# Patient Record
Sex: FEMALE | Race: WHITE | Employment: OTHER | URBAN - METROPOLITAN AREA
[De-identification: names, ages, dates, MRNs, and addresses within clinical notes are randomized per-mention and may not be internally consistent; named-entity substitution may affect disease eponyms.]

---

## 2017-01-11 ENCOUNTER — ANESTHESIA EVENT (OUTPATIENT)
Dept: SURGERY | Age: 82
DRG: 470 | End: 2017-01-11
Payer: MEDICARE

## 2017-01-12 ENCOUNTER — ANESTHESIA (OUTPATIENT)
Dept: SURGERY | Age: 82
DRG: 470 | End: 2017-01-12
Payer: MEDICARE

## 2017-01-12 ENCOUNTER — SURGERY (OUTPATIENT)
Age: 82
End: 2017-01-12

## 2017-01-12 PROBLEM — M19.90 OSTEOARTHRITIS: Status: ACTIVE | Noted: 2017-01-12

## 2017-01-12 PROCEDURE — 77030020782 HC GWN BAIR PAWS FLX 3M -B: Performed by: ANESTHESIOLOGY

## 2017-01-12 PROCEDURE — 74011000250 HC RX REV CODE- 250

## 2017-01-12 PROCEDURE — 74011250636 HC RX REV CODE- 250/636: Performed by: ORTHOPAEDIC SURGERY

## 2017-01-12 PROCEDURE — 77030003602 HC NDL NRV BLK BBMI -B: Performed by: ANESTHESIOLOGY

## 2017-01-12 PROCEDURE — 74011250636 HC RX REV CODE- 250/636: Performed by: ANESTHESIOLOGY

## 2017-01-12 PROCEDURE — 74011250636 HC RX REV CODE- 250/636

## 2017-01-12 PROCEDURE — 77030003665 HC NDL SPN BBMI -A: Performed by: ANESTHESIOLOGY

## 2017-01-12 PROCEDURE — 77030007880 HC KT SPN EPDRL BBMI -B: Performed by: ANESTHESIOLOGY

## 2017-01-12 RX ORDER — ROCURONIUM BROMIDE 10 MG/ML
INJECTION, SOLUTION INTRAVENOUS AS NEEDED
Status: DISCONTINUED | OUTPATIENT
Start: 2017-01-12 | End: 2017-01-12 | Stop reason: HOSPADM

## 2017-01-12 RX ORDER — ONDANSETRON 2 MG/ML
INJECTION INTRAMUSCULAR; INTRAVENOUS AS NEEDED
Status: DISCONTINUED | OUTPATIENT
Start: 2017-01-12 | End: 2017-01-12 | Stop reason: HOSPADM

## 2017-01-12 RX ORDER — LIDOCAINE HYDROCHLORIDE 20 MG/ML
INJECTION, SOLUTION EPIDURAL; INFILTRATION; INTRACAUDAL; PERINEURAL AS NEEDED
Status: DISCONTINUED | OUTPATIENT
Start: 2017-01-12 | End: 2017-01-12 | Stop reason: HOSPADM

## 2017-01-12 RX ORDER — FENTANYL CITRATE 50 UG/ML
INJECTION, SOLUTION INTRAMUSCULAR; INTRAVENOUS AS NEEDED
Status: DISCONTINUED | OUTPATIENT
Start: 2017-01-12 | End: 2017-01-12 | Stop reason: HOSPADM

## 2017-01-12 RX ORDER — ROPIVACAINE HYDROCHLORIDE 5 MG/ML
INJECTION, SOLUTION EPIDURAL; INFILTRATION; PERINEURAL AS NEEDED
Status: DISCONTINUED | OUTPATIENT
Start: 2017-01-12 | End: 2017-01-12 | Stop reason: HOSPADM

## 2017-01-12 RX ORDER — DEXAMETHASONE SODIUM PHOSPHATE 4 MG/ML
INJECTION, SOLUTION INTRA-ARTICULAR; INTRALESIONAL; INTRAMUSCULAR; INTRAVENOUS; SOFT TISSUE AS NEEDED
Status: DISCONTINUED | OUTPATIENT
Start: 2017-01-12 | End: 2017-01-12 | Stop reason: HOSPADM

## 2017-01-12 RX ORDER — PROPOFOL 10 MG/ML
INJECTION, EMULSION INTRAVENOUS AS NEEDED
Status: DISCONTINUED | OUTPATIENT
Start: 2017-01-12 | End: 2017-01-12 | Stop reason: HOSPADM

## 2017-01-12 RX ADMIN — SODIUM CHLORIDE, SODIUM LACTATE, POTASSIUM CHLORIDE, AND CALCIUM CHLORIDE: 600; 310; 30; 20 INJECTION, SOLUTION INTRAVENOUS at 10:46

## 2017-01-12 RX ADMIN — SODIUM CHLORIDE 50 ML: 9 INJECTION, SOLUTION INTRAMUSCULAR; INTRAVENOUS; SUBCUTANEOUS at 09:51

## 2017-01-12 RX ADMIN — CEFAZOLIN 2 G: 1 INJECTION, POWDER, FOR SOLUTION INTRAMUSCULAR; INTRAVENOUS; PARENTERAL at 09:19

## 2017-01-12 RX ADMIN — SODIUM CHLORIDE, SODIUM LACTATE, POTASSIUM CHLORIDE, AND CALCIUM CHLORIDE: 600; 310; 30; 20 INJECTION, SOLUTION INTRAVENOUS at 09:58

## 2017-01-12 RX ADMIN — ROPIVACAINE HYDROCHLORIDE 60 ML: 2 INJECTION, SOLUTION EPIDURAL; INFILTRATION at 09:51

## 2017-01-12 RX ADMIN — LIDOCAINE HYDROCHLORIDE 70 MG: 20 INJECTION, SOLUTION EPIDURAL; INFILTRATION; INTRACAUDAL; PERINEURAL at 09:26

## 2017-01-12 RX ADMIN — ONDANSETRON 4 MG: 2 INJECTION INTRAMUSCULAR; INTRAVENOUS at 09:45

## 2017-01-12 RX ADMIN — MORPHINE SULFATE 10 MG: 10 INJECTION INTRAMUSCULAR; INTRAVENOUS; SUBCUTANEOUS at 09:50

## 2017-01-12 RX ADMIN — KETOROLAC TROMETHAMINE 30 MG: 30 INJECTION, SOLUTION INTRAMUSCULAR; INTRAVENOUS at 09:50

## 2017-01-12 RX ADMIN — FENTANYL CITRATE 50 MCG: 50 INJECTION, SOLUTION INTRAMUSCULAR; INTRAVENOUS at 10:16

## 2017-01-12 RX ADMIN — PROPOFOL 120 MG: 10 INJECTION, EMULSION INTRAVENOUS at 09:26

## 2017-01-12 RX ADMIN — ROPIVACAINE HYDROCHLORIDE 25 ML: 5 INJECTION, SOLUTION EPIDURAL; INFILTRATION; PERINEURAL at 09:00

## 2017-01-12 RX ADMIN — SODIUM CHLORIDE 1 G: 900 INJECTION, SOLUTION INTRAVENOUS at 09:51

## 2017-01-12 RX ADMIN — SODIUM CHLORIDE, SODIUM LACTATE, POTASSIUM CHLORIDE, AND CALCIUM CHLORIDE: 600; 310; 30; 20 INJECTION, SOLUTION INTRAVENOUS at 09:20

## 2017-01-12 RX ADMIN — FENTANYL CITRATE 50 MCG: 50 INJECTION, SOLUTION INTRAMUSCULAR; INTRAVENOUS at 10:00

## 2017-01-12 RX ADMIN — DEXAMETHASONE SODIUM PHOSPHATE 8 MG: 4 INJECTION, SOLUTION INTRA-ARTICULAR; INTRALESIONAL; INTRAMUSCULAR; INTRAVENOUS; SOFT TISSUE at 09:45

## 2017-01-12 RX ADMIN — ROCURONIUM BROMIDE 30 MG: 10 INJECTION, SOLUTION INTRAVENOUS at 09:26

## 2017-01-12 RX ADMIN — FENTANYL CITRATE 100 MCG: 50 INJECTION, SOLUTION INTRAMUSCULAR; INTRAVENOUS at 09:25

## 2017-01-12 NOTE — ANESTHESIA POSTPROCEDURE EVALUATION
Post-Anesthesia Evaluation and Assessment    Patient: Yousuf Chaves MRN: [id number]  SSN: [id number]    YOB: 1929  Age: 80 y.o. Sex: female       Cardiovascular Function/Vital Signs  Visit Vitals    /70    Pulse 92    Temp 36.5 °C (97.7 °F)    Resp 16    Ht 5' 3\" (1.6 m)    Wt 65.8 kg (145 lb)    SpO2 92%    BMI 25.69 kg/m2       Patient is status post general anesthesia for Procedure(s):  LEFT TOTAL KNEE ARTHROPLASTY . Nausea/Vomiting: None    Postoperative hydration reviewed and adequate. Pain:  Pain Scale 1: Visual (01/12/17 1144)  Pain Intensity 1: 0 (01/12/17 1144)   Managed    Neurological Status:   Neuro (WDL): Exceptions to WDL (01/12/17 1144)  Neuro  Neurologic State: Drowsy (01/12/17 1144)  Orientation Level: Oriented to person;Oriented to place;Oriented to situation (01/12/17 1059)  Cognition: Follows commands (01/12/17 1144)  LUE Motor Response: Purposeful (01/12/17 1144)  LLE Motor Response: Purposeful (01/12/17 1144)  RUE Motor Response: Purposeful (01/12/17 1144)  RLE Motor Response: Purposeful (01/12/17 1144)   At baseline    Mental Status and Level of Consciousness: Arousable    Pulmonary Status:   O2 Device: Nasal cannula (01/12/17 1144)   Adequate oxygenation and airway patent    Complications related to anesthesia: None    Post-anesthesia assessment completed.  No concerns    Signed By: Nga Presley MD     January 12, 2017

## 2017-01-12 NOTE — ANESTHESIA PROCEDURE NOTES
Peripheral Block    Start time: 1/12/2017 8:56 AM  End time: 1/12/2017 9:00 AM  Performed by: Maynor Garcia  Authorized by: Maynor Garcia       Pre-procedure: Indications: post-op pain management    Preanesthetic Checklist: patient identified, risks and benefits discussed, site marked, timeout performed, anesthesia consent given and patient being monitored    Timeout Time: 08:56          Block Type:   Block Type:   Adductor canal  Monitoring:  Continuous pulse ox, frequent vital sign checks, heart rate, oxygen and responsive to questions  Injection Technique:  Single shot  Procedures: ultrasound guided and nerve stimulator    Patient Position: supine  Prep: DuraPrep    Location:  Mid thigh  Needle Type:  Stimuplex  Needle Gauge:  20 G  Needle Localization:  Nerve stimulator and ultrasound guidance  Motor Response: minimal motor response >0.4 mA    Medication Injected:  0.5%  ropivacaine  Adds:  Epi 1:400K  Volume (mL):  25    Assessment:  Number of attempts:  1  Injection Assessment:  Incremental injection every 5 mL, local visualized surrounding nerve on ultrasound, negative aspiration for blood, no paresthesia, no intravascular symptoms and ultrasound image on chart  Patient tolerance:  Patient tolerated the procedure well with no immediate complications

## 2017-01-12 NOTE — ANESTHESIA PREPROCEDURE EVALUATION
Anesthetic History               Review of Systems / Medical History  Patient summary reviewed    Pulmonary                   Neuro/Psych         Psychiatric history     Cardiovascular    Hypertension: well controlled        Dysrhythmias : atrial fibrillation  CAD and cardiac stents (5/2015)  Pertinent negatives: No past MI  Exercise tolerance: >4 METS  Comments: Cath 12/2015, nonobstructive disease, EF 60%   GI/Hepatic/Renal     GERD           Endo/Other      Hypothyroidism       Other Findings              Physical Exam    Airway  Mallampati: II  TM Distance: > 6 cm  Neck ROM: normal range of motion   Mouth opening: Normal     Cardiovascular  Regular rate and rhythm,  S1 and S2 normal,  no murmur, click, rub, or gallop             Dental  No notable dental hx       Pulmonary  Breath sounds clear to auscultation               Abdominal         Other Findings            Anesthetic Plan    ASA: 3        Post-op pain plan if not by surgeon: peripheral nerve block single      Anesthetic plan and risks discussed with: Patient      Patient continued Plavix through 1/8. Will proceed with GA.

## 2017-01-13 PROBLEM — Z96.659 S/P TOTAL KNEE ARTHROPLASTY: Status: ACTIVE | Noted: 2017-01-13

## 2017-01-15 ENCOUNTER — APPOINTMENT (OUTPATIENT)
Dept: CT IMAGING | Age: 82
DRG: 470 | End: 2017-01-15
Attending: INTERNAL MEDICINE
Payer: MEDICARE

## 2017-01-15 PROCEDURE — 71260 CT THORAX DX C+: CPT

## 2017-01-17 ENCOUNTER — PATIENT OUTREACH (OUTPATIENT)
Dept: CASE MANAGEMENT | Age: 82
End: 2017-01-17

## 2017-01-17 NOTE — PROGRESS NOTES
Patient discharge to [address] [place] [address] (SNF) 01/16/2017, follow up call in 21 days post acute discharge to home. Patient is ineligible for Transitions of Care Outreach Program /Call due to discharge disposition.

## 2017-01-27 ENCOUNTER — PATIENT OUTREACH (OUTPATIENT)
Dept: CASE MANAGEMENT | Age: 82
End: 2017-01-27

## 2017-04-04 PROBLEM — N36.2 URETHRAL CARUNCLE: Status: ACTIVE | Noted: 2017-04-04

## 2017-04-06 PROBLEM — N39.0 URINARY TRACT INFECTION WITHOUT HEMATURIA: Status: ACTIVE | Noted: 2017-04-06

## 2017-05-11 ENCOUNTER — HOSPITAL ENCOUNTER (OUTPATIENT)
Dept: MAMMOGRAPHY | Age: 82
Discharge: HOME | End: 2017-05-11
Attending: FAMILY MEDICINE
Payer: MEDICARE

## 2017-05-11 DIAGNOSIS — N64.4 PAIN OF BOTH BREASTS: ICD-10-CM

## 2017-05-11 PROCEDURE — 76642 ULTRASOUND BREAST LIMITED: CPT

## 2017-05-11 PROCEDURE — 77066 DX MAMMO INCL CAD BI: CPT

## 2017-05-11 NOTE — PROGRESS NOTES
Mammogram/ultrasound show no evidence of malignancy/cancer  Let me know if symptoms persist/worsen  Dr. Estefania Steinberg  Results released to [address] [place] [address] [address] with comments

## 2017-07-23 ENCOUNTER — APPOINTMENT (OUTPATIENT)
Dept: GENERAL RADIOLOGY | Age: 82
End: 2017-07-23
Attending: EMERGENCY MEDICINE
Payer: MEDICARE

## 2017-07-23 ENCOUNTER — HOSPITAL ENCOUNTER (EMERGENCY)
Age: 82
Discharge: HOME | End: 2017-07-23
Attending: EMERGENCY MEDICINE
Payer: MEDICARE

## 2017-07-23 ENCOUNTER — APPOINTMENT (OUTPATIENT)
Dept: CT IMAGING | Age: 82
End: 2017-07-23
Attending: EMERGENCY MEDICINE
Payer: MEDICARE

## 2017-07-23 VITALS
RESPIRATION RATE: 18 BRPM | BODY MASS INDEX: 26.58 KG/M2 | TEMPERATURE: 97.7 F | HEIGHT: 63 IN | OXYGEN SATURATION: 100 % | WEIGHT: 150 LBS | SYSTOLIC BLOOD PRESSURE: 157 MMHG | HEART RATE: 60 BPM | DIASTOLIC BLOOD PRESSURE: 90 MMHG

## 2017-07-23 DIAGNOSIS — R07.9 CHEST PAIN, UNSPECIFIED TYPE: Primary | ICD-10-CM

## 2017-07-23 DIAGNOSIS — K21.9 GASTROESOPHAGEAL REFLUX DISEASE WITHOUT ESOPHAGITIS: ICD-10-CM

## 2017-07-23 LAB
ALBUMIN SERPL BCP-MCNC: 3.2 G/DL (ref 3.2–4.6)
ALBUMIN/GLOB SERPL: 0.8 {RATIO} (ref 1.2–3.5)
ALP SERPL-CCNC: 91 U/L (ref 50–136)
ALT SERPL-CCNC: 20 U/L (ref 12–65)
ANION GAP BLD CALC-SCNC: 10 MMOL/L (ref 7–16)
AST SERPL W P-5'-P-CCNC: 26 U/L (ref 15–37)
BASOPHILS # BLD AUTO: 0 K/UL (ref 0–0.2)
BASOPHILS # BLD: 1 % (ref 0–2)
BILIRUB SERPL-MCNC: 1 MG/DL (ref 0.2–1.1)
BUN SERPL-MCNC: 19 MG/DL (ref 8–23)
CALCIUM SERPL-MCNC: 8.4 MG/DL (ref 8.3–10.4)
CHLORIDE SERPL-SCNC: 107 MMOL/L (ref 98–107)
CO2 SERPL-SCNC: 23 MMOL/L (ref 21–32)
CREAT SERPL-MCNC: 0.87 MG/DL (ref 0.6–1)
DIFFERENTIAL METHOD BLD: ABNORMAL
EOSINOPHIL # BLD: 0.1 K/UL (ref 0–0.8)
EOSINOPHIL NFR BLD: 1 % (ref 0.5–7.8)
ERYTHROCYTE [DISTWIDTH] IN BLOOD BY AUTOMATED COUNT: 16.3 % (ref 11.9–14.6)
GLOBULIN SER CALC-MCNC: 4 G/DL (ref 2.3–3.5)
GLUCOSE SERPL-MCNC: 88 MG/DL (ref 65–100)
HCT VFR BLD AUTO: 31.6 % (ref 35.8–46.3)
HGB BLD-MCNC: 10.4 G/DL (ref 11.7–15.4)
IMM GRANULOCYTES # BLD: 0 K/UL (ref 0–0.5)
IMM GRANULOCYTES NFR BLD AUTO: 0.4 % (ref 0–5)
LYMPHOCYTES # BLD AUTO: 44 % (ref 13–44)
LYMPHOCYTES # BLD: 2.3 K/UL (ref 0.5–4.6)
MCH RBC QN AUTO: 25.8 PG (ref 26.1–32.9)
MCHC RBC AUTO-ENTMCNC: 32.9 G/DL (ref 31.4–35)
MCV RBC AUTO: 78.4 FL (ref 79.6–97.8)
MONOCYTES # BLD: 0.8 K/UL (ref 0.1–1.3)
MONOCYTES NFR BLD AUTO: 14 % (ref 4–12)
NEUTS SEG # BLD: 2.1 K/UL (ref 1.7–8.2)
NEUTS SEG NFR BLD AUTO: 40 % (ref 43–78)
PLATELET # BLD AUTO: 215 K/UL (ref 150–450)
PMV BLD AUTO: 10.6 FL (ref 10.8–14.1)
POTASSIUM SERPL-SCNC: 3.9 MMOL/L (ref 3.5–5.1)
PROT SERPL-MCNC: 7.2 G/DL (ref 6.3–8.2)
RBC # BLD AUTO: 4.03 M/UL (ref 4.05–5.25)
SODIUM SERPL-SCNC: 140 MMOL/L (ref 136–145)
TROPONIN I BLD-MCNC: 0 NG/ML (ref 0–0.08)
TROPONIN I SERPL-MCNC: <0.02 NG/ML (ref 0.02–0.05)
WBC # BLD AUTO: 5.4 K/UL (ref 4.3–11.1)

## 2017-07-23 PROCEDURE — 71260 CT THORAX DX C+: CPT

## 2017-07-23 PROCEDURE — 74011250637 HC RX REV CODE- 250/637: Performed by: EMERGENCY MEDICINE

## 2017-07-23 PROCEDURE — 84484 ASSAY OF TROPONIN QUANT: CPT | Performed by: EMERGENCY MEDICINE

## 2017-07-23 PROCEDURE — 99285 EMERGENCY DEPT VISIT HI MDM: CPT | Performed by: EMERGENCY MEDICINE

## 2017-07-23 PROCEDURE — 96361 HYDRATE IV INFUSION ADD-ON: CPT | Performed by: EMERGENCY MEDICINE

## 2017-07-23 PROCEDURE — 74011250636 HC RX REV CODE- 250/636: Performed by: EMERGENCY MEDICINE

## 2017-07-23 PROCEDURE — 74011000258 HC RX REV CODE- 258: Performed by: EMERGENCY MEDICINE

## 2017-07-23 PROCEDURE — 93005 ELECTROCARDIOGRAM TRACING: CPT | Performed by: EMERGENCY MEDICINE

## 2017-07-23 PROCEDURE — 74011636320 HC RX REV CODE- 636/320: Performed by: EMERGENCY MEDICINE

## 2017-07-23 PROCEDURE — 85025 COMPLETE CBC W/AUTO DIFF WBC: CPT | Performed by: EMERGENCY MEDICINE

## 2017-07-23 PROCEDURE — 96374 THER/PROPH/DIAG INJ IV PUSH: CPT | Performed by: EMERGENCY MEDICINE

## 2017-07-23 PROCEDURE — 80053 COMPREHEN METABOLIC PANEL: CPT | Performed by: EMERGENCY MEDICINE

## 2017-07-23 PROCEDURE — 71020 XR CHEST PA LAT: CPT

## 2017-07-23 RX ORDER — SODIUM CHLORIDE 0.9 % (FLUSH) 0.9 %
10 SYRINGE (ML) INJECTION
Status: COMPLETED | OUTPATIENT
Start: 2017-07-23 | End: 2017-07-23

## 2017-07-23 RX ORDER — MORPHINE SULFATE 4 MG/ML
4 INJECTION, SOLUTION INTRAMUSCULAR; INTRAVENOUS
Status: COMPLETED | OUTPATIENT
Start: 2017-07-23 | End: 2017-07-23

## 2017-07-23 RX ORDER — NITROGLYCERIN 0.4 MG/1
0.4 TABLET SUBLINGUAL
Status: COMPLETED | OUTPATIENT
Start: 2017-07-23 | End: 2017-07-23

## 2017-07-23 RX ADMIN — NITROGLYCERIN 0.4 MG: 0.4 TABLET SUBLINGUAL at 16:04

## 2017-07-23 RX ADMIN — Medication 10 ML: at 18:27

## 2017-07-23 RX ADMIN — SODIUM CHLORIDE 100 ML: 900 INJECTION, SOLUTION INTRAVENOUS at 18:27

## 2017-07-23 RX ADMIN — IOPAMIDOL 100 ML: 755 INJECTION, SOLUTION INTRAVENOUS at 18:27

## 2017-07-23 RX ADMIN — NITROGLYCERIN 0.4 MG: 0.4 TABLET SUBLINGUAL at 16:11

## 2017-07-23 RX ADMIN — NITROGLYCERIN 0.4 MG: 0.4 TABLET SUBLINGUAL at 16:22

## 2017-07-23 RX ADMIN — SODIUM CHLORIDE 500 ML: 900 INJECTION, SOLUTION INTRAVENOUS at 17:29

## 2017-07-23 RX ADMIN — MORPHINE SULFATE 4 MG: 4 INJECTION, SOLUTION INTRAMUSCULAR; INTRAVENOUS at 17:29

## 2017-07-23 NOTE — ED TRIAGE NOTES
Pt arrive c/o chest pain that radiates into upper back and left shoulder that started Friday night. Denies n/v/d. States sob.  Dr. Issac Marquez is pts cardiologist.

## 2017-07-23 NOTE — ED PROVIDER NOTES
HPI Comments: Patient states  That she woke up Saturday morning with lower chest and back pain relating to her left shoulder. She describes it as constant achy pressure with episodes of sharp pain worse when she moves a certain way. She has had some shortness of breath and states she has chronic shortness of breath. She has had stents before, the last time being in May 2015. She states she had similar symptoms at that time. She sees Dr. Leyla Lee of [facility] cardiology. She has not taken any medicine for her symptoms. She does take aspirin and Plavix. Elements of this note were created using speech recognition software. As such, errors of speech recognition may be present. Patient is a 80 y.o. female presenting with chest pain. The history is provided by the patient. Chest Pain (Angina)    Pertinent negatives include no fever, no nausea and no vomiting. 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 Normal LV systolic function.     Hypercholesteremia 1/14/2013    Hypertension     Hypothyroidism 1/14/2013    Osteoarthritis of back 1/2/2014    Osteoporosis     Pulmonary embolism ([address] 2009    after knee surgery    Sick sinus syndrome ([address] 1/7/2016    Tachycardia-Bradycardia    UTI (urinary tract infection)        Past Surgical History:   Procedure Laterality Date    HX APPENDECTOMY  1965    HX BREAST BIOPSY Bilateral     HX CATARACT REMOVAL Right 2010    HX CORONARY STENT PLACEMENT  05/2014    LAD X 3    HX HEART CATHETERIZATION  09/17/2015    HX HEENT      multiple eye surgeries - left - macular hoe, retinal detachment twice,     HX HYSTERECTOMY  1970    HX KNEE REPLACEMENT  2009    HX KNEE REPLACEMENT Left 01/12/2017    HX ORTHOPAEDIC  2006 and 2008    herniated disc    HX TONSIL AND ADENOIDECTOMY  1934         Family History:   Problem Relation Age of Onset    Cancer Mother     Breast Cancer Mother     Cancer Father     Cancer Sister     Cancer Brother      pancreas    Breast Cancer Maternal Aunt        Social History     Social History    Marital status:      Spouse name: N/A    Number of children: N/A    Years of education: N/A     Occupational History    Not on file. Social History Main Topics    Smoking status: Never Smoker    Smokeless tobacco: Never Used    Alcohol use No    Drug use: No    Sexual activity: Not on file     Other Topics Concern    Not on file     Social History Narrative    No family/social/cultural issues pertinent to care         ALLERGIES: Adhesive    Review of Systems   Constitutional: Negative for chills and fever. Cardiovascular: Positive for chest pain. Gastrointestinal: Negative for nausea and vomiting. All other systems reviewed and are negative. Vitals:    07/23/17 1333   BP: 168/60   Pulse: 60   Resp: 18   Temp: 97.7 °F (36.5 °C)   SpO2: 99%   Weight: 68 kg (150 lb)   Height: 5' 3\" (1.6 m)            Physical Exam   Constitutional: She is oriented to person, place, and time. She appears well-developed and well-nourished. HENT:   Head: Normocephalic and atraumatic. Eyes: Conjunctivae are normal. Pupils are equal, round, and reactive to light. Neck: Normal range of motion. Neck supple. Cardiovascular: Normal rate and regular rhythm. Pulmonary/Chest: Effort normal and breath sounds normal.   Abdominal: Soft. Bowel sounds are normal. There is tenderness.        Tenderness to palpation epigastrium as indicated   Musculoskeletal: She exhibits no edema or tenderness. Neurological: She is alert and oriented to person, place, and time. Skin: Skin is warm and dry. Psychiatric: She has a normal mood and affect. Her behavior is normal.   Nursing note and vitals reviewed. MDM  Number of Diagnoses or Management Options  Diagnosis management comments: Differential diagnosis: Unstable angina, atypical chest pain, biliary colic, gastritis  7:74 PM patient had mild temporary improvement after nitroglycerin, however she is hurting worse now. It is positional and worse when she moves a certain way. I am more concerned about a possible PE or aortic pathology given her back involvement and her age, we will get a CT scan  7:14 PM CT scan shows no PE or aortic pathology. I spoke with Dr. Cristy Holden, discussed details of case. We reviewed her catheter report from 2015, no intervention was done at that time and she had mild scattered disease.   The plan is to repeat her troponin, home if normal with close follow-up with cardiology       Amount and/or Complexity of Data Reviewed  Clinical lab tests: ordered and reviewed  Tests in the radiology section of CPT®: ordered and reviewed  Tests in the medicine section of CPT®: ordered and reviewed  Decide to obtain previous medical records or to obtain history from someone other than the patient: yes  Review and summarize past medical records: yes  Discuss the patient with other providers: yes  Independent visualization of images, tracings, or specimens: yes    Risk of Complications, Morbidity, and/or Mortality  Presenting problems: high  Diagnostic procedures: moderate  Management options: high      ED Course       Procedures

## 2017-07-23 NOTE — DISCHARGE INSTRUCTIONS
Chest Pain: Care Instructions  Your Care Instructions  There are many things that can cause chest pain. Some are not serious and will get better on their own in a few days. But some kinds of chest pain need more testing and treatment. Your doctor may have recommended a follow-up visit in the next 8 to 12 hours. If you are not getting better, you may need more tests or treatment. Even though your doctor has released you, you still need to watch for any problems. The doctor carefully checked you, but sometimes problems can develop later. If you have new symptoms or if your symptoms do not get better, get medical care right away. If you have worse or different chest pain or pressure that lasts more than 5 minutes or you passed out (lost consciousness), call 911 or seek other emergency help right away. A medical visit is only one step in your treatment. Even if you feel better, you still need to do what your doctor recommends, such as going to all suggested follow-up appointments and taking medicines exactly as directed. This will help you recover and help prevent future problems. How can you care for yourself at home? · Rest until you feel better. · Take your medicine exactly as prescribed. Call your doctor if you think you are having a problem with your medicine. · Do not drive after taking a prescription pain medicine. When should you call for help? Call 911 if:  · You passed out (lost consciousness). · You have severe difficulty breathing. · You have symptoms of a heart attack. These may include:  ¨ Chest pain or pressure, or a strange feeling in your chest.  ¨ Sweating. ¨ Shortness of breath. ¨ Nausea or vomiting. ¨ Pain, pressure, or a strange feeling in your back, neck, jaw, or upper belly or in one or both shoulders or arms. ¨ Lightheadedness or sudden weakness. ¨ A fast or irregular heartbeat.   After you call 911, the  may tell you to chew 1 adult-strength or 2 to 4 low-dose aspirin. Wait for an ambulance. Do not try to drive yourself. Call your doctor today if:  · You have any trouble breathing. · Your chest pain gets worse. · You are dizzy or lightheaded, or you feel like you may faint. · You are not getting better as expected. · You are having new or different chest pain. Where can you learn more? Go to http://gómez-maxine.info/. Enter A120 in the search box to learn more about \"Chest Pain: Care Instructions. \"  Current as of: March 20, 2017  Content Version: 11.3  © 5602-9865 [employer]. Care instructions adapted under license by [employer] (which disclaims liability or warranty for this information). If you have questions about a medical condition or this instruction, always ask your healthcare professional. [address] any warranty or liability for your use of this information.

## 2017-07-24 LAB
ATRIAL RATE: 55 BPM
CALCULATED P AXIS, ECG09: 64 DEGREES
CALCULATED R AXIS, ECG10: 58 DEGREES
CALCULATED T AXIS, ECG11: 49 DEGREES
DIAGNOSIS, 93000: NORMAL
P-R INTERVAL, ECG05: 202 MS
Q-T INTERVAL, ECG07: 480 MS
QRS DURATION, ECG06: 78 MS
QTC CALCULATION (BEZET), ECG08: 459 MS
VENTRICULAR RATE, ECG03: 55 BPM

## 2017-08-04 ENCOUNTER — HOSPITAL ENCOUNTER (OUTPATIENT)
Dept: LAB | Age: 82
Discharge: HOME | End: 2017-08-04
Attending: INTERNAL MEDICINE
Payer: MEDICARE

## 2017-08-04 DIAGNOSIS — I25.119 ATHEROSCLEROSIS OF NATIVE CORONARY ARTERY OF NATIVE HEART WITH ANGINA PECTORIS (HCC): ICD-10-CM

## 2017-08-04 DIAGNOSIS — R94.30 ABNORMAL CARDIOVASCULAR FUNCTION STUDY: ICD-10-CM

## 2017-08-04 LAB
ANION GAP BLD CALC-SCNC: 8 MMOL/L
BASOPHILS # BLD AUTO: 0 K/UL (ref 0–0.2)
BASOPHILS # BLD: 0 % (ref 0–2)
BUN SERPL-MCNC: 21 MG/DL (ref 8–23)
CALCIUM SERPL-MCNC: 8.8 MG/DL (ref 8.3–10.4)
CHLORIDE SERPL-SCNC: 109 MMOL/L (ref 98–107)
CO2 SERPL-SCNC: 24 MMOL/L (ref 21–32)
CREAT SERPL-MCNC: 0.8 MG/DL (ref 0.6–1)
DIFFERENTIAL METHOD BLD: ABNORMAL
EOSINOPHIL # BLD: 0.2 K/UL (ref 0–0.8)
EOSINOPHIL NFR BLD: 3 % (ref 0.5–7.8)
ERYTHROCYTE [DISTWIDTH] IN BLOOD BY AUTOMATED COUNT: 16.6 % (ref 11.9–14.6)
GLUCOSE SERPL-MCNC: 99 MG/DL (ref 65–100)
HCT VFR BLD AUTO: 33.4 % (ref 35.8–46.3)
HGB BLD-MCNC: 10.6 G/DL (ref 11.7–15.4)
LYMPHOCYTES # BLD AUTO: 43 % (ref 13–44)
LYMPHOCYTES # BLD: 2.5 K/UL (ref 0.5–4.6)
MCH RBC QN AUTO: 26 PG (ref 26.1–32.9)
MCHC RBC AUTO-ENTMCNC: 31.7 G/DL (ref 31.4–35)
MCV RBC AUTO: 82.1 FL (ref 79.6–97.8)
MONOCYTES # BLD: 0.7 K/UL (ref 0.1–1.3)
MONOCYTES NFR BLD AUTO: 11 % (ref 4–12)
NEUTS SEG # BLD: 2.5 K/UL (ref 1.7–8.2)
NEUTS SEG NFR BLD AUTO: 43 % (ref 43–78)
PLATELET # BLD AUTO: 227 K/UL (ref 150–450)
PMV BLD AUTO: 10.8 FL (ref 10.8–14.1)
POTASSIUM SERPL-SCNC: 3.9 MMOL/L (ref 3.5–5.1)
RBC # BLD AUTO: 4.07 M/UL (ref 4.05–5.25)
SODIUM SERPL-SCNC: 141 MMOL/L (ref 136–145)
WBC # BLD AUTO: 5.8 K/UL (ref 4.3–11.1)

## 2017-08-04 PROCEDURE — 85025 COMPLETE CBC W/AUTO DIFF WBC: CPT | Performed by: INTERNAL MEDICINE

## 2017-08-04 PROCEDURE — 80048 BASIC METABOLIC PNL TOTAL CA: CPT | Performed by: INTERNAL MEDICINE

## 2017-08-04 PROCEDURE — 36415 COLL VENOUS BLD VENIPUNCTURE: CPT | Performed by: INTERNAL MEDICINE

## 2017-08-09 NOTE — PROGRESS NOTES
Patient pre-assessment complete for [facility] poss with DR Kannan Rodríguez scheduled for 8/10/17 at 9:30am, arrival time 7:30am. Patient verified using . Patient instructed to bring all home medications in labeled bottles on the day of procedure. NPO status reinforced. Patient informed to take a full dose aspirin 325mg  or 81 mg x 4 on the day of procedure. Instructed they can take all other medications excluding vitamins & supplements. Patient verbalizes understanding of all instructions & denies any questions at this time.

## 2017-08-10 ENCOUNTER — HOSPITAL ENCOUNTER (OUTPATIENT)
Dept: CARDIAC CATH/INVASIVE PROCEDURES | Age: 82
Discharge: HOME | End: 2017-08-10
Attending: INTERNAL MEDICINE | Admitting: INTERNAL MEDICINE
Payer: MEDICARE

## 2017-08-10 VITALS
RESPIRATION RATE: 16 BRPM | TEMPERATURE: 97.7 F | DIASTOLIC BLOOD PRESSURE: 82 MMHG | OXYGEN SATURATION: 98 % | SYSTOLIC BLOOD PRESSURE: 165 MMHG | BODY MASS INDEX: 26.58 KG/M2 | HEIGHT: 63 IN | HEART RATE: 56 BPM | WEIGHT: 150 LBS

## 2017-08-10 LAB
ATRIAL RATE: 52 BPM
CALCULATED P AXIS, ECG09: 56 DEGREES
CALCULATED R AXIS, ECG10: -11 DEGREES
CALCULATED T AXIS, ECG11: 1 DEGREES
DIAGNOSIS, 93000: NORMAL
P-R INTERVAL, ECG05: 218 MS
Q-T INTERVAL, ECG07: 506 MS
QRS DURATION, ECG06: 86 MS
QTC CALCULATION (BEZET), ECG08: 470 MS
VENTRICULAR RATE, ECG03: 52 BPM

## 2017-08-10 PROCEDURE — C1894 INTRO/SHEATH, NON-LASER: HCPCS

## 2017-08-10 PROCEDURE — 74011250636 HC RX REV CODE- 250/636: Performed by: INTERNAL MEDICINE

## 2017-08-10 PROCEDURE — C1769 GUIDE WIRE: HCPCS

## 2017-08-10 PROCEDURE — 77030003394 HC NDL ART COOK -A

## 2017-08-10 PROCEDURE — 74011250636 HC RX REV CODE- 250/636

## 2017-08-10 PROCEDURE — 93005 ELECTROCARDIOGRAM TRACING: CPT | Performed by: INTERNAL MEDICINE

## 2017-08-10 PROCEDURE — 99152 MOD SED SAME PHYS/QHP 5/>YRS: CPT

## 2017-08-10 PROCEDURE — 74011636320 HC RX REV CODE- 636/320: Performed by: INTERNAL MEDICINE

## 2017-08-10 PROCEDURE — 74011000250 HC RX REV CODE- 250: Performed by: INTERNAL MEDICINE

## 2017-08-10 PROCEDURE — 77030004534 HC CATH ANGI DX INFN CARD -A

## 2017-08-10 PROCEDURE — 93458 L HRT ARTERY/VENTRICLE ANGIO: CPT

## 2017-08-10 PROCEDURE — 77030029997 HC DEV COM RDL R BND TELE -B

## 2017-08-10 PROCEDURE — 99153 MOD SED SAME PHYS/QHP EA: CPT

## 2017-08-10 RX ORDER — MIDAZOLAM HYDROCHLORIDE 1 MG/ML
.5-2 INJECTION, SOLUTION INTRAMUSCULAR; INTRAVENOUS
Status: DISCONTINUED | OUTPATIENT
Start: 2017-08-10 | End: 2017-08-10 | Stop reason: HOSPADM

## 2017-08-10 RX ORDER — HEPARIN SODIUM 200 [USP'U]/100ML
3 INJECTION, SOLUTION INTRAVENOUS CONTINUOUS
Status: DISCONTINUED | OUTPATIENT
Start: 2017-08-10 | End: 2017-08-10 | Stop reason: HOSPADM

## 2017-08-10 RX ORDER — SODIUM CHLORIDE 9 MG/ML
75 INJECTION, SOLUTION INTRAVENOUS CONTINUOUS
Status: DISCONTINUED | OUTPATIENT
Start: 2017-08-10 | End: 2017-08-10 | Stop reason: HOSPADM

## 2017-08-10 RX ORDER — GUAIFENESIN 100 MG/5ML
81-324 LIQUID (ML) ORAL ONCE
Status: DISCONTINUED | OUTPATIENT
Start: 2017-08-10 | End: 2017-08-10 | Stop reason: HOSPADM

## 2017-08-10 RX ORDER — FENTANYL CITRATE 50 UG/ML
25-50 INJECTION, SOLUTION INTRAMUSCULAR; INTRAVENOUS
Status: DISCONTINUED | OUTPATIENT
Start: 2017-08-10 | End: 2017-08-10 | Stop reason: HOSPADM

## 2017-08-10 RX ORDER — DIAZEPAM 5 MG/1
5 TABLET ORAL ONCE
Status: DISCONTINUED | OUTPATIENT
Start: 2017-08-10 | End: 2017-08-10 | Stop reason: HOSPADM

## 2017-08-10 RX ORDER — LIDOCAINE HYDROCHLORIDE 20 MG/ML
1-20 INJECTION, SOLUTION INFILTRATION; PERINEURAL
Status: DISCONTINUED | OUTPATIENT
Start: 2017-08-10 | End: 2017-08-10 | Stop reason: HOSPADM

## 2017-08-10 RX ADMIN — MIDAZOLAM HYDROCHLORIDE 2 MG: 1 INJECTION, SOLUTION INTRAMUSCULAR; INTRAVENOUS at 09:53

## 2017-08-10 RX ADMIN — LIDOCAINE HYDROCHLORIDE 60 MG: 20 INJECTION, SOLUTION INFILTRATION; PERINEURAL at 09:50

## 2017-08-10 RX ADMIN — MIDAZOLAM HYDROCHLORIDE 2 MG: 1 INJECTION, SOLUTION INTRAMUSCULAR; INTRAVENOUS at 09:46

## 2017-08-10 RX ADMIN — HEPARIN SODIUM 3 ML/HR: 200 INJECTION, SOLUTION INTRAVENOUS at 09:23

## 2017-08-10 RX ADMIN — IOPAMIDOL 70 ML: 755 INJECTION, SOLUTION INTRAVENOUS at 10:10

## 2017-08-10 RX ADMIN — HEPARIN SODIUM 2 ML: 10000 INJECTION, SOLUTION INTRAVENOUS; SUBCUTANEOUS at 09:56

## 2017-08-10 NOTE — IP AVS SNAPSHOT
[facility] 
 
 
 [address] 
[phone] Patient: Theresa Gooden MRN: [id number] YSY:[id number] Discharge Summary 8/10/2017 Theresa Gooden MRN[de-identified]  [id number] Admission Information Provider Pager Service Admission Date Expected D/C Date Marcos Sheldon, [address] CATH LAB 8/10/2017 Actual LOS Patient Class 0 days OUTPATIENT Follow-up Information None Current Discharge Medication List  
  
ASK your doctor about these medications Dose & Instructions Dispensing Information Comments Morning Noon Evening Bedtime  
 amLODIPine 2.5 mg tablet Commonly known as:  [place] Your last dose was: Your next dose is:    
   
   
 Dose:  2.5 mg Take 1 Tab by mouth daily. Quantity:  90 Tab Refills:  3  
     
   
   
   
  
 aspirin delayed-release 81 mg tablet Your last dose was: Your next dose is:    
   
   
 Dose:  81 mg Take 81 mg by mouth every evening. Refills:  0  
     
   
   
   
  
 atorvastatin 40 mg tablet Commonly known as:  LIPITOR Your last dose was: Your next dose is: TAKE 1 TABLET EVERY DAY Quantity:  90 Tab Refills:  3  
     
   
   
   
  
 biotin 2,500 mcg Tab Your last dose was: Your next dose is: Take  by mouth daily. Refills:  0  
     
   
   
   
  
 CALTRATE 600+D PLUS MINERALS 600 mg calcium- 400 unit Tab Generic drug:  Calcium Carbonate-Vit D3-Min Your last dose was: Your next dose is: Take  by mouth daily. Refills:  0  
     
   
   
   
  
 clopidogrel 75 mg Tab Commonly known as:  PLAVIX Your last dose was: Your next dose is:    
   
   
 Dose:  75 mg Take 1 Tab by mouth every evening. Quantity:  90 Tab Refills:  3  
     
   
   
   
  
 escitalopram oxalate 10 mg tablet Commonly known as:  Jose Rash Your last dose was: Your next dose is:    
   
   
 Dose:  10 mg Take 1 Tab by mouth daily. Quantity:  90 Tab Refills:  3  
     
   
   
   
  
 estradiol 0.01 % (0.1 mg/gram) vaginal cream  
Commonly known as:  ESTRACE Your last dose was: Your next dose is:    
   
   
 Apply 1 gm to vagina 3x/week at hs Quantity:  42.5 g Refills:  5 HEALTHY COLON PO Your last dose was: Your next dose is: Take  by mouth daily. Refills:  0 ICAPS AREDS PO Your last dose was: Your next dose is: Take  by mouth. Twice a day Refills:  0  
     
   
   
   
  
 levothyroxine 50 mcg tablet Commonly known as:  synthroid Your last dose was: Your next dose is:    
   
   
 Dose:  50 mcg Take 1 Tab by mouth Daily (before breakfast). Quantity:  90 Tab Refills:  3  
     
   
   
   
  
 metoprolol succinate 25 mg XL tablet Commonly known as:  TOPROL-XL Your last dose was: Your next dose is:    
   
   
 Dose:  12.5 mg Take 0.5 Tabs by mouth daily. Quantity:  90 Tab Refills:  3 NEURONTIN PO Your last dose was: Your next dose is:    
   
   
 Dose:  300 mg Take 300 mg by mouth as needed. Refills:  0  
     
   
   
   
  
 nitrofurantoin 50 mg capsule Commonly known as:  MACRODANTIN Your last dose was: Your next dose is:    
   
   
 Dose:  50 mg Take 1 Cap by mouth daily. Quantity:  30 Cap Refills:  12  
     
   
   
   
  
 nitroglycerin 0.3 mg SL tablet Commonly known as:  NITROSTAT Your last dose was: Your next dose is:    
   
   
 Dose:  0.4 mg  
1 Tab by SubLINGual route every five (5) minutes as needed for Chest Pain. Quantity:  1 Bottle Refills:  3  
     
   
   
   
  
 pantoprazole 40 mg granules for oral suspension Commonly known as:  PROTONIX Your last dose was: Your next dose is:    
   
   
 Dose:  40 mg  
40 mg daily. Refills:  0  
     
   
   
   
  
 potassium 99 mg tablet Your last dose was: Your next dose is:    
   
   
 Dose:  99 mg Take 99 mg by mouth every evening. Refills:  0  
     
   
   
   
  
 TYLENOL ARTHRITIS PAIN 650 mg CR tablet Generic drug:  acetaminophen Your last dose was: Your next dose is:    
   
   
 Dose:  650 mg Take 650 mg by mouth every six (6) hours as needed for Pain. Refills:  0  
     
   
   
   
  
 VITAMIN B-12 100 mcg tablet Generic drug:  cyanocobalamin Your last dose was: Your next dose is:    
   
   
 Dose:  1000 mcg Take 1,000 mcg by mouth daily. Refills:  0  
     
   
   
   
  
 VITAMIN D3 1,000 unit Cap Generic drug:  cholecalciferol Your last dose was: Your next dose is: Take  by mouth daily. Refills:  0  
     
   
   
   
  
 vitamin E 400 unit capsule Commonly known as:  [address] Your last dose was: Your next dose is: Take  by mouth daily. Refills:  0 VOLTAREN 1 % Gel Generic drug:  diclofenac Your last dose was: Your next dose is:    
   
   
 Apply  to affected area as needed. Refills:  0  
     
   
   
   
  
 zolpidem 10 mg tablet Commonly known as:  AMBIEN Your last dose was: Your next dose is:    
   
   
 Dose:  10 mg Take 10 mg by mouth nightly as needed for Sleep. Refills:  0 General Information Please provide this summary of care documentation to your next provider. Allergies Unspecified:  Adhesive Current Immunizations  Reviewed on 3/21/2017 Name Date Influenza High Dose Vaccine PF 11/9/2016, 11/5/2015 Influenza Vaccine 10/27/2014, 10/17/2013 Pneumococcal Conjugate (PCV-13) 1/15/2016 TB Skin Test (PPD) Intradermal 1/12/2017 Discharge Instructions Discharge Instructions HEART CATHETERIZATION/ANGIOGRAPHY DISCHARGE INSTRUCTIONS Follow-up appointment: August 24th @ 1:15pm with Dr. Mahesh Ramos 1. Check puncture site frequently for swelling or bleeding. If there is any bleeding, lie down and apply pressure over the area with a clean towel or washcloth. Notify your doctor for any redness, swelling, drainage, or oozing from the puncture site. Notify your doctor for any fever or chills. 2. If the extremity becomes cold, numb, or painful call [facility] at [phone]. 
3. Activity should be limited for the next 48 hours. Climb stairs as little as possible and avoid any stooping, bending, or strenuous activity for 48 hours. No heavy lifting (anything over 10 pounds) for 3 days. 4. You may resume your usual diet. Drink more fluids than usual. 
5. Have a responsible person drive you home and stay with you for at least 24 hours after your heart catheterization/angiography. 6. You may remove bandage from your Right wrist in 24 hours. You may shower in 24 hours. No tub baths, hot tubs, or swimming for 1 week. Do not place any lotions, creams, powders, or ointments over puncture site for 1 week. You may place a clean band-aid over the puncture site each day for 5 days. Change daily. I have read the above instructions and have had the opportunity to ask questions. Patient: ________________________   Date: 8/10/2017 Witness: _______________________   Date: 8/10/2017 Discharge Orders None  
  
` Patient Signature:  ____________________________________________________________ Date:  ____________________________________________________________  
  
 Ellen No Provider Signature:  ____________________________________________________________ Date:  ____________________________________________________________

## 2017-08-10 NOTE — PROCEDURES
Brief Cardiac Procedure Note    Patient: Verner Lies MRN: [id number]  SSN: [id number]    YOB: 1929  Age: 80 y.o. Sex: female      Date of Procedure: 8/10/2017     Pre-procedure Diagnosis: Chest pain CCS Class III    Post-procedure Diagnosis: Coronary Artery Disease    Procedure: Left Heart Catheterization    Brief Description of Procedure: via rra    Performed By: Josy Reddy MD     Assistants:     Anesthesia: Moderate Sedation    Estimated Blood Loss: Less than 10 mL      Specimens: None    Implants: None    Findings:   Ef nml  edp 10  Lm ok  Lad stents ok  lcx ok  rca ok    Complications: None    Recommendations: Continue medical therapy.     Signed By: Josy Reddy MD     August 10, 2017

## 2017-08-10 NOTE — PROCEDURES
[address]       Name:  Irvin Stevenson   MR#:  [id number]   :  1929   Account #:  [de-identified]   Date of Adm:  08/10/2017       DATE OF PROCEDURE: 08/10/2017. PROCEDURES PERFORMED: Cardiac catheterization. HISTORY: This is an 68-year-old lady undergoing cardiac   catheterization for evaluation of chest pain. She has a history   of coronary artery disease and prior extensive LAD stenting. A   recent nuclear stress test suggested anterior wall ischemia. A   cardiac catheterization is recommended. PROCEDURE: Left heart catheterization, left ventriculography,   coronary angiography is carried out from the right radial artery   by modified Seldinger technique with a 5-I[place]ic multipurpose   catheter. She tolerated the procedure well. FINDINGS: The central aortic pressure is 120/70 mmHg. Left   ventricular end-diastolic pressure is 8 mmHg. There is no   gradient on pullback across the aortic valve. The overall left ventricular size is normal. The wall motion is   normal. Ejection fraction 63%. Coronary angiography reveals a normal left main. It divides into   an LAD and left circumflex. The LAD has been stented in its proximal segment and then in its   distal segment. The stents are widely patent with no restenosis. The major diagonal branch has evidence of some ostial disease. It is unchanged from prior angiography. There is brisk flow   through this area. The left circumflex is tortuous with minor irregularity. The right coronary artery is a large tortuous normal-appearing   vessel. IMPRESSION:   1. Normal left ventricular function. 2. Coronary artery disease, as described. The proximal LAD and   the distal LAD segments have been stented with no restenosis. The major diagonal branch of the LAD is a moderate-sized vessel. It is jailed by the proximal LAD stent.  There is some ostial   narrowing at this diagonal branch, but it does not appear to be   severe and is not flow limiting and it is unchanged from prior   angiography. The left circumflex and right coronary arteries   show no obstruction. RECOMMENDATIONS: Medical therapy.         MD Cassi Frazier / Nat Garsia   D:  08/10/2017   10:13   T:  08/10/2017   11:02   Job #:  [id number]

## 2017-08-10 NOTE — PROGRESS NOTES
Report received from [facility] Cath Lab RN. Procedural findings communicated. Intra procedural  medication administration reviewed. Progression of care discussed.      Patient received into [id number] [facility] 7 post sheath removal.     Access site without bleeding or swelling yes    Dressing dry and intact yes    Patient instructed to limit movement to right upper extremity    Routine post procedural vital signs and site assessment initiated yes

## 2017-08-10 NOTE — DISCHARGE INSTRUCTIONS
HEART CATHETERIZATION/ANGIOGRAPHY DISCHARGE INSTRUCTIONS  Follow-up appointment: August 24th @ 1:15pm with Dr. Cristhian Santos    1. Check puncture site frequently for swelling or bleeding. If there is any bleeding, lie down and apply pressure over the area with a clean towel or washcloth. Notify your doctor for any redness, swelling, drainage, or oozing from the puncture site. Notify your doctor for any fever or chills. 2. If the extremity becomes cold, numb, or painful call [facility] Cardiology at [phone].  3. Activity should be limited for the next 48 hours. Climb stairs as little as possible and avoid any stooping, bending, or strenuous activity for 48 hours. No heavy lifting (anything over 10 pounds) for 3 days. 4. You may resume your usual diet. Drink more fluids than usual.  5. Have a responsible person drive you home and stay with you for at least 24 hours after your heart catheterization/angiography. 6. You may remove bandage from your Right wrist in 24 hours. You may shower in 24 hours. No tub baths, hot tubs, or swimming for 1 week. Do not place any lotions, creams, powders, or ointments over puncture site for 1 week. You may place a clean band-aid over the puncture site each day for 5 days. Change daily. I have read the above instructions and have had the opportunity to ask questions.       Patient: ________________________   Date: 8/10/2017    Witness: _______________________   Date: 8/10/2017

## 2017-08-10 NOTE — PROGRESS NOTES
Patient received to [address] [place] [address] room # 3  Ambulatory from [facility]. Patient scheduled for [facility] today with Dr Bowen Overall. Procedure reviewed & questions answered, voiced good understanding consent obtained & placed on chart. All medications and medical history reviewed. Will prep patient per orders. Patient & family updated on plan of care.

## 2017-08-10 NOTE — PROGRESS NOTES
TRANSFER - OUT REPORT:    Verbal report given to Laura Rajput RN on Jane Bellamy  being transferred to [facility] for routine progression of care       Report consisted of patients Situation, Background, Assessment and Recommendations(SBAR). Information from the following report(s) SBAR, Kardex, Procedure Summary and MAR was reviewed with the receiving nurse. Opportunity for questions and clarification was provided.       [facility] with Dr Lemuel Garcia  No intervention  4 versed  Right radial R band 12ml at 21

## 2018-02-15 ENCOUNTER — APPOINTMENT (OUTPATIENT)
Dept: GENERAL RADIOLOGY | Age: 83
End: 2018-02-15
Attending: EMERGENCY MEDICINE
Payer: MEDICARE

## 2018-02-15 ENCOUNTER — HOSPITAL ENCOUNTER (EMERGENCY)
Age: 83
Discharge: HOME | End: 2018-02-15
Attending: EMERGENCY MEDICINE
Payer: MEDICARE

## 2018-02-15 VITALS
OXYGEN SATURATION: 98 % | WEIGHT: 152 LBS | RESPIRATION RATE: 18 BRPM | DIASTOLIC BLOOD PRESSURE: 83 MMHG | BODY MASS INDEX: 26.93 KG/M2 | HEIGHT: 63 IN | TEMPERATURE: 98 F | SYSTOLIC BLOOD PRESSURE: 172 MMHG | HEART RATE: 79 BPM

## 2018-02-15 DIAGNOSIS — J06.9 ACUTE UPPER RESPIRATORY INFECTION: ICD-10-CM

## 2018-02-15 DIAGNOSIS — E86.0 DEHYDRATION: ICD-10-CM

## 2018-02-15 DIAGNOSIS — I48.0 PAROXYSMAL ATRIAL FIBRILLATION (HCC): Primary | ICD-10-CM

## 2018-02-15 LAB
ALBUMIN SERPL-MCNC: 3.4 G/DL (ref 3.2–4.6)
ALBUMIN/GLOB SERPL: 0.8 {RATIO} (ref 1.2–3.5)
ALP SERPL-CCNC: 97 U/L (ref 50–136)
ALT SERPL-CCNC: 22 U/L (ref 12–65)
ANION GAP SERPL CALC-SCNC: 11 MMOL/L (ref 7–16)
AST SERPL-CCNC: 18 U/L (ref 15–37)
ATRIAL RATE: 138 BPM
BASOPHILS # BLD: 0 K/UL (ref 0–0.2)
BASOPHILS NFR BLD: 0 % (ref 0–2)
BILIRUB SERPL-MCNC: 1.2 MG/DL (ref 0.2–1.1)
BNP SERPL-MCNC: 141 PG/ML
BUN SERPL-MCNC: 21 MG/DL (ref 8–23)
CALCIUM SERPL-MCNC: 9 MG/DL (ref 8.3–10.4)
CALCULATED R AXIS, ECG10: 35 DEGREES
CALCULATED T AXIS, ECG11: 125 DEGREES
CHLORIDE SERPL-SCNC: 107 MMOL/L (ref 98–107)
CO2 SERPL-SCNC: 23 MMOL/L (ref 21–32)
CREAT SERPL-MCNC: 0.85 MG/DL (ref 0.6–1)
DIAGNOSIS, 93000: NORMAL
DIFFERENTIAL METHOD BLD: ABNORMAL
EOSINOPHIL # BLD: 0.2 K/UL (ref 0–0.8)
EOSINOPHIL NFR BLD: 1 % (ref 0.5–7.8)
ERYTHROCYTE [DISTWIDTH] IN BLOOD BY AUTOMATED COUNT: 15.4 % (ref 11.9–14.6)
FLUAV AG NPH QL IA: NEGATIVE
FLUBV AG NPH QL IA: NEGATIVE
GLOBULIN SER CALC-MCNC: 4.3 G/DL (ref 2.3–3.5)
GLUCOSE SERPL-MCNC: 121 MG/DL (ref 65–100)
HCT VFR BLD AUTO: 36.2 % (ref 35.8–46.3)
HGB BLD-MCNC: 12 G/DL (ref 11.7–15.4)
IMM GRANULOCYTES # BLD: 0.1 K/UL (ref 0–0.5)
IMM GRANULOCYTES NFR BLD AUTO: 1 % (ref 0–5)
LYMPHOCYTES # BLD: 4.1 K/UL (ref 0.5–4.6)
LYMPHOCYTES NFR BLD: 35 % (ref 13–44)
MAGNESIUM SERPL-MCNC: 2.3 MG/DL (ref 1.8–2.4)
MCH RBC QN AUTO: 27.8 PG (ref 26.1–32.9)
MCHC RBC AUTO-ENTMCNC: 33.1 G/DL (ref 31.4–35)
MCV RBC AUTO: 84 FL (ref 79.6–97.8)
MONOCYTES # BLD: 1.2 K/UL (ref 0.1–1.3)
MONOCYTES NFR BLD: 10 % (ref 4–12)
NEUTS SEG # BLD: 6.3 K/UL (ref 1.7–8.2)
NEUTS SEG NFR BLD: 53 % (ref 43–78)
PLATELET # BLD AUTO: 295 K/UL (ref 150–450)
PMV BLD AUTO: 11.5 FL (ref 10.8–14.1)
POTASSIUM SERPL-SCNC: 3.2 MMOL/L (ref 3.5–5.1)
PROT SERPL-MCNC: 7.7 G/DL (ref 6.3–8.2)
Q-T INTERVAL, ECG07: 318 MS
QRS DURATION, ECG06: 74 MS
QTC CALCULATION (BEZET), ECG08: 434 MS
RBC # BLD AUTO: 4.31 M/UL (ref 4.05–5.25)
SODIUM SERPL-SCNC: 141 MMOL/L (ref 136–145)
TROPONIN I BLD-MCNC: 0.02 NG/ML (ref 0.02–0.05)
VENTRICULAR RATE, ECG03: 112 BPM
WBC # BLD AUTO: 11.8 K/UL (ref 4.3–11.1)

## 2018-02-15 PROCEDURE — 99285 EMERGENCY DEPT VISIT HI MDM: CPT | Performed by: EMERGENCY MEDICINE

## 2018-02-15 PROCEDURE — 87804 INFLUENZA ASSAY W/OPTIC: CPT | Performed by: EMERGENCY MEDICINE

## 2018-02-15 PROCEDURE — 83735 ASSAY OF MAGNESIUM: CPT | Performed by: EMERGENCY MEDICINE

## 2018-02-15 PROCEDURE — 83880 ASSAY OF NATRIURETIC PEPTIDE: CPT | Performed by: EMERGENCY MEDICINE

## 2018-02-15 PROCEDURE — 96374 THER/PROPH/DIAG INJ IV PUSH: CPT | Performed by: EMERGENCY MEDICINE

## 2018-02-15 PROCEDURE — 80053 COMPREHEN METABOLIC PANEL: CPT | Performed by: EMERGENCY MEDICINE

## 2018-02-15 PROCEDURE — 74011250636 HC RX REV CODE- 250/636: Performed by: EMERGENCY MEDICINE

## 2018-02-15 PROCEDURE — 85025 COMPLETE CBC W/AUTO DIFF WBC: CPT | Performed by: EMERGENCY MEDICINE

## 2018-02-15 PROCEDURE — 93005 ELECTROCARDIOGRAM TRACING: CPT | Performed by: EMERGENCY MEDICINE

## 2018-02-15 PROCEDURE — 81003 URINALYSIS AUTO W/O SCOPE: CPT | Performed by: EMERGENCY MEDICINE

## 2018-02-15 PROCEDURE — 84484 ASSAY OF TROPONIN QUANT: CPT

## 2018-02-15 PROCEDURE — 71046 X-RAY EXAM CHEST 2 VIEWS: CPT

## 2018-02-15 PROCEDURE — 96361 HYDRATE IV INFUSION ADD-ON: CPT | Performed by: EMERGENCY MEDICINE

## 2018-02-15 RX ORDER — ONDANSETRON 2 MG/ML
4 INJECTION INTRAMUSCULAR; INTRAVENOUS
Status: COMPLETED | OUTPATIENT
Start: 2018-02-15 | End: 2018-02-15

## 2018-02-15 RX ADMIN — SODIUM CHLORIDE 1000 ML: 9 INJECTION, SOLUTION INTRAVENOUS at 18:07

## 2018-02-15 RX ADMIN — ONDANSETRON 4 MG: 2 INJECTION INTRAMUSCULAR; INTRAVENOUS at 18:06

## 2018-02-15 NOTE — Clinical Note
Drink plenty of fluids Call and arrange follow-up with her primary care physician as well as cardiology Return to the ER for any new or worsening symptoms

## 2018-02-15 NOTE — ED TRIAGE NOTES
Per ems the pt was found in a-fib at a rate of . When the pt would stand the heart rate would elevate to 140. Pt states she has been having palpitations for the past three days. Pt reports being dizzy with standing. Pt denies any pain. Pt reports just finishing prednisone for a sinus infection.

## 2018-02-15 NOTE — ED PROVIDER NOTES
HPI Comments: Patient presents to the ER playing some intermittent palpitations and lightheadedness. Patient states the past couple days she has been feeling fatigue and cough, congestion as well as chills. Reports she has been treated with prednisone for possible sinus infection. Reports today she had an episode where she felt her heart was skipping. She reports a history of paroxysmal A. Fib. Patient is a 80 y.o. female presenting with palpitations. The history is provided by the patient. Palpitations    This is a new problem. The current episode started 3 to 5 hours ago. The problem has not changed since onset. Associated symptoms include malaise/fatigue, near-syncope, nausea, weakness, cough and sputum production. Pertinent negatives include no fever, no numbness, no orthopnea, no abdominal pain, no vomiting and no back pain. Her past medical history is significant for hypertension and atrial fibrillation. 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 Normal LV systolic function.     Hypercholesteremia 1/14/2013    Hypertension     Hypothyroidism 1/14/2013    Osteoarthritis of back 1/2/2014    Osteoporosis     Pulmonary embolism ([address] 2009    after knee surgery    Sick sinus syndrome ([address] 1/7/2016    Tachycardia-Bradycardia    UTI (urinary tract infection)        Past Surgical History:   Procedure Laterality Date    HX APPENDECTOMY  1965    HX BREAST BIOPSY Bilateral     HX CATARACT REMOVAL Right 2010    HX CORONARY STENT PLACEMENT  05/2014    LAD X 3    HX HEART CATHETERIZATION  09/17/2015    HX HEENT      multiple eye surgeries - left - macular hoe, retinal detachment twice,     HX HYSTERECTOMY  1970    HX KNEE REPLACEMENT  2009    HX KNEE REPLACEMENT Left 01/12/2017    HX ORTHOPAEDIC  2006 and 2008    herniated disc    HX TONSIL AND ADENOIDECTOMY  1934         Family History:   Problem Relation Age of Onset    Cancer Mother     Breast Cancer Mother     Cancer Father     Cancer Sister     Cancer Brother      pancreas    Breast Cancer Maternal Aunt        Social History     Social History    Marital status:      Spouse name: N/A    Number of children: N/A    Years of education: N/A     Occupational History    Not on file. Social History Main Topics    Smoking status: Never Smoker    Smokeless tobacco: Never Used    Alcohol use No    Drug use: No    Sexual activity: Not on file     Other Topics Concern    Not on file     Social History Narrative    No family/social/cultural issues pertinent to care         ALLERGIES: Adhesive    Review of Systems   Constitutional: Positive for fatigue and malaise/fatigue. Negative for fever and unexpected weight change. HENT: Negative for congestion. Eyes: Negative for photophobia, pain and visual disturbance. Respiratory: Positive for cough and sputum production. Negative for chest tightness and wheezing. Cardiovascular: Positive for palpitations and near-syncope. Negative for orthopnea. Gastrointestinal: Positive for nausea. Negative for abdominal pain and vomiting. Endocrine: Negative for polydipsia and polyphagia. Genitourinary: Negative for flank pain, frequency and urgency. Musculoskeletal: Negative for back pain. Skin: Negative for pallor and rash. Allergic/Immunologic: Negative for food allergies and immunocompromised state. Neurological: Positive for weakness.  Negative for syncope, speech difficulty and numbness. Psychiatric/Behavioral: Negative for behavioral problems and confusion. All other systems reviewed and are negative. Vitals:    02/15/18 1559 02/15/18 1600 02/15/18 1601 02/15/18 1606   BP:    174/86   Pulse: 97 94 93 75   Resp: 14 15 15 13   Temp:       SpO2: 97% 98% 99% 99%   Weight:       Height:                Physical Exam   Constitutional: She is oriented to person, place, and time. She appears well-developed and well-nourished. HENT:   Head: Normocephalic and atraumatic. Mouth/Throat: Oropharynx is clear and moist.   Eyes: Conjunctivae and EOM are normal. Pupils are equal, round, and reactive to light. No scleral icterus. Neck: Normal range of motion. Neck supple. No tracheal deviation present. No thyromegaly present. Cardiovascular: Normal rate, regular rhythm and intact distal pulses. Pulmonary/Chest: Effort normal and breath sounds normal. No respiratory distress. She has no rales. Abdominal: Soft. Bowel sounds are normal. She exhibits no distension. Musculoskeletal: Normal range of motion. She exhibits no edema or deformity. Neurological: She is alert and oriented to person, place, and time. She has normal reflexes. No cranial nerve deficit. Nursing note and vitals reviewed. MDM  Number of Diagnoses or Management Options  Dehydration:   Paroxysmal atrial fibrillation [facility]):   Diagnosis management comments: Patient actually appears to be in a normal sinus rhythm currently  We'll check basic labs, electrolytes, urinalysis, chest x-ray as well as flu swab  Treatment symptomatically otherwise    6:07 PM  Normal basic labs. Patient remains in a normal sinus rhythm here. Vital signs reviewed. Will give IV fluids here and continue to monitor    7:25 PM  Symptomatically patient feels improved. I feel symptoms are related to her paroxysmal A. Fib and dehydration. She appears to be in a normal sinus rhythm here currently.   Will obtain orthostatic vital signs.  If stable will discharge home, follow-up with cardiology       Amount and/or Complexity of Data Reviewed  Clinical lab tests: ordered and reviewed  Tests in the radiology section of CPT®: ordered and reviewed    Risk of Complications, Morbidity, and/or Mortality  Presenting problems: moderate  Diagnostic procedures: moderate  Management options: moderate    Patient Progress  Patient progress: stable        ED Course       Procedures      Results Include:    Recent Results (from the past 24 hour(s))   EKG, 12 LEAD, INITIAL    Collection Time: 02/15/18  3:53 PM   Result Value Ref Range    Ventricular Rate 112 BPM    Atrial Rate 138 BPM    QRS Duration 74 ms    Q-T Interval 318 ms    QTC Calculation (Bezet) 434 ms    Calculated R Axis 35 degrees    Calculated T Axis 125 degrees    Diagnosis       !! AGE AND GENDER SPECIFIC ECG ANALYSIS !! Atrial fibrillation with rapid ventricular response with premature   ventricular or aberrantly conducted complexes  Cannot rule out Anterior infarct , age undetermined  ST & T wave abnormality, consider inferior ischemia or digitalis effect  Abnormal ECG  When compared with ECG of 10-AUG-2017 08:44,  Atrial fibrillation has replaced Sinus rhythm  Vent. rate has increased BY  60 BPM  ST now depressed in Lateral leads  Confirmed by Cassia Giron ([id number]) on 2/15/2018 4:52:38 PM     CBC WITH AUTOMATED DIFF    Collection Time: 02/15/18  3:58 PM   Result Value Ref Range    WBC 11.8 (H) 4.3 - 11.1 K/uL    RBC 4.31 4.05 - 5.25 M/uL    HGB 12.0 11.7 - 15.4 g/dL    HCT 36.2 35.8 - 46.3 %    MCV 84.0 79.6 - 97.8 FL    MCH 27.8 26.1 - 32.9 PG    MCHC 33.1 31.4 - 35.0 g/dL    RDW 15.4 (H) 11.9 - 14.6 %    PLATELET 134 765 - 511 K/uL    MPV 11.5 10.8 - 14.1 FL    DF AUTOMATED      NEUTROPHILS 53 43 - 78 %    LYMPHOCYTES 35 13 - 44 %    MONOCYTES 10 4.0 - 12.0 %    EOSINOPHILS 1 0.5 - 7.8 %    BASOPHILS 0 0.0 - 2.0 %    IMMATURE GRANULOCYTES 1 0.0 - 5.0 %    ABS.  NEUTROPHILS 6.3 1.7 - 8.2 K/UL    ABS. LYMPHOCYTES 4.1 0.5 - 4.6 K/UL    ABS. MONOCYTES 1.2 0.1 - 1.3 K/UL    ABS. EOSINOPHILS 0.2 0.0 - 0.8 K/UL    ABS. BASOPHILS 0.0 0.0 - 0.2 K/UL    ABS. IMM. GRANS. 0.1 0.0 - 0.5 K/UL   METABOLIC PANEL, COMPREHENSIVE    Collection Time: 02/15/18  3:58 PM   Result Value Ref Range    Sodium 141 136 - 145 mmol/L    Potassium 3.2 (L) 3.5 - 5.1 mmol/L    Chloride 107 98 - 107 mmol/L    CO2 23 21 - 32 mmol/L    Anion gap 11 7 - 16 mmol/L    Glucose 121 (H) 65 - 100 mg/dL    BUN 21 8 - 23 MG/DL    Creatinine 0.85 0.6 - 1.0 MG/DL    GFR est AA >60 >60 ml/min/1.73m2    GFR est non-AA >60 >60 ml/min/1.73m2    Calcium 9.0 8.3 - 10.4 MG/DL    Bilirubin, total 1.2 (H) 0.2 - 1.1 MG/DL    ALT (SGPT) 22 12 - 65 U/L    AST (SGOT) 18 15 - 37 U/L    Alk.  phosphatase 97 50 - 136 U/L    Protein, total 7.7 6.3 - 8.2 g/dL    Albumin 3.4 3.2 - 4.6 g/dL    Globulin 4.3 (H) 2.3 - 3.5 g/dL    A-G Ratio 0.8 (L) 1.2 - 3.5     BNP    Collection Time: 02/15/18  3:58 PM   Result Value Ref Range     pg/mL   POC TROPONIN-I    Collection Time: 02/15/18  4:01 PM   Result Value Ref Range    Troponin-I (POC) 0.02 0.02 - 0.05 ng/ml   INFLUENZA A & B AG (RAPID TEST)    Collection Time: 02/15/18  4:38 PM   Result Value Ref Range    Influenza A Ag NEGATIVE  NEG      Influenza B Ag NEGATIVE  NEG

## 2018-02-16 NOTE — ED NOTES
I have reviewed discharge instructions with the patient. The patient verbalized understanding. Patient left ED via Discharge Method: ambulatory to Home with daughter. Opportunity for questions and clarification provided. Patient given 0 scripts. To continue your aftercare when you leave the hospital, you may receive an automated call from our care team to check in on how you are doing. This is a free service and part of our promise to provide the best care and service to meet your aftercare needs.  If you have questions, or wish to unsubscribe from this service please call [phone]. Thank you for Choosing our [facility] Emergency Department.

## 2018-06-11 PROBLEM — Z79.01 LONG TERM (CURRENT) USE OF ANTICOAGULANTS: Status: ACTIVE | Noted: 2018-06-11

## 2018-06-20 ENCOUNTER — HOSPITAL ENCOUNTER (OUTPATIENT)
Dept: MAMMOGRAPHY | Age: 83
Discharge: HOME | End: 2018-06-20
Attending: FAMILY MEDICINE
Payer: MEDICARE

## 2018-06-20 DIAGNOSIS — Z12.31 VISIT FOR SCREENING MAMMOGRAM: ICD-10-CM

## 2018-06-20 PROCEDURE — 77067 SCR MAMMO BI INCL CAD: CPT

## 2018-07-13 ENCOUNTER — HOSPITAL ENCOUNTER (INPATIENT)
Age: 83
LOS: 4 days | Discharge: HOME | DRG: 812 | End: 2018-07-17
Attending: EMERGENCY MEDICINE | Admitting: FAMILY MEDICINE
Payer: MEDICARE

## 2018-07-13 ENCOUNTER — HOSPITAL ENCOUNTER (OUTPATIENT)
Dept: LAB | Age: 83
Discharge: HOME | DRG: 812 | End: 2018-07-13
Attending: INTERNAL MEDICINE
Payer: MEDICARE

## 2018-07-13 DIAGNOSIS — R42 DIZZINESS: ICD-10-CM

## 2018-07-13 DIAGNOSIS — R53.1 WEAKNESS: ICD-10-CM

## 2018-07-13 DIAGNOSIS — I95.1 ORTHOSTATIC HYPOTENSION: ICD-10-CM

## 2018-07-13 DIAGNOSIS — D64.9 ANEMIA, UNSPECIFIED TYPE: Primary | ICD-10-CM

## 2018-07-13 PROBLEM — Z96.659 S/P TOTAL KNEE ARTHROPLASTY: Status: RESOLVED | Noted: 2017-01-13 | Resolved: 2018-07-13

## 2018-07-13 PROBLEM — N39.0 URINARY TRACT INFECTION WITHOUT HEMATURIA: Status: RESOLVED | Noted: 2017-04-06 | Resolved: 2018-07-13

## 2018-07-13 PROBLEM — Z79.01 LONG TERM (CURRENT) USE OF ANTICOAGULANTS: Chronic | Status: ACTIVE | Noted: 2018-06-11

## 2018-07-13 PROBLEM — R19.5 HEME POSITIVE STOOL: Status: ACTIVE | Noted: 2018-07-13

## 2018-07-13 PROBLEM — D50.0 ANEMIA DUE TO GI BLOOD LOSS: Status: ACTIVE | Noted: 2018-07-13

## 2018-07-13 LAB
ALBUMIN SERPL-MCNC: 2.9 G/DL (ref 3.2–4.6)
ALBUMIN/GLOB SERPL: 0.9 {RATIO} (ref 1.2–3.5)
ALP SERPL-CCNC: 71 U/L (ref 50–136)
ALT SERPL-CCNC: 31 U/L (ref 12–65)
ANION GAP SERPL CALC-SCNC: 10 MMOL/L (ref 7–16)
ANION GAP SERPL CALC-SCNC: 9 MMOL/L
AST SERPL-CCNC: 26 U/L (ref 15–37)
BASOPHILS # BLD: 0 K/UL (ref 0–0.2)
BASOPHILS # BLD: 0 K/UL (ref 0–0.2)
BASOPHILS NFR BLD: 0 % (ref 0–2)
BASOPHILS NFR BLD: 1 % (ref 0–2)
BILIRUB SERPL-MCNC: 0.6 MG/DL (ref 0.2–1.1)
BUN SERPL-MCNC: 13 MG/DL (ref 8–23)
BUN SERPL-MCNC: 21 MG/DL (ref 8–23)
CALCIUM SERPL-MCNC: 8.8 MG/DL (ref 8.3–10.4)
CALCIUM SERPL-MCNC: 9.2 MG/DL (ref 8.3–10.4)
CHLORIDE SERPL-SCNC: 105 MMOL/L (ref 98–107)
CHLORIDE SERPL-SCNC: 110 MMOL/L (ref 98–107)
CO2 SERPL-SCNC: 20 MMOL/L (ref 21–32)
CO2 SERPL-SCNC: 23 MMOL/L (ref 21–32)
CREAT SERPL-MCNC: 1 MG/DL (ref 0.6–1)
CREAT SERPL-MCNC: 1.16 MG/DL (ref 0.6–1)
DIFFERENTIAL METHOD BLD: ABNORMAL
DIFFERENTIAL METHOD BLD: ABNORMAL
EOSINOPHIL # BLD: 0 K/UL (ref 0–0.8)
EOSINOPHIL # BLD: 0.1 K/UL (ref 0–0.8)
EOSINOPHIL NFR BLD: 1 % (ref 0.5–7.8)
EOSINOPHIL NFR BLD: 1 % (ref 0.5–7.8)
ERYTHROCYTE [DISTWIDTH] IN BLOOD BY AUTOMATED COUNT: 16.6 % (ref 11.9–14.6)
ERYTHROCYTE [DISTWIDTH] IN BLOOD BY AUTOMATED COUNT: 17 % (ref 11.9–14.6)
GLOBULIN SER CALC-MCNC: 3.3 G/DL (ref 2.3–3.5)
GLUCOSE SERPL-MCNC: 101 MG/DL (ref 65–100)
GLUCOSE SERPL-MCNC: 147 MG/DL (ref 65–100)
HCT VFR BLD AUTO: 21.7 % (ref 35.8–46.3)
HCT VFR BLD AUTO: 23.4 % (ref 35.8–46.3)
HGB BLD-MCNC: 6.8 G/DL (ref 11.7–15.4)
HGB BLD-MCNC: 7.4 G/DL (ref 11.7–15.4)
IMM GRANULOCYTES # BLD: 0 K/UL (ref 0–0.5)
IMM GRANULOCYTES NFR BLD AUTO: 0 % (ref 0–5)
INR PPP: 2.1
LYMPHOCYTES # BLD: 1.8 K/UL (ref 0.5–4.6)
LYMPHOCYTES # BLD: 2 K/UL (ref 0.5–4.6)
LYMPHOCYTES NFR BLD: 33 % (ref 13–44)
LYMPHOCYTES NFR BLD: 39 % (ref 13–44)
MCH RBC QN AUTO: 26.2 PG (ref 26.1–32.9)
MCH RBC QN AUTO: 27.1 PG (ref 26.1–32.9)
MCHC RBC AUTO-ENTMCNC: 31.3 G/DL (ref 31.4–35)
MCHC RBC AUTO-ENTMCNC: 31.6 G/DL (ref 31.4–35)
MCV RBC AUTO: 83.5 FL (ref 79.6–97.8)
MCV RBC AUTO: 85.7 FL (ref 79.6–97.8)
MONOCYTES # BLD: 0.6 K/UL (ref 0.1–1.3)
MONOCYTES # BLD: 0.6 K/UL (ref 0.1–1.3)
MONOCYTES NFR BLD: 11 % (ref 4–12)
MONOCYTES NFR BLD: 12 % (ref 4–12)
NEUTS SEG # BLD: 2.5 K/UL (ref 1.7–8.2)
NEUTS SEG # BLD: 3.1 K/UL (ref 1.7–8.2)
NEUTS SEG NFR BLD: 48 % (ref 43–78)
NEUTS SEG NFR BLD: 54 % (ref 43–78)
PLATELET # BLD AUTO: 249 K/UL (ref 150–450)
PLATELET # BLD AUTO: 278 K/UL (ref 150–450)
PMV BLD AUTO: 10.6 FL (ref 10.8–14.1)
PMV BLD AUTO: 11 FL (ref 10.8–14.1)
POTASSIUM SERPL-SCNC: 3.6 MMOL/L (ref 3.5–5.1)
POTASSIUM SERPL-SCNC: 3.9 MMOL/L (ref 3.5–5.1)
PROT SERPL-MCNC: 6.2 G/DL (ref 6.3–8.2)
PROTHROMBIN TIME: 24.7 SEC (ref 11.5–14.5)
RBC # BLD AUTO: 2.6 M/UL (ref 4.05–5.25)
RBC # BLD AUTO: 2.73 M/UL (ref 4.05–5.25)
SODIUM SERPL-SCNC: 138 MMOL/L (ref 136–145)
SODIUM SERPL-SCNC: 139 MMOL/L (ref 136–145)
WBC # BLD AUTO: 5.1 K/UL (ref 4.3–11.1)
WBC # BLD AUTO: 5.6 K/UL (ref 4.3–11.1)

## 2018-07-13 PROCEDURE — 80053 COMPREHEN METABOLIC PANEL: CPT | Performed by: INTERNAL MEDICINE

## 2018-07-13 PROCEDURE — 36415 COLL VENOUS BLD VENIPUNCTURE: CPT | Performed by: INTERNAL MEDICINE

## 2018-07-13 PROCEDURE — 85025 COMPLETE CBC W/AUTO DIFF WBC: CPT | Performed by: INTERNAL MEDICINE

## 2018-07-13 PROCEDURE — 36430 TRANSFUSION BLD/BLD COMPNT: CPT

## 2018-07-13 PROCEDURE — 86900 BLOOD TYPING SEROLOGIC ABO: CPT | Performed by: EMERGENCY MEDICINE

## 2018-07-13 PROCEDURE — 74011250637 HC RX REV CODE- 250/637: Performed by: FAMILY MEDICINE

## 2018-07-13 PROCEDURE — 77030039270 HC TU BLD FLTR CARD -A

## 2018-07-13 PROCEDURE — 65270000029 HC RM PRIVATE

## 2018-07-13 PROCEDURE — 99283 EMERGENCY DEPT VISIT LOW MDM: CPT | Performed by: EMERGENCY MEDICINE

## 2018-07-13 PROCEDURE — 85610 PROTHROMBIN TIME: CPT | Performed by: EMERGENCY MEDICINE

## 2018-07-13 PROCEDURE — P9016 RBC LEUKOCYTES REDUCED: HCPCS | Performed by: EMERGENCY MEDICINE

## 2018-07-13 PROCEDURE — 77030020256 HC SOL INJ NACL 0.9%  500ML

## 2018-07-13 PROCEDURE — 86923 COMPATIBILITY TEST ELECTRIC: CPT | Performed by: EMERGENCY MEDICINE

## 2018-07-13 RX ORDER — SODIUM CHLORIDE 9 MG/ML
1000 INJECTION, SOLUTION INTRAVENOUS CONTINUOUS
Status: DISPENSED | OUTPATIENT
Start: 2018-07-13 | End: 2018-07-14

## 2018-07-13 RX ORDER — HYDROCODONE BITARTRATE AND ACETAMINOPHEN 10; 325 MG/1; MG/1
1 TABLET ORAL
Status: DISCONTINUED | OUTPATIENT
Start: 2018-07-13 | End: 2018-07-17 | Stop reason: HOSPADM

## 2018-07-13 RX ORDER — ESCITALOPRAM OXALATE 10 MG/1
10 TABLET ORAL DAILY
Status: DISCONTINUED | OUTPATIENT
Start: 2018-07-14 | End: 2018-07-13

## 2018-07-13 RX ORDER — NALOXONE HYDROCHLORIDE 0.4 MG/ML
0.4 INJECTION, SOLUTION INTRAMUSCULAR; INTRAVENOUS; SUBCUTANEOUS AS NEEDED
Status: DISCONTINUED | OUTPATIENT
Start: 2018-07-13 | End: 2018-07-17 | Stop reason: HOSPADM

## 2018-07-13 RX ORDER — SODIUM CHLORIDE 0.9 % (FLUSH) 0.9 %
5-10 SYRINGE (ML) INJECTION AS NEEDED
Status: DISCONTINUED | OUTPATIENT
Start: 2018-07-13 | End: 2018-07-17 | Stop reason: HOSPADM

## 2018-07-13 RX ORDER — SODIUM CHLORIDE 9 MG/ML
250 INJECTION, SOLUTION INTRAVENOUS AS NEEDED
Status: DISCONTINUED | OUTPATIENT
Start: 2018-07-13 | End: 2018-07-17 | Stop reason: HOSPADM

## 2018-07-13 RX ORDER — PROCHLORPERAZINE EDISYLATE 5 MG/ML
5 INJECTION INTRAMUSCULAR; INTRAVENOUS
Status: DISCONTINUED | OUTPATIENT
Start: 2018-07-13 | End: 2018-07-17 | Stop reason: HOSPADM

## 2018-07-13 RX ORDER — BISACODYL 5 MG
5 TABLET, DELAYED RELEASE (ENTERIC COATED) ORAL DAILY PRN
Status: DISCONTINUED | OUTPATIENT
Start: 2018-07-13 | End: 2018-07-17 | Stop reason: HOSPADM

## 2018-07-13 RX ORDER — LORAZEPAM 1 MG/1
1 TABLET ORAL
Status: DISCONTINUED | OUTPATIENT
Start: 2018-07-13 | End: 2018-07-17 | Stop reason: HOSPADM

## 2018-07-13 RX ORDER — DIPHENHYDRAMINE HCL 25 MG
25 CAPSULE ORAL
Status: DISCONTINUED | OUTPATIENT
Start: 2018-07-13 | End: 2018-07-17 | Stop reason: HOSPADM

## 2018-07-13 RX ORDER — ACETAMINOPHEN 325 MG/1
650 TABLET ORAL
Status: DISCONTINUED | OUTPATIENT
Start: 2018-07-13 | End: 2018-07-17 | Stop reason: HOSPADM

## 2018-07-13 RX ORDER — ATORVASTATIN CALCIUM 40 MG/1
40 TABLET, FILM COATED ORAL
Status: DISCONTINUED | OUTPATIENT
Start: 2018-07-13 | End: 2018-07-17 | Stop reason: HOSPADM

## 2018-07-13 RX ORDER — LEVOTHYROXINE SODIUM 50 UG/1
50 TABLET ORAL
Status: DISCONTINUED | OUTPATIENT
Start: 2018-07-14 | End: 2018-07-17 | Stop reason: HOSPADM

## 2018-07-13 RX ORDER — ASPIRIN 81 MG/1
81 TABLET ORAL EVERY EVENING
Status: DISCONTINUED | OUTPATIENT
Start: 2018-07-13 | End: 2018-07-17 | Stop reason: HOSPADM

## 2018-07-13 RX ORDER — SODIUM CHLORIDE 0.9 % (FLUSH) 0.9 %
5-10 SYRINGE (ML) INJECTION EVERY 8 HOURS
Status: DISCONTINUED | OUTPATIENT
Start: 2018-07-13 | End: 2018-07-17 | Stop reason: HOSPADM

## 2018-07-13 RX ORDER — ESCITALOPRAM OXALATE 10 MG/1
10 TABLET ORAL EVERY EVENING
Status: DISCONTINUED | OUTPATIENT
Start: 2018-07-13 | End: 2018-07-17 | Stop reason: HOSPADM

## 2018-07-13 RX ORDER — PANTOPRAZOLE SODIUM 40 MG/1
40 TABLET, DELAYED RELEASE ORAL
Status: DISCONTINUED | OUTPATIENT
Start: 2018-07-14 | End: 2018-07-17 | Stop reason: HOSPADM

## 2018-07-13 RX ORDER — METOPROLOL SUCCINATE 25 MG/1
12.5 TABLET, EXTENDED RELEASE ORAL DAILY
Status: DISCONTINUED | OUTPATIENT
Start: 2018-07-14 | End: 2018-07-17 | Stop reason: HOSPADM

## 2018-07-13 RX ADMIN — ATORVASTATIN CALCIUM 40 MG: 40 TABLET, FILM COATED ORAL at 23:05

## 2018-07-13 RX ADMIN — ESCITALOPRAM OXALATE 10 MG: 10 TABLET ORAL at 23:05

## 2018-07-13 NOTE — IP AVS SNAPSHOT
[address] [place]  
[phone] Patient: Chloe Weber MRN: [id number] [id number] A check tanner indicates which time of day the medication should be taken. My Medications CHANGE how you take these medications Instructions Each Dose to Equal  
 Morning Noon Evening Bedtime  
 atorvastatin 40 mg tablet Commonly known as:  LIPITOR What changed:   
- when to take this 
- additional instructions Your last dose was: Your next dose is: TAKE 1 TABLET EVERY DAY  
     
   
   
   
  
 escitalopram oxalate 10 mg tablet Commonly known as:  Barbara Dougherty What changed:  when to take this Your last dose was: Your next dose is: Take 1 Tab by mouth daily. 10 mg CONTINUE taking these medications Instructions Each Dose to Equal  
 Morning Noon Evening Bedtime  
 biotin 2,500 mcg Tab Your last dose was: Your next dose is: Take  by mouth daily. CALTRATE 600+D PLUS MINERALS 600 mg calcium- 400 unit Tab Generic drug:  Calcium Carbonate-Vit D3-Min Your last dose was: Your next dose is: Take  by mouth daily. estradiol 0.01 % (0.1 mg/gram) vaginal cream  
Commonly known as:  ESTRACE Your last dose was: Your next dose is:    
   
   
 Apply 1 gm to vagina 3x/week at hs HEALTHY COLON PO Your last dose was: Your next dose is: Take  by mouth daily. ICAPS AREDS PO Your last dose was: Your next dose is: Take  by mouth. Twice a day  
     
   
   
   
  
 levothyroxine 50 mcg tablet Commonly known as:  synthroid Your last dose was: Your next dose is: Take 1 Tab by mouth Daily (before breakfast). 50 mcg metoprolol succinate 25 mg XL tablet Commonly known as:  TOPROL-XL Your last dose was: Your next dose is: Take 0.5 Tabs by mouth daily. 12.5 mg  
    
   
   
   
  
 nitroglycerin 0.3 mg SL tablet Commonly known as:  NITROSTAT Your last dose was: Your next dose is:    
   
   
 1 Tab by SubLINGual route every five (5) minutes as needed for Chest Pain. 0.4 mg  
    
   
   
   
  
 pantoprazole 40 mg granules for oral suspension Commonly known as:  PROTONIX Your last dose was: Your next dose is:    
   
   
 40 mg daily. 40 mg  
    
   
   
   
  
 TYLENOL ARTHRITIS PAIN 650 mg Tber Generic drug:  acetaminophen Your last dose was: Your next dose is: Take 650 mg by mouth every six (6) hours as needed for Pain. 650 mg  
    
   
   
   
  
 VITAMIN B-12 100 mcg tablet Generic drug:  cyanocobalamin Your last dose was: Your next dose is: Take 1,000 mcg by mouth daily. 1000 mcg VITAMIN D3 1,000 unit Cap Generic drug:  cholecalciferol Your last dose was: Your next dose is: Take  by mouth daily. vitamin E 400 unit capsule Commonly known as:  [address] Your last dose was: Your next dose is: Take  by mouth daily. zolpidem 10 mg tablet Commonly known as:  AMBIEN Your last dose was: Your next dose is: Take 1 Tab by mouth nightly as needed for Sleep. Max Daily Amount: 10 mg.  
 10 mg  
    
   
   
   
  
  
STOP taking these medications   
 aspirin delayed-release 81 mg tablet  
   
  
 clopidogrel 75 mg Tab Commonly known as:  PLAVIX  
   
  
 potassium 99 mg tablet VOLTAREN 1 % Gel Generic drug:  diclofenac  
   
  
 warfarin 5 mg tablet Commonly known as:  COUMADIN

## 2018-07-13 NOTE — IP AVS SNAPSHOT
Wil Mathur 
 
 
 [address] [id number] [facility] 
[phone] Patient: Katherine De La Torre MRN: [id number] VKN:[id number] About your hospitalization You were admitted on:  July 13, 2018 You last received care in the:  [address] [place] [address] You were discharged on:  July 17, 2018 Why you were hospitalized Your primary diagnosis was: Anemia Due To Gi Blood Loss Your diagnoses also included:  Anemia, Heme Positive Stool, Weakness, Long Term (Current) Use Of Anticoagulants, Paroxysmal Atrial Fibrillation (Hcc), Hypothyroidism, Depression, Hypercholesteremia, Gerd (Gastroesophageal Reflux Disease) Follow-up Information Follow up With Details Comments Contact Info [facility]  APPT. SEPT. 20th @ 1:[address] [address] 
[phone] Re Tai MD In 4 days APPT. JULY 23rd @ 9:00 [address] [place] [address] [facility] [address] [phone] Your Scheduled Appointments Monday July 23, 2018  9:00 AM EDT Office Visit with Re Tai MD  
[address] [address] [phone] Friday July 27, 2018  8:15 AM EDT Office Visit with Wil Mathur MD  
[facility] ([address] [place] [address]) [address]  
[phone] Friday August 10, 2018  9:45 AM EDT ANTICOAG with [address] [place] [address] OFFICE ([address] [place] [address]) [address]  
[phone] Thursday September 06, 2018 10:30 AM EDT Office Visit with Wil Mathur MD  
[facility] ([address] [place] [address]) [address]  
[phone] Discharge Orders Procedure Order Date Status Priority Quantity Spec Type Associated Dx DISCHARGE INSTRUCTIONS 07/17/18 1004 Normal Routine 1 Comments: If worsened, call your doctor or return to hospital.  
When follow up with your doctor, make sure that your doctor is aware of this admission and review hospital record and follow up on lab results for continuity of care. Also, review your medications with your doctor for possible need of adjustment or refills. A check tanner indicates which time of day the medication should be taken. My Medications CHANGE how you take these medications Instructions Each Dose to Equal  
 Morning Noon Evening Bedtime  
 atorvastatin 40 mg tablet Commonly known as:  LIPITOR What changed:   
- when to take this 
- additional instructions Your last dose was: Your next dose is: TAKE 1 TABLET EVERY DAY  
     
   
   
   
  
 escitalopram oxalate 10 mg tablet Commonly known as:  Poncho Antwan What changed:  when to take this Your last dose was: Your next dose is: Take 1 Tab by mouth daily. 10 mg CONTINUE taking these medications Instructions Each Dose to Equal  
 Morning Noon Evening Bedtime  
 biotin 2,500 mcg Tab Your last dose was: Your next dose is: Take  by mouth daily. CALTRATE 600+D PLUS MINERALS 600 mg calcium- 400 unit Tab Generic drug:  Calcium Carbonate-Vit D3-Min Your last dose was: Your next dose is: Take  by mouth daily. estradiol 0.01 % (0.1 mg/gram) vaginal cream  
Commonly known as:  ESTRACE Your last dose was: Your next dose is:    
   
   
 Apply 1 gm to vagina 3x/week at hs HEALTHY COLON PO Your last dose was: Your next dose is: Take  by mouth daily. ICAPS AREDS PO Your last dose was: Your next dose is: Take  by mouth. Twice a day  
     
   
   
   
  
 levothyroxine 50 mcg tablet Commonly known as:  synthroid Your last dose was: Your next dose is: Take 1 Tab by mouth Daily (before breakfast). 50 mcg  
    
   
   
   
  
 metoprolol succinate 25 mg XL tablet Commonly known as:  TOPROL-XL Your last dose was: Your next dose is: Take 0.5 Tabs by mouth daily. 12.5 mg  
    
   
   
   
  
 nitroglycerin 0.3 mg SL tablet Commonly known as:  NITROSTAT Your last dose was: Your next dose is:    
   
   
 1 Tab by SubLINGual route every five (5) minutes as needed for Chest Pain. 0.4 mg  
    
   
   
   
  
 pantoprazole 40 mg granules for oral suspension Commonly known as:  PROTONIX Your last dose was: Your next dose is:    
   
   
 40 mg daily. 40 mg  
    
   
   
   
  
 TYLENOL ARTHRITIS PAIN 650 mg Tber Generic drug:  acetaminophen Your last dose was: Your next dose is: Take 650 mg by mouth every six (6) hours as needed for Pain. 650 mg  
    
   
   
   
  
 VITAMIN B-12 100 mcg tablet Generic drug:  cyanocobalamin Your last dose was: Your next dose is: Take 1,000 mcg by mouth daily. 1000 mcg VITAMIN D3 1,000 unit Cap Generic drug:  cholecalciferol Your last dose was: Your next dose is: Take  by mouth daily. vitamin E 400 unit capsule Commonly known as:  [address] Your last dose was: Your next dose is: Take  by mouth daily. zolpidem 10 mg tablet Commonly known as:  AMBIEN Your last dose was: Your next dose is: Take 1 Tab by mouth nightly as needed for Sleep. Max Daily Amount: 10 mg.  
 10 mg  
    
   
   
   
  
  
STOP taking these medications   
 aspirin delayed-release 81 mg tablet clopidogrel 75 mg Tab Commonly known as:  PLAVIX  
   
  
 potassium 99 mg tablet VOLTAREN 1 % Gel Generic drug:  diclofenac  
   
  
 warfarin 5 mg tablet Commonly known as:  COUMADIN Discharge Instructions DISCHARGE SUMMARY from Nurse PATIENT INSTRUCTIONS: 
 
Anemia is a low level of red blood cells, which carry oxygen throughout your body. Many things can cause anemia. Lack of iron is one of the most common causes. Your body needs iron to make hemoglobin, a substance in red blood cells that carries oxygen from the lungs to your body's cells. Without enough iron, the body produces fewer and smaller red blood cells. As a result, your body's cells do not get enough oxygen, and you feel tired and weak. And you may have trouble concentrating. Bleeding is the most common cause of a lack of iron. You may have heavy menstrual bleeding or bleeding caused by conditions such as ulcers, hemorrhoids, or cancer. Regular use of aspirin or other anti-inflammatory medicines (such as ibuprofen) also can cause bleeding in some people. A lack of iron in your diet also can cause anemia, especially at times when the body needs more iron, such as during pregnancy, infancy, and the teen years. Your doctor may have prescribed iron pills. It may take several months of treatment for your iron levels to return to normal. Your doctor also may suggest that you eat foods that are rich in iron, such as meat and beans. There are many other causes of anemia. It is not always due to a lack of iron. Finding the specific cause of your anemia will help your doctor find the right treatment for you. Follow-up care is a key part of your treatment and safety. Be sure to make and go to all appointments, and call your doctor if you are having problems. It's also a good idea to know your test results and keep a list of the medicines you take. How can you care for yourself at home? · Take your medicines exactly as prescribed. Call your doctor if you think you are having a problem with your medicine. · If your doctor recommends iron pills, take them as directed: ¨ Try to take the pills on an empty stomach about 1 hour before or 2 hours after meals. But you may need to take iron with food to avoid an upset stomach. ¨ Do not take antacids or drink milk or caffeine drinks (such as coffee, tea, or cola) at the same time or within 2 hours of the time that you take your iron. They can make it hard for your body to absorb the iron. ¨ Vitamin C (from food or supplements) helps your body absorb iron. Try taking iron pills with a glass of orange juice or some other food that is high in vitamin C, such as citrus fruits. ¨ Iron pills may cause stomach problems, such as heartburn, nausea, diarrhea, constipation, and cramps. Be sure to drink plenty of fluids, and include fruits, vegetables, and fiber in your diet each day. Iron pills often make your bowel movements dark or green. ¨ If you forget to take an iron pill, do not take a double dose of iron the next time you take a pill. ¨ Keep iron pills out of the reach of small children. An overdose of iron can be very dangerous. · Follow your doctor's advice about eating iron-rich foods. These include red meat, shellfish, poultry, eggs, beans, raisins, whole-grain bread, and leafy green vegetables. · Steam vegetables to help them keep their iron content. When should you call for help? Call 911 anytime you think you may need emergency care. For example, call if: 
   · You have symptoms of a heart attack. These may include: ¨ Chest pain or pressure, or a strange feeling in the chest. 
¨ Sweating. ¨ Shortness of breath. ¨ Nausea or vomiting. ¨ Pain, pressure, or a strange feeling in the back, neck, jaw, or upper belly or in one or both shoulders or arms. ¨ Lightheadedness or sudden weakness. ¨ A fast or irregular heartbeat. After you call 911, the  may tell you to chew 1 adult-strength or 2 to 4 low-dose aspirin. Wait for an ambulance. Do not try to drive yourself.  
   · You passed out (lost consciousness).  
 Call your doctor now or seek immediate medical care if: 
   · You have new or increased shortness of breath.  
   · You are dizzy or lightheaded, or you feel like you may faint.  
   · Your fatigue and weakness continue or get worse.  
   · You have any abnormal bleeding, such as: 
¨ Nosebleeds. ¨ Vaginal bleeding that is different (heavier, more frequent, at a different time of the month) than what you are used to. ¨ Bloody or black stools, or rectal bleeding. ¨ Bloody or pink urine.  
 Watch closely for changes in your health, and be sure to contact your doctor if: 
   · You do not get better as expected. Where can you learn more? Go to http://gómez-maxine.info/. Enter R301 in the search box to learn more about \"Anemia: Care Instructions. \" Current as of: October 9, 2017 Content Version: 11.7 © 4972-4617 [employer]. Care instructions adapted under license by [employer] (which disclaims liability or warranty for this information). If you have questions about a medical condition or this instruction, always ask your healthcare professional. [address] any warranty or liability for your use of this information. 
  
 
 
These are general instructions for a healthy lifestyle: No smoking/ No tobacco products/ Avoid exposure to second hand smoke Surgeon General's Warning:  Quitting smoking now greatly reduces serious risk to your health. Obesity, smoking, and sedentary lifestyle greatly increases your risk for illness A healthy diet, regular physical exercise & weight monitoring are important for maintaining a healthy lifestyle You may be retaining fluid if you have a history of heart failure or if you experience any of the following symptoms:  Weight gain of 3 pounds or more overnight or 5 pounds in a week, increased swelling in our hands or feet or shortness of breath while lying flat in bed. Please call your doctor as soon as you notice any of these symptoms; do not wait until your next office visit. Recognize signs and symptoms of STROKE: 
 
F-face looks uneven A-arms unable to move or move unevenly S-speech slurred or non-existent T-time-call 911 as soon as signs and symptoms begin-DO NOT go Back to bed or wait to see if you get better-TIME IS BRAIN. Warning Signs of HEART ATTACK Call 911 if you have these symptoms: 
? Chest discomfort. Most heart attacks involve discomfort in the center of the chest that lasts more than a few minutes, or that goes away and comes back. It can feel like uncomfortable pressure, squeezing, fullness, or pain. ? Discomfort in other areas of the upper body. Symptoms can include pain or discomfort in one or both arms, the back, neck, jaw, or stomach. ? Shortness of breath with or without chest discomfort. ? Other signs may include breaking out in a cold sweat, nausea, or lightheadedness. Don't wait more than five minutes to call [address]! Fast action can save your life. Calling 911 is almost always the fastest way to get lifesaving treatment. Emergency Medical Services staff can begin treatment when they arrive  up to an hour sooner than if someone gets to the hospital by car. The discharge information has been reviewed with the {PATIENT PARENT GUARDIAN:89801}. The {PATIENT PARENT GUARDIAN:49119} verbalized understanding. Discharge medications reviewed with the {Dishcarge meds reviewed EHVD:48892} and appropriate educational materials and side effects teaching were provided. ___________________________________________________________________________________________________________________________________ ACO Transitions of Care [facility] offers a voluntary care coordination program to provide high quality service and care to [facility] fee-for-service beneficiaries. Lucie Moya was designed to help you enhance your health and well-being through the following services: ? Transitions of Care  support for individuals who are transitioning from one care setting to another (example: Hospital to home). ? Chronic and Complex Care Coordination  support for individuals and caregivers of those with serious or chronic illnesses or with more than one chronic (ongoing) condition and those who take a number of different medications. If you meet specific medical criteria, a [address] [place] [address] Rd may call you directly to coordinate your care with your primary care physician and your other care providers. For questions about the [facility] [place] programs, please, contact your physicians office. For general questions or additional information about Accountable Care Organizations: 
Please visit www.medicare.gov/acos. html or call 1-800-MEDICARE (9-246.445.9632) TTY users should call 9-190.456.5647. AssertID Announcement We are excited to announce that we are making your provider's discharge notes available to you in AssertID. You will see these notes when they are completed and signed by the physician that discharged you from your recent hospital stay. If you have any questions or concerns about any information you see in Callvinet, please call the Health Information Department where you were seen or reach out to your Primary Care Provider for more information about your plan of care. Introducing hospitals & HEALTH SERVICES! Dear Akila Martinez: 
Thank you for requesting a AssertID account. Our records indicate that you already have an active AssertID account.   You can access your account anytime at [email or web address] Did you know that you can access your hospital and ER discharge instructions at any time in Pixability? You can also review all of your test results from your hospital stay or ER visit. Additional Information If you have questions, please visit the Frequently Asked Questions section of the Pixability website at [email or web address][email or web address] Remember, Pixability is NOT to be used for urgent needs. For medical emergencies, dial 911. Now available from your iPhone and Android! Introducing Rafael Hernandes As a [employer] patient, I wanted to make you aware of our electronic visit tool called Rafael Hernandes. [employer] 24/7 allows you to connect within minutes with a medical provider 24 hours a day, seven days a week via a mobile device or tablet or logging into a secure website from your computer. You can access Rafael Hernandes from anywhere in the [place]. A virtual visit might be right for you when you have a simple condition and feel like you just dont want to get out of bed, or cant get away from work for an appointment, when your regular [employer] provider is not available (evenings, weekends or holidays), or when youre out of town and need minor care. Electronic visits cost only $49 and if the [employer] 24/7 provider determines a prescription is needed to treat your condition, one can be electronically transmitted to a nearby pharmacy*. Please take a moment to enroll today if you have not already done so. The enrollment process is free and takes just a few minutes. To enroll, please download the [employer] 24/7 hedy to your tablet or phone, or visit [email or web address] to enroll on your computer.    
And, as an [address] [place] [address] patient with a [employer] account, the results of your visits will be scanned into your electronic medical record and your primary care provider will be able to view the scanned results. We urge you to continue to see your regular [employer] provider for your ongoing medical care. And while your primary care provider may not be the one available when you seek a [facility] virtual visit, the peace of mind you get from getting a real diagnosis real time can be priceless. For more information on [facility] view our Frequently Asked Questions (FAQs) at [email or web address]. Sincerely, 
 
Sandy Hudson MD 
Chief Medical Officer TyrelTaryn Nolan Edgardo *:  certain medications cannot be prescribed via [facility] Providers Seen During Your Hospitalization Provider Specialty Primary office phone Ander Segal MD Emergency Medicine [phone] Benita England MD Family Practice [phone] Your Primary Care Physician (PCP) Primary Care Physician Office Phone Office Fax Hortencia Argueta [phone] You are allergic to the following Allergen Reactions Adhesive Rash Recent Documentation Height Weight BMI OB Status Smoking Status 1.6 m 66.7 kg 26.04 kg/m2 Postmenopausal Never Smoker Emergency Contacts Name Discharge Info Relation Home Work Mobile Eyad Barrera DISCHARGE CAREGIVER [3] Spouse [3] [phone] Antonietta Islas  Daughter [21] [phone] Patient Belongings The following personal items are in your possession at time of discharge: 
  Dental Appliances: None  Visual Aid: Glasses, With patient   Hearing Aids/Status: Bilateral  Home Medications: None   Jewelry: None  Clothing: Pants, Shirt, At bedside    Other Valuables: Cell Phone Please provide this summary of care documentation to your next provider. Signatures-by signing, you are acknowledging that this After Visit Summary has been reviewed with you and you have received a copy. Patient Signature:  ____________________________________________________________ Date:  ____________________________________________________________  
  
Aloma Snellen Provider Signature:  ____________________________________________________________ Date:  ____________________________________________________________

## 2018-07-13 NOTE — ED NOTES
TRANSFER - OUT REPORT:    Verbal report given to [address] [address] on Binh Walsh  being transferred to [address] [place] [address] for routine progression of care       Report consisted of patients Situation, Background, Assessment and   Recommendations(SBAR). Information from the following report(s) SBAR was reviewed with the receiving nurse. Lines:   Peripheral IV 07/13/18 Left Antecubital (Active)   Site Assessment Clean, dry, & intact 7/13/2018  7:14 PM   Phlebitis Assessment 0 7/13/2018  7:14 PM   Infiltration Assessment 0 7/13/2018  7:14 PM   Dressing Status Clean, dry, & intact 7/13/2018  7:14 PM   Action Taken Blood drawn 7/13/2018  7:14 PM        Opportunity for questions and clarification was provided.       Patient transported with:   [facility]

## 2018-07-14 ENCOUNTER — APPOINTMENT (OUTPATIENT)
Dept: CT IMAGING | Age: 83
DRG: 812 | End: 2018-07-14
Attending: FAMILY MEDICINE
Payer: MEDICARE

## 2018-07-14 LAB
ABO + RH BLD: NORMAL
ANION GAP SERPL CALC-SCNC: 7 MMOL/L (ref 7–16)
BASOPHILS # BLD: 0 K/UL (ref 0–0.2)
BASOPHILS NFR BLD: 0 % (ref 0–2)
BLD PROD TYP BPU: NORMAL
BLD PROD TYP BPU: NORMAL
BLOOD GROUP ANTIBODIES SERPL: NORMAL
BPU ID: NORMAL
BPU ID: NORMAL
BUN SERPL-MCNC: 19 MG/DL (ref 8–23)
CALCIUM SERPL-MCNC: 8.6 MG/DL (ref 8.3–10.4)
CHLORIDE SERPL-SCNC: 109 MMOL/L (ref 98–107)
CO2 SERPL-SCNC: 24 MMOL/L (ref 21–32)
CREAT SERPL-MCNC: 0.87 MG/DL (ref 0.6–1)
CROSSMATCH RESULT,%XM: NORMAL
CROSSMATCH RESULT,%XM: NORMAL
DIFFERENTIAL METHOD BLD: ABNORMAL
EOSINOPHIL # BLD: 0.1 K/UL (ref 0–0.8)
EOSINOPHIL NFR BLD: 2 % (ref 0.5–7.8)
ERYTHROCYTE [DISTWIDTH] IN BLOOD BY AUTOMATED COUNT: 16.3 % (ref 11.9–14.6)
GLUCOSE SERPL-MCNC: 115 MG/DL (ref 65–100)
HCT VFR BLD AUTO: 27.7 % (ref 35.8–46.3)
HCT VFR BLD AUTO: 29.2 % (ref 35.8–46.3)
HGB BLD-MCNC: 8.5 G/DL (ref 11.7–15.4)
HGB BLD-MCNC: 9.2 G/DL (ref 11.7–15.4)
IMM GRANULOCYTES # BLD: 0 K/UL (ref 0–0.5)
IMM GRANULOCYTES NFR BLD AUTO: 0 % (ref 0–5)
LYMPHOCYTES # BLD: 1.7 K/UL (ref 0.5–4.6)
LYMPHOCYTES NFR BLD: 39 % (ref 13–44)
MCH RBC QN AUTO: 26.3 PG (ref 26.1–32.9)
MCHC RBC AUTO-ENTMCNC: 31.5 G/DL (ref 31.4–35)
MCV RBC AUTO: 83.4 FL (ref 79.6–97.8)
MONOCYTES # BLD: 0.7 K/UL (ref 0.1–1.3)
MONOCYTES NFR BLD: 16 % (ref 4–12)
NEUTS SEG # BLD: 1.8 K/UL (ref 1.7–8.2)
NEUTS SEG NFR BLD: 43 % (ref 43–78)
PLATELET # BLD AUTO: 223 K/UL (ref 150–450)
PMV BLD AUTO: 10.9 FL (ref 10.8–14.1)
POTASSIUM SERPL-SCNC: 4 MMOL/L (ref 3.5–5.1)
RBC # BLD AUTO: 3.5 M/UL (ref 4.05–5.25)
SODIUM SERPL-SCNC: 140 MMOL/L (ref 136–145)
SPECIMEN EXP DATE BLD: NORMAL
STATUS OF UNIT,%ST: NORMAL
STATUS OF UNIT,%ST: NORMAL
UNIT DIVISION, %UDIV: 0
UNIT DIVISION, %UDIV: 0
WBC # BLD AUTO: 4.3 K/UL (ref 4.3–11.1)

## 2018-07-14 PROCEDURE — 85018 HEMOGLOBIN: CPT | Performed by: NURSE PRACTITIONER

## 2018-07-14 PROCEDURE — 74011636320 HC RX REV CODE- 636/320: Performed by: FAMILY MEDICINE

## 2018-07-14 PROCEDURE — 36415 COLL VENOUS BLD VENIPUNCTURE: CPT | Performed by: FAMILY MEDICINE

## 2018-07-14 PROCEDURE — 74177 CT ABD & PELVIS W/CONTRAST: CPT

## 2018-07-14 PROCEDURE — 74011250636 HC RX REV CODE- 250/636: Performed by: FAMILY MEDICINE

## 2018-07-14 PROCEDURE — 77030020263 HC SOL INJ SOD CL0.9% LFCR 1000ML

## 2018-07-14 PROCEDURE — 74011250637 HC RX REV CODE- 250/637: Performed by: FAMILY MEDICINE

## 2018-07-14 PROCEDURE — 85025 COMPLETE CBC W/AUTO DIFF WBC: CPT | Performed by: FAMILY MEDICINE

## 2018-07-14 PROCEDURE — 80048 BASIC METABOLIC PNL TOTAL CA: CPT | Performed by: FAMILY MEDICINE

## 2018-07-14 PROCEDURE — 65270000029 HC RM PRIVATE

## 2018-07-14 PROCEDURE — 77030032490 HC SLV COMPR SCD KNE COVD -B

## 2018-07-14 PROCEDURE — 74011000258 HC RX REV CODE- 258: Performed by: FAMILY MEDICINE

## 2018-07-14 RX ORDER — SODIUM CHLORIDE 0.9 % (FLUSH) 0.9 %
10 SYRINGE (ML) INJECTION
Status: COMPLETED | OUTPATIENT
Start: 2018-07-14 | End: 2018-07-14

## 2018-07-14 RX ORDER — BISACODYL 5 MG
20 TABLET, DELAYED RELEASE (ENTERIC COATED) ORAL ONCE
Status: COMPLETED | OUTPATIENT
Start: 2018-07-15 | End: 2018-07-15

## 2018-07-14 RX ORDER — TEMAZEPAM 7.5 MG/1
7.5 CAPSULE ORAL
Status: DISCONTINUED | OUTPATIENT
Start: 2018-07-14 | End: 2018-07-17 | Stop reason: HOSPADM

## 2018-07-14 RX ORDER — POLYETHYLENE GLYCOL 3350 17 G/17G
255 POWDER, FOR SOLUTION ORAL ONCE
Status: COMPLETED | OUTPATIENT
Start: 2018-07-15 | End: 2018-07-15

## 2018-07-14 RX ORDER — HYDRALAZINE HYDROCHLORIDE 25 MG/1
50 TABLET, FILM COATED ORAL
Status: DISCONTINUED | OUTPATIENT
Start: 2018-07-14 | End: 2018-07-17 | Stop reason: HOSPADM

## 2018-07-14 RX ADMIN — PANTOPRAZOLE SODIUM 40 MG: 40 TABLET, DELAYED RELEASE ORAL at 07:51

## 2018-07-14 RX ADMIN — HYDRALAZINE HYDROCHLORIDE 50 MG: 25 TABLET, FILM COATED ORAL at 00:59

## 2018-07-14 RX ADMIN — Medication 10 ML: at 21:56

## 2018-07-14 RX ADMIN — TEMAZEPAM 7.5 MG: 7.5 CAPSULE ORAL at 22:13

## 2018-07-14 RX ADMIN — METOPROLOL SUCCINATE 12.5 MG: 25 TABLET, EXTENDED RELEASE ORAL at 09:10

## 2018-07-14 RX ADMIN — SODIUM CHLORIDE 1000 ML: 900 INJECTION, SOLUTION INTRAVENOUS at 02:06

## 2018-07-14 RX ADMIN — ESCITALOPRAM OXALATE 10 MG: 10 TABLET ORAL at 17:25

## 2018-07-14 RX ADMIN — Medication 5 ML: at 05:26

## 2018-07-14 RX ADMIN — DIATRIZOATE MEGLUMINE AND DIATRIZOATE SODIUM 15 ML: 660; 100 LIQUID ORAL; RECTAL at 07:51

## 2018-07-14 RX ADMIN — SODIUM CHLORIDE 100 ML: 900 INJECTION, SOLUTION INTRAVENOUS at 09:34

## 2018-07-14 RX ADMIN — IOPAMIDOL 100 ML: 755 INJECTION, SOLUTION INTRAVENOUS at 09:35

## 2018-07-14 RX ADMIN — ATORVASTATIN CALCIUM 40 MG: 40 TABLET, FILM COATED ORAL at 21:51

## 2018-07-14 RX ADMIN — Medication 10 ML: at 09:34

## 2018-07-14 RX ADMIN — LEVOTHYROXINE SODIUM 50 MCG: 50 TABLET ORAL at 07:51

## 2018-07-14 NOTE — PROGRESS NOTES
Patient bp is trending up with last bp 171/91 with a HR of 62. Dr. Sridhar Bentley notified and order received for hydralazine 50 mg PO H7J prn for systolic bp >160 and to give her a dose now while blood is infusing.

## 2018-07-14 NOTE — ED PROVIDER NOTES
HPI Comments: Patient is an 79 yo female who presents with anemia. Patient was seen by her cardiologist for fatigue, SOB, and lightheadedness with walking for the past few days who ordered bloodwork and found her to be anemic with hgb 7 from 11 when checked a couple months prior. She denies any chest pain, no abdominal pain, no recent falls or injuries, no dark or tarry stools however states she did have some diarrhea last week which was watery in nature. Overall patient is very well appearing, NAD. Patient is a 80 y.o. female presenting with abnormal lab results. The history is provided by the patient. No  was used. Abnormal Lab Results   Pertinent negatives include no chest pain, no abdominal pain, no headaches and no shortness of breath. 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 Normal LV systolic function.     Hypercholesteremia 1/14/2013    Hypertension     Hypothyroidism 1/14/2013    Osteoarthritis of back 1/2/2014    Osteoporosis     Pulmonary embolism ([address] 2009    after knee surgery    S/P total knee arthroplasty 1/13/2017    Sick sinus syndrome ([address] 1/7/2016    Tachycardia-Bradycardia    UTI (urinary tract infection)        Past Surgical History:   Procedure Laterality Date    HX APPENDECTOMY  1965    HX BREAST BIOPSY Bilateral     HX CATARACT REMOVAL Right 2010    HX CORONARY STENT PLACEMENT  05/2014    LAD X 3    HX HEART CATHETERIZATION  09/17/2015  HX HEENT      multiple eye surgeries - left - macular hoe, retinal detachment twice,     HX HYSTERECTOMY  1970    HX KNEE REPLACEMENT  2009    HX KNEE REPLACEMENT Left 01/12/2017    HX ORTHOPAEDIC  2006 and 2008    herniated disc    HX TONSIL AND ADENOIDECTOMY  1934         Family History:   Problem Relation Age of Onset    Cancer Mother     Breast Cancer Mother     Cancer Father     Cancer Sister     Cancer Brother      pancreas    Breast Cancer Maternal Aunt        Social History     Social History    Marital status:      Spouse name: N/A    Number of children: N/A    Years of education: N/A     Occupational History    Not on file. Social History Main Topics    Smoking status: Never Smoker    Smokeless tobacco: Never Used    Alcohol use No    Drug use: No    Sexual activity: Not on file     Other Topics Concern    Not on file     Social History Narrative    No family/social/cultural issues pertinent to care         ALLERGIES: Adhesive    Review of Systems   Constitutional: Positive for fatigue. Negative for activity change, chills and fever. HENT: Negative for rhinorrhea and sore throat. Eyes: Negative for visual disturbance. Respiratory: Negative for cough and shortness of breath. Cardiovascular: Negative for chest pain and leg swelling. Gastrointestinal: Positive for diarrhea. Negative for abdominal pain, nausea and vomiting. Genitourinary: Negative for dysuria. Musculoskeletal: Negative for back pain and neck pain. Skin: Negative for rash. Neurological: Positive for light-headedness. Negative for weakness and headaches. Psychiatric/Behavioral: The patient is not nervous/anxious.         Vitals:    07/13/18 1831 07/13/18 1924 07/13/18 1925 07/13/18 2014   BP: 162/72 175/77  161/63   Pulse: 68  70 60   Resp: 21   18   Temp: 98.5 °F (36.9 °C)   98.5 °F (36.9 °C)   SpO2: 99%  100% 99%   Weight: 66.7 kg (147 lb)      Height: 5' 3\" (1.6 m) Physical Exam   Constitutional: She is oriented to person, place, and time. She appears well-developed and well-nourished. HENT:   Head: Normocephalic. Right Ear: External ear normal.   Left Ear: External ear normal.   Eyes: Conjunctivae and EOM are normal. Pupils are equal, round, and reactive to light. Neck: Normal range of motion. Neck supple. No tracheal deviation present. Cardiovascular: Normal rate, regular rhythm, normal heart sounds and intact distal pulses. No murmur heard. Pulmonary/Chest: Effort normal and breath sounds normal. No respiratory distress. Abdominal: Soft. She exhibits no distension. There is no tenderness. There is no rebound. Non-tender to deep palpation through-out entire abdomen. Very benign abdominal exam.   Musculoskeletal: Normal range of motion. Neurological: She is alert and oriented to person, place, and time. No cranial nerve deficit. Skin: No rash noted. Nursing note and vitals reviewed.        MDM  Number of Diagnoses or Management Options  Anemia, unspecified type: new and requires workup     Amount and/or Complexity of Data Reviewed  Clinical lab tests: ordered and reviewed  Tests in the radiology section of CPT®: ordered and reviewed  Tests in the medicine section of CPT®: ordered and reviewed  Review and summarize past medical records: yes    Risk of Complications, Morbidity, and/or Mortality  Presenting problems: high  Diagnostic procedures: high  Management options: high    Patient Progress  Patient progress: stable        ED Course       Procedures    Recent Results (from the past 12 hour(s))   AMB POC PT/INR    Collection Time: 07/13/18  9:37 AM   Result Value Ref Range    VALID INTERNAL CONTROL POC Yes     Prothrombin time (POC)  seconds    INR POC 2.2    CBC WITH AUTOMATED DIFF    Collection Time: 07/13/18 11:16 AM   Result Value Ref Range    WBC 5.6 4.3 - 11.1 K/uL    RBC 2.73 (L) 4.05 - 5.25 M/uL    HGB 7.4 (L) 11.7 - 15.4 g/dL    HCT 23.4 (L) 35.8 - 46.3 %    MCV 85.7 79.6 - 97.8 FL    MCH 27.1 26.1 - 32.9 PG    MCHC 31.6 31.4 - 35.0 g/dL    RDW 16.6 (H) 11.9 - 14.6 %    PLATELET 155 337 - 916 K/uL    MPV 10.6 (L) 10.8 - 14.1 FL    DF AUTOMATED      NEUTROPHILS 54 43 - 78 %    LYMPHOCYTES 33 13 - 44 %    MONOCYTES 12 4.0 - 12.0 %    EOSINOPHILS 1 0.5 - 7.8 %    BASOPHILS 0 0.0 - 2.0 %    ABS. NEUTROPHILS 3.1 1.7 - 8.2 K/UL    ABS. LYMPHOCYTES 1.8 0.5 - 4.6 K/UL    ABS. MONOCYTES 0.6 0.1 - 1.3 K/UL    ABS. EOSINOPHILS 0.0 0.0 - 0.8 K/UL    ABS. BASOPHILS 0.0 0.0 - 0.2 K/UL   METABOLIC PANEL, BASIC    Collection Time: 07/13/18 11:16 AM   Result Value Ref Range    Sodium 139 136 - 145 mmol/L    Potassium 3.9 3.5 - 5.1 mmol/L    Chloride 110 (H) 98 - 107 mmol/L    CO2 20 (L) 21 - 32 mmol/L    Anion gap 9 mmol/L    Glucose 101 (H) 65 - 100 mg/dL    BUN 13 8 - 23 MG/DL    Creatinine 1.00 0.6 - 1.0 MG/DL    GFR est AA >60 ml/min/1.73m2    GFR est non-AA 56 ml/min/1.73m2    Calcium 8.8 8.3 - 10.4 MG/DL   CBC WITH AUTOMATED DIFF    Collection Time: 07/13/18  6:56 PM   Result Value Ref Range    WBC 5.1 4.3 - 11.1 K/uL    RBC 2.60 (L) 4.05 - 5.25 M/uL    HGB 6.8 (LL) 11.7 - 15.4 g/dL    HCT 21.7 (L) 35.8 - 46.3 %    MCV 83.5 79.6 - 97.8 FL    MCH 26.2 26.1 - 32.9 PG    MCHC 31.3 (L) 31.4 - 35.0 g/dL    RDW 17.0 (H) 11.9 - 14.6 %    PLATELET 855 578 - 365 K/uL    MPV 11.0 10.8 - 14.1 FL    DF AUTOMATED      NEUTROPHILS 48 43 - 78 %    LYMPHOCYTES 39 13 - 44 %    MONOCYTES 11 4.0 - 12.0 %    EOSINOPHILS 1 0.5 - 7.8 %    BASOPHILS 1 0.0 - 2.0 %    IMMATURE GRANULOCYTES 0 0.0 - 5.0 %    ABS. NEUTROPHILS 2.5 1.7 - 8.2 K/UL    ABS. LYMPHOCYTES 2.0 0.5 - 4.6 K/UL    ABS. MONOCYTES 0.6 0.1 - 1.3 K/UL    ABS. EOSINOPHILS 0.1 0.0 - 0.8 K/UL    ABS. BASOPHILS 0.0 0.0 - 0.2 K/UL    ABS. IMM.  GRANS. 0.0 0.0 - 0.5 K/UL   METABOLIC PANEL, COMPREHENSIVE    Collection Time: 07/13/18  6:56 PM   Result Value Ref Range    Sodium 138 136 - 145 mmol/L    Potassium 3.6 3.5 - 5.1 mmol/L Chloride 105 98 - 107 mmol/L    CO2 23 21 - 32 mmol/L    Anion gap 10 7 - 16 mmol/L    Glucose 147 (H) 65 - 100 mg/dL    BUN 21 8 - 23 MG/DL    Creatinine 1.16 (H) 0.6 - 1.0 MG/DL    GFR est AA 57 (L) >60 ml/min/1.73m2    GFR est non-AA 47 (L) >60 ml/min/1.73m2    Calcium 9.2 8.3 - 10.4 MG/DL    Bilirubin, total 0.6 0.2 - 1.1 MG/DL    ALT (SGPT) 31 12 - 65 U/L    AST (SGOT) 26 15 - 37 U/L    Alk. phosphatase 71 50 - 136 U/L    Protein, total 6.2 (L) 6.3 - 8.2 g/dL    Albumin 2.9 (L) 3.2 - 4.6 g/dL    Globulin 3.3 2.3 - 3.5 g/dL    A-G Ratio 0.9 (L) 1.2 - 3.5     PROTHROMBIN TIME + INR    Collection Time: 07/13/18  6:56 PM   Result Value Ref Range    Prothrombin time 24.7 (H) 11.5 - 14.5 sec    INR 2.1     TYPE & SCREEN    Collection Time: 07/13/18  7:07 PM   Result Value Ref Range    Crossmatch Expiration 07/16/2018     ABO/Rh(D) Micki Stai POSITIVE     Antibody screen NEG     Unit number [id number]     Blood component type [facility]     Unit division 00     Status of unit ISSUED     Crossmatch result Compatible     Unit number [id number]     Blood component type [facility]     Unit division 00     Status of unit ALLOCATED     Crossmatch result Compatible      [facility] Mammo Bi Screening Incl Cad    Result Date: 6/21/2018  BILATERAL SCREENING MAMMOGRAM, 6/20/2018. CLINICAL HISTORY: Routine screening mammogram. Comparison studies:  Screening mammograms 5/11/2017, and 10/14/2015. FINDINGS: Bilateral digital screening mammography, interpreted in conjunction with CAD overread. The breasts are heterogeneously dense which can obscure a subtle lesion. The axilla contain benign appearing lymph nodes. No evidence of evolving skin thickening, or nipple retraction is seen. Bilateral breast calcifications are seen which are not felt to have significantly changed. No evolving unique clustered microcalcifications are seen to suggest a worrisome process. No evolving dominant mass, spiculated density, or architectural distortion is seen. Breast nodules and asymmetries are seen which are not felt to be significantly changed. IMPRESSION: 1. Heterogeneously dense breasts without evolving changes suggestive of malignancy. Therefore, routine followup is recommended with screening mammogram in 1 year unless clinically indicated sooner. A reminder letter will be scheduled. We are mailing breast density information to the patient along with the mammogram report. BI-RADS Assessment Category 2: Benign finding(s). BD4     79 yo female with anemia:     Patient is fecal occult positive, non-melanotic light brown stool. No abdominal or back tenderness, no rashes or other signs of bleeding, no falls or trauma. Will type and cross and give blood and patient to be admitted for GI consult and further workup. Patient does ask about CT abdomen as she states Dr. Jennifer Nunez states she would get one however with fecal occult positive and no trauma or abdominal or back pain feel GI workup indicated first, however on discussion with hospitalist we will admit and CT to be ordered due to patient request and HGB did drop slightly since drawn earlier today.

## 2018-07-14 NOTE — PROGRESS NOTES
Hospitalist Progress Note Admit Date:  2018  6:28 PM  
Name:  Doug Trent Age:  80 y.o. 
:  1929 MRN:  [id number] PCP:  Nighat Castle MD 
Treatment Team: Attending Provider: Debbi Limon MD 
 
Subjective:  
  
Patient is a 80 y.o. female who presents to the ER due to anemia. She saw her cardiologist for weakness and fatigue and some SOB. Labs were done and he called her tonight and told her to come to the ER. She denies any melena or brbpr. She did have watery diarrhea last week which has greatly improved. Denies chest or abdominal pain. She is on coumadin, denies any recent injury or trauma. She was heme + on exam in ER. hgb was 11 in may and now 6.8. 
 
:  CC:  Weakness S/P 2 units pRBC, Hb this am 9.2. Reports SOB resolved. No CP. No abd pain. No N/V/D.  GI eval pending. Pt unsure if she has had diverticulosis dx in past.  She states she did note darker than usual stools last week. Objective:  
Patient Vitals for the past 24 hrs: 
 Temp Pulse Resp BP SpO2  
18 0829 97.9 °F (36.6 °C) 71 18 154/65 96 % 18 0358 97.8 °F (36.6 °C) 74 18 137/61 96 % 18 0202 97 °F (36.1 °C) 62 16 175/60 96 % 18 0058 97 °F (36.1 °C) 69 18 177/82 96 % 18 2356 97.2 °F (36.2 °C) 62 18 (!) 171/91 97 % 18 2338 98 °F (36.7 °C) 61 18 173/89 99 % 18 2240 97.6 °F (36.4 °C) 60 18 169/72 99 % 18 2134 97.7 °F (36.5 °C) (!) 59 18 167/87 98 % 184 97.3 °F (36.3 °C) 60 18 161/60 98 % 18 98.5 °F (36.9 °C) 60 18 161/63 99 % 18 - 70 - - 100 % 18 - - - 175/77 -  
18 1831 98.5 °F (36.9 °C) 68 21 162/72 99 % Oxygen Therapy O2 Sat (%): 96 % (18 0829) Pulse via Oximetry: 70 beats per minute (18) Intake/Output Summary (Last 24 hours) at 18 1029 Last data filed at 18 2886 Gross per 24 hour Intake            647.7 ml Output             1300 ml  
Net           -652.3 ml  
   
 
General:    Well nourished. Alert. AAOx3 
OP:  Moist 
Neck:  supple CV:   RRR. No murmur, rub, or gallop. Lungs:   CTAB. No wheezing, rhonchi, or rales. Abdomen:   Soft, nontender, nondistended. Extremities: Warm and dry. No cyanosis or edema. Skin:     Pale. No rashes or jaundice. Neuro:  Nonfocal 
Data Review: 
I have reviewed all labs, meds, telemetry events, and studies from the last 24 hours. Recent Results (from the past 24 hour(s)) CBC WITH AUTOMATED DIFF Collection Time: 07/13/18 11:16 AM  
Result Value Ref Range WBC 5.6 4.3 - 11.1 K/uL  
 RBC 2.73 (L) 4.05 - 5.25 M/uL HGB 7.4 (L) 11.7 - 15.4 g/dL HCT 23.4 (L) 35.8 - 46.3 % MCV 85.7 79.6 - 97.8 FL  
 MCH 27.1 26.1 - 32.9 PG  
 MCHC 31.6 31.4 - 35.0 g/dL  
 RDW 16.6 (H) 11.9 - 14.6 % PLATELET 392 804 - 322 K/uL MPV 10.6 (L) 10.8 - 14.1 FL  
 DF AUTOMATED NEUTROPHILS 54 43 - 78 % LYMPHOCYTES 33 13 - 44 % MONOCYTES 12 4.0 - 12.0 % EOSINOPHILS 1 0.5 - 7.8 % BASOPHILS 0 0.0 - 2.0 %  
 ABS. NEUTROPHILS 3.1 1.7 - 8.2 K/UL  
 ABS. LYMPHOCYTES 1.8 0.5 - 4.6 K/UL  
 ABS. MONOCYTES 0.6 0.1 - 1.3 K/UL  
 ABS. EOSINOPHILS 0.0 0.0 - 0.8 K/UL  
 ABS. BASOPHILS 0.0 0.0 - 0.2 K/UL METABOLIC PANEL, BASIC Collection Time: 07/13/18 11:16 AM  
Result Value Ref Range Sodium 139 136 - 145 mmol/L Potassium 3.9 3.5 - 5.1 mmol/L Chloride 110 (H) 98 - 107 mmol/L  
 CO2 20 (L) 21 - 32 mmol/L Anion gap 9 mmol/L Glucose 101 (H) 65 - 100 mg/dL BUN 13 8 - 23 MG/DL Creatinine 1.00 0.6 - 1.0 MG/DL  
 GFR est AA >60 ml/min/1.73m2 GFR est non-AA 56 ml/min/1.73m2 Calcium 8.8 8.3 - 10.4 MG/DL  
CBC WITH AUTOMATED DIFF Collection Time: 07/13/18  6:56 PM  
Result Value Ref Range WBC 5.1 4.3 - 11.1 K/uL  
 RBC 2.60 (L) 4.05 - 5.25 M/uL HGB 6.8 (LL) 11.7 - 15.4 g/dL HCT 21.7 (L) 35.8 - 46.3 %  MCV 83.5 79.6 - 97.8 FL  
 MCH 26.2 26.1 - 32.9 PG  
 MCHC 31.3 (L) 31.4 - 35.0 g/dL  
 RDW 17.0 (H) 11.9 - 14.6 % PLATELET 798 584 - 579 K/uL MPV 11.0 10.8 - 14.1 FL  
 DF AUTOMATED NEUTROPHILS 48 43 - 78 % LYMPHOCYTES 39 13 - 44 % MONOCYTES 11 4.0 - 12.0 % EOSINOPHILS 1 0.5 - 7.8 % BASOPHILS 1 0.0 - 2.0 % IMMATURE GRANULOCYTES 0 0.0 - 5.0 %  
 ABS. NEUTROPHILS 2.5 1.7 - 8.2 K/UL  
 ABS. LYMPHOCYTES 2.0 0.5 - 4.6 K/UL  
 ABS. MONOCYTES 0.6 0.1 - 1.3 K/UL  
 ABS. EOSINOPHILS 0.1 0.0 - 0.8 K/UL  
 ABS. BASOPHILS 0.0 0.0 - 0.2 K/UL  
 ABS. IMM. GRANS. 0.0 0.0 - 0.5 K/UL METABOLIC PANEL, COMPREHENSIVE Collection Time: 07/13/18  6:56 PM  
Result Value Ref Range Sodium 138 136 - 145 mmol/L Potassium 3.6 3.5 - 5.1 mmol/L Chloride 105 98 - 107 mmol/L  
 CO2 23 21 - 32 mmol/L Anion gap 10 7 - 16 mmol/L Glucose 147 (H) 65 - 100 mg/dL BUN 21 8 - 23 MG/DL Creatinine 1.16 (H) 0.6 - 1.0 MG/DL  
 GFR est AA 57 (L) >60 ml/min/1.73m2 GFR est non-AA 47 (L) >60 ml/min/1.73m2 Calcium 9.2 8.3 - 10.4 MG/DL Bilirubin, total 0.6 0.2 - 1.1 MG/DL  
 ALT (SGPT) 31 12 - 65 U/L  
 AST (SGOT) 26 15 - 37 U/L Alk. phosphatase 71 50 - 136 U/L Protein, total 6.2 (L) 6.3 - 8.2 g/dL Albumin 2.9 (L) 3.2 - 4.6 g/dL Globulin 3.3 2.3 - 3.5 g/dL A-G Ratio 0.9 (L) 1.2 - 3.5 PROTHROMBIN TIME + INR Collection Time: 07/13/18  6:56 PM  
Result Value Ref Range Prothrombin time 24.7 (H) 11.5 - 14.5 sec INR 2.1 TYPE & SCREEN Collection Time: 07/13/18  7:07 PM  
Result Value Ref Range Crossmatch Expiration 07/16/2018 ABO/Rh(D) O POSITIVE Antibody screen NEG Unit number [id number] Blood component type [facility] Unit division 00 Status of unit TRANSFUSED Crossmatch result Compatible Unit number [id number] Blood component type [facility] Unit division 00 Status of unit TRANSFUSED Crossmatch result Compatible METABOLIC PANEL, BASIC  Collection Time: 07/14/18  5:52 AM  
Result Value Ref Range Sodium 140 136 - 145 mmol/L Potassium 4.0 3.5 - 5.1 mmol/L Chloride 109 (H) 98 - 107 mmol/L  
 CO2 24 21 - 32 mmol/L Anion gap 7 7 - 16 mmol/L Glucose 115 (H) 65 - 100 mg/dL BUN 19 8 - 23 MG/DL Creatinine 0.87 0.6 - 1.0 MG/DL  
 GFR est AA >60 >60 ml/min/1.73m2 GFR est non-AA >60 >60 ml/min/1.73m2 Calcium 8.6 8.3 - 10.4 MG/DL  
CBC WITH AUTOMATED DIFF Collection Time: 07/14/18  5:52 AM  
Result Value Ref Range WBC 4.3 4.3 - 11.1 K/uL  
 RBC 3.50 (L) 4.05 - 5.25 M/uL HGB 9.2 (L) 11.7 - 15.4 g/dL HCT 29.2 (L) 35.8 - 46.3 % MCV 83.4 79.6 - 97.8 FL  
 MCH 26.3 26.1 - 32.9 PG  
 MCHC 31.5 31.4 - 35.0 g/dL  
 RDW 16.3 (H) 11.9 - 14.6 % PLATELET 261 234 - 772 K/uL MPV 10.9 10.8 - 14.1 FL  
 DF AUTOMATED NEUTROPHILS 43 43 - 78 % LYMPHOCYTES 39 13 - 44 % MONOCYTES 16 (H) 4.0 - 12.0 % EOSINOPHILS 2 0.5 - 7.8 % BASOPHILS 0 0.0 - 2.0 % IMMATURE GRANULOCYTES 0 0.0 - 5.0 %  
 ABS. NEUTROPHILS 1.8 1.7 - 8.2 K/UL  
 ABS. LYMPHOCYTES 1.7 0.5 - 4.6 K/UL  
 ABS. MONOCYTES 0.7 0.1 - 1.3 K/UL  
 ABS. EOSINOPHILS 0.1 0.0 - 0.8 K/UL  
 ABS. BASOPHILS 0.0 0.0 - 0.2 K/UL  
 ABS. IMM. GRANS. 0.0 0.0 - 0.5 K/UL All Micro Results None Current Meds: 
Current Facility-Administered Medications Medication Dose Route Frequency  hydrALAZINE (APRESOLINE) tablet 50 mg  50 mg Oral Q6H PRN  
 0.9% sodium chloride infusion 250 mL  250 mL IntraVENous PRN  
 aspirin delayed-release tablet 81 mg  81 mg Oral QPM  
 atorvastatin (LIPITOR) tablet 40 mg  40 mg Oral QHS  levothyroxine (SYNTHROID) tablet 50 mcg  50 mcg Oral ACB  metoprolol succinate (TOPROL-XL) XL tablet 12.5 mg  12.5 mg Oral DAILY  pantoprazole (PROTONIX) tablet 40 mg  40 mg Oral ACB  sodium chloride (NS) flush 5-10 mL  5-10 mL IntraVENous Q8H  
 sodium chloride (NS) flush 5-10 mL  5-10 mL IntraVENous PRN  
 acetaminophen (TYLENOL) tablet 650 mg  650 mg Oral Q4H PRN  
 HYDROcodone-acetaminophen (NORCO)  mg tablet 1 Tab  1 Tab Oral Q4H PRN  
 naloxone (NARCAN) injection 0.4 mg  0.4 mg IntraVENous PRN  
 diphenhydrAMINE (BENADRYL) capsule 25 mg  25 mg Oral Q4H PRN  prochlorperazine (COMPAZINE) injection 5 mg  5 mg IntraVENous Q8H PRN  
 bisacodyl (DULCOLAX) tablet 5 mg  5 mg Oral DAILY PRN  
 LORazepam (ATIVAN) tablet 1 mg  1 mg Oral BID PRN  
 escitalopram oxalate (LEXAPRO) tablet 10 mg  10 mg Oral QPM  
 
 
Other Studies (last 24 hours): No results found. Assessment and Plan:  
 
Hospital Problems as of 7/14/2018  Date Reviewed: 7/13/2018 Codes Class Noted - Resolved POA Weakness ICD-10-CM: R53.1 ICD-9-CM: 780.79  7/13/2018 - Present Yes Heme positive stool ICD-10-CM: R19.5 ICD-9-CM: 792.1  7/13/2018 - Present Yes * (Principal)Anemia due to GI blood loss 
-suspect subacute blood loss. No evidence active bleeding at this time. No abd pain. Coumadin on hold. INR 2.1 this am.  Will acutely reverse if evidence ongoing bleeding or if procedures (await GI eval) 
-monitor Hb and transfuse prn 
-I have suspicion this is due to diverticular bleed  
-remains on IV ppi for now as cannot exclude upper GIB 
- I do see a CT abb/pelvis was ordered earlier w contrast (no contrast necessary to r/o bleed, will attmept to dc contrast if this is not already given) ICD-10-CM: D50.0 ICD-9-CM: 280.0  7/13/2018 - Present Yes Long term (current) use of anticoagulants (Chronic) 
-coumadin held. (on coumadin for Afib) ICD-10-CM: Z79.01 
ICD-9-CM: V58.61  6/11/2018 - Present Yes Paroxysmal atrial fibrillation (HCC) 
-currently sinus rrhythm ICD-10-CM: I48.0 ICD-9-CM: 427.31  1/14/2013 - Present Yes Overview Signed 11/13/2015 11:17 AM by Saurav Hastings Paroxysmal  
  
  
   
  ICD-10-CM: D64.9 ICD-9-CM: 285.9  11/13/2015 - 7/13/2018 Yes PLAN:   
·  
 
DC planning/Dispo: DVT ppx:  SCD Signed: 
Rob Jenkins MD

## 2018-07-14 NOTE — H&P
HOSPITALIST H&P/CONSULT 
NAME:  Pierre Jackson Age:  80 y.o. 
:   1929 MRN:   [id number] PCP: Dali Kate MD 
Treatment Team: Attending Provider: Juan Castle MD 
 
Prior CC: Reason for admission is: anemia HPI:  
Patient history was obtained from the ER provider prior to seeing the patient. Patient is a 80 y.o. female who presents to the ER due to anemia. She saw her cardiologist for weakness and fatigue and some SOB. Labs were done and he called her tonight and told her to come to the ER. She denies any melena or brbpr. She did have watery diarrhea last week which has greatly improved. Denies chest or abdominal pain. She is on coumadin, denies any recent injury or trauma. She was heme + on exam in ER. hgb was 11 in may and now 6.8. 
 
 
ROS: 
All systems have been reviewed and are negative except as stated in HPI or elsewhere. Past Medical History:  
Diagnosis Date  Abnormal cardiovascular function study 2016  Allergic rhinitis 2013  Anemia 2015  Arthritis  Atrial fibrillation ([address] 2013  Blind left eye   
 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  
 stent to LAD; 3 stents total  
 Depression 2013  GERD (gastroesophageal reflux disease)  H/O percutaneous left heart catheterization 2015 Patent LAD stents in the proximal to mid vessel, as well as the distal vessel. Normal LV systolic function.  Hypercholesteremia 2013  Hypertension  Hypothyroidism 2013  Osteoarthritis of back 2014  Osteoporosis  Pulmonary embolism ([address] 2009  
 after knee surgery  S/P total knee arthroplasty 2017  Sick sinus syndrome ([address] 2016 Tachycardia-Bradycardia  UTI (urinary tract infection) Past Surgical History:  
Procedure Laterality Date [address]  HX BREAST BIOPSY Bilateral   
 HX CATARACT REMOVAL Right 2010  HX CORONARY STENT PLACEMENT  05/2014 LAD X 3  
 HX HEART CATHETERIZATION  09/17/2015  HX HEENT    
 multiple eye surgeries - left - macular hoe, retinal detachment twice,   
[facility]  HX KNEE REPLACEMENT  2009  HX KNEE REPLACEMENT Left 01/12/2017  HX ORTHOPAEDIC  2006 and 2008  
 herniated disc  HX TONSIL AND ADENOIDECTOMY  [id number] Social History Substance Use Topics  Smoking status: Never Smoker  Smokeless tobacco: Never Used  Alcohol use No  
  
Family History Problem Relation Age of Onset  Cancer Mother  Breast Cancer Mother  Cancer Father  Cancer Sister  Cancer Brother   
  pancreas  Breast Cancer Maternal Aunt FH Reviewed and non-contributory to admitting diagnosis Allergies Allergen Reactions  Adhesive Rash Prior to Admission Medications Prescriptions Last Dose Informant Patient Reported? Taking? Calcium Carbonate-Vit D3-Min (CALTRATE 600+D PLUS MINERALS) 600 mg calcium- 400 unit Tab   Yes No  
Sig: Take  by mouth daily. Cholecalciferol, Vitamin D3, (VITAMIN D3) 1,000 unit cap   Yes No  
Sig: Take  by mouth daily. L.ACIDOPHILUS/B.BIFIDUM&LONGUM (HEALTHY COLON PO)   Yes No  
Sig: Take  by mouth daily. VIT A/VIT C/VIT E/ZINC/COPPER (ICAPS AREDS PO)   Yes No  
Sig: Take  by mouth. Twice a day  
acetaminophen (TYLENOL ARTHRITIS PAIN) 650 mg CR tablet   Yes No  
Sig: Take 650 mg by mouth every six (6) hours as needed for Pain. aspirin delayed-release 81 mg tablet   Yes No  
Sig: Take 81 mg by mouth every evening. atorvastatin (LIPITOR) 40 mg tablet   No No  
Sig: TAKE 1 TABLET EVERY DAY  
biotin 2,500 mcg tab   Yes No  
Sig: Take  by mouth daily. clopidogrel (PLAVIX) 75 mg tab   No No  
Sig: Take 1 Tab by mouth every evening. cyanocobalamin (VITAMIN B-12) 100 mcg tablet   Yes No  
Sig: Take 1,000 mcg by mouth daily.   
diclofenac (VOLTAREN) 1 % gel   Yes No  
Sig: Apply  to affected area as needed. escitalopram oxalate (LEXAPRO) 10 mg tablet   No No  
Sig: Take 1 Tab by mouth daily. estradiol (ESTRACE) 0.01 % (0.1 mg/gram) vaginal cream   No No  
Sig: Apply 1 gm to vagina 3x/week at hs  
levothyroxine (SYNTHROID) 50 mcg tablet   No No  
Sig: Take 1 Tab by mouth Daily (before breakfast). metoprolol succinate (TOPROL-XL) 25 mg XL tablet   No No  
Sig: Take 0.5 Tabs by mouth daily. nitroglycerin (NITROSTAT) 0.3 mg SL tablet   No No  
Si Tab by SubLINGual route every five (5) minutes as needed for Chest Pain.  
pantoprazole (PROTONIX) 40 mg granules for oral suspension   Yes No  
Si mg daily. potassium 99 mg tablet   Yes No  
Sig: Take 99 mg by mouth every evening. vitamin E (AQUA GEMS) 400 unit capsule   Yes No  
Sig: Take  by mouth daily. warfarin (COUMADIN) 5 mg tablet   No No  
Si tab every day or as directed  
zolpidem (AMBIEN) 10 mg tablet   No No  
Sig: Take 1 Tab by mouth nightly as needed for Sleep. Max Daily Amount: 10 mg. Facility-Administered Medications: None Objective:  
Patient Vitals for the past 24 hrs: 
 Temp Pulse Resp BP SpO2  
18 - 70 - - 100 % 18 - - - 175/77 -  
18 1831 98.5 °F (36.9 °C) 68 21 162/72 99 % No intake or output data in the 24 hours ending 18 Temp (24hrs), Av.5 °F (36.9 °C), Min:98.5 °F (36.9 °C), Max:98.5 °F (36.9 °C) Oxygen Therapy O2 Sat (%): 100 % (18) Pulse via Oximetry: 70 beats per minute (18) Body mass index is 26.04 kg/(m^2). Physical Exam: 
 
General:    WD and WN, No apparent distress. Appears younger than stated Head:   Normocephalic, without obvious abnormality, atraumatic. Eyes:  PERRL; EOMI; sclera normal/non-icteric ENT:  Hearing is normal.  Oropharynx is clear with tacky mucous membranes Resp:    Clear to auscultation bilaterally. No Wheezing or Rhonchi. Resp are even and unlabored Heart[de-identified]  Regular rate and rhythm, no murmur,   No LE edema Abdomen:   Soft, non-tender. Not distended. Bowel sounds normal.  hepato-splenomegaly-none Musc/SK: Muscle strength is good and tone normal; No cyanosis. No clubbing Skin:     Texture, turgor normal. No significant rashes or lesions. Neurologic: CN II - XII are grossly intact - other than Eye exam as noted above Psych: Alert and oriented x 4;  Judgement and insight are normal 
  
Data Review:  
Recent Results (from the past 24 hour(s)) AMB POC PT/INR Collection Time: 07/13/18  9:37 AM  
Result Value Ref Range VALID INTERNAL CONTROL POC Yes Prothrombin time (POC)  seconds INR POC 2.2   
CBC WITH AUTOMATED DIFF Collection Time: 07/13/18 11:16 AM  
Result Value Ref Range WBC 5.6 4.3 - 11.1 K/uL  
 RBC 2.73 (L) 4.05 - 5.25 M/uL HGB 7.4 (L) 11.7 - 15.4 g/dL HCT 23.4 (L) 35.8 - 46.3 % MCV 85.7 79.6 - 97.8 FL  
 MCH 27.1 26.1 - 32.9 PG  
 MCHC 31.6 31.4 - 35.0 g/dL  
 RDW 16.6 (H) 11.9 - 14.6 % PLATELET 898 454 - 464 K/uL MPV 10.6 (L) 10.8 - 14.1 FL  
 DF AUTOMATED NEUTROPHILS 54 43 - 78 % LYMPHOCYTES 33 13 - 44 % MONOCYTES 12 4.0 - 12.0 % EOSINOPHILS 1 0.5 - 7.8 % BASOPHILS 0 0.0 - 2.0 %  
 ABS. NEUTROPHILS 3.1 1.7 - 8.2 K/UL  
 ABS. LYMPHOCYTES 1.8 0.5 - 4.6 K/UL  
 ABS. MONOCYTES 0.6 0.1 - 1.3 K/UL  
 ABS. EOSINOPHILS 0.0 0.0 - 0.8 K/UL  
 ABS. BASOPHILS 0.0 0.0 - 0.2 K/UL METABOLIC PANEL, BASIC Collection Time: 07/13/18 11:16 AM  
Result Value Ref Range Sodium 139 136 - 145 mmol/L Potassium 3.9 3.5 - 5.1 mmol/L Chloride 110 (H) 98 - 107 mmol/L  
 CO2 20 (L) 21 - 32 mmol/L Anion gap 9 mmol/L Glucose 101 (H) 65 - 100 mg/dL BUN 13 8 - 23 MG/DL Creatinine 1.00 0.6 - 1.0 MG/DL  
 GFR est AA >60 ml/min/1.73m2 GFR est non-AA 56 ml/min/1.73m2 Calcium 8.8 8.3 - 10.4 MG/DL  
CBC WITH AUTOMATED DIFF Collection Time: 07/13/18  6:56 PM  
Result Value Ref Range  WBC 5.1 4.3 - 11.1 K/uL  
 RBC 2.60 (L) 4.05 - 5.25 M/uL HGB 6.8 (LL) 11.7 - 15.4 g/dL HCT 21.7 (L) 35.8 - 46.3 % MCV 83.5 79.6 - 97.8 FL  
 MCH 26.2 26.1 - 32.9 PG  
 MCHC 31.3 (L) 31.4 - 35.0 g/dL  
 RDW 17.0 (H) 11.9 - 14.6 % PLATELET 122 051 - 022 K/uL MPV 11.0 10.8 - 14.1 FL  
 DF AUTOMATED NEUTROPHILS 48 43 - 78 % LYMPHOCYTES 39 13 - 44 % MONOCYTES 11 4.0 - 12.0 % EOSINOPHILS 1 0.5 - 7.8 % BASOPHILS 1 0.0 - 2.0 % IMMATURE GRANULOCYTES 0 0.0 - 5.0 %  
 ABS. NEUTROPHILS 2.5 1.7 - 8.2 K/UL  
 ABS. LYMPHOCYTES 2.0 0.5 - 4.6 K/UL  
 ABS. MONOCYTES 0.6 0.1 - 1.3 K/UL  
 ABS. EOSINOPHILS 0.1 0.0 - 0.8 K/UL  
 ABS. BASOPHILS 0.0 0.0 - 0.2 K/UL  
 ABS. IMM. GRANS. 0.0 0.0 - 0.5 K/UL METABOLIC PANEL, COMPREHENSIVE Collection Time: 07/13/18  6:56 PM  
Result Value Ref Range Sodium 138 136 - 145 mmol/L Potassium 3.6 3.5 - 5.1 mmol/L Chloride 105 98 - 107 mmol/L  
 CO2 23 21 - 32 mmol/L Anion gap 10 7 - 16 mmol/L Glucose 147 (H) 65 - 100 mg/dL BUN 21 8 - 23 MG/DL Creatinine 1.16 (H) 0.6 - 1.0 MG/DL  
 GFR est AA 57 (L) >60 ml/min/1.73m2 GFR est non-AA 47 (L) >60 ml/min/1.73m2 Calcium 9.2 8.3 - 10.4 MG/DL Bilirubin, total 0.6 0.2 - 1.1 MG/DL  
 ALT (SGPT) 31 12 - 65 U/L  
 AST (SGOT) 26 15 - 37 U/L Alk. phosphatase 71 50 - 136 U/L Protein, total 6.2 (L) 6.3 - 8.2 g/dL Albumin 2.9 (L) 3.2 - 4.6 g/dL Globulin 3.3 2.3 - 3.5 g/dL A-G Ratio 0.9 (L) 1.2 - 3.5 PROTHROMBIN TIME + INR Collection Time: 07/13/18  6:56 PM  
Result Value Ref Range Prothrombin time 24.7 (H) 11.5 - 14.5 sec INR 2.1 TYPE & SCREEN Collection Time: 07/13/18  7:07 PM  
Result Value Ref Range Crossmatch Expiration 07/16/2018 ABO/Rh(D) O POSITIVE Antibody screen NEG Unit number [id number] Blood component type [facility] Unit division 00 Status of unit ALLOCATED Crossmatch result Compatible Unit number [id number] Blood component type [facility] Unit division 00  Status of unit ALLOCATED Crossmatch result Compatible CXR Results  (Last 48 hours) None CT Results  (Last 48 hours) None Assessment and Plan: Active Hospital Problems Diagnosis Date Noted  Heme positive stool 07/13/2018  Anemia due to GI blood loss 07/13/2018  Weakness 07/13/2018  Long term (current) use of anticoagulants 06/11/2018  Paroxysmal atrial fibrillation ([facility] [address] [facility] 01/14/2013 Paroxysmal  
  
 
Principal Problem: Anemia due to GI blood loss (7/13/2018) Stop coumadin; transfuse 2 units; consult GI for further w/u Active Problems: 
  Paroxysmal atrial fibrillation ([facility] [address] (1/14/2013) Home meds Long term (current) use of anticoagulants (6/11/2018) Will d/c; ok with cardiology Weakness (7/13/2018) Likely due to anemia Heme positive stool (7/13/2018) As above PLAN  
· Cont appropriate home meds (see MAR) · Control symptoms (pain, n/v, fever, etc) · Monitor appropriate labs · DVT prophylaxis:  SCDs · Code status: Full · Risk: high- anemia requiring transfusion; advanced age; need for further eval 
· Anticipated DC needs: · Estimated LOS:  Greater than 2 midnights · Plans discussed with patient and/or caregiver; questions answered. Med records reviewed if applicable; findings:  
 
Critical care time if applicable:   
 
Signed By: Abelardo May MD   
 July 13, 2018

## 2018-07-14 NOTE — PROGRESS NOTES
TRANSFER - IN REPORT:    Verbal report received from JENNIFER Lacey on Day Emanuel  being received from ER for routine progression of care      Report consisted of patients Situation, Background, Assessment and   Recommendations(SBAR). Information from the following report(s) SBAR, Kardex and ED Summary was reviewed with the receiving nurse. Opportunity for questions and clarification was provided. Assessment completed upon patients arrival to unit and care assumed.

## 2018-07-14 NOTE — PROGRESS NOTES
Bedside and Verbal shift change report given to [address] [place] [address]  (oncoming nurse) by Nena Pavon RN  (offgoing nurse). Report included the following information SBAR, Kardex, Intake/Output, MAR and Recent Results.

## 2018-07-14 NOTE — PROGRESS NOTES
Problem: Falls - Risk of  Goal: *Absence of Falls  Document Tim Fall Risk and appropriate interventions in the flowsheet.    Outcome: Progressing Towards Goal  Fall Risk Interventions:  Mobility Interventions: Patient to call before getting OOB         Medication Interventions: Patient to call before getting OOB, Teach patient to arise slowly    Elimination Interventions: Call light in reach, Patient to call for help with toileting needs

## 2018-07-14 NOTE — PROGRESS NOTES
Admit assessment complete, see flowsheet notes. Pt resting in bed and is alert and oriented x 4. She denies pain and is on 2 L NC. RR even and unlabored. Patient oriented to call light, room, and staff and instructed to call for assistance if needed. Will monitor.  at bedside. Patient brought up from ER with blood consent completed, in chart.

## 2018-07-14 NOTE — PROGRESS NOTES
A.M assessment complete; pt in bed. Alert & oriented X4. Resp even & unlabored on room air; lungs clear. HR regular. Abdomen soft with active bowel sounds. No c/o pain or SOB. States she still feels a little weak but not as bad as she did yesterday. Assisted to bathroom with no difficulty. Bed low & locked; call light in reach; will continue to monitor.

## 2018-07-14 NOTE — PROGRESS NOTES
Primary Nurse Roderick Castleman and Ct Aguilar, JENNIFER performed a dual skin assessment on this patient No impairment noted  [place] score is 20

## 2018-07-14 NOTE — CONSULTS
[facility] Consult Note       Primary GI Physician: new    Referring Physician:  Dr Aiden Sawant    Consult Date:  7/14/2018    Admit Date:  7/13/2018    Chief Complaint:  Anemia, heme positive stool    Subjective:     History of Present Illness:  Patient is a 80 y.o. female is seen in consultation at the request of Dr. Aiden Sawant for evaluation of anemia and heme positive stool. She was admitted 7/13/18 after presenting to the ER after office visit with cardiology earlier in the day. Hgb was low at 7.4, done from 11 in May of this year, and she was contacted by cardiology with recommendations to go to the ER. On arrival to the ER, hgb was 6.8 and she was admitted for transfusion and further evaluation. She was given 2 units PRBC with improvement in hgb this morning at 9.2. She was heme positive on rectal exam in the ER but denied overt bleeding or melena. INR on admission was 2.1 and Coumadin is now on hold. CT abd/pelvis w IV contrast was obtained today, noting no etiology of anemia, with a stable cyst noted in the pancreas. Patient reports in the last 1-2 weeks, increased fatigue and shortness of breath, but denies overt bleeding or melena. She notes recent increase in heartburn and is on daily Protonix at home. She has been on both Coumadin and Plavix as an outpatient, last taken Thursday night. She reports prior EGD and colonoscopy remotely while living elsewhere, having moved to [place] 5-6 years ago. Her  is a patient of Dr Falguni Mendoza but she has not seen gi here in town. CT ABDOMEN AND PELVIS WITH IV CONTRAST 7/14/18   HISTORY:  Unexplained anemia. Weakness. Diarrhea.   COMPARISON:  CT scan May 27, 1523   TECHNIQUE:  Helical scans through the abdomen and pelvis with oral and IV  contrast, Isovue-370, 100 cc  IV to improve conspicuity of infection and  neoplasia. Radiation dose reduction techniques were used for this study:   All  CT scans performed at this facility use one or all of the following: Automated  exposure control, adjustment of the mA and/or kVp according to patient's size,  iterative reconstruction.   CT ABDOMEN:  Mild chronic changes at the lung bases. Subcentimeter  low-attenuation lesion hepatic lesion adjacent to the liver, unchanged. No  biliary dilatation. Subcentimeter cystic lesion at the pancreatic tail is  unchanged. No new pancreatic abnormalities. Small bowel not dilated. No ascites. Negative for lymphadenopathy. Normal size spleen. Scattered bilateral renal  subcentimeter low-attenuation abnormalities, probably cysts, no worrisome renal  lesions.   CT PELVIS:  No free fluid, mass, or lymphadenopathy. No CT evidence of  diverticulitis or colitis.   IMPRESSION  IMPRESSION:    1. No acute CT findings in the abdomen or pelvis. 2. Stable subcentimeter cystic lesion in the pancreatic tail. 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 Normal LV systolic function.     Hypercholesteremia 1/14/2013    Hypertension     Hypothyroidism 1/14/2013    Osteoarthritis of back 1/2/2014    Osteoporosis     Pulmonary embolism ([address] 2009    after knee surgery    S/P total knee arthroplasty 1/13/2017    Sick sinus syndrome ([address] 1/7/2016    Tachycardia-Bradycardia    UTI (urinary tract infection)        PSH:  Past Surgical History:   Procedure Laterality Date    HX APPENDECTOMY  1965    HX BREAST BIOPSY Bilateral     HX CATARACT REMOVAL Right 2010    HX CORONARY STENT PLACEMENT  05/2014    LAD X 3    HX HEART CATHETERIZATION  09/17/2015    HX HEENT      multiple eye surgeries - left - macular hoe, retinal detachment twice,     HX HYSTERECTOMY  1970    HX KNEE REPLACEMENT  2009    HX KNEE REPLACEMENT Left 01/12/2017    HX ORTHOPAEDIC  2006 and 2008    herniated disc    HX TONSIL AND ADENOIDECTOMY  1934       Allergies: Allergies   Allergen Reactions    Adhesive Rash       Home Medications:  Prior to Admission medications    Medication Sig Start Date End Date Taking? Authorizing Provider   warfarin (COUMADIN) 5 mg tablet 1 tab every day or as directed 6/11/18   Juve Marina MD   atorvastatin (LIPITOR) 40 mg tablet TAKE 1 TABLET EVERY DAY  Patient taking differently: nightly. TAKE 1 TABLET EVERY DAY 4/30/18   Juve Marina MD   escitalopram oxalate (LEXAPRO) 10 mg tablet Take 1 Tab by mouth daily. Patient taking differently: Take 10 mg by mouth nightly. 4/16/18   Wilma Velasco MD   levothyroxine (SYNTHROID) 50 mcg tablet Take 1 Tab by mouth Daily (before breakfast). 3/14/18   Wilma Velasco MD   clopidogrel (PLAVIX) 75 mg tab Take 1 Tab by mouth every evening. 3/9/18   Juve Marina MD   metoprolol succinate (TOPROL-XL) 25 mg XL tablet Take 0.5 Tabs by mouth daily. 1/23/18   Wilma Velasco MD   estradiol (ESTRACE) 0.01 % (0.1 mg/gram) vaginal cream Apply 1 gm to vagina 3x/week at hs 9/28/17   Wilma Velasco MD   zolpidem (AMBIEN) 10 mg tablet Take 1 Tab by mouth nightly as needed for Sleep. Max Daily Amount: 10 mg. 9/28/17   Wilma Velasco MD   nitroglycerin (NITROSTAT) 0.3 mg SL tablet 1 Tab by SubLINGual route every five (5) minutes as needed for Chest Pain. 8/4/17   Juve Marina MD   pantoprazole (PROTONIX) 40 mg granules for oral suspension 40 mg daily. Historical Provider   acetaminophen (TYLENOL ARTHRITIS PAIN) 650 mg CR tablet Take 650 mg by mouth every six (6) hours as needed for Pain.     Historical Provider diclofenac (VOLTAREN) 1 % gel Apply  to affected area as needed. Historical Provider   L.ACIDOPHILUS/B.BIFIDUM&LONGUM (HEALTHY COLON PO) Take  by mouth daily. Historical Provider   biotin 2,500 mcg tab Take  by mouth daily. Historical Provider   VIT A/VIT C/VIT E/ZINC/COPPER (ICAPS AREDS PO) Take  by mouth. Twice a day    Historical Provider   vitamin E (AQUA GEMS) 400 unit capsule Take  by mouth daily. Historical Provider   aspirin delayed-release 81 mg tablet Take 81 mg by mouth every evening. Historical Provider   Calcium Carbonate-Vit D3-Min (CALTRATE 600+D PLUS MINERALS) 600 mg calcium- 400 unit Tab Take  by mouth daily. Historical Provider   Cholecalciferol, Vitamin D3, (VITAMIN D3) 1,000 unit cap Take  by mouth daily. Historical Provider   cyanocobalamin (VITAMIN B-12) 100 mcg tablet Take 1,000 mcg by mouth daily. Historical Provider   potassium 99 mg tablet Take 99 mg by mouth every evening.     Historical Provider       Hospital Medications:  Current Facility-Administered Medications   Medication Dose Route Frequency    hydrALAZINE (APRESOLINE) tablet 50 mg  50 mg Oral Q6H PRN    0.9% sodium chloride infusion 250 mL  250 mL IntraVENous PRN    aspirin delayed-release tablet 81 mg  81 mg Oral QPM    atorvastatin (LIPITOR) tablet 40 mg  40 mg Oral QHS    levothyroxine (SYNTHROID) tablet 50 mcg  50 mcg Oral ACB    metoprolol succinate (TOPROL-XL) XL tablet 12.5 mg  12.5 mg Oral DAILY    pantoprazole (PROTONIX) tablet 40 mg  40 mg Oral ACB    sodium chloride (NS) flush 5-10 mL  5-10 mL IntraVENous Q8H    sodium chloride (NS) flush 5-10 mL  5-10 mL IntraVENous PRN    acetaminophen (TYLENOL) tablet 650 mg  650 mg Oral Q4H PRN    HYDROcodone-acetaminophen (NORCO)  mg tablet 1 Tab  1 Tab Oral Q4H PRN    naloxone (NARCAN) injection 0.4 mg  0.4 mg IntraVENous PRN    diphenhydrAMINE (BENADRYL) capsule 25 mg  25 mg Oral Q4H PRN    prochlorperazine (COMPAZINE) injection 5 mg 5 mg IntraVENous Q8H PRN    bisacodyl (DULCOLAX) tablet 5 mg  5 mg Oral DAILY PRN    LORazepam (ATIVAN) tablet 1 mg  1 mg Oral BID PRN    escitalopram oxalate (LEXAPRO) tablet 10 mg  10 mg Oral QPM       Social History:  Social History   Substance Use Topics    Smoking status: Never Smoker    Smokeless tobacco: Never Used    Alcohol use No           Family History:  Family History   Problem Relation Age of Onset    Cancer Mother     Breast Cancer Mother     Cancer Father     Cancer Sister     Cancer Brother      pancreas    Breast Cancer Maternal Aunt        Review of Systems:  A detailed 10 system ROS is obtained, with pertinent positives as listed above. All others are negative. Diet:  regular    Objective:     Physical Exam:  Vitals:  Visit Vitals    /61 (BP 1 Location: Right arm, BP Patient Position: At rest)    Pulse 66    Temp 98.2 °F (36.8 °C)    Resp 18    Ht 5' 3\" (1.6 m)    Wt 66.7 kg (147 lb)    SpO2 96%    BMI 26.04 kg/m2     Gen:  Pt is alert, cooperative, no acute distress  Skin:  Extremities and face reveal no rashes. Pale. HEENT: Sclerae anicteric. Extra-occular muscles are intact. No oral ulcers. No abnormal pigmentation of the lips. The neck is supple. Cardiovascular: Regular rate and rhythm. No murmurs, gallops, or rubs. Respiratory:  Comfortable breathing with no accessory muscle use. Clear breath sounds anteriorly with no wheezes, rales, or rhonchi. GI:  Abdomen nondistended, soft, and nontender. Normal active bowel sounds. No enlargement of the liver or spleen. No masses palpable. Rectal:  Deferred  Musculoskeletal:  No pitting edema of the lower legs. Neurological:  Gross memory appears intact. Patient is alert and oriented. Psychiatric:  Mood appears appropriate with judgement intact. Lymphatic:  No cervical or supraclavicular adenopathy.     Laboratory:    Recent Labs      07/14/18   0552  07/13/18   1856  07/13/18   1116  07/13/18   0937   WBC 4.3  5.1  5.6   --    HGB  9.2*  6.8*  7.4*   --    HCT  29.2*  21.7*  23.4*   --    PLT  223  249  278   --    MCV  83.4  83.5  85.7   --    NA  140  138  139   --    K  4.0  3.6  3.9   --    CL  109*  105  110*   --    CO2  24  23  20*   --    BUN  19  21  13   --    CREA  0.87  1.16*  1.00   --    CA  8.6  9.2  8.8   --    GLU  115*  147*  101*   --    AP   --   71   --    --    SGOT   --   26   --    --    ALT   --   31   --    --    TBILI   --   0.6   --    --    ALB   --   2.9*   --    --    TP   --   6.2*   --    --    PTP   --   24.7*   --    --    INR   --   2.1   --   2.2          Assessment:     Principal Problem:    Anemia due to GI blood loss (7/13/2018)    Active Problems:    Hypothyroidism (1/14/2013)      Depression (1/14/2013)      Hypercholesteremia (1/14/2013)      Paroxysmal atrial fibrillation (HCC) (1/14/2013)      Overview: Paroxysmal       GERD (gastroesophageal reflux disease) (3/6/2014)      Long term (current) use of anticoagulants (6/11/2018)      Weakness (7/13/2018)      Heme positive stool (7/13/2018)        Plan:     79 yo female is seen today for evaluation of anemia, admitted yesterday after having labs earlier in the day at the cardiology office which revealed an acute anemia. On arrival to the ER, she was noted to have a hgb 6.8 and received transfusion 2 units PRBC with improvement in hgb today to 9.2. Patient has been on both Coumadin and Plavix, as well as daily asa as an outpatient, but denies overt bleeding or melena. Hx of CAD with stents is noted in 2014 with Dr Raymond Plascencia. Remote hx of EGD/colonoscopy is reported elsewhere. INR was 2.1 on admission yesterday and anti-coagulation was last taken Thursday. 1.  Clear liquid diet to begin at midnight   2. Change hgb to q12 hr in absence of overt bleeding  3. Repeat INR in am  4. Plan bowel prep tomorrow to be followed by EGD/colonoscopy on Monday; patient and spouse agreeable. Risks vs benefits reviewed.   5.  Continue Protonix every day for now. Patient is seen and examined in collaboration with Dr. Mohan Mendez. Assessment and plan as per Dr. Mohan Mendez.   Saulo Carlson NP

## 2018-07-15 LAB
ANION GAP SERPL CALC-SCNC: 8 MMOL/L (ref 7–16)
BUN SERPL-MCNC: 15 MG/DL (ref 8–23)
CALCIUM SERPL-MCNC: 8.2 MG/DL (ref 8.3–10.4)
CHLORIDE SERPL-SCNC: 109 MMOL/L (ref 98–107)
CO2 SERPL-SCNC: 23 MMOL/L (ref 21–32)
CREAT SERPL-MCNC: 0.84 MG/DL (ref 0.6–1)
GLUCOSE SERPL-MCNC: 115 MG/DL (ref 65–100)
HCT VFR BLD AUTO: 30 % (ref 35.8–46.3)
HCT VFR BLD AUTO: 31.3 % (ref 35.8–46.3)
HGB BLD-MCNC: 9.4 G/DL (ref 11.7–15.4)
HGB BLD-MCNC: 9.9 G/DL (ref 11.7–15.4)
INR PPP: 1.7
POTASSIUM SERPL-SCNC: 3.8 MMOL/L (ref 3.5–5.1)
PROTHROMBIN TIME: 21 SEC (ref 11.5–14.5)
SODIUM SERPL-SCNC: 140 MMOL/L (ref 136–145)

## 2018-07-15 PROCEDURE — 65270000029 HC RM PRIVATE

## 2018-07-15 PROCEDURE — 36415 COLL VENOUS BLD VENIPUNCTURE: CPT | Performed by: NURSE PRACTITIONER

## 2018-07-15 PROCEDURE — 85018 HEMOGLOBIN: CPT | Performed by: NURSE PRACTITIONER

## 2018-07-15 PROCEDURE — 85610 PROTHROMBIN TIME: CPT | Performed by: FAMILY MEDICINE

## 2018-07-15 PROCEDURE — 74011250637 HC RX REV CODE- 250/637: Performed by: FAMILY MEDICINE

## 2018-07-15 PROCEDURE — 74011250637 HC RX REV CODE- 250/637: Performed by: NURSE PRACTITIONER

## 2018-07-15 PROCEDURE — 80048 BASIC METABOLIC PNL TOTAL CA: CPT | Performed by: FAMILY MEDICINE

## 2018-07-15 RX ORDER — NITROFURANTOIN MACROCRYSTALS 50 MG/1
50 CAPSULE ORAL
Status: DISCONTINUED | OUTPATIENT
Start: 2018-07-15 | End: 2018-07-17 | Stop reason: HOSPADM

## 2018-07-15 RX ADMIN — ATORVASTATIN CALCIUM 40 MG: 40 TABLET, FILM COATED ORAL at 22:29

## 2018-07-15 RX ADMIN — Medication 5 ML: at 16:11

## 2018-07-15 RX ADMIN — Medication 10 ML: at 05:31

## 2018-07-15 RX ADMIN — BISACODYL 20 MG: 5 TABLET, COATED ORAL at 16:08

## 2018-07-15 RX ADMIN — POLYETHYLENE GLYCOL 3350 255 G: 17 POWDER, FOR SOLUTION ORAL at 18:42

## 2018-07-15 RX ADMIN — PANTOPRAZOLE SODIUM 40 MG: 40 TABLET, DELAYED RELEASE ORAL at 07:22

## 2018-07-15 RX ADMIN — LEVOTHYROXINE SODIUM 50 MCG: 50 TABLET ORAL at 07:22

## 2018-07-15 RX ADMIN — Medication 10 ML: at 22:32

## 2018-07-15 RX ADMIN — ESCITALOPRAM OXALATE 10 MG: 10 TABLET ORAL at 17:12

## 2018-07-15 RX ADMIN — METOPROLOL SUCCINATE 12.5 MG: 25 TABLET, EXTENDED RELEASE ORAL at 09:02

## 2018-07-15 NOTE — PROGRESS NOTES
Problem: Falls - Risk of  Goal: *Absence of Falls  Document Tim Fall Risk and appropriate interventions in the flowsheet.    Outcome: Progressing Towards Goal  Fall Risk Interventions:  Mobility Interventions: Patient to call before getting OOB         Medication Interventions: Patient to call before getting OOB    Elimination Interventions: Patient to call for help with toileting needs

## 2018-07-15 NOTE — PROGRESS NOTES
A.M assessment complete; pt in bed resting. Alert & oriented X4. Resp even & unlabored on room air; lungs clear. HR regular. Abdomen soft with active bowel sounds. No c/o pain. Pt states she has not had any stool overnight. Bed low & locked; call light in reach; no needs voiced; will continue to monitor.

## 2018-07-15 NOTE — PROGRESS NOTES
Pt's MEWS score is a 4 because her pulse jumped to 143 according to PCT. PCT checked it manually & it was only 90. I also double checked it & watched it for a few minutes on the VS machine & it was between 58-61. Pt in stable condition. States she knows what it feels like when she is in active AFIB. Will continue to monitor.

## 2018-07-15 NOTE — PROGRESS NOTES
Bedside and Verbal shift change report given to [address] [place] [address]  (oncoming nurse) by Mary Mullins RN  (offgoing nurse). Report included the following information SBAR, Kardex, Intake/Output, MAR and Recent Results.

## 2018-07-15 NOTE — PROGRESS NOTES
GI DAILY PROGRESS NOTE    Admit Date:  7/13/2018    Today's Date:  7/15/2018    CC:  Anemia, heme positive stool       Subjective:     7/15: Patient doing well with few small brown bms overnight but no evidence of bleeding. She is to start colo prep today for egd/colo tomorrow. History of Present Illness:  Patient is a 80 y.o. female is seen in consultation at the request of Dr. Zully Mckeon for evaluation of anemia and heme positive stool. She was admitted 7/13/18 after presenting to the ER after office visit with cardiology earlier in the day. Hgb was low at 7.4, done from 11 in May of this year, and she was contacted by cardiology with recommendations to go to the ER. On arrival to the ER, hgb was 6.8 and she was admitted for transfusion and further evaluation. She was given 2 units PRBC with improvement in hgb this morning at 9.2. She was heme positive on rectal exam in the ER but denied overt bleeding or melena. INR on admission was 2.1 and Coumadin is now on hold. CT abd/pelvis w IV contrast was obtained today, noting no etiology of anemia, with a stable cyst noted in the pancreas.       Patient reports in the last 1-2 weeks, increased fatigue and shortness of breath, but denies overt bleeding or melena. She notes recent increase in heartburn and is on daily Protonix at home. She has been on both Coumadin and Plavix as an outpatient, last taken Thursday night. She reports prior EGD and colonoscopy remotely while living elsewhere, having moved to [place] 5-6 years ago. Her  is a patient of Dr Tanisha Landaverde but she has not seen gi here in town.             CT ABDOMEN AND PELVIS WITH IV CONTRAST 7/14/18   HISTORY:  Unexplained anemia. Weakness.  Diarrhea.   COMPARISON:  CT scan May 27, 4059   TECHNIQUE:  Helical scans through the abdomen and pelvis with oral and IV  contrast, Isovue-370, 100 cc  IV to improve conspicuity of infection and  neoplasia.  Radiation dose reduction techniques were used for this study:  All  CT scans performed at this facility use one or all of the following: Automated  exposure control, adjustment of the mA and/or kVp according to patient's size,  iterative reconstruction.   CT ABDOMEN:  Mild chronic changes at the lung bases. Subcentimeter  low-attenuation lesion hepatic lesion adjacent to the liver, unchanged. No  biliary dilatation. Subcentimeter cystic lesion at the pancreatic tail is  unchanged. No new pancreatic abnormalities. Small bowel not dilated. No ascites. Negative for lymphadenopathy. Normal size spleen. Scattered bilateral renal  subcentimeter low-attenuation abnormalities, probably cysts, no worrisome renal  lesions.   CT PELVIS:  No free fluid, mass, or lymphadenopathy. No CT evidence of  diverticulitis or colitis.   IMPRESSION  IMPRESSION:    1. No acute CT findings in the abdomen or pelvis.   2. Stable subcentimeter cystic lesion in the pancreatic tail.             Medications:   Current Facility-Administered Medications   Medication Dose Route Frequency    nitrofurantoin (MACRODANTIN) capsule 50 mg (Patient Supplied)  50 mg Oral QHS    hydrALAZINE (APRESOLINE) tablet 50 mg  50 mg Oral Q6H PRN    bisacodyl (DULCOLAX) tablet 20 mg  20 mg Oral ONCE    polyethylene glycol (MIRALAX) powder 255 g  255 g Oral ONCE    temazepam (RESTORIL) capsule 7.5 mg  7.5 mg Oral QHS PRN    0.9% sodium chloride infusion 250 mL  250 mL IntraVENous PRN    aspirin delayed-release tablet 81 mg  81 mg Oral QPM    atorvastatin (LIPITOR) tablet 40 mg  40 mg Oral QHS    levothyroxine (SYNTHROID) tablet 50 mcg  50 mcg Oral ACB    metoprolol succinate (TOPROL-XL) XL tablet 12.5 mg  12.5 mg Oral DAILY    pantoprazole (PROTONIX) tablet 40 mg  40 mg Oral ACB    sodium chloride (NS) flush 5-10 mL  5-10 mL IntraVENous Q8H    sodium chloride (NS) flush 5-10 mL  5-10 mL IntraVENous PRN    acetaminophen (TYLENOL) tablet 650 mg  650 mg Oral Q4H PRN    HYDROcodone-acetaminophen (NORCO)  mg tablet 1 Tab  1 Tab Oral Q4H PRN    naloxone (NARCAN) injection 0.4 mg  0.4 mg IntraVENous PRN    diphenhydrAMINE (BENADRYL) capsule 25 mg  25 mg Oral Q4H PRN    prochlorperazine (COMPAZINE) injection 5 mg  5 mg IntraVENous Q8H PRN    bisacodyl (DULCOLAX) tablet 5 mg  5 mg Oral DAILY PRN    LORazepam (ATIVAN) tablet 1 mg  1 mg Oral BID PRN    escitalopram oxalate (LEXAPRO) tablet 10 mg  10 mg Oral QPM       Review of Systems:  ROS was obtained, with pertinent positives as listed above. No chest pain or SOB. Diet:  clear    Objective:   Vitals:  Visit Vitals    /64 (BP 1 Location: Right arm, BP Patient Position: At rest)    Pulse 63    Temp 98.3 °F (36.8 °C)    Resp 18    Ht 5' 3\" (1.6 m)    Wt 66.7 kg (147 lb)    SpO2 94%    BMI 26.04 kg/m2     Intake/Output:     07/13 1901 - 07/15 0700  In: 647.7   Out: 1300 [Urine:1300]  Exam:  General appearance: alert, cooperative, no distress - appears younger than stated age. Lungs: clear to auscultation bilaterally anteriorly  Heart: regular rate and rhythm  Abdomen: soft, non-tender.  Bowel sounds normal. No masses, no organomegaly  Extremities: extremities normal, atraumatic, no cyanosis or edema  Neuro:  alert and oriented    Data Review (Labs):    Recent Labs      07/15/18   0524  07/14/18   2328  07/14/18   0552  07/13/18   1856  07/13/18   1116  07/13/18   0937   WBC   --    --   4.3  5.1  5.6   --    HGB   --   8.5*  9.2*  6.8*  7.4*   --    HCT   --   27.7*  29.2*  21.7*  23.4*   --    PLT   --    --   223  249  278   --    MCV   --    --   83.4  83.5  85.7   --    NA  140   --   140  138  139   --    K  3.8   --   4.0  3.6  3.9   --    CL  109*   --   109*  105  110*   --    CO2  23   --   24  23  20*   --    BUN  15   --   19  21  13   --    CREA  0.84   --   0.87  1.16*  1.00   --    CA  8.2*   --   8.6  9.2  8.8   --    GLU  115*   --   115*  147*  101*   --    AP   --    --    --   71   --    --    SGOT   --    --    --   26   --    -- ALB   --    --    --   2.9*   --    --    TP   --    --    --   6.2*   --    --    PTP  21.0*   --    --   24.7*   --    --    INR  1.7   --    --   2.1   --   2.2       Assessment:     Principal Problem:    Anemia due to GI blood loss (7/13/2018)    Active Problems:    Hypothyroidism (1/14/2013)      Depression (1/14/2013)      Hypercholesteremia (1/14/2013)      Paroxysmal atrial fibrillation (HCC) (1/14/2013)      Overview: Paroxysmal       GERD (gastroesophageal reflux disease) (3/6/2014)      Long term (current) use of anticoagulants (6/11/2018)      Weakness (7/13/2018)      Heme positive stool (7/13/2018)        Plan:     81 yo female is seen today for evaluation of anemia, admitted yesterday after having labs earlier in the day at the cardiology office which revealed an acute anemia. On arrival to the ER, she was noted to have a hgb 6.8 and received transfusion 2 units PRBC with improvement in hgb today to 9.2. Patient has been on both Coumadin and Plavix, as well as daily asa as an outpatient, but denies overt bleeding or melena. Hx of CAD with stents is noted in 2014 with Dr Elmer Robles. Remote hx of EGD/colonoscopy is reported elsewhere. INR was 2.1 on admission but now down to 1.7     1. Clear liquid today and bowel prep later   2. Change hgb to q12 hr in absence of overt bleeding  3. Repeat INR in am  4. Plan bowel prep tomorrow to be followed by EGD/colonoscopy on Monday; patient and spouse agreeable. Risks vs benefits reviewed. Pt wishes to have procedure with Dr Julissa Goodson if possible  5.   Continue Protonix every day for now.  María Peacock MD  [facility], [address]

## 2018-07-15 NOTE — PROGRESS NOTES
Socioevironmental:  CM met with patient who states that she lives with her  Domenic Montejo ([phone]) and their dog. Patient states that she still drives and is very independent. Patient has multiple children, grandchildren, and great-grandchildren that she identifies as supportive.          Ambulation:  Patient states that she ambulates without the use of assistive devices. Patient is independent in her ADLs. Patient experienced weakness prior to admission. However, she states that she's feeling \"much better\" today. Patient denies any falls this year, states that she fell from the shower, but thinks that was most likely last year.      Primary Care:  Patient sees Dr. Alex Rosa, last appointment was June 13th of this year.       Needs:   No needs are identified at this time. PT/OT evaluation ordered due to patient's prior weakness. Case management will continue to follow until discharge to help accommodate any that should arise.      Naye Chavez, [address] [facility]    [address] [facility]  [phone]

## 2018-07-15 NOTE — PROGRESS NOTES
Dr. Sourav Rubio notified about patient wanting [facility]. Order received for restoril 7.5 mg PRN bedtime.

## 2018-07-15 NOTE — PROGRESS NOTES
Progress Note Patient: Monique Lopez MRN: [id number]  SSN: [id number] YOB: 1929  Age: 80 y.o. Sex: female Admit Date: 7/13/2018 LOS: 2 days Subjective:  
 
Patient has bowel movements. No blood pre rectum. Stool was soft. No hematemesis. Feeling stronger. Asking for maintenance dose of Nitrofurantoin. Objective:  
 
Vitals:  
 07/14/18 1959 07/14/18 2356 07/15/18 0405 07/15/18 0745 BP: 148/67 136/70 139/60 160/64 Pulse: 65 66 63 63 Resp: 18 18 18 18 Temp: 97.4 °F (36.3 °C) 98.2 °F (36.8 °C) 98.2 °F (36.8 °C) 98.3 °F (36.8 °C) SpO2: 96% 95% 93% 94% Weight:      
Height:      
  
 
Intake and Output: 
Current Shift:   
Last three shifts: 07/13 1901 - 07/15 0700 In: 647.7 Out: 1300 [Urine:1300] Physical Exam:  
 
General:                    The patient is a pleasant elderly female in no acute distress. Head:                                   Normocephalic/atraumatic. Eyes:                                   palpebral pallor, no scleral icterus. ENT:                                    External auricular and nasal exam within normal limits. Mucous membranes are moist. 
Neck:                                   Supple, non-tender, no JVD. Lungs:                       Clear to auscultation bilaterally without wheezes or crackles. No respiratory distress or accessory muscle use. Heart:                                  Regular rate and rhythm, without murmurs, rubs, or gallops. Abdomen:                  Soft, non-tender, non-distended with normoactive bowel sounds. Genitourinary:           No tenderness over the bladder or bilateral CVAs. Extremities:               Without clubbing, cyanosis, or edema. Skin:                                    Normal color, texture, and turgor. No rashes, lesions, or jaundice.  
Pulses:                      Radial and dorsalis pedis pulses present 2+ bilaterally. Capillary refill <2s. Neurologic:                CN II-XII grossly intact and symmetrical.  
                                            Moving all four extremities well with no focal deficits. Psychiatric:                Pleasant demeanor, appropriate affect. Alert and oriented x 3 Lab/Data Review: 
 
Recent Results (from the past 24 hour(s)) HGB & HCT Collection Time: 07/14/18 11:28 PM  
Result Value Ref Range HGB 8.5 (L) 11.7 - 15.4 g/dL HCT 27.7 (L) 35.8 - 46.3 % METABOLIC PANEL, BASIC Collection Time: 07/15/18  5:24 AM  
Result Value Ref Range Sodium 140 136 - 145 mmol/L Potassium 3.8 3.5 - 5.1 mmol/L Chloride 109 (H) 98 - 107 mmol/L  
 CO2 23 21 - 32 mmol/L Anion gap 8 7 - 16 mmol/L Glucose 115 (H) 65 - 100 mg/dL BUN 15 8 - 23 MG/DL Creatinine 0.84 0.6 - 1.0 MG/DL  
 GFR est AA >60 >60 ml/min/1.73m2 GFR est non-AA >60 >60 ml/min/1.73m2 Calcium 8.2 (L) 8.3 - 10.4 MG/DL PROTHROMBIN TIME + INR Collection Time: 07/15/18  5:24 AM  
Result Value Ref Range Prothrombin time 21.0 (H) 11.5 - 14.5 sec INR 1.7 HGB & HCT Collection Time: 07/15/18 12:19 PM  
Result Value Ref Range HGB 9.9 (L) 11.7 - 15.4 g/dL HCT 31.3 (L) 35.8 - 46.3 % Current Facility-Administered Medications:  
  nitrofurantoin (MACRODANTIN) capsule 50 mg (Patient Supplied), 50 mg, Oral, QHS, Gama Ariza MD 
  hydrALAZINE (APRESOLINE) tablet 50 mg, 50 mg, Oral, Q6H PRN, Valerie Mackenzie MD, 50 mg at 07/14/18 6893   bisacodyl (DULCOLAX) tablet 20 mg, 20 mg, Oral, ONCE, Lillian Avalos NP 
  polyethylene glycol (MIRALAX) powder 255 g, 255 g, Oral, ONCE, Snyder Barges, NP 
  temazepam (RESTORIL) capsule 7.5 mg, 7.5 mg, Oral, QHS PRN, Valerie Mackenzie MD, 7.5 mg at 07/14/18 5884 
  0.9% sodium chloride infusion 250 mL, 250 mL, IntraVENous, PRN, Raissa Figueroa MD 
[facility] aspirin delayed-release tablet 81 mg, 81 mg, Oral, QPM, Suzan Díaz MD, Stopped at 07/14/18 1800 
  atorvastatin (LIPITOR) tablet 40 mg, 40 mg, Oral, QHS, Suzan Díaz MD, 40 mg at 07/14/18 2151   levothyroxine (SYNTHROID) tablet 50 mcg, 50 mcg, Oral, ACB, Suzan íDaz MD, 50 mcg at 07/15/18 1345   metoprolol succinate (TOPROL-XL) XL tablet 12.5 mg, 12.5 mg, Oral, DAILY, Suzan Díaz MD, 12.5 mg at 07/15/18 5292   pantoprazole (PROTONIX) tablet 40 mg, 40 mg, Oral, ACB, Suzan Díaz MD, 40 mg at 07/15/18 7448   sodium chloride (NS) flush 5-10 mL, 5-10 mL, IntraVENous, Q8H, Suzan Díaz MD, 10 mL at 07/15/18 0531 
  sodium chloride (NS) flush 5-10 mL, 5-10 mL, IntraVENous, PRN, Suzan Díaz MD 
  acetaminophen (TYLENOL) tablet 650 mg, 650 mg, Oral, Q4H PRN, Suzan Díaz MD 
  HYDROcodone-acetaminophen [facility])  mg tablet 1 Tab, 1 Tab, Oral, Q4H PRN, Suzan Díaz MD 
  naloxone [facility]) injection 0.4 mg, 0.4 mg, IntraVENous, PRN, Suzan Díaz MD 
  diphenhydrAMINE (BENADRYL) capsule 25 mg, 25 mg, Oral, Q4H PRN, Suzan Díaz MD 
  prochlorperazine (COMPAZINE) injection 5 mg, 5 mg, IntraVENous, Q8H PRN, Suzan Díaz MD 
  bisacodyl (DULCOLAX) tablet 5 mg, 5 mg, Oral, DAILY PRN, Suzan Díaz MD 
  LORazepam (ATIVAN) tablet 1 mg, 1 mg, Oral, BID PRN, Suzan Díaz MD 
  escitalopram oxalate (LEXAPRO) tablet 10 mg, 10 mg, Oral, QPM, Suzan Díaz MD, 10 mg at 07/14/18 1725 Assessment:  
 
Principal Problem: Anemia due to GI blood loss (7/13/2018) Active Problems: Hypothyroidism (1/14/2013) Depression (1/14/2013) Hypercholesteremia (1/14/2013) Paroxysmal atrial fibrillation ([facility] (1/14/2013) Overview: Paroxysmal  
 
  GERD (gastroesophageal reflux disease) (3/6/2014) Long term (current) use of anticoagulants (6/11/2018)   Weakness (7/13/2018) Heme positive stool (7/13/2018) Plan: Anemia Improved with PRC transfusion. GI saw patient. Plan for EGD/colonoscopy Monday. Monitor Hb Continue Protonix. Stop ASA, Plavix, Warfarin now. Hypothyroidism Hyperlipidemia Continue home medications. I have discussed the plan of care with patient. DVT prophylaxis : SCD Signed By: Philippe Arshad MD   
 July 15, 2018

## 2018-07-16 ENCOUNTER — ANESTHESIA (OUTPATIENT)
Dept: ENDOSCOPY | Age: 83
DRG: 812 | End: 2018-07-16
Payer: MEDICARE

## 2018-07-16 ENCOUNTER — ANESTHESIA EVENT (OUTPATIENT)
Dept: ENDOSCOPY | Age: 83
DRG: 812 | End: 2018-07-16
Payer: MEDICARE

## 2018-07-16 LAB
ANION GAP SERPL CALC-SCNC: 12 MMOL/L (ref 7–16)
BUN SERPL-MCNC: 6 MG/DL (ref 8–23)
CALCIUM SERPL-MCNC: 8 MG/DL (ref 8.3–10.4)
CHLORIDE SERPL-SCNC: 107 MMOL/L (ref 98–107)
CO2 SERPL-SCNC: 22 MMOL/L (ref 21–32)
CREAT SERPL-MCNC: 0.79 MG/DL (ref 0.6–1)
GLUCOSE SERPL-MCNC: 110 MG/DL (ref 65–100)
HCT VFR BLD AUTO: 30.7 % (ref 35.8–46.3)
HGB BLD-MCNC: 9.7 G/DL (ref 11.7–15.4)
POTASSIUM SERPL-SCNC: 3.7 MMOL/L (ref 3.5–5.1)
SODIUM SERPL-SCNC: 141 MMOL/L (ref 136–145)

## 2018-07-16 PROCEDURE — 88305 TISSUE EXAM BY PATHOLOGIST: CPT | Performed by: INTERNAL MEDICINE

## 2018-07-16 PROCEDURE — 85018 HEMOGLOBIN: CPT | Performed by: NURSE PRACTITIONER

## 2018-07-16 PROCEDURE — 74011250636 HC RX REV CODE- 250/636

## 2018-07-16 PROCEDURE — 65270000029 HC RM PRIVATE

## 2018-07-16 PROCEDURE — 0DBK8ZX EXCISION OF ASCENDING COLON, VIA NATURAL OR ARTIFICIAL OPENING ENDOSCOPIC, DIAGNOSTIC: ICD-10-PCS | Performed by: INTERNAL MEDICINE

## 2018-07-16 PROCEDURE — 77030013991 HC SNR POLYP ENDOSC BSC -A: Performed by: INTERNAL MEDICINE

## 2018-07-16 PROCEDURE — 97165 OT EVAL LOW COMPLEX 30 MIN: CPT

## 2018-07-16 PROCEDURE — 80048 BASIC METABOLIC PNL TOTAL CA: CPT | Performed by: FAMILY MEDICINE

## 2018-07-16 PROCEDURE — 30233N1 TRANSFUSION OF NONAUTOLOGOUS RED BLOOD CELLS INTO PERIPHERAL VEIN, PERCUTANEOUS APPROACH: ICD-10-PCS | Performed by: FAMILY MEDICINE

## 2018-07-16 PROCEDURE — 97161 PT EVAL LOW COMPLEX 20 MIN: CPT

## 2018-07-16 PROCEDURE — 36415 COLL VENOUS BLD VENIPUNCTURE: CPT | Performed by: FAMILY MEDICINE

## 2018-07-16 PROCEDURE — 0DBL8ZZ EXCISION OF TRANSVERSE COLON, VIA NATURAL OR ARTIFICIAL OPENING ENDOSCOPIC: ICD-10-PCS | Performed by: INTERNAL MEDICINE

## 2018-07-16 PROCEDURE — 76040000025: Performed by: INTERNAL MEDICINE

## 2018-07-16 PROCEDURE — 76060000032 HC ANESTHESIA 0.5 TO 1 HR: Performed by: INTERNAL MEDICINE

## 2018-07-16 PROCEDURE — 74011000250 HC RX REV CODE- 250

## 2018-07-16 PROCEDURE — 74011250637 HC RX REV CODE- 250/637: Performed by: FAMILY MEDICINE

## 2018-07-16 RX ORDER — HYDROMORPHONE HYDROCHLORIDE 2 MG/ML
0.5 INJECTION, SOLUTION INTRAMUSCULAR; INTRAVENOUS; SUBCUTANEOUS
Status: CANCELLED | OUTPATIENT
Start: 2018-07-16

## 2018-07-16 RX ORDER — SODIUM CHLORIDE, SODIUM LACTATE, POTASSIUM CHLORIDE, CALCIUM CHLORIDE 600; 310; 30; 20 MG/100ML; MG/100ML; MG/100ML; MG/100ML
INJECTION, SOLUTION INTRAVENOUS
Status: DISCONTINUED | OUTPATIENT
Start: 2018-07-16 | End: 2018-07-16 | Stop reason: HOSPADM

## 2018-07-16 RX ORDER — LIDOCAINE HYDROCHLORIDE 20 MG/ML
INJECTION, SOLUTION EPIDURAL; INFILTRATION; INTRACAUDAL; PERINEURAL AS NEEDED
Status: DISCONTINUED | OUTPATIENT
Start: 2018-07-16 | End: 2018-07-16 | Stop reason: HOSPADM

## 2018-07-16 RX ORDER — SODIUM CHLORIDE, SODIUM LACTATE, POTASSIUM CHLORIDE, CALCIUM CHLORIDE 600; 310; 30; 20 MG/100ML; MG/100ML; MG/100ML; MG/100ML
100 INJECTION, SOLUTION INTRAVENOUS CONTINUOUS
Status: CANCELLED | OUTPATIENT
Start: 2018-07-16

## 2018-07-16 RX ORDER — OXYCODONE HYDROCHLORIDE 5 MG/1
10 TABLET ORAL
Status: CANCELLED | OUTPATIENT
Start: 2018-07-16 | End: 2018-07-17

## 2018-07-16 RX ORDER — MIDAZOLAM HYDROCHLORIDE 1 MG/ML
2 INJECTION, SOLUTION INTRAMUSCULAR; INTRAVENOUS ONCE
Status: CANCELLED | OUTPATIENT
Start: 2018-07-16 | End: 2018-07-16

## 2018-07-16 RX ORDER — NALOXONE HYDROCHLORIDE 0.4 MG/ML
0.1 INJECTION, SOLUTION INTRAMUSCULAR; INTRAVENOUS; SUBCUTANEOUS AS NEEDED
Status: CANCELLED | OUTPATIENT
Start: 2018-07-16

## 2018-07-16 RX ORDER — ALBUTEROL SULFATE 0.83 MG/ML
2.5 SOLUTION RESPIRATORY (INHALATION) AS NEEDED
Status: CANCELLED | OUTPATIENT
Start: 2018-07-16

## 2018-07-16 RX ORDER — EPHEDRINE SULFATE 50 MG/ML
INJECTION, SOLUTION INTRAVENOUS AS NEEDED
Status: DISCONTINUED | OUTPATIENT
Start: 2018-07-16 | End: 2018-07-16 | Stop reason: HOSPADM

## 2018-07-16 RX ORDER — LIDOCAINE HYDROCHLORIDE 10 MG/ML
0.1 INJECTION INFILTRATION; PERINEURAL AS NEEDED
Status: CANCELLED | OUTPATIENT
Start: 2018-07-16

## 2018-07-16 RX ORDER — PROPOFOL 10 MG/ML
INJECTION, EMULSION INTRAVENOUS
Status: DISCONTINUED | OUTPATIENT
Start: 2018-07-16 | End: 2018-07-16 | Stop reason: HOSPADM

## 2018-07-16 RX ORDER — DIPHENHYDRAMINE HYDROCHLORIDE 50 MG/ML
12.5 INJECTION, SOLUTION INTRAMUSCULAR; INTRAVENOUS
Status: CANCELLED | OUTPATIENT
Start: 2018-07-16 | End: 2018-07-17

## 2018-07-16 RX ORDER — PROPOFOL 10 MG/ML
INJECTION, EMULSION INTRAVENOUS AS NEEDED
Status: DISCONTINUED | OUTPATIENT
Start: 2018-07-16 | End: 2018-07-16 | Stop reason: HOSPADM

## 2018-07-16 RX ORDER — FENTANYL CITRATE 50 UG/ML
100 INJECTION, SOLUTION INTRAMUSCULAR; INTRAVENOUS ONCE
Status: CANCELLED | OUTPATIENT
Start: 2018-07-16 | End: 2018-07-16

## 2018-07-16 RX ORDER — ONDANSETRON 2 MG/ML
4 INJECTION INTRAMUSCULAR; INTRAVENOUS ONCE
Status: CANCELLED | OUTPATIENT
Start: 2018-07-16 | End: 2018-07-16

## 2018-07-16 RX ORDER — OXYCODONE HYDROCHLORIDE 5 MG/1
5 TABLET ORAL
Status: CANCELLED | OUTPATIENT
Start: 2018-07-16 | End: 2018-07-17

## 2018-07-16 RX ORDER — MIDAZOLAM HYDROCHLORIDE 1 MG/ML
2 INJECTION, SOLUTION INTRAMUSCULAR; INTRAVENOUS
Status: CANCELLED | OUTPATIENT
Start: 2018-07-16 | End: 2018-07-17

## 2018-07-16 RX ORDER — SODIUM CHLORIDE, SODIUM LACTATE, POTASSIUM CHLORIDE, CALCIUM CHLORIDE 600; 310; 30; 20 MG/100ML; MG/100ML; MG/100ML; MG/100ML
100 INJECTION, SOLUTION INTRAVENOUS CONTINUOUS
Status: CANCELLED | OUTPATIENT
Start: 2018-07-16 | End: 2018-07-17

## 2018-07-16 RX ADMIN — LIDOCAINE HYDROCHLORIDE 100 MG: 20 INJECTION, SOLUTION EPIDURAL; INFILTRATION; INTRACAUDAL; PERINEURAL at 12:59

## 2018-07-16 RX ADMIN — ESCITALOPRAM OXALATE 10 MG: 10 TABLET ORAL at 17:15

## 2018-07-16 RX ADMIN — ASPIRIN 81 MG: 81 TABLET, COATED ORAL at 17:15

## 2018-07-16 RX ADMIN — ATORVASTATIN CALCIUM 40 MG: 40 TABLET, FILM COATED ORAL at 21:42

## 2018-07-16 RX ADMIN — LEVOTHYROXINE SODIUM 50 MCG: 50 TABLET ORAL at 09:05

## 2018-07-16 RX ADMIN — PROPOFOL 50 MG: 10 INJECTION, EMULSION INTRAVENOUS at 12:59

## 2018-07-16 RX ADMIN — Medication 5 ML: at 21:43

## 2018-07-16 RX ADMIN — PROPOFOL 160 MCG/KG/MIN: 10 INJECTION, EMULSION INTRAVENOUS at 12:59

## 2018-07-16 RX ADMIN — SODIUM CHLORIDE, SODIUM LACTATE, POTASSIUM CHLORIDE, CALCIUM CHLORIDE: 600; 310; 30; 20 INJECTION, SOLUTION INTRAVENOUS at 12:52

## 2018-07-16 RX ADMIN — PANTOPRAZOLE SODIUM 40 MG: 40 TABLET, DELAYED RELEASE ORAL at 09:05

## 2018-07-16 RX ADMIN — Medication 5 ML: at 05:33

## 2018-07-16 RX ADMIN — TEMAZEPAM 7.5 MG: 7.5 CAPSULE ORAL at 21:45

## 2018-07-16 RX ADMIN — EPHEDRINE SULFATE 10 MG: 50 INJECTION, SOLUTION INTRAVENOUS at 13:01

## 2018-07-16 NOTE — PROGRESS NOTES
Progress Note Patient: Jorge Luis Davis MRN: [id number]  SSN: [id number] YOB: 1929  Age: 80 y.o. Sex: female Admit Date: 7/13/2018 LOS: 3 days Subjective: No bleeding per rectum, no bleeding per urine. No abdominal pain. No fever. No shortness of breath. Objective:  
 
Vitals:  
 07/15/18 2117 07/15/18 2346 07/16/18 0510 07/16/18 4280 BP: 187/63 161/70 165/72 131/55 Pulse: 66 61 60 (!) 59 Resp: 14 16 16 15 Temp: 98.2 °F (36.8 °C) 97.7 °F (36.5 °C) 97.6 °F (36.4 °C) 97.4 °F (36.3 °C) SpO2: 95% 95% 92% 99% Weight:      
Height:      
  
 
Intake and Output: 
Current Shift:   
Last three shifts:   
 
Physical Exam:  
 
General:                    The patient is a pleasant elderly female in no acute distress. Head:                                   Normocephalic/atraumatic. Eyes:                                   palpebral pallor, no scleral icterus. ENT:                                    External auricular and nasal exam within normal limits. Mucous membranes are moist. 
Neck:                                   Supple, non-tender, no JVD. Lungs:                       Clear to auscultation bilaterally without wheezes or crackles. No respiratory distress or accessory muscle use. Heart:                                  Regular rate and rhythm, without murmurs, rubs, or gallops. Abdomen:                  Soft, non-tender, non-distended with normoactive bowel sounds. Genitourinary:           No tenderness over the bladder or bilateral CVAs. Extremities:               Without clubbing, cyanosis, or edema. Skin:                                    Normal color, texture, and turgor. No rashes, lesions, or jaundice. Pulses:                      Radial and dorsalis pedis pulses present 2+ bilaterally. Capillary refill <2s. Neurologic:                CN II-XII grossly intact and symmetrical.  
                                            Moving all four extremities well with no focal deficits. Psychiatric:                Pleasant demeanor, appropriate affect. Alert and oriented x 3 Lab/Data Review: 
 
Recent Results (from the past 24 hour(s)) HGB & HCT Collection Time: 07/15/18 12:19 PM  
Result Value Ref Range HGB 9.9 (L) 11.7 - 15.4 g/dL HCT 31.3 (L) 35.8 - 46.3 % HGB & HCT Collection Time: 07/15/18 11:38 PM  
Result Value Ref Range HGB 9.4 (L) 11.7 - 15.4 g/dL HCT 30.0 (L) 35.8 - 46.3 % METABOLIC PANEL, BASIC Collection Time: 07/16/18  5:32 AM  
Result Value Ref Range Sodium 141 136 - 145 mmol/L Potassium 3.7 3.5 - 5.1 mmol/L Chloride 107 98 - 107 mmol/L  
 CO2 22 21 - 32 mmol/L Anion gap 12 7 - 16 mmol/L Glucose 110 (H) 65 - 100 mg/dL BUN 6 (L) 8 - 23 MG/DL Creatinine 0.79 0.6 - 1.0 MG/DL  
 GFR est AA >60 >60 ml/min/1.73m2 GFR est non-AA >60 >60 ml/min/1.73m2 Calcium 8.0 (L) 8.3 - 10.4 MG/DL Current Facility-Administered Medications:  
  nitrofurantoin (MACRODANTIN) capsule 50 mg (Patient Supplied), 50 mg, Oral, QHS, Rory Uribe MD, Stopped at 07/15/18 2200   hydrALAZINE (APRESOLINE) tablet 50 mg, 50 mg, Oral, Q6H PRN, Beckie Sawyer MD, 50 mg at 07/14/18 9825   temazepam (RESTORIL) capsule 7.5 mg, 7.5 mg, Oral, QHS PRN, Beckie Sawyer MD, 7.5 mg at 07/14/18 2213 
  0.9% sodium chloride infusion 250 mL, 250 mL, IntraVENous, PRN, Tamie Rojas MD 
  aspirin delayed-release tablet 81 mg, 81 mg, Oral, QPM, Beckie Sawyer MD, Stopped at 07/14/18 1800 
  atorvastatin (LIPITOR) tablet 40 mg, 40 mg, Oral, QHS, Beckie Sawyer MD, 40 mg at 07/15/18 2229   levothyroxine (SYNTHROID) tablet 50 mcg, 50 mcg, Oral, ACB, Beckie Sawyer MD, 50 mcg at 07/16/18 8571   metoprolol succinate (TOPROL-XL) XL tablet 12.5 mg, 12.5 mg, Oral, DAILY, Lisy Chaves MD, Stopped at 07/16/18 1407   pantoprazole (PROTONIX) tablet 40 mg, 40 mg, Oral, ACB, Lisy Chaves MD, 40 mg at 07/16/18 0497   sodium chloride (NS) flush 5-10 mL, 5-10 mL, IntraVENous, Q8H, Lisy Chaves MD, 5 mL at 07/16/18 9787   sodium chloride (NS) flush 5-10 mL, 5-10 mL, IntraVENous, PRN, Lisy Chaves MD 
  acetaminophen (TYLENOL) tablet 650 mg, 650 mg, Oral, Q4H PRN, Lisy Chaves MD 
  HYDROcodone-acetaminophen [facility])  mg tablet 1 Tab, 1 Tab, Oral, Q4H PRN, Lisy Chaves MD 
  naloxone [facility]) injection 0.4 mg, 0.4 mg, IntraVENous, PRN, Lisy Chaves MD 
  diphenhydrAMINE (BENADRYL) capsule 25 mg, 25 mg, Oral, Q4H PRN, Lisy Chaves MD 
  prochlorperazine (COMPAZINE) injection 5 mg, 5 mg, IntraVENous, Q8H PRN, Lisy Chaves MD 
  bisacodyl (DULCOLAX) tablet 5 mg, 5 mg, Oral, DAILY PRN, Lisy Chaves MD 
  LORazepam (ATIVAN) tablet 1 mg, 1 mg, Oral, BID PRN, Lisy Chaves MD 
  escitalopram oxalate (LEXAPRO) tablet 10 mg, 10 mg, Oral, QPM, Lisy Chaves MD, 10 mg at 07/15/18 [phone] Assessment:  
 
Principal Problem: Anemia due to GI blood loss (7/13/2018) Active Problems: Hypothyroidism (1/14/2013) Depression (1/14/2013) Hypercholesteremia (1/14/2013) Paroxysmal atrial fibrillation ([facility] (1/14/2013) Overview: Paroxysmal  
 
  GERD (gastroesophageal reflux disease) (3/6/2014) Long term (current) use of anticoagulants (6/11/2018) Weakness (7/13/2018) Heme positive stool (7/13/2018) Plan: Anemia Improved with PRC transfusion. For EGD/colonoscopy today. Will check findings. Monitor Hb Continue Protonix. holding ASA, Plavix, Warfarin for now. Hypothyroidism Hyperlipidemia Continue home medications. I have discussed the plan of care with patient. DVT prophylaxis : SCD Signed By: Jay Delgado Dakota Mcfarlane MD   
 July 16, 2018

## 2018-07-16 NOTE — PERIOP NOTES
TRANSFER - OUT REPORT:    Verbal report given to Farida Díaz RN on Welton Sacks  being transferred to med surg room [phone] for routine progression of care       Report consisted of patients Situation, Background, Assessment and   Recommendations(SBAR). Information from the following report(s) SBAR, Intake/Output and MAR was reviewed with the receiving nurse. Opportunity for questions and clarification was provided.       Patient transported with:   O2 @ 2 liters  Tech

## 2018-07-16 NOTE — PROGRESS NOTES
She presented with new anemia and hemoccult positive stool. Iron studies were not performed here. EGD and colonoscopy did not reveal a cause of occult GI bleeding. This was probably caused by her anticoagulation. Capsule endoscopy has been discussed. She prefers to defer this. She is not willing to resume anticoagulation. She would like to see what happens with her hgb over time. We will arrange office follow up in a couple of months.

## 2018-07-16 NOTE — INTERVAL H&P NOTE
H&P Update:  Monique Lopez was seen and examined. History and physical has been reviewed.  Significant clinical changes have occurred as noted:  none    Signed By: Dewayne Mercado MD     July 16, 2018 12:45 PM

## 2018-07-16 NOTE — OP NOTES
Gastroenterology Procedure Note           Procedure:  Esophagogastroduodenoscopy    Date of Procedure:  7/16/2018    Patient:  Aleja Escobar     9/20/1929    Indication:  Anemia    Sedation:  MAC    Pre-Procedure Physical Exam:    Mental status:  alert and oriented  Airway:  normal oropharyngeal airway and neck mobility  CV:  regular rate and rhythm  Respiratory:  clear to auscultation    Procedure:  A History and Physical has been performed, and patient medication allergies have been  reviewed. Risks of perforation, hemorrhage, adverse drug reaction, and aspiration were discussed. Informed consent was obtained for the procedure, including sedation. The patient was placed in the left lateral decubitus position. The heart rate, oxygen saturations, blood pressure, and response to care were monitored throughout the procedure. The gastroscope was passed through the mouth and advanced under direct vision to the second portion of the duodenum. A careful inspection was made as the gastroscope was withdrawn, including a retroflexed view of the proximal stomach. The patient tolerated the procedure well. Findings:     OROPHARYNX: Cords and pyriform recesses appear normal.   ESOPHAGUS: The esophagus is normal. The proximal, mid, and distal portions are normal. The Z-Line is intact. STOMACH: Several small erosions are seen in the antrum. Not biopsied. DUODENUM: The bulb and second portions are normal.    Specimen:  No    Estimated Blood Loss:  None    Implant:  None           Impression:    Erosive gastritis. Plan:  1. PPI once daily. 2. Avoid NSAIDs.       Procedure:  Colonoscopy    Date of Procedure:  7/16/2018    Patient:  Aleja Escobar     9/20/1929    Indication:  Anemia, occult blood in stool    Sedation:  MAC    Pre-Procedure Exam:    Mental status:  alert and oriented  Airway:  normal oropharyngeal airway and neck mobility  CV:  regular rate and rhythm   Respiratory:  clear to auscultation    Procedure:  A History and Physical has been performed, and patient medication allergies have been  reviewed. Risks of perforation, hemorrhage, adverse drug reaction, and aspiration were discussed. Informed consent was obtained for the procedure, including sedation. The patient was placed in the left lateral decubitus position. The heart rate, oxygen saturations, blood pressure, and response to care were monitored throughout the procedure. After performing a rectal exam, the colonoscope was passed through the anus and advanced under direct vision to the cecum, identified by appendiceal orifice and ileocecal valve. The quality of prep was adequate. A careful inspection was made as the colonoscope was withdrawn, including a retroflexed view of the rectum. The patient tolerated the procedure well. Findings:     ANUS:  Anal exam reveals no masses or hemorrhoids. RECTUM:  Internal hemorrhoids are seen. SIGMOID COLON:  Several small diverticula are seen. DESCENDING COLON:  The mucosa is normal with good vascular pattern and without ulcers, diverticula, and polyps. SPLENIC FLEXURE:  The splenic flexure is normal.   TRANSVERSE COLON:  A 6 mm sessile polyp was removed with a cold snare. HEPATIC FLEXURE:  The hepatic flexure is normal.   ASCENDING COLON:  Five 4-6 mm sessile polyps were removed with a cold snare. CECUM:  The appendiceal orifice appears normal. The ileocecal valve appears normal.   TERMINAL ILEUM:  The terminal ileum was normal.    Specimen:  Yes    Estimated Blood Loss:  Minimal    Implant:  None           Impression:    Colon polyps. Diverticulosis. Internal hemorrhoids. Plan:  1. Follow up pathology. 2. Resume diet.   3. Further recommendations per GI rounding team.    Signed:  Vinayak Morse MD  7/16/2018  12:54 PM

## 2018-07-16 NOTE — PERIOP NOTES
TRANSFER - IN REPORT:    Verbal report received from Tiny Faulkner RN on Jhonnie Lopez  being received from 2rd Med-Surg for routine progression of care      Report consisted of patients Situation, Background, Assessment and   Recommendations(SBAR). Information from the following report(s) SBAR and MAR was reviewed with the receiving nurse. Opportunity for questions and clarification was provided. Assessment completed upon patients arrival to unit and care assumed.

## 2018-07-16 NOTE — H&P (VIEW-ONLY)
[facility] Consult Note Primary GI Physician: new Referring Physician:  Dr Jenise Grossman Consult Date:  7/14/2018 Admit Date:  7/13/2018 Chief Complaint:  Anemia, heme positive stool Subjective:  
 
History of Present Illness:  Patient is a 80 y.o. female is seen in consultation at the request of Dr. Jenise Grossman for evaluation of anemia and heme positive stool. She was admitted 7/13/18 after presenting to the ER after office visit with cardiology earlier in the day. Hgb was low at 7.4, done from 11 in May of this year, and she was contacted by cardiology with recommendations to go to the ER. On arrival to the ER, hgb was 6.8 and she was admitted for transfusion and further evaluation. She was given 2 units PRBC with improvement in hgb this morning at 9.2. She was heme positive on rectal exam in the ER but denied overt bleeding or melena. INR on admission was 2.1 and Coumadin is now on hold. CT abd/pelvis w IV contrast was obtained today, noting no etiology of anemia, with a stable cyst noted in the pancreas. Patient reports in the last 1-2 weeks, increased fatigue and shortness of breath, but denies overt bleeding or melena. She notes recent increase in heartburn and is on daily Protonix at home. She has been on both Coumadin and Plavix as an outpatient, last taken Thursday night. She reports prior EGD and colonoscopy remotely while living elsewhere, having moved to [place] 5-6 years ago. Her  is a patient of Dr Dru Tapia but she has not seen gi here in town. CT ABDOMEN AND PELVIS WITH IV CONTRAST 7/14/18  
HISTORY:  Unexplained anemia. Weakness. Diarrhea.  
COMPARISON:  CT scan May 27, 1692  
TECHNIQUE:  Helical scans through the abdomen and pelvis with oral and IV 
contrast, Isovue-370, 100 cc  IV to improve conspicuity of infection and 
neoplasia. Radiation dose reduction techniques were used for this study:   All 
CT scans performed at this facility use one or all of the following: Automated 
exposure control, adjustment of the mA and/or kVp according to patient's size, 
iterative reconstruction.  
CT ABDOMEN:  Mild chronic changes at the lung bases. Subcentimeter 
low-attenuation lesion hepatic lesion adjacent to the liver, unchanged. No 
biliary dilatation. Subcentimeter cystic lesion at the pancreatic tail is 
unchanged. No new pancreatic abnormalities. Small bowel not dilated. No ascites. Negative for lymphadenopathy. Normal size spleen. Scattered bilateral renal 
subcentimeter low-attenuation abnormalities, probably cysts, no worrisome renal 
lesions.  
CT PELVIS:  No free fluid, mass, or lymphadenopathy. No CT evidence of 
diverticulitis or colitis.  IMPRESSION IMPRESSION:   
1. No acute CT findings in the abdomen or pelvis. 2. Stable subcentimeter cystic lesion in the pancreatic tail. PMH: 
Past Medical History:  
Diagnosis Date  Abnormal cardiovascular function study 1/7/2016  Allergic rhinitis 1/14/2013  Anemia 11/13/2015  Arthritis  Atrial fibrillation ([address] 1/14/2013  Blind left eye   
 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  
 stent to LAD; 3 stents total  
 Depression 1/14/2013  GERD (gastroesophageal reflux disease)  H/O percutaneous left heart catheterization 09/17/2015 Patent LAD stents in the proximal to mid vessel, as well as the distal vessel. Normal LV systolic function.  Hypercholesteremia 1/14/2013  Hypertension  Hypothyroidism 1/14/2013  Osteoarthritis of back 1/2/2014  Osteoporosis  Pulmonary embolism ([address] 2009  
 after knee surgery  S/P total knee arthroplasty 1/13/2017  Sick sinus syndrome ([address] 1/7/2016 Tachycardia-Bradycardia  UTI (urinary tract infection) PSH: 
Past Surgical History:  
Procedure Laterality Date [facility]  HX BREAST BIOPSY Bilateral   
 HX CATARACT REMOVAL Right 2010  HX CORONARY STENT PLACEMENT  05/2014 LAD X 3  
 HX HEART CATHETERIZATION  09/17/2015  HX HEENT    
 multiple eye surgeries - left - macular hoe, retinal detachment twice,   
[address]  HX KNEE REPLACEMENT  2009  HX KNEE REPLACEMENT Left 01/12/2017  HX ORTHOPAEDIC  2006 and 2008  
 herniated disc  HX TONSIL AND ADENOIDECTOMY  [id number] Allergies: Allergies Allergen Reactions  Adhesive Rash Home Medications: 
Prior to Admission medications Medication Sig Start Date End Date Taking? Authorizing Provider  
warfarin (COUMADIN) 5 mg tablet 1 tab every day or as directed 6/11/18   Carolee Paredes MD  
atorvastatin (LIPITOR) 40 mg tablet TAKE 1 TABLET EVERY DAY Patient taking differently: nightly. TAKE 1 TABLET EVERY DAY 4/30/18   Carolee Paredes MD  
escitalopram oxalate (LEXAPRO) 10 mg tablet Take 1 Tab by mouth daily. Patient taking differently: Take 10 mg by mouth nightly. 4/16/18   Broderick Baez MD  
levothyroxine (SYNTHROID) 50 mcg tablet Take 1 Tab by mouth Daily (before breakfast). 3/14/18   Broderick Baez MD  
clopidogrel (PLAVIX) 75 mg tab Take 1 Tab by mouth every evening. 3/9/18   Carolee Paredes MD  
metoprolol succinate (TOPROL-XL) 25 mg XL tablet Take 0.5 Tabs by mouth daily. 1/23/18   Broderick Baez MD  
estradiol (ESTRACE) 0.01 % (0.1 mg/gram) vaginal cream Apply 1 gm to vagina 3x/week at hs 9/28/17   Broderick Baez MD  
zolpidem (AMBIEN) 10 mg tablet Take 1 Tab by mouth nightly as needed for Sleep. Max Daily Amount: 10 mg. 9/28/17   Broderick Baez MD  
nitroglycerin (NITROSTAT) 0.3 mg SL tablet 1 Tab by SubLINGual route every five (5) minutes as needed for Chest Pain. 8/4/17   Carolee Paredes MD  
pantoprazole (PROTONIX) 40 mg granules for oral suspension 40 mg daily. Historical Provider  
acetaminophen (TYLENOL ARTHRITIS PAIN) 650 mg CR tablet Take 650 mg by mouth every six (6) hours as needed for Pain.     Historical Provider diclofenac (VOLTAREN) 1 % gel Apply  to affected area as needed. Historical Provider L.ACIDOPHILUS/B.BIFIDUM&LONGUM (HEALTHY COLON PO) Take  by mouth daily. Historical Provider  
biotin 2,500 mcg tab Take  by mouth daily. Historical Provider VIT A/VIT C/VIT E/ZINC/COPPER (ICAPS AREDS PO) Take  by mouth. Twice a day    Historical Provider  
vitamin E (AQUA GEMS) 400 unit capsule Take  by mouth daily. Historical Provider  
aspirin delayed-release 81 mg tablet Take 81 mg by mouth every evening. Historical Provider Calcium Carbonate-Vit D3-Min (CALTRATE 600+D PLUS MINERALS) 600 mg calcium- 400 unit Tab Take  by mouth daily. Historical Provider Cholecalciferol, Vitamin D3, (VITAMIN D3) 1,000 unit cap Take  by mouth daily. Historical Provider  
cyanocobalamin (VITAMIN B-12) 100 mcg tablet Take 1,000 mcg by mouth daily. Historical Provider  
potassium 99 mg tablet Take 99 mg by mouth every evening. Historical Provider Hospital Medications: 
Current Facility-Administered Medications Medication Dose Route Frequency  hydrALAZINE (APRESOLINE) tablet 50 mg  50 mg Oral Q6H PRN  
 0.9% sodium chloride infusion 250 mL  250 mL IntraVENous PRN  
 aspirin delayed-release tablet 81 mg  81 mg Oral QPM  
 atorvastatin (LIPITOR) tablet 40 mg  40 mg Oral QHS  levothyroxine (SYNTHROID) tablet 50 mcg  50 mcg Oral ACB  metoprolol succinate (TOPROL-XL) XL tablet 12.5 mg  12.5 mg Oral DAILY  pantoprazole (PROTONIX) tablet 40 mg  40 mg Oral ACB  sodium chloride (NS) flush 5-10 mL  5-10 mL IntraVENous Q8H  
 sodium chloride (NS) flush 5-10 mL  5-10 mL IntraVENous PRN  
 acetaminophen (TYLENOL) tablet 650 mg  650 mg Oral Q4H PRN  
 HYDROcodone-acetaminophen (NORCO)  mg tablet 1 Tab  1 Tab Oral Q4H PRN  
 naloxone (NARCAN) injection 0.4 mg  0.4 mg IntraVENous PRN  
 diphenhydrAMINE (BENADRYL) capsule 25 mg  25 mg Oral Q4H PRN  prochlorperazine (COMPAZINE) injection 5 mg 5 mg IntraVENous Q8H PRN  
 bisacodyl (DULCOLAX) tablet 5 mg  5 mg Oral DAILY PRN  
 LORazepam (ATIVAN) tablet 1 mg  1 mg Oral BID PRN  
 escitalopram oxalate (LEXAPRO) tablet 10 mg  10 mg Oral QPM  
 
 
Social History: 
Social History Substance Use Topics  Smoking status: Never Smoker  Smokeless tobacco: Never Used  Alcohol use No  
 
 
 
 
Family History: 
Family History Problem Relation Age of Onset  Cancer Mother  Breast Cancer Mother  Cancer Father  Cancer Sister  Cancer Brother   
  pancreas  Breast Cancer Maternal Aunt Review of Systems: A detailed 10 system ROS is obtained, with pertinent positives as listed above. All others are negative. Diet:  regular Objective:  
 
Physical Exam: 
Vitals: 
Visit Vitals  /61 (BP 1 Location: Right arm, BP Patient Position: At rest)  Pulse 66  Temp 98.2 °F (36.8 °C)  Resp 18  Ht 5' 3\" (1.6 m)  Wt 66.7 kg (147 lb)  SpO2 96%  BMI 26.04 kg/m2 Gen:  Pt is alert, cooperative, no acute distress Skin:  Extremities and face reveal no rashes. Pale. HEENT: Sclerae anicteric. Extra-occular muscles are intact. No oral ulcers. No abnormal pigmentation of the lips. The neck is supple. Cardiovascular: Regular rate and rhythm. No murmurs, gallops, or rubs. Respiratory:  Comfortable breathing with no accessory muscle use. Clear breath sounds anteriorly with no wheezes, rales, or rhonchi. GI:  Abdomen nondistended, soft, and nontender. Normal active bowel sounds. No enlargement of the liver or spleen. No masses palpable. Rectal:  Deferred Musculoskeletal:  No pitting edema of the lower legs. Neurological:  Gross memory appears intact. Patient is alert and oriented. Psychiatric:  Mood appears appropriate with judgement intact. Lymphatic:  No cervical or supraclavicular adenopathy. Laboratory:   
Recent Labs  
   07/14/18 
 0552  07/13/18 
 1856  07/13/18 
 1116  07/13/18 
 7637 WBC 4.3  5.1  5.6   --   
HGB  9.2*  6.8*  7.4*   --   
HCT  29.2*  21.7*  23.4*   --   
PLT  223  249  278   --   
MCV  83.4  83.5  85.7   --   
NA  140  138  139   --   
K  4.0  3.6  3.9   --   
CL  109*  105  110*   --   
CO2  24  23  20*   --   
BUN  19  21  13   --   
CREA  0.87  1.16*  1.00   --   
CA  8.6  9.2  8.8   --   
GLU  115*  147*  101*   --   
AP   --   71   --    --   
SGOT   --   26   --    --   
ALT   --   31   --    --   
TBILI   --   0.6   --    --   
ALB   --   2.9*   --    --   
TP   --   6.2*   --    --   
PTP   --   24.7*   --    --   
INR   --   2.1   --   2.2 Assessment:  
 
Principal Problem: Anemia due to GI blood loss (7/13/2018) Active Problems: Hypothyroidism (1/14/2013) Depression (1/14/2013) Hypercholesteremia (1/14/2013) Paroxysmal atrial fibrillation ([facility] (1/14/2013) Overview: Paroxysmal  
 
  GERD (gastroesophageal reflux disease) (3/6/2014) Long term (current) use of anticoagulants (6/11/2018) Weakness (7/13/2018) Heme positive stool (7/13/2018) Plan:  
 
81 yo female is seen today for evaluation of anemia, admitted yesterday after having labs earlier in the day at the cardiology office which revealed an acute anemia. On arrival to the ER, she was noted to have a hgb 6.8 and received transfusion 2 units PRBC with improvement in hgb today to 9.2. Patient has been on both Coumadin and Plavix, as well as daily asa as an outpatient, but denies overt bleeding or melena. Hx of CAD with stents is noted in 2014 with Dr Berkley Arrington. Remote hx of EGD/colonoscopy is reported elsewhere. INR was 2.1 on admission yesterday and anti-coagulation was last taken Thursday. 1.  Clear liquid diet to begin at midnight 2. Change hgb to q12 hr in absence of overt bleeding 3. Repeat INR in am 
4. Plan bowel prep tomorrow to be followed by EGD/colonoscopy on Monday; patient and spouse agreeable. Risks vs benefits reviewed.  
5.  Continue Protonix every day for now. Patient is seen and examined in collaboration with Dr. Mayda Sorto. Assessment and plan as per Dr. Mayda Sorto.  
Dolores Fischer NP

## 2018-07-16 NOTE — PROGRESS NOTES
Shift assessment complete. Pt sitting up in chair. Alert and oriented x4. No signs of acute distress. Pt completed bowel prep last night, reports last bowel movement was clear with no residual. PT denies pain. Call light within reach. Pt instructed to call for assistance.

## 2018-07-16 NOTE — PROGRESS NOTES
Problem: Self Care Deficits Care Plan (Adult)  Goal: *Acute Goals and Plan of Care (Insert Text)  Demonstrated independence in functional mobility during ADL's as well as independence with ADL's. Outcome: Resolved/Met Date Met: 07/16/18    OCCUPATIONAL THERAPY: Initial Assessment and Discharge 7/16/2018  INPATIENT: Hospital Day: 4  [place] / [place] / Product Type: Medicare /      NAME/AGE/GENDER: Kwabena Valerio is a 80 y.o. female   PRIMARY DIAGNOSIS:  Iron deficiency anemia, unspecified iron deficiency anemia type [D50.9] Anemia due to GI blood loss Anemia due to GI blood loss  Procedure(s) (LRB):  ESOPHAGOGASTRODUODENOSCOPY (EGD) (N/A)  COLONOSCOPY (N/A)     ICD-10: Treatment Diagnosis:    · Generalized Muscle Weakness (M62.81)   Precautions/Allergies:     Adhesive      ASSESSMENT:     Ms. Sanket Gandara presents sitting up in chair waiting for colonoscopy. She is pleasant and demonstrated excellent cognition through conversation. She was able to demonstrate LB dressing, item retrieval and good ROM and balance during mobility and self cares. She does not require OT for acute deficits. This section established at most recent assessment   PROBLEM LIST (Impairments causing functional limitations):     INTERVENTIONS PLANNED: (Benefits and precautions of occupational therapy have been discussed with the patient.)  1. NA          RECOMMENDED REHABILITATION/EQUIPMENT: (at time of discharge pending progress): Due to the probability of continued deficits (see above) this patient will not likely need continued skilled occupational therapy after discharge. Equipment:    None at this time              OCCUPATIONAL PROFILE AND HISTORY:   History of Present Injury/Illness (Reason for Referral):  See H&P  Past Medical History/Comorbidities:   Ms. Sanket Gandara  has a past medical history of Abnormal cardiovascular function study (1/7/2016); Allergic rhinitis (1/14/2013); Anemia (11/13/2015);  Arthritis; Atrial fibrillation ([address] (1/14/2013); Blind left eye; CAD (coronary artery disease), native coronary artery (May 2014); Depression (1/14/2013); GERD (gastroesophageal reflux disease); H/O percutaneous left heart catheterization (09/17/2015); Hypercholesteremia (1/14/2013); Hypertension; Hypothyroidism (1/14/2013); Osteoarthritis of back (1/2/2014); Osteoporosis; Pulmonary embolism ([address] (2009); S/P total knee arthroplasty (1/13/2017); Sick sinus syndrome ([address] (1/7/2016); and UTI (urinary tract infection). She also has no past medical history of Asthma; Autoimmune disease ([address] Cancer ([address] Chronic kidney disease; Chronic obstructive pulmonary disease ([address] Diabetes ([address] Difficult intubation; Heart failure ([address] Liver disease; Malignant hyperthermia due to anesthesia; Nausea & vomiting; Pseudocholinesterase deficiency; PUD (peptic ulcer disease); Seizures ([address] Sleep apnea; or Stroke [facility]). Ms. Ronal Carbajal  has a past surgical history that includes hx appendectomy (1965); hx hysterectomy (1970); hx tonsil and adenoidectomy (1934); hx heent; hx orthopaedic (2006 and 2008); hx cataract removal (Right, 2010); hx coronary stent placement (05/2014); hx heart catheterization (09/17/2015); hx knee replacement (2009); hx knee replacement (Left, 01/12/2017); and hx breast biopsy (Bilateral). Social History/Living Environment:   Home Environment: Private residence ([address] [place] [address], [address] home at [facility])  # Steps to Enter: 0  One/Two Story Residence: One story  Living Alone: No  Support Systems: Family member(s), Spouse/Significant Other/Partner  Patient Expects to be Discharged to[de-identified] Private residence  Current DME Used/Available at Home: None  Prior Level of Function/Work/Activity:  Driving.  Independent all ADL's and IADL's     Number of Personal Factors/Comorbidities that affect the Plan of Care: Brief history (0):  LOW COMPLEXITY   ASSESSMENT OF OCCUPATIONAL PERFORMANCE[de-identified]   Activities of Daily Living:   Basic ADLs (From Assessment) Complex ADLs (From Assessment)   Feeding: Independent  Oral Facial Hygiene/Grooming: Independent  Bathing: Independent  Upper Body Dressing: Independent  Lower Body Dressing: Independent  Toileting: Independent     Grooming/Bathing/Dressing Activities of Daily Living     Cognitive Retraining  Safety/Judgement: Awareness of environment; Fall prevention                 Functional Transfers  Toilet Transfer : Independent  Shower Transfer: Independent     Bed/Mat Mobility  Supine to Sit: Independent  Sit to Supine: Independent  Sit to Stand: Independent  Bed to Chair: Independent  Scooting: Independent       Most Recent Physical Functioning:   Gross Assessment:                  Posture:     Balance:  Sitting: Intact; Without support  Standing: Intact; Without support Bed Mobility:  Supine to Sit: Independent  Sit to Supine: Independent  Scooting: Independent  Wheelchair Mobility:     Transfers:  Sit to Stand: Independent  Stand to Sit: Independent  Bed to Chair: Independent            Patient Vitals for the past 6 hrs:   BP BP Patient Position SpO2 Pulse   07/16/18 0510 165/72 At rest 92 % 60   07/16/18 0812 131/55 At rest;Supine 99 % (!) 59       Mental Status  Neurologic State: Alert  Orientation Level: Oriented X4  Cognition: Appropriate decision making, Appropriate for age attention/concentration  Perception: Appears intact  Perseveration: No perseveration noted  Safety/Judgement: Awareness of environment, Fall prevention            LLE Assessment  LLE Assessment (WDL): Within defined limits RLE Assessment  RLE Assessment (WDL): Within defined limits           Physical Skills Involved:  1. Strength Cognitive Skills Affected (resulting in the inability to perform in a timely and safe manner):  1. NT Psychosocial Skills Affected:  1.  NA   Number of elements that affect the Plan of Care: 1-3:  LOW COMPLEXITY   CLINICAL DECISION MAKING:   [employer] AM-PAC [facility]   Daily Activity Inpatient Short Form  How much help from another person does the patient currently need. .. Total A Lot A Little None   1. Putting on and taking off regular lower body clothing? [] 1   [] 2   [] 3   [x] 4   2. Bathing (including washing, rinsing, drying)? [] 1   [] 2   [] 3   [x] 4   3. Toileting, which includes using toilet, bedpan or urinal?   [] 1   [] 2   [] 3   [x] 4   4. Putting on and taking off regular upper body clothing? [] 1   [] 2   [] 3   [x] 4   5. Taking care of personal grooming such as brushing teeth? [] 1   [] 2   [] 3   [x] 4   6. Eating meals? [] 1   [] 2   [] 3   [x] 4   © 2007, Trustees of [address] [place] [address] [address] [address], under license to [employer]. All rights reserved      Score:  Initial: 24 Most Recent: X (Date: -- )    Interpretation of Tool:  Represents activities that are increasingly more difficult (i.e. Bed mobility, Transfers, Gait). Score 24 23 22-20 19-15 14-10 9-7 6     Modifier CH CI CJ CK CL CM CN      ?  Self Care:     - CURRENT STATUS: CH - 0% impaired, limited or restricted    - GOAL STATUS: CH - 0% impaired, limited or restricted    - D/C STATUS:  CH - 0% impaired, limited or restricted  [place] / [place] / Product Type: Medicare /         Use of outcome tool(s) and clinical judgement create a POC that gives a: LOW COMPLEXITY         TREATMENT:   (In addition to Assessment/Re-Assessment sessions the following treatments were rendered)     Pre-treatment Symptoms/Complaints:    Pain: Initial:   Pain Intensity 1: 0  Post Session:  0     Assessment/Reassessment only, no treatment provided today    Braces/Orthotics/Lines/Etc:   · none  Treatment/Session Assessment:    · Response to Treatment:  Good  · Interdisciplinary Collaboration:   o Physical Therapist  o Occupational Therapist  o Registered Nurse  · After treatment position/precautions:   o Up in chair  o Bed/Chair-wheels locked  o Call light within reach     Total Treatment Duration:  OT Patient Time In/Time Out  Time In: 0902  Time Out: [address] [facility], OT

## 2018-07-16 NOTE — ANESTHESIA PREPROCEDURE EVALUATION
Anesthetic History               Review of Systems / Medical History  Patient summary reviewed    Pulmonary                   Neuro/Psych         Psychiatric history     Cardiovascular    Hypertension: well controlled        Dysrhythmias : atrial fibrillation  CAD and cardiac stents (5/2015)  Pertinent negatives: No past MI  Exercise tolerance: >4 METS  Comments: Cath 12/2015, nonobstructive disease, EF 60%   GI/Hepatic/Renal     GERD           Endo/Other      Hypothyroidism       Other Findings        Anesthetic History               Review of Systems / Medical History  Patient summary reviewed    Pulmonary                   Neuro/Psych         Psychiatric history     Cardiovascular    Hypertension: well controlled        Dysrhythmias : atrial fibrillation  CAD and cardiac stents (5/2015)  Pertinent negatives: No past MI  Exercise tolerance: >4 METS  Comments: Cath 12/2015, nonobstructive disease, EF 60%   GI/Hepatic/Renal     GERD           Endo/Other      Hypothyroidism       Other Findings              Physical Exam    Airway  Mallampati: II  TM Distance: > 6 cm  Neck ROM: normal range of motion   Mouth opening: Normal     Cardiovascular  Regular rate and rhythm,  S1 and S2 normal,  no murmur, click, rub, or gallop             Dental  No notable dental hx       Pulmonary  Breath sounds clear to auscultation               Abdominal         Other Findings            Anesthetic Plan    ASA: 3        Post-op pain plan if not by surgeon: peripheral nerve block single      Anesthetic plan and risks discussed with: Patient      Patient continued Plavix through 1/8. Will proceed with GA.         Physical Exam    Airway  Mallampati: II  TM Distance: > 6 cm  Neck ROM: normal range of motion   Mouth opening: Normal     Cardiovascular  Regular rate and rhythm,  S1 and S2 normal,  no murmur, click, rub, or gallop             Dental  No notable dental hx       Pulmonary  Breath sounds clear to auscultation Abdominal         Other Findings            Anesthetic Plan    ASA: 3  Anesthesia type: total IV anesthesia            Anesthetic plan and risks discussed with: Patient

## 2018-07-16 NOTE — ANESTHESIA POSTPROCEDURE EVALUATION
Post-Anesthesia Evaluation and Assessment    Patient: Fede Lopez MRN: [id number]  SSN: [id number]    YOB: 1929  Age: 80 y.o. Sex: female       Cardiovascular Function/Vital Signs  Visit Vitals    /67    Pulse 71    Temp 36.2 °C (97.2 °F)    Resp 16    Ht 5' 3\" (1.6 m)    Wt 66.7 kg (147 lb)    SpO2 100%    BMI 26.04 kg/m2       Patient is status post total IV anesthesia anesthesia for Procedure(s):  ESOPHAGOGASTRODUODENOSCOPY (EGD)  COLONOSCOPY  ENDOSCOPIC POLYPECTOMY. Nausea/Vomiting: None    Postoperative hydration reviewed and adequate. Pain:  Pain Scale 1: Visual (07/16/18 1334)  Pain Intensity 1: 0 (07/16/18 1328)   Managed    Neurological Status:   Neuro  Neurologic State: Alert (07/16/18 1000)  Orientation Level: Oriented X4 (07/16/18 1000)  Cognition: Appropriate decision making; Appropriate for age attention/concentration (07/16/18 1000)   At baseline    Mental Status and Level of Consciousness: Arousable    Pulmonary Status:   O2 Device: Nasal cannula (07/16/18 1339)   Adequate oxygenation and airway patent    Complications related to anesthesia: None    Post-anesthesia assessment completed.  No concerns    Signed By: Stacia Sprague MD     July 16, 2018

## 2018-07-16 NOTE — PROGRESS NOTES
Problem: Mobility Impaired (Adult and Pediatric)  Goal: *Acute Goals and Plan of Care (Insert Text)  ALL GOALS MET:  1) pt independent with bed mobility. 2) transfers independently. 3) pt ambulating 300 ft independently. PHYSICAL THERAPY: Initial Assessment, Discharge, Treatment Day: Day of Assessment, AM 7/16/2018  INPATIENT: Hospital Day: 4  [place] / [place] / Product Type: Medicare /      NAME/AGE/GENDER: Ezra Duffy is a 80 y.o. female   PRIMARY DIAGNOSIS: Iron deficiency anemia, unspecified iron deficiency anemia type [D50.9] Anemia due to GI blood loss Anemia due to GI blood loss  Procedure(s) (LRB):  ESOPHAGOGASTRODUODENOSCOPY (EGD) (N/A)  COLONOSCOPY (N/A)     ICD-10: Treatment Diagnosis:    · Generalized Muscle Weakness (M62.81)   Precaution/Allergies:  Adhesive      ASSESSMENT:     Ms. Samantha Levy presents with no remarkable functional deficits. PT did recommend for pt to walk halls 3 x a day with staff or family, for safety, to keep active & not to become deconditioned. No further skilled PT follow up needed at this time. This section established at most recent assessment   PROBLEM LIST (Impairments causing functional limitations):  1. NA   INTERVENTIONS PLANNED: (Benefits and precautions of physical therapy have been discussed with the patient.)  1. NA     TREATMENT PLAN: Frequency/Duration: NA  Rehabilitation Potential For Stated Goals: NA     RECOMMENDED REHABILITATION/EQUIPMENT: (at time of discharge pending progress): Due to the probability of continued deficits (see above) this patient will not likely need continued skilled physical therapy after discharge. Equipment:    None at this time              HISTORY:   History of Present Injury/Illness (Reason for Referral):  Patient is a 80 y.o. female who presents to the ER due to anemia. She saw her cardiologist for weakness and fatigue and some SOB.   Labs were done and he called her tonight and told her to come to the ER. She denies any melena or brbpr. She did have watery diarrhea last week which has greatly improved. Denies chest or abdominal pain. She is on coumadin, denies any recent injury or trauma. She was heme + on exam in ER. hgb was 11 in may and now 6.8.        Past Medical History/Comorbidities:   Ms. Luli Whittington  has a past medical history of Abnormal cardiovascular function study (1/7/2016); Allergic rhinitis (1/14/2013); Anemia (11/13/2015); Arthritis; Atrial fibrillation ([address] (1/14/2013); Blind left eye; CAD (coronary artery disease), native coronary artery (May 2014); Depression (1/14/2013); GERD (gastroesophageal reflux disease); H/O percutaneous left heart catheterization (09/17/2015); Hypercholesteremia (1/14/2013); Hypertension; Hypothyroidism (1/14/2013); Osteoarthritis of back (1/2/2014); Osteoporosis; Pulmonary embolism ([address] (2009); S/P total knee arthroplasty (1/13/2017); Sick sinus syndrome ([address] (1/7/2016); and UTI (urinary tract infection). She also has no past medical history of Asthma; Autoimmune disease ([address] Cancer ([address] Chronic kidney disease; Chronic obstructive pulmonary disease ([address] Diabetes ([address] Difficult intubation; Heart failure ([address] Liver disease; Malignant hyperthermia due to anesthesia; Nausea & vomiting; Pseudocholinesterase deficiency; PUD (peptic ulcer disease); Seizures ([address] Sleep apnea; or Stroke [facility]). Ms. Luli Whittington  has a past surgical history that includes hx appendectomy (1965); hx hysterectomy (1970); hx tonsil and adenoidectomy (1934); hx heent; hx orthopaedic (2006 and 2008); hx cataract removal (Right, 2010); hx coronary stent placement (05/2014); hx heart catheterization (09/17/2015); hx knee replacement (2009); hx knee replacement (Left, 01/12/2017); and hx breast biopsy (Bilateral).   Social History/Living Environment:   Home Environment: Private residence ([address] [place] [address], [address] home at [facility])  # Steps to Enter: 0  One/Two Story Residence: One story  Living Alone: No  Support Systems: Family member(s), Spouse/Significant Other/Partner  Patient Expects to be Discharged to[de-identified] Private residence  Current DME Used/Available at Home: None  Prior Level of Function/Work/Activity:  Pt was independent without an assistive device prior to this admission. Personal Factors: Other factors that influence how disability is experienced by the patient:  Current & PMH   Number of Personal Factors/Comorbidities that affect the Plan of Care: 3+: HIGH COMPLEXITY   EXAMINATION:   Most Recent Physical Functioning:   Gross Assessment:  AROM: Within functional limits (all limbs & core)  Strength: Within functional limits (all limbs & core)  Coordination: Within functional limits (all limbs & core)                    Balance:  Sitting: Intact; Without support  Standing: Intact; Without support Bed Mobility:  Supine to Sit: Independent  Sit to Supine: Independent  Scooting: Independent       Transfers:  Sit to Stand: Independent  Stand to Sit: Independent  Bed to Chair: Independent  Gait:     Speed/Irena: Fluctuations  Gait Abnormalities: Decreased step clearance  Distance (ft): 300 Feet (ft)  Assistive Device:  (none)  Ambulation - Level of Assistance: Independent   Functional Mobility:         Gait/Ambulation:  Ind        Transfers:  Ind        Bed Mobility:  Ind   Body Structures Involved:  1. Digestive Structures Body Functions Affected:  1. Digestive Activities and Participation Affected:  1. General Tasks and Demands  2. Mobility   Number of elements that affect the Plan of Care: 4+: HIGH COMPLEXITY   CLINICAL PRESENTATION:   Presentation: Stable and uncomplicated: LOW COMPLEXITY   CLINICAL DECISION MAKIN [facility] [id number] AM-PAC 6 Clicks   Basic Mobility Inpatient Short Form  How much difficulty does the patient currently have. .. Unable A Lot A Little None   1. Turning over in bed (including adjusting bedclothes, sheets and blankets)? [] 1   [] 2   [] 3   [x] 4   2. Sitting down on and standing up from a chair with arms ( e.g., wheelchair, bedside commode, etc.)   [] 1   [] 2   [] 3   [x] 4   3. Moving from lying on back to sitting on the side of the bed? [] 1   [] 2   [] 3   [x] 4   How much help from another person does the patient currently need. .. Total A Lot A Little None   4. Moving to and from a bed to a chair (including a wheelchair)? [] 1   [] 2   [] 3   [x] 4   5. Need to walk in hospital room? [] 1   [] 2   [] 3   [x] 4   6. Climbing 3-5 steps with a railing? [] 1   [] 2   [x] 3   [] 4   © 2007, Trustees of [facility] [employer], under license to [employer]. All rights reserved      Score:  Initial: 23 Most Recent: X (Date: -- )    Interpretation of Tool:  Represents activities that are increasingly more difficult (i.e. Bed mobility, Transfers, Gait). Score 24 23 22-20 19-15 14-10 9-7 6     Modifier CH CI CJ CK CL CM CN      ? Mobility - Walking and Moving Around:     - CURRENT STATUS: CI - 1%-19% impaired, limited or restricted    - GOAL STATUS: CI - 1%-19% impaired, limited or restricted    - D/C STATUS:  CI - 1%-19% impaired, limited or restricted  [place] / Product Type: Medicare /      Medical Necessity:     · no skilled PT follow up needed. Reason for Services/Other Comments:  · no skilled PT follow up needed.    Use of outcome tool(s) and clinical judgement create a POC that gives a: Clear prediction of patient's progress: LOW COMPLEXITY            TREATMENT:   (In addition to Assessment/Re-Assessment sessions the following treatments were rendered)   Pre-treatment Symptoms/Complaints:  none  Pain: Initial: visual scale  Pain Intensity 1: 0  Post Session:  0/10     Assessment/Reassessment only, no treatment provided today    Braces/Orthotics/Lines/Etc:   · IV  Treatment/Session Assessment:    · Response to Treatment:  Pt tolerated well  · Interdisciplinary Collaboration:   o Registered Nurse  · After treatment position/precautions:   o Up in chair  o Bed/Chair-wheels locked  o Call light within reach  o RN notified   · Compliance with Program/Exercises: compliant all of the time. Recommendations/ Discharge PT services after evaluation.   Total Treatment Duration:  PT Patient Time In/Time Out  Time In: 0920  Time Out: [address] [facility], PT

## 2018-07-17 VITALS
TEMPERATURE: 98.2 F | OXYGEN SATURATION: 96 % | HEART RATE: 66 BPM | WEIGHT: 147 LBS | RESPIRATION RATE: 18 BRPM | HEIGHT: 63 IN | SYSTOLIC BLOOD PRESSURE: 145 MMHG | DIASTOLIC BLOOD PRESSURE: 64 MMHG | BODY MASS INDEX: 26.05 KG/M2

## 2018-07-17 LAB
HCT VFR BLD AUTO: 28.9 % (ref 35.8–46.3)
HGB BLD-MCNC: 8.9 G/DL (ref 11.7–15.4)

## 2018-07-17 PROCEDURE — 85018 HEMOGLOBIN: CPT | Performed by: NURSE PRACTITIONER

## 2018-07-17 PROCEDURE — 94760 N-INVAS EAR/PLS OXIMETRY 1: CPT

## 2018-07-17 PROCEDURE — 74011250637 HC RX REV CODE- 250/637: Performed by: FAMILY MEDICINE

## 2018-07-17 PROCEDURE — 36415 COLL VENOUS BLD VENIPUNCTURE: CPT | Performed by: NURSE PRACTITIONER

## 2018-07-17 RX ADMIN — LEVOTHYROXINE SODIUM 50 MCG: 50 TABLET ORAL at 08:05

## 2018-07-17 RX ADMIN — METOPROLOL SUCCINATE 12.5 MG: 25 TABLET, EXTENDED RELEASE ORAL at 08:05

## 2018-07-17 RX ADMIN — Medication 5 ML: at 06:03

## 2018-07-17 RX ADMIN — PANTOPRAZOLE SODIUM 40 MG: 40 TABLET, DELAYED RELEASE ORAL at 08:05

## 2018-07-17 NOTE — DISCHARGE SUMMARY
Discharge Summary     Patient: Tal Kramerain MRN: [id number]  SSN: [id number]    YOB: 1929  Age: 80 y.o.   Sex: female       Admit Date: 7/13/2018    Discharge Date: 7/17/2018      Admission Diagnoses: Iron deficiency anemia, unspecified iron deficiency anemia type [D50.9]    Discharge Diagnoses:   Problem List as of 7/17/2018  Date Reviewed: 7/13/2018          Codes Class Noted - Resolved    Weakness ICD-10-CM: R53.1  ICD-9-CM: 780.79  7/13/2018 - Present        Heme positive stool ICD-10-CM: R19.5  ICD-9-CM: 792.1  7/13/2018 - Present        * (Principal)Anemia due to GI blood loss ICD-10-CM: D50.0  ICD-9-CM: 280.0  7/13/2018 - Present        Long term (current) use of anticoagulants (Chronic) ICD-10-CM: Z79.01  ICD-9-CM: V58.61  6/11/2018 - Present        Urethral caruncle ICD-10-CM: N36.2  ICD-9-CM: 599.3  4/4/2017 - Present        Insomnia, unspecified ICD-10-CM: G47.00  ICD-9-CM: 780.52  8/24/2016 - Present        Chest pain, unspecified ICD-10-CM: R07.9  ICD-9-CM: 786.50  1/7/2016 - Present        Sick sinus syndrome ([facility] ICD-10-CM: I49.5  ICD-9-CM: 427.81  1/7/2016 - Present    Overview Signed 1/7/2016  2:46 PM by Thom Richter     Tachycardia-Bradycardia             Angina pectoris ([facility] ICD-10-CM: I20.9  ICD-9-CM: 413.9  11/13/2015 - Present        Coronary atherosclerosis of native coronary artery ICD-10-CM: I25.10  ICD-9-CM: 414.01  11/13/2015 - Present        GERD (gastroesophageal reflux disease) ICD-10-CM: K21.9  ICD-9-CM: 530.81  3/6/2014 - Present        Osteoarthritis of back ICD-10-CM: M47.815  ICD-9-CM: 721.90  1/2/2014 - Present        Hypothyroidism ICD-10-CM: E03.9  ICD-9-CM: 244.9  1/14/2013 - Present        Depression ICD-10-CM: F32.9  ICD-9-CM: 311  1/14/2013 - Present        Hypercholesteremia ICD-10-CM: E78.00  ICD-9-CM: 272.0  1/14/2013 - Present        Paroxysmal atrial fibrillation ([facility] ICD-10-CM: I48.0  ICD-9-CM: 427.31  1/14/2013 - Present    Overview Signed 11/13/2015 11:17 AM by Pancho Barnes     Paroxysmal              Allergic rhinitis ICD-10-CM: J30.9  ICD-9-CM: 477.9  1/14/2013 - Present        RESOLVED: Orthostatic hypotension ICD-10-CM: I95.1  ICD-9-CM: 458.0  7/13/2018 - 7/13/2018        RESOLVED: Urinary tract infection without hematuria ICD-10-CM: N39.0  ICD-9-CM: 599.0  4/6/2017 - 7/13/2018        RESOLVED: S/P total knee arthroplasty ICD-10-CM: M10.660  ICD-9-CM: V43.65  1/13/2017 - 7/13/2018        RESOLVED: Palpitations ICD-10-CM: R00.2  ICD-9-CM: 785.1  7/13/2016 - 2/9/2017        RESOLVED: Shortness of breath dyspnea ICD-10-CM: [id number]  ICD-9-CM: Deann Santizo  1/7/2016 - 7/13/2018        RESOLVED: Coronary atherosclerosis of native coronary vessel ICD-10-CM: I25.10  ICD-9-CM: 414.01  1/7/2016 - 2/9/2017        RESOLVED: Abnormal cardiovascular function study ICD-10-CM: R94.30  ICD-9-CM: 794.30  1/7/2016 - 7/13/2018        RESOLVED: Anemia ICD-10-CM: D64.9  ICD-9-CM: 285.9  11/13/2015 - 7/13/2018               Discharge Condition: Stable    Hospital Course:     Patient was admitted due to anemia. She was called by her cardiologist to come to ER when her Hb was found to be 6.8. She had hemoccult positive stool. She received 2 units of PRC transfusion. Her serial Hb have been stable. She has been seen by GI for consultation. She had EGD and colonoscopy which did not reveal a cause of occult GI bleeding. Capsule endoscopy has been discussed. She prefers to defer this. She is not willing to resume anticoagulation. She would like to see what happens with her hgb over time. Patient is deemed stable to be discharged today. She will follow up with GI and cardiology and her PMD.     Physical Exam:      General:                    The patient is a pleasant elderly female in no acute distress.    Head:                                   Normocephalic/atraumatic.    Eyes:                                   palpebral pallor, no scleral icterus. ENT:                                    External auricular and nasal exam within normal limits.                                             GYLSKX membranes are moist.  Neck:                                   Supple, non-tender, no JVD. Lungs:                       Clear to auscultation bilaterally without wheezes or crackles.                                             No respiratory distress or accessory muscle use. Heart:                                  Regular rate and rhythm, without murmurs, rubs, or gallops. Abdomen:                  Soft, non-tender, non-distended with normoactive bowel sounds. Genitourinary:           No tenderness over the bladder or bilateral CVAs. Extremities:               Without clubbing, cyanosis, or edema. Skin:                                    Normal color, texture, and turgor. No rashes, lesions, or jaundice. Pulses:                      Radial and dorsalis pedis pulses present 2+ bilaterally.                                               Capillary refill <2s. Neurologic:                CN II-XII grossly intact and symmetrical.                                               Moving all four extremities well with no focal deficits. Psychiatric:                Pleasant demeanor, appropriate affect. Alert and oriented x 3    Consults: Gastroenterology    Significant Diagnostic Studies:     CT abdomen and pelvis   7/14/2018  IMPRESSION:    1. No acute CT findings in the abdomen or pelvis. 2. Stable subcentimeter cystic lesion in the pancreatic tail.     7/15/2018  6:12 AM - [facility], Lab In [facility]   Component Results   Component Value Flag Ref Range Units Status   Sodium 140  136 - 145 mmol/L Final   Potassium 3.8  3.5 - 5.1 mmol/L Final   Chloride 109 (H) 98 - 107 mmol/L Final   CO2 23  21 - 32 mmol/L Final   Anion gap 8  7 - 16 mmol/L Final   Glucose 115 (H) 65 - 100 mg/dL Final   Comment:   47 - 60 mg/dl Consistent with, but not fully diagnostic of hypoglycemia. 101 - 125 mg/dl Impaired fasting glucose/consistent with pre-diabetes mellitus   > 126 mg/dl Fasting glucose consistent with overt diabetes mellitus      BUN 15  8 - 23 MG/DL Final   Creatinine 0.84  0.6 - 1.0 MG/DL Final   GFR est AA >60  >60 ml/min/1.73m2 Final   GFR est non-AA >60  >60 ml/min/1.73m2 Final   Calcium 8.2 (L) 8.3 - 10.4 MG/DL Final     7/14/2018  6:32 AM - [facility], Lab In [facility]   Component Results   Component Value Flag Ref Range Units Status   WBC 4.3  4.3 - 11.1 K/uL Final   RBC 3.50 (L) 4.05 - 5.25 M/uL Final   HGB 9.2 (L) 11.7 - 15.4 g/dL Final   HCT 29.2 (L) 35.8 - 46.3 % Final   MCV 83.4  79.6 - 97.8 FL Final   MCH 26.3  26.1 - 32.9 PG Final   MCHC 31.5  31.4 - 35.0 g/dL Final   RDW 16.3 (H) 11.9 - 14.6 % Final   PLATELET 875  352 - 885 K/uL Final   MPV 10.9  10.8 - 14.1 FL Final   DF AUTOMATED     Final   NEUTROPHILS 43  43 - 78 % Final   LYMPHOCYTES 39  13 - 44 % Final   MONOCYTES 16 (H) 4.0 - 12.0 % Final   EOSINOPHILS 2  0.5 - 7.8 % Final   BASOPHILS 0  0.0 - 2.0 % Final   IMMATURE GRANULOCYTES 0  0.0 - 5.0 % Final     Recent Results (from the past 24 hour(s))   HGB & HCT    Collection Time: 07/16/18 11:38 AM   Result Value Ref Range    HGB 9.7 (L) 11.7 - 15.4 g/dL    HCT 30.7 (L) 35.8 - 46.3 %   HGB & HCT    Collection Time: 07/17/18 12:05 AM   Result Value Ref Range    HGB 8.9 (L) 11.7 - 15.4 g/dL    HCT 28.9 (L) 35.8 - 46.3 %         Disposition: home    Discharge Medications:   Current Discharge Medication List      CONTINUE these medications which have NOT CHANGED    Details   atorvastatin (LIPITOR) 40 mg tablet TAKE 1 TABLET EVERY DAY  Qty: 90 Tab, Refills: 3      escitalopram oxalate (LEXAPRO) 10 mg tablet Take 1 Tab by mouth daily. Qty: 90 Tab, Refills: 3      levothyroxine (SYNTHROID) 50 mcg tablet Take 1 Tab by mouth Daily (before breakfast). Qty: 90 Tab, Refills: 3      metoprolol succinate (TOPROL-XL) 25 mg XL tablet Take 0.5 Tabs by mouth daily.   Qty: 45 Tab, Refills: 3      estradiol (ESTRACE) 0.01 % (0.1 mg/gram) vaginal cream Apply 1 gm to vagina 3x/week at hs  Qty: 42.5 g, Refills: 5    Associated Diagnoses: Atrophic vaginitis      zolpidem (AMBIEN) 10 mg tablet Take 1 Tab by mouth nightly as needed for Sleep. Max Daily Amount: 10 mg.  Qty: 30 Tab, Refills: 2    Associated Diagnoses: Insomnia, unspecified type      nitroglycerin (NITROSTAT) 0.3 mg SL tablet 1 Tab by SubLINGual route every five (5) minutes as needed for Chest Pain. Qty: 1 Bottle, Refills: 3      pantoprazole (PROTONIX) 40 mg granules for oral suspension 40 mg daily. acetaminophen (TYLENOL ARTHRITIS PAIN) 650 mg CR tablet Take 650 mg by mouth every six (6) hours as needed for Pain. L.ACIDOPHILUS/B.BIFIDUM&LONGUM (HEALTHY COLON PO) Take  by mouth daily. biotin 2,500 mcg tab Take  by mouth daily. VIT A/VIT C/VIT E/ZINC/COPPER (ICAPS AREDS PO) Take  by mouth. Twice a day      vitamin E (AQUA GEMS) 400 unit capsule Take  by mouth daily. Calcium Carbonate-Vit D3-Min (CALTRATE 600+D PLUS MINERALS) 600 mg calcium- 400 unit Tab Take  by mouth daily. Cholecalciferol, Vitamin D3, (VITAMIN D3) 1,000 unit cap Take  by mouth daily. cyanocobalamin (VITAMIN B-12) 100 mcg tablet Take 1,000 mcg by mouth daily.          STOP taking these medications       warfarin (COUMADIN) 5 mg tablet Comments:   Reason for Stopping:         clopidogrel (PLAVIX) 75 mg tab Comments:   Reason for Stopping:         diclofenac (VOLTAREN) 1 % gel Comments:   Reason for Stopping:         aspirin delayed-release 81 mg tablet Comments:   Reason for Stopping:         potassium 99 mg tablet Comments:   Reason for Stopping:               Activity: Activity as tolerated  Diet: Cardiac Diet  Wound Care: None needed    Follow-up Appointments   Procedures    FOLLOW UP VISIT Appointment in: 3 - 5 Days See primary doctor     See primary doctor     Standing Status:   Standing     Number of Occurrences:   1     Order Specific Question:   Appointment in     Answer:   3 - 5 Days       I have discussed the plan of care with patient. Time spent on discharge is 39 minutes.        Signed By: Geno Shone, MD     July 17, 2018

## 2018-07-17 NOTE — PROGRESS NOTES
Shift assessment complete. Pt alert and oriented x4, respirations present, even and unlabored, HOB elevated, pt denies any SOB at this time, S1&S2 auscultated, HR regular, abd soft, non- tender, Active BS in all 4 quadrants, No pressure ulcers or edema noted, pt is up with assistance to the bathroom, voiding, pt denies any pain at this time, pt instructed to call for assistance, pt verbalizes understanding, bed low and locked, side rails x3, call light within reach.

## 2018-07-17 NOTE — DISCHARGE INSTRUCTIONS
DISCHARGE SUMMARY from Nurse    PATIENT INSTRUCTIONS:    Anemia is a low level of red blood cells, which carry oxygen throughout your body. Many things can cause anemia. Lack of iron is one of the most common causes. Your body needs iron to make hemoglobin, a substance in red blood cells that carries oxygen from the lungs to your body's cells. Without enough iron, the body produces fewer and smaller red blood cells. As a result, your body's cells do not get enough oxygen, and you feel tired and weak. And you may have trouble concentrating. Bleeding is the most common cause of a lack of iron. You may have heavy menstrual bleeding or bleeding caused by conditions such as ulcers, hemorrhoids, or cancer. Regular use of aspirin or other anti-inflammatory medicines (such as ibuprofen) also can cause bleeding in some people. A lack of iron in your diet also can cause anemia, especially at times when the body needs more iron, such as during pregnancy, infancy, and the teen years. Your doctor may have prescribed iron pills. It may take several months of treatment for your iron levels to return to normal. Your doctor also may suggest that you eat foods that are rich in iron, such as meat and beans. There are many other causes of anemia. It is not always due to a lack of iron. Finding the specific cause of your anemia will help your doctor find the right treatment for you. Follow-up care is a key part of your treatment and safety. Be sure to make and go to all appointments, and call your doctor if you are having problems. It's also a good idea to know your test results and keep a list of the medicines you take. How can you care for yourself at home? · Take your medicines exactly as prescribed. Call your doctor if you think you are having a problem with your medicine. · If your doctor recommends iron pills, take them as directed:  ¨ Try to take the pills on an empty stomach about 1 hour before or 2 hours after meals. But you may need to take iron with food to avoid an upset stomach. ¨ Do not take antacids or drink milk or caffeine drinks (such as coffee, tea, or cola) at the same time or within 2 hours of the time that you take your iron. They can make it hard for your body to absorb the iron. ¨ Vitamin C (from food or supplements) helps your body absorb iron. Try taking iron pills with a glass of orange juice or some other food that is high in vitamin C, such as citrus fruits. ¨ Iron pills may cause stomach problems, such as heartburn, nausea, diarrhea, constipation, and cramps. Be sure to drink plenty of fluids, and include fruits, vegetables, and fiber in your diet each day. Iron pills often make your bowel movements dark or green. ¨ If you forget to take an iron pill, do not take a double dose of iron the next time you take a pill. ¨ Keep iron pills out of the reach of small children. An overdose of iron can be very dangerous. · Follow your doctor's advice about eating iron-rich foods. These include red meat, shellfish, poultry, eggs, beans, raisins, whole-grain bread, and leafy green vegetables. · Steam vegetables to help them keep their iron content. When should you call for help? Call 911 anytime you think you may need emergency care. For example, call if:     · You have symptoms of a heart attack. These may include:  ¨ Chest pain or pressure, or a strange feeling in the chest.  ¨ Sweating. ¨ Shortness of breath. ¨ Nausea or vomiting. ¨ Pain, pressure, or a strange feeling in the back, neck, jaw, or upper belly or in one or both shoulders or arms. ¨ Lightheadedness or sudden weakness. ¨ A fast or irregular heartbeat. After you call 911, the  may tell you to chew 1 adult-strength or 2 to 4 low-dose aspirin. Wait for an ambulance.  Do not try to drive yourself.      · You passed out (lost consciousness).    Call your doctor now or seek immediate medical care if:     · You have new or increased shortness of breath.      · You are dizzy or lightheaded, or you feel like you may faint.      · Your fatigue and weakness continue or get worse.      · You have any abnormal bleeding, such as:  ¨ Nosebleeds. ¨ Vaginal bleeding that is different (heavier, more frequent, at a different time of the month) than what you are used to. ¨ Bloody or black stools, or rectal bleeding. ¨ Bloody or pink urine.    Watch closely for changes in your health, and be sure to contact your doctor if:     · You do not get better as expected. Where can you learn more? Go to http://gómez-maxine.info/. Enter R301 in the search box to learn more about \"Anemia: Care Instructions. \"  Current as of: October 9, 2017  Content Version: 11.7  © 5699-2738 [employer]. Care instructions adapted under license by [employer] (which disclaims liability or warranty for this information). If you have questions about a medical condition or this instruction, always ask your healthcare professional. [place] [address] any warranty or liability for your use of this information.         These are general instructions for a healthy lifestyle:    No smoking/ No tobacco products/ Avoid exposure to second hand smoke  Surgeon General's Warning:  Quitting smoking now greatly reduces serious risk to your health. Obesity, smoking, and sedentary lifestyle greatly increases your risk for illness    A healthy diet, regular physical exercise & weight monitoring are important for maintaining a healthy lifestyle    You may be retaining fluid if you have a history of heart failure or if you experience any of the following symptoms:  Weight gain of 3 pounds or more overnight or 5 pounds in a week, increased swelling in our hands or feet or shortness of breath while lying flat in bed. Please call your doctor as soon as you notice any of these symptoms; do not wait until your next office visit.     Recognize signs and symptoms of STROKE:    F-face looks uneven    A-arms unable to move or move unevenly    S-speech slurred or non-existent    T-time-call 911 as soon as signs and symptoms begin-DO NOT go       Back to bed or wait to see if you get better-TIME IS BRAIN. Warning Signs of HEART ATTACK     Call 911 if you have these symptoms:   Chest discomfort. Most heart attacks involve discomfort in the center of the chest that lasts more than a few minutes, or that goes away and comes back. It can feel like uncomfortable pressure, squeezing, fullness, or pain.  Discomfort in other areas of the upper body. Symptoms can include pain or discomfort in one or both arms, the back, neck, jaw, or stomach.  Shortness of breath with or without chest discomfort.  Other signs may include breaking out in a cold sweat, nausea, or lightheadedness. Don't wait more than five minutes to call 911 - MINUTES MATTER! Fast action can save your life. Calling 911 is almost always the fastest way to get lifesaving treatment. Emergency Medical Services staff can begin treatment when they arrive -- up to an hour sooner than if someone gets to the hospital by car. The discharge information has been reviewed with the patient. The patient verbalized understanding. Discharge medications reviewed with the patient and appropriate educational materials and side effects teaching were provided.   ___________________________________________________________________________________________________________________________________

## 2018-07-17 NOTE — PROGRESS NOTES
Prescription and discharge instructions both verbal and written given to patient.  Patient verbalized understanding of instructions.   No home medications or valuables to be returned.  Patient instructed to call for assistance when ready for discharge home.

## 2018-07-17 NOTE — PROGRESS NOTES
Assessment done via doc flow sheet. Pt sitting up in bed, alert & oriented x4, resp easy & regular, lungs CTA bilaterally. Abdomen soft, active bowel sounds, denies pain. Bed low & locked, side rails x3 up with call light within reach, pt instructed to call for assistance as needed.

## 2018-07-18 ENCOUNTER — PATIENT OUTREACH (OUTPATIENT)
Dept: CASE MANAGEMENT | Age: 83
End: 2018-07-18

## 2018-07-18 NOTE — PROGRESS NOTES
This note will not be viewable in [address] Transition of Care Discharge Follow-up Questionnaire   Date/Time of Call:   7/18/18 234pm   What was the patient hospitalized for? anemia   Does the patient understand his/her diagnosis and/or treatment and what happened during the hospitalization? Spoke with patient, she states her understanding of diagnosis. Reports she is doing well, though not getting much rest after being gone for a few days. Did the patient receive discharge instructions? Yes, patient received discharge instructions. CM Assessed Risk for Readmission:       Patient stated Risk for Readmission:      low risk for readmission due to risk of another GI bleed    return of symptoms. Review any discharge instructions (see discharge instructions/AVS in [facility]). Ask patient if they understand these. Do they have any questions? Discharge instructions reviewed and patient verbalized understanding of instructions and denied any questions. Were home services ordered (nursing, PT, OT, ST, etc.)? N/A   If so, has the first visit occurred? If not, why? (Assist with coordination of services if necessary.)   N/A   Was any DME ordered? No   If so, has it been received? If not, why?  (Assist patient in obtaining DME orders &/or equipment if necessary.) N/A   Complete a review of all medications (new, continued and discontinued meds per the D/C instructions and medication tab in [facility]). Medication review completed, patient stated understanding of all meds   Were all new prescriptions filled? If not, why?  (Assist patient in obtaining medications if necessary  escalate for CCM &/or SW if ongoing issues are verbalized by pt or anticipated)   No new medications ordered at discharge   Does the patient understand the purpose and dosing instructions for all medications?   (If patient has questions, provide explanation and education.)   yes   Does the patient have any problems in performing ADLs? (If patient is unable to perform ADLs  what is the limiting factor(s)? Do they have a support system that can assist? If no support system is present, discuss possible assistance that they may be able to obtain. Escalate for CCM/SW if ongoing issues are verbalized by pt or anticipated)   Patient is independent,  is in home and helps if assistance is required. Does the patient have all follow-up appointments scheduled? 7 day f/up with PCP?   (f/up with PCP may be w/in 14 days if patient has a f/up with their specialist w/in 7 days)    7-14 day f/up with specialist?   (or per discharge instructions)    If f/up has not been made  what actions has the care coordinator made to accomplish this? Has transportation been arranged? Has the following appointments scheduled:  Dr. Hugh Coyle, PCP 7/23/18    Dr. Mahi Jurado 7/27/18    GI to call with appointment              No issues with transportation   Any other questions or concerns expressed by the patient? No other questions or concerns verbalized. No immediate needs identified. Schedule next appointment with RADHA BENJAMIN Coordinator or refer to RN Case Manager/ per the workflow guidelines. When is care coordinators next follow-up call scheduled? If referred for CCM  what RN care manager was the referral assigned? Patient will be followed by Care Coordinator. Next follow up planned for 2 to 3 weeks. Patient has CC contact information and advised to call for new concerns.    SAM Call Completed By: Venkata Hamilton, [facility] Coordinator, nataliya Alvarez [facility] Coordinator

## 2018-08-03 ENCOUNTER — PATIENT OUTREACH (OUTPATIENT)
Dept: CASE MANAGEMENT | Age: 83
End: 2018-08-03

## 2018-08-03 NOTE — PROGRESS NOTES
This note will not be viewable in [address] SAM follow up call. Spoke with patient who reports she is doing fairly well., not 100% but definitely better. Progressing back to normal activity. Patient was seen for  follow up by cardiologist and PCP with good report at visit. HGB on 7/23/18 was 9.5 and patient is scheduled for additional check next week. She is taking iron and tolerating it ok as long as she takes it with food. Patient denies any further bleeding and has decided not to take coumadin at this time. Patient denies any new concerns or questions. Will close case as patient has progressed well, no new needs and no further SAM outreach indicated.

## 2018-08-09 ENCOUNTER — HOSPITAL ENCOUNTER (OUTPATIENT)
Dept: LAB | Age: 83
Discharge: HOME | End: 2018-08-09
Payer: MEDICARE

## 2018-08-09 DIAGNOSIS — D50.0 ANEMIA DUE TO GI BLOOD LOSS: ICD-10-CM

## 2018-08-09 LAB
BASOPHILS # BLD: 0.1 K/UL
BASOPHILS NFR BLD: 1 % (ref 0–2)
DIFFERENTIAL METHOD BLD: ABNORMAL
EOSINOPHIL # BLD: 0.1 K/UL
EOSINOPHIL NFR BLD: 1 % (ref 0.5–7.8)
ERYTHROCYTE [DISTWIDTH] IN BLOOD BY AUTOMATED COUNT: 17.2 %
HCT VFR BLD AUTO: 32.6 % (ref 35.8–46.3)
HGB BLD-MCNC: 10.6 G/DL (ref 11.7–15.4)
IMM GRANULOCYTES # BLD: 0 K/UL
IMM GRANULOCYTES NFR BLD AUTO: 0 % (ref 0–5)
LYMPHOCYTES # BLD: 1.7 K/UL
LYMPHOCYTES NFR BLD: 32 % (ref 13–44)
MCH RBC QN AUTO: 26.8 PG (ref 26.1–32.9)
MCHC RBC AUTO-ENTMCNC: 32.5 G/DL (ref 31.4–35)
MCV RBC AUTO: 82.5 FL (ref 79.6–97.8)
MONOCYTES # BLD: 0.6 K/UL
MONOCYTES NFR BLD: 12 % (ref 4–12)
NEUTS SEG # BLD: 2.9 K/UL
NEUTS SEG NFR BLD: 54 % (ref 43–78)
NRBC # BLD: 0 K/UL (ref 0–0.2)
PLATELET # BLD AUTO: 179 K/UL (ref 150–450)
PLATELET COMMENTS,PCOM: ADEQUATE
PMV BLD AUTO: 11.3 FL (ref 9.4–12.3)
RBC # BLD AUTO: 3.95 M/UL (ref 4.05–5.2)
RBC MORPH BLD: ABNORMAL
WBC # BLD AUTO: 5.4 K/UL (ref 4.3–11.1)
WBC MORPH BLD: ABNORMAL

## 2018-08-09 PROCEDURE — 85025 COMPLETE CBC W/AUTO DIFF WBC: CPT

## 2018-08-09 PROCEDURE — 36415 COLL VENOUS BLD VENIPUNCTURE: CPT

## 2018-08-27 ENCOUNTER — HOSPITAL ENCOUNTER (OUTPATIENT)
Dept: LAB | Age: 83
Discharge: HOME | End: 2018-08-27
Attending: INTERNAL MEDICINE
Payer: MEDICARE

## 2018-08-27 DIAGNOSIS — D50.0 ANEMIA DUE TO GI BLOOD LOSS: ICD-10-CM

## 2018-08-27 LAB
BASOPHILS # BLD: 0 K/UL (ref 0–0.2)
BASOPHILS NFR BLD: 0 % (ref 0–2)
DIFFERENTIAL METHOD BLD: ABNORMAL
EOSINOPHIL # BLD: 0.1 K/UL (ref 0–0.8)
EOSINOPHIL NFR BLD: 1 % (ref 0.5–7.8)
ERYTHROCYTE [DISTWIDTH] IN BLOOD BY AUTOMATED COUNT: 17.4 %
HCT VFR BLD AUTO: 36.5 % (ref 35.8–46.3)
HGB BLD-MCNC: 11.6 G/DL (ref 11.7–15.4)
IMM GRANULOCYTES # BLD: 0 K/UL (ref 0–0.5)
IMM GRANULOCYTES NFR BLD AUTO: 0 % (ref 0–5)
LYMPHOCYTES # BLD: 1.7 K/UL (ref 0.5–4.6)
LYMPHOCYTES NFR BLD: 34 % (ref 13–44)
MCH RBC QN AUTO: 26.7 PG (ref 26.1–32.9)
MCHC RBC AUTO-ENTMCNC: 31.8 G/DL (ref 31.4–35)
MCV RBC AUTO: 84.1 FL (ref 79.6–97.8)
MONOCYTES # BLD: 0.5 K/UL (ref 0.1–1.3)
MONOCYTES NFR BLD: 10 % (ref 4–12)
NEUTS SEG # BLD: 2.7 K/UL (ref 1.7–8.2)
NEUTS SEG NFR BLD: 54 % (ref 43–78)
NRBC # BLD: 0 K/UL (ref 0–0.2)
PLATELET # BLD AUTO: 177 K/UL (ref 150–450)
PMV BLD AUTO: 11.1 FL (ref 9.4–12.3)
RBC # BLD AUTO: 4.34 M/UL (ref 4.05–5.2)
WBC # BLD AUTO: 5 K/UL (ref 4.3–11.1)

## 2018-08-27 PROCEDURE — 36415 COLL VENOUS BLD VENIPUNCTURE: CPT

## 2018-08-27 PROCEDURE — 85025 COMPLETE CBC W/AUTO DIFF WBC: CPT

## 2018-12-06 PROBLEM — Z87.440 HISTORY OF RECURRENT UTIS: Status: ACTIVE | Noted: 2018-12-06

## 2018-12-06 PROBLEM — M81.0 AGE-RELATED OSTEOPOROSIS WITHOUT CURRENT PATHOLOGICAL FRACTURE: Status: ACTIVE | Noted: 2018-12-06

## 2018-12-19 ENCOUNTER — HOSPITAL ENCOUNTER (OUTPATIENT)
Dept: MRI IMAGING | Age: 83
Discharge: HOME | End: 2018-12-19
Attending: FAMILY MEDICINE
Payer: MEDICARE

## 2018-12-19 DIAGNOSIS — R51.9 NONINTRACTABLE EPISODIC HEADACHE, UNSPECIFIED HEADACHE TYPE: ICD-10-CM

## 2018-12-19 PROCEDURE — 70551 MRI BRAIN STEM W/O DYE: CPT

## 2018-12-19 NOTE — PROGRESS NOTES
Some sinus congestion on right  Age related chronic changes  Nothing worrisome  Dr. Blackmon Balloon  Results released to [place] with comments

## 2019-03-13 ENCOUNTER — APPOINTMENT (OUTPATIENT)
Dept: MRI IMAGING | Age: 84
DRG: 062 | End: 2019-03-13
Attending: INTERNAL MEDICINE
Payer: MEDICARE

## 2019-03-13 ENCOUNTER — APPOINTMENT (OUTPATIENT)
Dept: CT IMAGING | Age: 84
DRG: 062 | End: 2019-03-13
Attending: PSYCHIATRY & NEUROLOGY
Payer: MEDICARE

## 2019-03-13 ENCOUNTER — HOSPITAL ENCOUNTER (INPATIENT)
Age: 84
LOS: 3 days | Discharge: NURSING, REHAB OR LONG-TERM CARE | DRG: 062 | End: 2019-03-16
Attending: EMERGENCY MEDICINE | Admitting: INTERNAL MEDICINE
Payer: MEDICARE

## 2019-03-13 DIAGNOSIS — I48.0 PAROXYSMAL ATRIAL FIBRILLATION (HCC): ICD-10-CM

## 2019-03-13 DIAGNOSIS — I63.9 CEREBROVASCULAR ACCIDENT (CVA), UNSPECIFIED MECHANISM (HCC): Primary | ICD-10-CM

## 2019-03-13 DIAGNOSIS — I63.512 CEREBROVASCULAR ACCIDENT (CVA) DUE TO OCCLUSION OF LEFT MIDDLE CEREBRAL ARTERY (HCC): ICD-10-CM

## 2019-03-13 DIAGNOSIS — Z92.82 RECEIVED INTRAVENOUS TISSUE PLASMINOGEN ACTIVATOR (TPA) IN EMERGENCY DEPARTMENT: ICD-10-CM

## 2019-03-13 LAB
ALBUMIN SERPL-MCNC: 3.6 G/DL (ref 3.2–4.6)
ALBUMIN/GLOB SERPL: 0.9 {RATIO} (ref 1.2–3.5)
ALP SERPL-CCNC: 81 U/L (ref 50–136)
ALT SERPL-CCNC: 28 U/L (ref 12–65)
ANION GAP SERPL CALC-SCNC: 13 MMOL/L (ref 7–16)
AST SERPL-CCNC: 25 U/L (ref 15–37)
BASOPHILS # BLD: 0 K/UL (ref 0–0.2)
BASOPHILS NFR BLD: 0 % (ref 0–2)
BILIRUB SERPL-MCNC: 1.2 MG/DL (ref 0.2–1.1)
BUN SERPL-MCNC: 18 MG/DL (ref 8–23)
CALCIUM SERPL-MCNC: 9.3 MG/DL (ref 8.3–10.4)
CHLORIDE SERPL-SCNC: 108 MMOL/L (ref 98–107)
CO2 SERPL-SCNC: 20 MMOL/L (ref 21–32)
CREAT SERPL-MCNC: 1 MG/DL (ref 0.6–1)
DIFFERENTIAL METHOD BLD: NORMAL
EOSINOPHIL # BLD: 0 K/UL (ref 0–0.8)
EOSINOPHIL NFR BLD: 1 % (ref 0.5–7.8)
ERYTHROCYTE [DISTWIDTH] IN BLOOD BY AUTOMATED COUNT: 14.1 % (ref 11.9–14.6)
GLOBULIN SER CALC-MCNC: 4 G/DL (ref 2.3–3.5)
GLUCOSE SERPL-MCNC: 137 MG/DL (ref 65–100)
HCT VFR BLD AUTO: 38 % (ref 35.8–46.3)
HGB BLD-MCNC: 12.7 G/DL (ref 11.7–15.4)
IMM GRANULOCYTES # BLD AUTO: 0 K/UL (ref 0–0.5)
IMM GRANULOCYTES NFR BLD AUTO: 0 % (ref 0–5)
INR BLD: 1 (ref 0.9–1.2)
LACTATE BLD-SCNC: 1.97 MMOL/L (ref 0.5–1.9)
LYMPHOCYTES # BLD: 2.2 K/UL (ref 0.5–4.6)
LYMPHOCYTES NFR BLD: 35 % (ref 13–44)
MCH RBC QN AUTO: 28.8 PG (ref 26.1–32.9)
MCHC RBC AUTO-ENTMCNC: 33.4 G/DL (ref 31.4–35)
MCV RBC AUTO: 86.2 FL (ref 79.6–97.8)
MONOCYTES # BLD: 0.6 K/UL (ref 0.1–1.3)
MONOCYTES NFR BLD: 10 % (ref 4–12)
NEUTS SEG # BLD: 3.4 K/UL (ref 1.7–8.2)
NEUTS SEG NFR BLD: 54 % (ref 43–78)
NRBC # BLD: 0 K/UL (ref 0–0.2)
PLATELET # BLD AUTO: 189 K/UL (ref 150–450)
PMV BLD AUTO: 12.2 FL (ref 9.4–12.3)
POTASSIUM SERPL-SCNC: 3.6 MMOL/L (ref 3.5–5.1)
PROT SERPL-MCNC: 7.6 G/DL (ref 6.3–8.2)
PT BLD: 12.1 SECS (ref 9.6–11.6)
RBC # BLD AUTO: 4.41 M/UL (ref 4.05–5.2)
SODIUM SERPL-SCNC: 141 MMOL/L (ref 136–145)
TROPONIN I BLD-MCNC: 0 NG/ML (ref 0.02–0.05)
WBC # BLD AUTO: 6.2 K/UL (ref 4.3–11.1)

## 2019-03-13 PROCEDURE — 93005 ELECTROCARDIOGRAM TRACING: CPT | Performed by: INTERNAL MEDICINE

## 2019-03-13 PROCEDURE — 96365 THER/PROPH/DIAG IV INF INIT: CPT | Performed by: EMERGENCY MEDICINE

## 2019-03-13 PROCEDURE — 74011250637 HC RX REV CODE- 250/637: Performed by: INTERNAL MEDICINE

## 2019-03-13 PROCEDURE — 83605 ASSAY OF LACTIC ACID: CPT

## 2019-03-13 PROCEDURE — 74011250636 HC RX REV CODE- 250/636: Performed by: PSYCHIATRY & NEUROLOGY

## 2019-03-13 PROCEDURE — 80053 COMPREHEN METABOLIC PANEL: CPT

## 2019-03-13 PROCEDURE — 74011000258 HC RX REV CODE- 258: Performed by: PSYCHIATRY & NEUROLOGY

## 2019-03-13 PROCEDURE — 96368 THER/DIAG CONCURRENT INF: CPT | Performed by: EMERGENCY MEDICINE

## 2019-03-13 PROCEDURE — 85610 PROTHROMBIN TIME: CPT

## 2019-03-13 PROCEDURE — 99285 EMERGENCY DEPT VISIT HI MDM: CPT | Performed by: EMERGENCY MEDICINE

## 2019-03-13 PROCEDURE — 65610000001 HC ROOM ICU GENERAL

## 2019-03-13 PROCEDURE — 85025 COMPLETE CBC W/AUTO DIFF WBC: CPT

## 2019-03-13 PROCEDURE — 84484 ASSAY OF TROPONIN QUANT: CPT

## 2019-03-13 PROCEDURE — 74011636320 HC RX REV CODE- 636/320: Performed by: EMERGENCY MEDICINE

## 2019-03-13 PROCEDURE — 70551 MRI BRAIN STEM W/O DYE: CPT

## 2019-03-13 PROCEDURE — 74011000258 HC RX REV CODE- 258: Performed by: EMERGENCY MEDICINE

## 2019-03-13 PROCEDURE — 70450 CT HEAD/BRAIN W/O DYE: CPT

## 2019-03-13 PROCEDURE — 3E03317 INTRODUCTION OF OTHER THROMBOLYTIC INTO PERIPHERAL VEIN, PERCUTANEOUS APPROACH: ICD-10-PCS | Performed by: PSYCHIATRY & NEUROLOGY

## 2019-03-13 PROCEDURE — 70496 CT ANGIOGRAPHY HEAD: CPT

## 2019-03-13 PROCEDURE — 0042T CT PERF W CBF: CPT

## 2019-03-13 PROCEDURE — 74011000250 HC RX REV CODE- 250: Performed by: PSYCHIATRY & NEUROLOGY

## 2019-03-13 PROCEDURE — 99223 1ST HOSP IP/OBS HIGH 75: CPT | Performed by: PSYCHIATRY & NEUROLOGY

## 2019-03-13 RX ORDER — METOPROLOL SUCCINATE 25 MG/1
12.5 TABLET, EXTENDED RELEASE ORAL DAILY
Status: DISCONTINUED | OUTPATIENT
Start: 2019-03-14 | End: 2019-03-16 | Stop reason: HOSPADM

## 2019-03-13 RX ORDER — ACETAMINOPHEN 325 MG/1
650 TABLET ORAL
Status: DISCONTINUED | OUTPATIENT
Start: 2019-03-13 | End: 2019-03-16 | Stop reason: HOSPADM

## 2019-03-13 RX ORDER — NICARDIPINE HYDROCHLORIDE 0.1 MG/ML
5 INJECTION INTRAVENOUS
Status: DISCONTINUED | OUTPATIENT
Start: 2019-03-13 | End: 2019-03-13 | Stop reason: SDUPTHER

## 2019-03-13 RX ORDER — ACETAMINOPHEN 650 MG/1
650 SUPPOSITORY RECTAL
Status: DISCONTINUED | OUTPATIENT
Start: 2019-03-13 | End: 2019-03-16 | Stop reason: HOSPADM

## 2019-03-13 RX ORDER — SODIUM CHLORIDE 0.9 % (FLUSH) 0.9 %
5-40 SYRINGE (ML) INJECTION AS NEEDED
Status: DISCONTINUED | OUTPATIENT
Start: 2019-03-13 | End: 2019-03-16 | Stop reason: HOSPADM

## 2019-03-13 RX ORDER — LEVOTHYROXINE SODIUM 50 UG/1
50 TABLET ORAL
Status: DISCONTINUED | OUTPATIENT
Start: 2019-03-14 | End: 2019-03-16 | Stop reason: HOSPADM

## 2019-03-13 RX ORDER — ONDANSETRON 2 MG/ML
4 INJECTION INTRAMUSCULAR; INTRAVENOUS
Status: DISCONTINUED | OUTPATIENT
Start: 2019-03-13 | End: 2019-03-16 | Stop reason: HOSPADM

## 2019-03-13 RX ORDER — SODIUM CHLORIDE 9 MG/ML
50 INJECTION, SOLUTION INTRAVENOUS ONCE
Status: COMPLETED | OUTPATIENT
Start: 2019-03-13 | End: 2019-03-13

## 2019-03-13 RX ORDER — SODIUM CHLORIDE 0.9 % (FLUSH) 0.9 %
5-40 SYRINGE (ML) INJECTION EVERY 8 HOURS
Status: DISCONTINUED | OUTPATIENT
Start: 2019-03-13 | End: 2019-03-16 | Stop reason: HOSPADM

## 2019-03-13 RX ORDER — ATORVASTATIN CALCIUM 40 MG/1
40 TABLET, FILM COATED ORAL
Status: DISCONTINUED | OUTPATIENT
Start: 2019-03-13 | End: 2019-03-16 | Stop reason: HOSPADM

## 2019-03-13 RX ORDER — ESCITALOPRAM OXALATE 10 MG/1
10 TABLET ORAL
Status: DISCONTINUED | OUTPATIENT
Start: 2019-03-13 | End: 2019-03-16 | Stop reason: HOSPADM

## 2019-03-13 RX ORDER — SODIUM CHLORIDE 0.9 % (FLUSH) 0.9 %
10 SYRINGE (ML) INJECTION
Status: COMPLETED | OUTPATIENT
Start: 2019-03-13 | End: 2019-03-13

## 2019-03-13 RX ADMIN — SODIUM CHLORIDE 50 ML: 900 INJECTION, SOLUTION INTRAVENOUS at 14:27

## 2019-03-13 RX ADMIN — ALTEPLASE 5.92 MG: KIT at 13:20

## 2019-03-13 RX ADMIN — IOPAMIDOL 110 ML: 755 INJECTION, SOLUTION INTRAVENOUS at 13:06

## 2019-03-13 RX ADMIN — ACETAMINOPHEN 650 MG: 650 SUPPOSITORY RECTAL at 21:55

## 2019-03-13 RX ADMIN — Medication 10 ML: at 22:00

## 2019-03-13 RX ADMIN — ALTEPLASE 53.3 MG: KIT at 13:24

## 2019-03-13 RX ADMIN — ACETAMINOPHEN 650 MG: 650 SUPPOSITORY RECTAL at 15:11

## 2019-03-13 RX ADMIN — Medication 10 ML: at 13:06

## 2019-03-13 RX ADMIN — SODIUM CHLORIDE 100 ML: 9 INJECTION, SOLUTION INTRAVENOUS at 13:06

## 2019-03-13 RX ADMIN — Medication 10 ML: at 17:50

## 2019-03-13 RX ADMIN — NICARDIPINE HYDROCHLORIDE 5 MG/HR: 0.1 INJECTION, SOLUTION INTRAVENOUS at 13:06

## 2019-03-13 NOTE — H&P
Hospitalist H&P Note     Admit Date:  3/13/2019 12:49 PM   Name:  Kristy Swift   Age:  80 y.o.  :  1929   MRN:  [id number]   PCP:  Roro Yepze MD  Treatment Team: Attending Provider: Darshan Cat MD; Primary Nurse: Estefania Jesus RN    HPI/Subjective:   Mrs. Ramin Gonzales is a 79 y/o WF with a h/o AFib not on [address], hypothyroidism, GERD who presents today as Code S. Family is present and provide the history as the patient has persistent aphasia. She was in the kitchen making lunch when the  noted she had difficulty speaking. He sat her down and wanted to call an ambulance but patient declined, however patient's aphasia persisted and she had right sided weakness at which point he called 911. Code S was called on arrival and neurology evaluated the patient at the bedside. Initial NIH was 9 but patient is also blind in the left eye. Head CT/perfusion was normal and CTA head/neck showed no evidence of LVO. She was given tPA which finished around 1430. She is on Cardene for BP control. She continues to have some difficulty expressing her thoughts but is able to say short sentences like, \"I'm OK\". She follows all commands and moves all extremities spontaneously. She follows with cardiology and was last seen in November at which point she was hesitant to restart [address] so she was being considered for a Watchman. Hospitalist consulted for admission. 10 systems reviewed and negative except as noted in HPI.   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    CAD (coronary artery disease), native coronary artery May 2014    stent to LAD; 3 stents total    Depression 2013    GERD (gastroesophageal reflux disease)     Hypercholesteremia 2013    Hypertension     Hypothyroidism 2013    Osteoarthritis of back 1/2/2014    Osteoporosis     Pulmonary embolism ([facility] 2009    after knee surgery    Sick sinus syndrome ([facility] 1/7/2016    Tachycardia-Bradycardia      Past Surgical History:   Procedure Laterality Date    COLONOSCOPY N/A 7/16/2018    COLONOSCOPY performed by Nancy Vargas MD at [address] HX BREAST BIOPSY Bilateral     HX CATARACT REMOVAL Right 2010    HX CORONARY STENT PLACEMENT  05/2014    LAD X 3    HX HEART CATHETERIZATION  09/17/2015    HX HEENT      multiple eye surgeries - left - macular hoe, retinal detachment twice,     HX HYSTERECTOMY  1970    HX KNEE REPLACEMENT  2009    HX KNEE REPLACEMENT Left 01/12/2017    HX ORTHOPAEDIC  2006 and 2008    herniated disc    HX TONSIL AND ADENOIDECTOMY  1934      Allergies   Allergen Reactions    Adhesive Rash      Social History     Tobacco Use    Smoking status: Never Smoker    Smokeless tobacco: Never Used   Substance Use Topics    Alcohol use: No     Alcohol/week: 0.0 oz      Family History   Problem Relation Age of Onset    Cancer Mother     Breast Cancer Mother     Cancer Father     Cancer Sister     Cancer Brother         pancreas    Breast Cancer Maternal Aunt       Immunization History   Administered Date(s) Administered    Influenza High Dose Vaccine PF 11/05/2015, 11/09/2016, 11/13/2017, 09/06/2018    Influenza Vaccine 10/17/2013, 10/27/2014    Pneumococcal Conjugate (PCV-13) 01/15/2016    Pneumococcal Polysaccharide (PPSV-23) 05/16/2018    TB Skin Test (PPD) Intradermal 01/12/2017    Tdap 06/01/2014     PTA Medications:  Prior to Admission Medications   Prescriptions Last Dose Informant Patient Reported? Taking? Calcium Carbonate-Vit D3-Min (CALTRATE 600+D PLUS MINERALS) 600 mg calcium- 400 unit Tab   Yes No   Sig: Take  by mouth daily. Cholecalciferol, Vitamin D3, (VITAMIN D3) 1,000 unit cap   Yes No   Sig: Take  by mouth daily.    VIT A/VIT C/VIT E/ZINC/COPPER (ICAPS AREDS PO)   Yes No   Sig: Take  by mouth. Twice a day   acetaminophen (TYLENOL ARTHRITIS PAIN) 650 mg CR tablet   Yes No   Sig: Take 650 mg by mouth every six (6) hours as needed for Pain. atorvastatin (LIPITOR) 40 mg tablet   No No   Sig: TAKE 1 TABLET EVERY DAY   Patient taking differently: nightly. TAKE 1 TABLET EVERY DAY   clopidogrel (PLAVIX) 75 mg tab   Yes No   Sig: Take 75 mg by mouth daily. cyanocobalamin (VITAMIN B-12) 100 mcg tablet   Yes No   Sig: Take 5,000 mcg by mouth daily. escitalopram oxalate (LEXAPRO) 10 mg tablet   No No   Sig: Take 1 Tab by mouth daily. Patient taking differently: Take 10 mg by mouth nightly. ferrous sulfate 324 mg (65 mg iron) tablet   Yes No   Sig: Take 65 mg by mouth Daily (before breakfast). levothyroxine (SYNTHROID) 50 mcg tablet   No No   Sig: Take 1 Tab by mouth Daily (before breakfast). metoprolol succinate (TOPROL-XL) 25 mg XL tablet   No No   Sig: Take 0.5 Tabs by mouth daily. nitrofurantoin (MACRODANTIN) 50 mg capsule   No No   Sig: Take 1 Cap by mouth daily. nitroglycerin (NITROSTAT) 0.3 mg SL tablet   No No   Si Tab by SubLINGual route every five (5) minutes as needed for Chest Pain.   potassium 99 mg tablet   Yes No   Sig: Take 99 mg by mouth daily. vit C/vit E/lutein/min/omega-3 (OCUVITE PO)   Yes No   Sig: Take 1 Cap by mouth daily. vitamin E (AQUA GEMS) 400 unit capsule   Yes No   Sig: Take  by mouth daily. zolpidem (AMBIEN) 10 mg tablet   No No   Sig: Take 1 Tab by mouth nightly as needed for Sleep. Max Daily Amount: 10 mg.       Facility-Administered Medications: None       Objective:     Patient Vitals for the past 24 hrs:   Temp Pulse Resp BP SpO2   19 1412  78 10 145/65 97 %   19 1407  80 14 148/65 95 %   19 1402  72 21 155/67 97 %   19 1337  77 24 (!) 148/111    19 1336  79 18  99 %   19 1331  80  146/66    19 1329  81  165/70    19 1328  80  154/70    19 1327  83  159/70    03/13/19 1325  80  164/72    03/13/19 1320  80  164/70 98 %   03/13/19 1319  81  161/70    03/13/19 1317    191/78    03/13/19 1311    190/81    03/13/19 1306  (!) 58  (!) 201/84    03/13/19 1251 98.5 °F (36.9 °C) 65 16 (!) 202/92 98 %     Oxygen Therapy  O2 Sat (%): 97 % (03/13/19 1412)  Pulse via Oximetry: 78 beats per minute (03/13/19 1412)  O2 Device: Room air (03/13/19 1251)  No intake or output data in the 24 hours ending 03/13/19 1445    *Note that automatically entered I/Os may not be accurate; dependent on patient compliance with collection and accurate  by assistants. Physical Exam:  General:    Well nourished. Alert. Eyes:   Normal sclera. Extraocular movements intact. HENT:  Normocephalic, atraumatic. Moist mucous membranes  CV:   RRR. No m/r/g. Peripheral pulses 2+. Capillary refill <2s. Lungs:  CTAB. No wheezing, rhonchi, or rales. Abdomen: Soft, nontender, nondistended. Extremities: Warm and dry. No cyanosis or edema. Neurologic: CN II-XII grossly intact. Sensation intact. No facial asymmetry. Tongue midline on protrusion. Strength 5/5 and symmetric in all 4 extremities. No nystagmus. Expressive aphasia, some more mild receptive aphasia. Skin:     No rashes or jaundice. Normal coloration  Psych:  Normal mood and affect. I reviewed the labs, imaging, EKGs, telemetry, and other studies done this admission.   Data Review:   Recent Results (from the past 24 hour(s))   CBC WITH AUTOMATED DIFF    Collection Time: 03/13/19 12:53 PM   Result Value Ref Range    WBC 6.2 4.3 - 11.1 K/uL    RBC 4.41 4.05 - 5.2 M/uL    HGB 12.7 11.7 - 15.4 g/dL    HCT 38.0 35.8 - 46.3 %    MCV 86.2 79.6 - 97.8 FL    MCH 28.8 26.1 - 32.9 PG    MCHC 33.4 31.4 - 35.0 g/dL    RDW 14.1 11.9 - 14.6 %    PLATELET 691 711 - 665 K/uL    MPV 12.2 9.4 - 12.3 FL    ABSOLUTE NRBC 0.00 0.0 - 0.2 K/uL    DF AUTOMATED      NEUTROPHILS 54 43 - 78 %    LYMPHOCYTES 35 13 - 44 % MONOCYTES 10 4.0 - 12.0 %    EOSINOPHILS 1 0.5 - 7.8 %    BASOPHILS 0 0.0 - 2.0 %    IMMATURE GRANULOCYTES 0 0.0 - 5.0 %    ABS. NEUTROPHILS 3.4 1.7 - 8.2 K/UL    ABS. LYMPHOCYTES 2.2 0.5 - 4.6 K/UL    ABS. MONOCYTES 0.6 0.1 - 1.3 K/UL    ABS. EOSINOPHILS 0.0 0.0 - 0.8 K/UL    ABS. BASOPHILS 0.0 0.0 - 0.2 K/UL    ABS. IMM. GRANS. 0.0 0.0 - 0.5 K/UL   METABOLIC PANEL, COMPREHENSIVE    Collection Time: 03/13/19 12:53 PM   Result Value Ref Range    Sodium 141 136 - 145 mmol/L    Potassium 3.6 3.5 - 5.1 mmol/L    Chloride 108 (H) 98 - 107 mmol/L    CO2 20 (L) 21 - 32 mmol/L    Anion gap 13 7 - 16 mmol/L    Glucose 137 (H) 65 - 100 mg/dL    BUN 18 8 - 23 MG/DL    Creatinine 1.00 0.6 - 1.0 MG/DL    GFR est AA >60 >60 ml/min/1.73m2    GFR est non-AA 55 (L) >60 ml/min/1.73m2    Calcium 9.3 8.3 - 10.4 MG/DL    Bilirubin, total 1.2 (H) 0.2 - 1.1 MG/DL    ALT (SGPT) 28 12 - 65 U/L    AST (SGOT) 25 15 - 37 U/L    Alk. phosphatase 81 50 - 136 U/L    Protein, total 7.6 6.3 - 8.2 g/dL    Albumin 3.6 3.2 - 4.6 g/dL    Globulin 4.0 (H) 2.3 - 3.5 g/dL    A-G Ratio 0.9 (L) 1.2 - 3.5     POC TROPONIN-I    Collection Time: 03/13/19 12:56 PM   Result Value Ref Range    Troponin-I (POC) 0 (L) 0.02 - 0.05 ng/ml   POC PT/INR    Collection Time: 03/13/19 12:59 PM   Result Value Ref Range    Prothrombin time (POC) 12.1 (H) 9.6 - 11.6 SECS    INR (POC) 1.0 0.9 - 1.2     POC LACTIC ACID    Collection Time: 03/13/19 12:59 PM   Result Value Ref Range    Lactic Acid (POC) 1.97 (H) 0.5 - 1.9 mmol/L       All Micro Results     None          Current Facility-Administered Medications   Medication Dose Route Frequency    niCARdipine in Saline (CARDENE) 0.1mg/mL 200 ml premix infusion  5 mg/hr IntraVENous TITRATE     Current Outpatient Medications   Medication Sig    levothyroxine (SYNTHROID) 50 mcg tablet Take 1 Tab by mouth Daily (before breakfast).  metoprolol succinate (TOPROL-XL) 25 mg XL tablet Take 0.5 Tabs by mouth daily.     nitrofurantoin (MACRODANTIN) 50 mg capsule Take 1 Cap by mouth daily.  clopidogrel (PLAVIX) 75 mg tab Take 75 mg by mouth daily.  ferrous sulfate 324 mg (65 mg iron) tablet Take 65 mg by mouth Daily (before breakfast).  potassium 99 mg tablet Take 99 mg by mouth daily.  vit C/vit E/lutein/min/omega-3 (OCUVITE PO) Take 1 Cap by mouth daily.  zolpidem (AMBIEN) 10 mg tablet Take 1 Tab by mouth nightly as needed for Sleep. Max Daily Amount: 10 mg.    atorvastatin (LIPITOR) 40 mg tablet TAKE 1 TABLET EVERY DAY (Patient taking differently: nightly. TAKE 1 TABLET EVERY DAY)    escitalopram oxalate (LEXAPRO) 10 mg tablet Take 1 Tab by mouth daily. (Patient taking differently: Take 10 mg by mouth nightly.)    nitroglycerin (NITROSTAT) 0.3 mg SL tablet 1 Tab by SubLINGual route every five (5) minutes as needed for Chest Pain.  acetaminophen (TYLENOL ARTHRITIS PAIN) 650 mg CR tablet Take 650 mg by mouth every six (6) hours as needed for Pain.  VIT A/VIT C/VIT E/ZINC/COPPER (ICAPS AREDS PO) Take  by mouth. Twice a day    vitamin E (AQUA GEMS) 400 unit capsule Take  by mouth daily.  Calcium Carbonate-Vit D3-Min (CALTRATE 600+D PLUS MINERALS) 600 mg calcium- 400 unit Tab Take  by mouth daily.  Cholecalciferol, Vitamin D3, (VITAMIN D3) 1,000 unit cap Take  by mouth daily.  cyanocobalamin (VITAMIN B-12) 100 mcg tablet Take 5,000 mcg by mouth daily.        Other Studies:  Ct Head Wo Cont    Result Date: 3/13/2019  EXAMINATION: HEAD CT WITHOUT CONTRAST 3/13/2019 1:19 PM ACCESSION NUMBER: [id number] INDICATION: code s COMPARISON: Brain MRI 12/19/2018, same day CT perfusion and CTA head and neck, head CT 11/15/2016 TECHNIQUE: Multiple-row detector helical CT examination of the head without intravenous contrast. Radiation dose reduction techniques were used for this study:  Our CT scanners use one or all of the following: Automated exposure control, adjustment of the mA and/or kVp according to patient's size, iterative reconstruction. FINDINGS: The ventricles are stable in caliber in comparison to the prior exam and are appropriate for the mild degree of symmetric global brain parenchymal volume loss. There is no midline shift. The basilar cisterns are patent. There is no cerebellar tonsillar ectopia or herniation. Hypodensities in the periventricular and subcortical white matter are nonspecific, but they are most likely to represent chronic microangiopathy. There is diffuse hyperdensity of the intracranial vascular structures, likely related to dehydration or other etiology of hemoconcentration. 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 There is no evidence of acute intracranial hemorrhage or extra-axial collections. No CT evidence of acute infarction. Right maxillary sinus wall thickening, consistent with chronic sinusitis. There is superimposed fluid and mucosal thickening. Correlation for symptoms of acute sinusitis recommended. IMPRESSION: 1. No acute intracranial hemorrhage. 2. There is no CT evidence of acute infarction. If there is continued concern for acute intracranial ischemia, an MRI may be of benefit. VOICE DICTATED BY: Dr. Teresita Schrader    Result Date: 3/13/2019  Title:  CT arteriogram of the neck and head. Indication: Aphasia. Technique: Axial images of the neck and head were obtained after the uneventful administration of intravenous iodinated contrast media. Contrast was used to best identify the arterial structures. Images were reviewed on a separate, free standing, three-dimensional workstation as per the referring physicians request.  All stenosis percentages derived by comparing the narrowest segment with the distal Internal Carotid Artery luminal diameter, as described in the [place] American Symptomatic Carotid Endarterectomy Trial (NASCET) criteria.  All CT scans at this facility are performed using dose reduction/dose modulation techniques, as appropriate the performed exam, including the following: Automated Exposure Control; Adjustment of the mA and/or kV according to patient size (this includes techniques or standardized protocols for targeted exams where dose is matched to indication/reason for exam); and Use of Iterative Reconstruction Technique. Comparison: None. Findings: Lungs: Normal Soft Tissues: Likely chronic mucosal thickening of the right maxillary sinus Cervical Spine: Degenerative changes Aorta: Four-vessel arch Great Vessels: Patent Right ICA: Mild atherosclerotic changes of the cavernous sinus. Cervical tortuosity % Stenosis: 0 Right MCA: Patent Right JOSH: Patent Left ICA: Scattered hard plaque at the bulb and mild atherosclerotic changes in the cavernous sinus. Some tortuosity of the cervical ICA % Stenosis: Less than 25 Left MCA: Patent Left JOSH: Patent Right Vertebral: Patent Left Vertebral: Patent Dominance: Right Basilar: Patent Right PCA: Patent Left PCA: Patent Other Vascular: Negative     Impression: No evidence of large vessel occlusion. Ct Perf W Cbf    Result Date: 3/13/2019  EXAMINATION: CT PERFUSION DATE: 3/13/2019 1:19 PM INDICATION: : code s aphasia COMPARISON: Head CT and CT angiogram from the same day TECHNIQUE: CT perfusion of the brain was obtained after the administration of intravenous contrast. Perfusion maps and perfusion analysis output were generated using the RAPID perfusion processing software algorithm. Radiation dose reduction techniques were used for this study: All CT scans performed at this facility use one or all of the following: Automated exposure control, adjustment of the mA and/or kVp according to patient's size, iterative reconstruction. FINDINGS:  There are no acute large artery territorial perfusion defects on the automated maps. The left eye is hyperdense suggesting intraocular silicone.  RAPID Output Values: CBF < 30% volume (best correlation with core infarct volume without overcalls): 0 ml (core infarction volume greater than 50 cc associated with poor outcomes) Tmax > 6 seconds: 0 ml Tmax/CBF Mismatch Volume: 0 ml Tmax/CBF Mismatch Ratio: None Tmax > 10 seconds Volume: 0 ml (volume greater than 100 mL is associated with poor outcome)     IMPRESSION: No acute large artery territorial perfusion defects. Please note that the determination of patient treatment is not based solely upon imaging factors or calculation values. Management of ischemia is at the discretion of the primary physician and is based upon a combination of clinical and imaging data, along other factors. Assessment and Plan:     Hospital Problems as of 3/13/2019 Date Reviewed: 3/13/2019          Codes Class Noted - Resolved POA    CVA [facility]cerebral vascular accident) [facility] ICD-10-CM: I63.9  ICD-9-CM: 434.91  3/13/2019 - Present Yes        Received intravenous tissue plasminogen activator (tPA) in emergency department ICD-10-CM: Z92.82  ICD-9-CM: V45.88  3/13/2019 - Present Yes        GERD (gastroesophageal reflux disease) ICD-10-CM: K21.9  ICD-9-CM: 530.81  3/6/2014 - Present Yes        Hypothyroidism ICD-10-CM: E03.9  ICD-9-CM: 244.9  1/14/2013 - Present Yes        Hypercholesteremia ICD-10-CM: E78.00  ICD-9-CM: 272.0  1/14/2013 - Present Yes        Paroxysmal atrial fibrillation ([facility] ICD-10-CM: I48.0  ICD-9-CM: 427.31  1/14/2013 - Present Yes    Overview Signed 11/13/2015 11:17 AM by Matt Mathur     Paroxysmal                    Plan:  # CVA s/p tPA   - Admit to ICU for post-tPA protocol   - Likely embolic 2/2 AFib w/o OAC   - BP control with Cardene   - Neurology appreciated   - A1C, FLP, Tele, TTE, repeat CT at 24 hrs, brain MRI, PT/OT/ST/PPD/CM. ASA at 24 hrs. Statin. # AFib   - CHADSVASc at least 4. Not on [address].   - BB    # Hypothyroid   - Synthroid    # HLD   - Statin    Discharge planning: As above. DVT ppx: SCDs only.   Code status:  Full  Estimated LOS:  Greater than 2 midnights  Risk:  high    Signed:  Olayinka Landeros MD

## 2019-03-13 NOTE — CONSULTS
Impression:    Frequent neuro checks No punctures    MRI brain, transthoracic echocardiogram, EKG monitoring    Lipid panel, HgBA1C    Rehab consult    Chief complaint: Code S    History:    The patient is a 5year-old female who developed the onset of right-sided weakness and difficulty speaking at 11:30. She was brought to the emergency department arriving at [phone] and code S was activated at 1150. Neurology responded at [phone]. NIH stroke scale was 9 partially due to chronic visual loss. Patient was noted to be profoundly aphasic. She no longer had right-sided weakness but had difficulty elevating both legs off the stretcher.   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    CAD (coronary artery disease), native coronary artery May 2014    stent to LAD; 3 stents total    Depression 1/14/2013    GERD (gastroesophageal reflux disease)     Hypercholesteremia 1/14/2013    Hypertension     Hypothyroidism 1/14/2013    Osteoarthritis of back 1/2/2014    Osteoporosis     Pulmonary embolism ([address] 2009    after knee surgery    Sick sinus syndrome ([address] 1/7/2016    Tachycardia-Bradycardia     Past Surgical History:   Procedure Laterality Date    COLONOSCOPY N/A 7/16/2018    COLONOSCOPY performed by Dagoberto Duncan MD at [address]  HX BREAST BIOPSY Bilateral     HX CATARACT REMOVAL Right 2010    HX CORONARY STENT PLACEMENT  05/2014    LAD X 3    HX HEART CATHETERIZATION  09/17/2015    HX HEENT      multiple eye surgeries - left - macular hoe, retinal detachment twice,     HX HYSTERECTOMY  1970    HX KNEE REPLACEMENT  2009    HX KNEE REPLACEMENT Left 01/12/2017    HX ORTHOPAEDIC  2006 and 2008    herniated disc    HX TONSIL AND ADENOIDECTOMY  1934     Family History   Problem Relation Age of Onset    Cancer Mother     Breast Cancer Mother     Cancer Father     Cancer Sister     Cancer Brother         pancreas    Breast Cancer Maternal Aunt      Social History     Tobacco Use    Smoking status: Never Smoker    Smokeless tobacco: Never Used   Substance Use Topics    Alcohol use: No     Alcohol/week: 0.0 oz    Drug use: No     No current facility-administered medications on file prior to encounter. Current Outpatient Medications on File Prior to Encounter   Medication Sig Dispense Refill    levothyroxine (SYNTHROID) 50 mcg tablet Take 1 Tab by mouth Daily (before breakfast). 30 Tab 0    metoprolol succinate (TOPROL-XL) 25 mg XL tablet Take 0.5 Tabs by mouth daily. 45 Tab 3    nitrofurantoin (MACRODANTIN) 50 mg capsule Take 1 Cap by mouth daily. 90 Cap 3    clopidogrel (PLAVIX) 75 mg tab Take 75 mg by mouth daily.  ferrous sulfate 324 mg (65 mg iron) tablet Take 65 mg by mouth Daily (before breakfast).  potassium 99 mg tablet Take 99 mg by mouth daily.  vit C/vit E/lutein/min/omega-3 (OCUVITE PO) Take 1 Cap by mouth daily.  zolpidem (AMBIEN) 10 mg tablet Take 1 Tab by mouth nightly as needed for Sleep. Max Daily Amount: 10 mg. 30 Tab 2    atorvastatin (LIPITOR) 40 mg tablet TAKE 1 TABLET EVERY DAY (Patient taking differently: nightly. TAKE 1 TABLET EVERY DAY) 90 Tab 3    escitalopram oxalate (LEXAPRO) 10 mg tablet Take 1 Tab by mouth daily. (Patient taking differently: Take 10 mg by mouth nightly.) 90 Tab 3    nitroglycerin (NITROSTAT) 0.3 mg SL tablet 1 Tab by SubLINGual route every five (5) minutes as needed for Chest Pain. 1 Bottle 3    acetaminophen (TYLENOL ARTHRITIS PAIN) 650 mg CR tablet Take 650 mg by mouth every six (6) hours as needed for Pain.  VIT A/VIT C/VIT E/ZINC/COPPER (ICAPS AREDS PO) Take  by mouth.  Twice a day      vitamin E (AQUA GEMS) 400 unit capsule Take  by mouth daily.  Calcium Carbonate-Vit D3-Min (CALTRATE 600+D PLUS MINERALS) 600 mg calcium- 400 unit Tab Take  by mouth daily.  Cholecalciferol, Vitamin D3, (VITAMIN D3) 1,000 unit cap Take  by mouth daily.  cyanocobalamin (VITAMIN B-12) 100 mcg tablet Take 5,000 mcg by mouth daily. Allergies   Allergen Reactions    Adhesive Rash     Patient is profoundly aphasic and unable to provide review of systems    Visit Vitals  BP (!) 148/111   Pulse 77   Temp 98.5 °F (36.9 °C)   Resp 24   Ht 5' 3\" (1.6 m)   Wt 145 lb (65.8 kg)   SpO2 99%   BMI 25.69 kg/m²       NIHSS    1a 0  1b 2  1c 0  2 0  3 1 (OLD)  4 0   5a 0  5b 0  6a 2  6b 2  7 0  8 0  9 2  10 0  11 0    NIHSS 8    CT Results (most recent):    CT of head reviewed by me shows no evidence of early ischemic change or hemorrhage. CTA of head reviewed by meDemonstrated questionable M1 short segment occlusion. This was not confirmed on review with neuroradiology    CT perfusion brain was normal    Recent Results (from the past 12 hour(s))   CBC WITH AUTOMATED DIFF    Collection Time: 03/13/19 12:53 PM   Result Value Ref Range    WBC 6.2 4.3 - 11.1 K/uL    RBC 4.41 4.05 - 5.2 M/uL    HGB 12.7 11.7 - 15.4 g/dL    HCT 38.0 35.8 - 46.3 %    MCV 86.2 79.6 - 97.8 FL    MCH 28.8 26.1 - 32.9 PG    MCHC 33.4 31.4 - 35.0 g/dL    RDW 14.1 11.9 - 14.6 %    PLATELET 289 896 - 728 K/uL    MPV 12.2 9.4 - 12.3 FL    ABSOLUTE NRBC 0.00 0.0 - 0.2 K/uL    DF AUTOMATED      NEUTROPHILS 54 43 - 78 %    LYMPHOCYTES 35 13 - 44 %    MONOCYTES 10 4.0 - 12.0 %    EOSINOPHILS 1 0.5 - 7.8 %    BASOPHILS 0 0.0 - 2.0 %    IMMATURE GRANULOCYTES 0 0.0 - 5.0 %    ABS. NEUTROPHILS 3.4 1.7 - 8.2 K/UL    ABS. LYMPHOCYTES 2.2 0.5 - 4.6 K/UL    ABS. MONOCYTES 0.6 0.1 - 1.3 K/UL    ABS. EOSINOPHILS 0.0 0.0 - 0.8 K/UL    ABS. BASOPHILS 0.0 0.0 - 0.2 K/UL    ABS. IMM.  GRANS. 0.0 0.0 - 0.5 K/UL   METABOLIC PANEL, COMPREHENSIVE    Collection Time: 03/13/19 12:53 PM   Result Value Ref Range    Sodium 141 136 - 145 mmol/L    Potassium 3.6 3.5 - 5.1 mmol/L    Chloride 108 (H) 98 - 107 mmol/L    CO2 20 (L) 21 - 32 mmol/L    Anion gap 13 7 - 16 mmol/L    Glucose 137 (H) 65 - 100 mg/dL    BUN 18 8 - 23 MG/DL    Creatinine 1.00 0.6 - 1.0 MG/DL    GFR est AA >60 >60 ml/min/1.73m2    GFR est non-AA 55 (L) >60 ml/min/1.73m2    Calcium 9.3 8.3 - 10.4 MG/DL    Bilirubin, total 1.2 (H) 0.2 - 1.1 MG/DL    ALT (SGPT) 28 12 - 65 U/L    AST (SGOT) 25 15 - 37 U/L    Alk.  phosphatase 81 50 - 136 U/L    Protein, total 7.6 6.3 - 8.2 g/dL    Albumin 3.6 3.2 - 4.6 g/dL    Globulin 4.0 (H) 2.3 - 3.5 g/dL    A-G Ratio 0.9 (L) 1.2 - 3.5     POC TROPONIN-I    Collection Time: 03/13/19 12:56 PM   Result Value Ref Range    Troponin-I (POC) 0 (L) 0.02 - 0.05 ng/ml   POC PT/INR    Collection Time: 03/13/19 12:59 PM   Result Value Ref Range    Prothrombin time (POC) 12.1 (H) 9.6 - 11.6 SECS    INR (POC) 1.0 0.9 - 1.2     POC LACTIC ACID    Collection Time: 03/13/19 12:59 PM   Result Value Ref Range    Lactic Acid (POC) 1.97 (H) 0.5 - 1.9 mmol/L

## 2019-03-13 NOTE — PROGRESS NOTES
CM chart review. PCP listed as Dr. Rosy Mcgovern ([facility] - last noted on 2-1-19).

## 2019-03-13 NOTE — PROGRESS NOTES
Pt seen in ICU s/p admission CVA. Alert and oriented post tPa. Confirms demographics. Discussed possible needs for d/c of HH vs STR. Spoke with spouse and daughter, as well. PPD for any potential rehab. PT/OT/Speech per MD. CM to follow. Care Management Interventions  PCP Verified by CM: Yes(confirms Dr. Giuliano Larkin)  Mode of Transport at Discharge:  Other (see comment)  Transition of Care Consult (CM Consult): Discharge Planning  Discharge Durable Medical Equipment: (none currently)  Current Support Network: Lives with Spouse, Own Home(lives with spouse, Isabela [phone]/ call daughter [phone]- [phone])  Confirm Follow Up Transport: Self(pt and spouse continue to drive)  Plan discussed with Pt/Family/Caregiver: Yes  Freedom of Choice Offered: Yes  [employer] Information Provided?: (confirms MCR/supplemental plan - able to get rx)  Discharge Location  Discharge Placement: Unable to determine at this time

## 2019-03-13 NOTE — ED PROVIDER NOTES
Presents with complaint of difficulty with speaking. States her digit half an hour before onset with EMS. Patient had clearing of symptoms during ride and then reoccurred on the way here. No other deficits. Patient activated as a code stroke. The history is provided by the patient and the EMS personnel. The history is limited by the condition of the patient. Extremity Weakness    This is a new problem. The current episode started less than 1 hour ago. The problem occurs constantly. The problem has been gradually worsening. The patient is experiencing no pain. Pertinent negatives include no numbness and full range of motion. There has been no history of extremity trauma.         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    CAD (coronary artery disease), native coronary artery May 2014    stent to LAD; 3 stents total    Depression 1/14/2013    GERD (gastroesophageal reflux disease)     Hypercholesteremia 1/14/2013    Hypertension     Hypothyroidism 1/14/2013    Osteoarthritis of back 1/2/2014    Osteoporosis     Pulmonary embolism ([address] 2009    after knee surgery    Sick sinus syndrome ([address] 1/7/2016    Tachycardia-Bradycardia       Past Surgical History:   Procedure Laterality Date    COLONOSCOPY N/A 7/16/2018    COLONOSCOPY performed by Tamra Ambrosio MD at [address]  HX BREAST BIOPSY Bilateral     HX CATARACT REMOVAL Right 2010    HX CORONARY STENT PLACEMENT  05/2014    LAD X 3    HX HEART CATHETERIZATION  09/17/2015    HX HEENT      multiple eye surgeries - left - macular hoe, retinal detachment twice,     HX HYSTERECTOMY  1970    HX KNEE REPLACEMENT  2009    HX KNEE REPLACEMENT Left 01/12/2017    HX ORTHOPAEDIC  2006 and 2008    herniated disc    HX TONSIL AND ADENOIDECTOMY  1934         Family History:   Problem Relation Age of Onset    Cancer Mother     Breast Cancer Mother     Cancer Father     Cancer Sister     Cancer Brother         pancreas    Breast Cancer Maternal Aunt        Social History     Socioeconomic History    Marital status:      Spouse name: Not on file    Number of children: Not on file    Years of education: Not on file    Highest education level: Not on file   Social Needs    Financial resource strain: Not on file    Food insecurity - worry: Not on file    Food insecurity - inability: Not on file   [employer] needs - medical: Not on file   [employer] needs - non-medical: Not on file   Occupational History    Not on file   Tobacco Use    Smoking status: Never Smoker    Smokeless tobacco: Never Used   Substance and Sexual Activity    Alcohol use: No     Alcohol/week: 0.0 oz    Drug use: No    Sexual activity: Not on file   Other Topics Concern    Not on file   Social History Narrative    No family/social/cultural issues pertinent to care         ALLERGIES: Adhesive    Review of Systems   Unable to perform ROS: Patient nonverbal   Neurological: Positive for weakness. Negative for numbness. Vitals:    03/13/19 1329 03/13/19 1331 03/13/19 1336 03/13/19 1337   BP: 165/70 146/66  (!) 148/111   Pulse: 81 80 79 77   Resp:   18 24   Temp:       SpO2:   99%    Weight:       Height:                Physical Exam   Constitutional: She appears well-developed and well-nourished. No distress. HENT:   Head: Normocephalic and atraumatic. Eyes: Conjunctivae are normal. Right eye exhibits no discharge. Left eye exhibits no discharge. Neck: Normal range of motion. Neck supple. Cardiovascular: Normal rate and regular rhythm. Pulmonary/Chest: Effort normal and breath sounds normal. No respiratory distress. Abdominal: Soft. She exhibits no distension. There is no tenderness.    Musculoskeletal: Normal range of motion. She exhibits no edema. Neurological: She is alert. No cranial nerve deficit. Expressive aphasia does not know the year difficulty saying her name and where she is at. Skin: Skin is warm and dry. Capillary refill takes less than 2 seconds. She is not diaphoretic. Psychiatric: She has a normal mood and affect. Her behavior is normal.   Nursing note and vitals reviewed. MDM  Number of Diagnoses or Management Options  Cerebrovascular accident (CVA), unspecified mechanism [facility]):   Diagnosis management comments: Patient activated as a code S and taken to CT. She is seen by Dr. Rohith Vega and he gave her TPA and CT. They had difficulty managing her blood pressure and she was started on a Cardizem drip. EKG shows a normal sinus rhythm with no ST elevation. I spoke with the hospitalist about admission.        Amount and/or Complexity of Data Reviewed  Clinical lab tests: ordered and reviewed  Review and summarize past medical records: yes  Discuss the patient with other providers: yes  Independent visualization of images, tracings, or specimens: yes    Risk of Complications, Morbidity, and/or Mortality  Presenting problems: high  Diagnostic procedures: moderate  Management options: high    Patient Progress  Patient progress: improved         Procedures

## 2019-03-13 NOTE — PROGRESS NOTES
Patient arrived from the ER on room air. Dual Juan assessment completed with [facility]. Patients NIH score of 5. Dual Skin assessment completed with Issac Mendoza. Skin on saccum has no evidence of breakdown. There is a mass or nodule on the right arm of the patient. Patient states it does not hurt.

## 2019-03-13 NOTE — ED NOTES
TRANSFER - OUT REPORT:    Verbal report given to Caesar Bass RN on Griselda Andrade  being transferred to [facility] [phone] for routine progression of care       Report consisted of patients Situation, Background, Assessment and   Recommendations(SBAR). Information from the following report(s) SBAR was reveiwed with the receiving nurse. Opportunity for questions and clarification was provided. Ischemic Stroke with Activase    Activase Date and Time of Administration: 3/1319 at 1320    BP Parameters: Less Than 180/105    Controlled With: Continuous IV    Dysphagia Screen Completed: Yes: Fail  Dysphagia Screening  Vocal Quality/Secretions: Normal  History of Dysphagia: No  O2 Saturation: Normal  Alertness: Normal  Pre-Swallow Assessment Score: 0     NIH Stroke Scale Complete: Yes: 8    Frequency of Vital Signs: Q30min till 2115    Frequency of Neuro Checks: Q30min till 2115, Q2H 2215 till 3/14/19 1315.

## 2019-03-13 NOTE — PROGRESS NOTES
TRANSFER - IN REPORT:    Verbal report received from [facility] (name) on Lou Neumann  being received from ER(unit) for change in patient condition(stroke)      Report consisted of patients Situation, Background, Assessment and   Recommendations(SBAR). Information from the following report(s) SBAR, Kardex, ED Summary, Procedure Summary, Intake/Output, MAR, Recent Results, Cardiac Rhythm A-fib and Alarm Parameters  was reviewed with the receiving nurse. Opportunity for questions and clarification was provided. Assessment completed upon patients arrival to unit and care assumed.

## 2019-03-13 NOTE — ED NOTES
Per family, pt is c/o headache. Pt unable to describe or give number for pain due to aphagia. No new neuro deficits noted. MD Damaris Fabian notified. Order for rectal tylenol recieved.

## 2019-03-13 NOTE — ED TRIAGE NOTES
Pt arrived via EMS from home with c/o diffculty speaking and confusion that start 30 min before EMS arrival per . EMS said speech cleared up and was A&Ox3 for a few minutes en route, but became confused and aphasic again.

## 2019-03-13 NOTE — CONSULTS
LEAPFROG EVALUATION -- [facility]    The Patient is current in the ICU for: CVA s/p TPA  He is being managed by:  hopsitalist/neurology  No need for any additional acute ICU interventions. Patient is awake and alert. Currently hemodynamically stable on meds  Please Call if Needed. No Charge. Roxanne Alcaraz MD    Electronically signed.

## 2019-03-14 ENCOUNTER — APPOINTMENT (OUTPATIENT)
Dept: CT IMAGING | Age: 84
DRG: 062 | End: 2019-03-14
Attending: INTERNAL MEDICINE
Payer: MEDICARE

## 2019-03-14 LAB
ANION GAP SERPL CALC-SCNC: 9 MMOL/L (ref 7–16)
ATRIAL RATE: 49 BPM
BUN SERPL-MCNC: 19 MG/DL (ref 8–23)
CALCIUM SERPL-MCNC: 8.4 MG/DL (ref 8.3–10.4)
CALCULATED R AXIS, ECG10: 6 DEGREES
CALCULATED T AXIS, ECG11: -131 DEGREES
CHLORIDE SERPL-SCNC: 111 MMOL/L (ref 98–107)
CHOLEST SERPL-MCNC: 121 MG/DL
CO2 SERPL-SCNC: 21 MMOL/L (ref 21–32)
CREAT SERPL-MCNC: 0.83 MG/DL (ref 0.6–1)
DIAGNOSIS, 93000: NORMAL
ERYTHROCYTE [DISTWIDTH] IN BLOOD BY AUTOMATED COUNT: 14.6 % (ref 11.9–14.6)
EST. AVERAGE GLUCOSE BLD GHB EST-MCNC: 131 MG/DL
GLUCOSE SERPL-MCNC: 110 MG/DL (ref 65–100)
HBA1C MFR BLD: 6.2 % (ref 4.8–6)
HCT VFR BLD AUTO: 36.2 % (ref 35.8–46.3)
HDLC SERPL-MCNC: 59 MG/DL (ref 40–60)
HDLC SERPL: 2.1 {RATIO}
HGB BLD-MCNC: 11.8 G/DL (ref 11.7–15.4)
LDLC SERPL CALC-MCNC: 51.6 MG/DL
LIPID PROFILE,FLP: NORMAL
MCH RBC QN AUTO: 28.9 PG (ref 26.1–32.9)
MCHC RBC AUTO-ENTMCNC: 32.6 G/DL (ref 31.4–35)
MCV RBC AUTO: 88.5 FL (ref 79.6–97.8)
NRBC # BLD: 0 K/UL (ref 0–0.2)
PLATELET # BLD AUTO: 191 K/UL (ref 150–450)
PMV BLD AUTO: 11.8 FL (ref 9.4–12.3)
POTASSIUM SERPL-SCNC: 3.6 MMOL/L (ref 3.5–5.1)
Q-T INTERVAL, ECG07: 356 MS
QRS DURATION, ECG06: 76 MS
QTC CALCULATION (BEZET), ECG08: 405 MS
RBC # BLD AUTO: 4.09 M/UL (ref 4.05–5.2)
SODIUM SERPL-SCNC: 141 MMOL/L (ref 136–145)
TRIGL SERPL-MCNC: 52 MG/DL (ref 35–150)
VENTRICULAR RATE, ECG03: 78 BPM
VLDLC SERPL CALC-MCNC: 10.4 MG/DL (ref 6–23)
WBC # BLD AUTO: 6.7 K/UL (ref 4.3–11.1)

## 2019-03-14 PROCEDURE — 99233 SBSQ HOSP IP/OBS HIGH 50: CPT | Performed by: PSYCHIATRY & NEUROLOGY

## 2019-03-14 PROCEDURE — 92610 EVALUATE SWALLOWING FUNCTION: CPT

## 2019-03-14 PROCEDURE — 36415 COLL VENOUS BLD VENIPUNCTURE: CPT

## 2019-03-14 PROCEDURE — 93306 TTE W/DOPPLER COMPLETE: CPT

## 2019-03-14 PROCEDURE — 83036 HEMOGLOBIN GLYCOSYLATED A1C: CPT

## 2019-03-14 PROCEDURE — 80061 LIPID PANEL: CPT

## 2019-03-14 PROCEDURE — 70450 CT HEAD/BRAIN W/O DYE: CPT

## 2019-03-14 PROCEDURE — 80048 BASIC METABOLIC PNL TOTAL CA: CPT

## 2019-03-14 PROCEDURE — 74011250637 HC RX REV CODE- 250/637: Performed by: INTERNAL MEDICINE

## 2019-03-14 PROCEDURE — 85027 COMPLETE CBC AUTOMATED: CPT

## 2019-03-14 PROCEDURE — 77010033678 HC OXYGEN DAILY

## 2019-03-14 PROCEDURE — 99222 1ST HOSP IP/OBS MODERATE 55: CPT | Performed by: PHYSICAL MEDICINE & REHABILITATION

## 2019-03-14 PROCEDURE — 65270000029 HC RM PRIVATE

## 2019-03-14 RX ORDER — ASPIRIN 81 MG/1
81 TABLET ORAL DAILY
Status: DISCONTINUED | OUTPATIENT
Start: 2019-03-15 | End: 2019-03-16 | Stop reason: HOSPADM

## 2019-03-14 RX ORDER — ATORVASTATIN CALCIUM 20 MG/1
20 TABLET, FILM COATED ORAL EVERY EVENING
COMMUNITY
End: 2019-03-16

## 2019-03-14 RX ADMIN — LEVOTHYROXINE SODIUM 50 MCG: 50 TABLET ORAL at 07:30

## 2019-03-14 RX ADMIN — ATORVASTATIN CALCIUM 40 MG: 40 TABLET, FILM COATED ORAL at 20:55

## 2019-03-14 RX ADMIN — Medication 10 ML: at 20:55

## 2019-03-14 RX ADMIN — ESCITALOPRAM OXALATE 10 MG: 10 TABLET ORAL at 20:55

## 2019-03-14 RX ADMIN — METOPROLOL SUCCINATE 12.5 MG: 25 TABLET, EXTENDED RELEASE ORAL at 09:00

## 2019-03-14 RX ADMIN — Medication 10 ML: at 05:17

## 2019-03-14 NOTE — PROGRESS NOTES
Problem: Falls - Risk of  Goal: *Absence of Falls  Document Tim Fall Risk and appropriate interventions in the flowsheet. Outcome: Progressing Towards Goal  Fall Risk Interventions:  Mobility Interventions: Communicate number of staff needed for ambulation/transfer, Bed/chair exit alarm    Mentation Interventions: Bed/chair exit alarm    Medication Interventions: Bed/chair exit alarm, Patient to call before getting OOB    Elimination Interventions: Call light in reach             Problem: Pressure Injury - Risk of  Goal: *Prevention of pressure injury  Document [place] Scale and appropriate interventions in the flowsheet.   Outcome: Progressing Towards Goal  Pressure Injury Interventions:  Sensory Interventions: Assess changes in LOC         Activity Interventions: PT/OT evaluation, Increase time out of bed    Mobility Interventions: HOB 30 degrees or less, Pressure redistribution bed/mattress (bed type), PT/OT evaluation    Nutrition Interventions: Offer support with meals,snacks and hydration

## 2019-03-14 NOTE — PROGRESS NOTES
TRANSFER - IN REPORT:    Verbal report received from [facility] on Zion Ort  being received from ICU for routine progression of care      Report consisted of patients Situation, Background, Assessment and   Recommendations(SBAR). Information from the following report(s) SBAR was reviewed with the receiving nurse. Opportunity for questions and clarification was provided. Assessment completed upon patients arrival to unit and care assumed.

## 2019-03-14 NOTE — PROGRESS NOTES
Patient offered morphine. Patient educated on risk vs. Benefit of morphine. Patient refused medication and stated she will take tylenol again when it is available.

## 2019-03-14 NOTE — PROGRESS NOTES
Pt's CT resulted final, call placed to Dr. Andrew Gonzalez. Orders received to transfer pt on remote telemetry to 7th floor.

## 2019-03-14 NOTE — PROGRESS NOTES
03/14/19 1644   Dual Skin Pressure Injury Assessment   Dual Skin Pressure Injury Assessment WDL   Second Care Provider (Based on [address] [place] [address]) Shazia RODRIGUEZ   Skin Integumentary   Skin Integumentary (WDL) WDL   Pressure  Injury Documentation No Pressure Injury Noted-Pressure Ulcer Prevention Initiated

## 2019-03-14 NOTE — PROGRESS NOTES
Neurology Daily Progress Note   Neurology Physician Attestation Statement    I have seen and examined the patient along with Joyce France NP. I agree with the documentation as recorded. In addition I would add:    I have seen the patient independently during which time I reviewed the history and performed a physical examination, reviewed the NP documentation and discussed with the NP . The interval history obtained is notable for: doing well. Apahsia resolved. Denies weakness or any other complaints. Patient volunteers a known history of A. Fib. She had upper GI bleeding with Coumadin and for financial reasons was not placed on a novel anticoagulant. The physical examination performed is notable for: cranial nerve II-12 intact. Speech fluent without paraphasic errors. Motor examination 5/5 power upper and lower extremities    The medical decision making including assessment and recommendations is notable for:  Aborted stroke status post intravenous alteplase. MRI pending. Discussed with Dr. Gavino Sorto . He indicates patient will be a good candidate for the watchman device. I will forward information to him so that she can be seen regarding this as an outpatient. The meantime we will get patient assistance for treatment with Xarelto. Alirio Weeks MD      Assessment:     Resolution of stroke symptoms s/p alteplase     Plan:       · Start ASA 81 mg daily after 24 hours  · Continue statin   · Neurochecks Q4H  · Telemetry PT/OT/ST  · BP management - after 24 hours, normotensive BP management   · Depression Screening prior to discharge      Subjective: Interval history:    Patient is doing well today. Language has returned to baseline. No focal neurologic deficits noted. Repeat head CT was negative for acute abnormality. MRI of brain was negative for acute infarction. TTE was negative for LAD or shunt.      History:    Dexter Cardoso is a 80 y.o. female who is being seen for stroke symptoms. Review of systems negative with exception of pertinent positives and negatives noted above. Objective:     Vitals:    03/14/19 1332 03/14/19 1402 03/14/19 1509 03/14/19 1526   BP: 135/71 154/65 145/67    Pulse: (!) 59 61 87    Resp: 20 (!) 38 (!) 37    Temp:    98.4 °F (36.9 °C)   SpO2: 95% 95% 95%    Weight:       Height:              Current Facility-Administered Medications:     atorvastatin (LIPITOR) tablet 40 mg, 40 mg, Oral, QHS, Johnnie Fernandes MD, Stopped at 03/13/19 2200    escitalopram oxalate (LEXAPRO) tablet 10 mg, 10 mg, Oral, QHS, Johnnie Fernandes MD, Stopped at 03/13/19 2200    levothyroxine (SYNTHROID) tablet 50 mcg, 50 mcg, Oral, ACB, Johnnie Fernandes MD, 50 mcg at 03/14/19 0730    metoprolol succinate (TOPROL-XL) XL tablet 12.5 mg, 12.5 mg, Oral, DAILY, Johnnie Fernandes MD, 12.5 mg at 03/14/19 0900    sodium chloride (NS) flush 5-40 mL, 5-40 mL, IntraVENous, Q8H, Johnnie Fernandes MD, Stopped at 03/14/19 1400    sodium chloride (NS) flush 5-40 mL, 5-40 mL, IntraVENous, PRN, Johnnie Fernandes MD    ondansetron [facility]) injection 4 mg, 4 mg, IntraVENous, Q6H PRN, Johnnie Fernandes MD    acetaminophen (TYLENOL) tablet 650 mg, 650 mg, Oral, Q4H PRN, Johnnie Fernandes MD    tuberculin injection 5 Units, 5 Units, IntraDERMal, ONCE, Johnnie Fernandes MD, Stopped at 03/13/19 1700    acetaminophen (TYLENOL) suppository 650 mg, 650 mg, Rectal, Q4H PRN, Johnnie Fernandes MD, 650 mg at 03/13/19 2155    niCARdipine (CARDENE) 25 mg in 0.9% sodium chloride 250 mL infusion, 0-15 mg/hr, IntraVENous, TITRATE, McBurney, Jeanell Quam, MD, Stopped at 03/13/19 2300    No results found for this or any previous visit (from the past 12 hour(s)). CT Results (most recent):  Results from Hospital Encounter encounter on 03/13/19   CT HEAD WO CONT    Narrative CT HEAD WITHOUT CONTRAST. INDICATION: Acute CVA, status post TPA follow-up.     COMPARISON: Yesterday's exam.      TECHNIQUE:   5 mm axial scans from the skull base to the vertex. Our CT  scanners use one or more of the following:  Automated exposure control,  adjustment of the mA and or kV according to patient size, iterative  reconstruction. FINDINGS:  No acute intraparenchymal hemorrhage or abnormal extra-axial fluid  collection. The ventricles are normal size. Bilateral white matter low  attenuation is present, nonspecific, likely chronic small vessel disease. No  midline shift mass effect. Included portion of the paranasal sinuses and orbits  grossly unremarkable. Impression IMPRESSION:  Negative for acute intracranial abnormality. Chronic changes. MRI Results (most recent):  Results from [place] encounter on 03/13/19   MRI BRAIN WO CONT    Narrative EXAMINATION: BRAIN MRI 3/13/2019 9:23 PM    ACCESSION NUMBER: [id number]    INDICATION: Stroke; stroke, aphasia    COMPARISON: Head CT 3/13/2019, CT perfusion 3/13/2019, CTA head and neck  3/13/2019, brain MRI 12/19/2018    TECHNIQUE: Multiplanar multisequence MRI of the brain without the administration  of intravenous contrast.    FINDINGS:    The ventricles are stable in caliber in comparison to the prior exam and are  appropriate for the mild degree of symmetric global brain parenchymal volume  loss. There is no midline shift. The basilar cisterns are patent. There is no  cerebellar tonsillar ectopia or herniation. Scattered T2 hyperintensities in the periventricular and subcortical white  matter are stable in comparison to the prior exam. This is a nonspecific finding  which most likely represents a mild to moderate burden of chronic  microangiopathy. Unchanged right maxillary sinus mucosal thickening. Prior bilateral lens replacement. There is T1 hyperintensity within the left  globe. This may represent intraocular silicone, and correlation with  ophthalmologic is history recommended.  Left-sided scleral band.    Diffusion imaging shows no evidence of acute infarction or other acute  abnormality. The expected large intracranial vascular flow voids are preserved. There is no  evidence of intracranial blood products. There are no suspicious osseous lesions. Impression  IMPRESSION:    1. No evidence of acute infarction. 2. 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 VOICE DICTATED BY: Dr. Karyle Rodes            Physical Exam:  General - Well developed, well nourished, in no apparent distress. Pleasant and conversent. HEENT - Normocephalic, atraumatic. Conjunctiva are clear. Neck - Supple without masses   Extremities - Peripheral pulses intact. No edema and no rashes. Neurological examination - Comprehension, attention, memory and reasoning are intact. Language and speech are normal.   On cranial nerve examination, (II, III, IV, VI) pupils are equal, round, and reactive to light. Visual acuity is adequate. Visual fields are full to finger confrontation. Extraocular motility is normal. (V, VII) Face is symmetric and sensation is intact to light touch. (VIII) Hearing is intact. (IX, X) Palate and uvula elevate symmetrically. Voice is normal. (XI) Shoulder shrug is strong and equal bilaterally. (XII)Tongue is midline. Motor examination - There is normal muscle tone and bulk. Power is full throughout. Muscle stretch reflexes are normoactive and there are no pathological reflexes present. Plantar response is flexor. Sensation is intact to light touch, pinprick, vibration and proprioception in all extremities.  Cerebellar examination is normal.     Signed By: Jean Carlos Hudson NP     March 14, 2019

## 2019-03-14 NOTE — CONSULTS
PM&R Rehab Consult    Subjective:     Date of Consultation:  March 14, 2019    Referring Physician: Dr Jessica Rowley    Patient is a 80 y.o. female who is being seen for rehab recommendations under Code S; s/p TPA     Active Problems:    Hypothyroidism (1/14/2013)      Hypercholesteremia (1/14/2013)      Paroxysmal atrial fibrillation ([facility] [address] (1/14/2013)      Overview: Paroxysmal       GERD (gastroesophageal reflux disease) (3/6/2014)      CVA (cerebral vascular accident) ([facility] [address] (3/13/2019)      Received intravenous tissue plasminogen activator (tPA) in emergency department (3/13/2019)    HPI; Mrs Rosaura Calabrese is a functionally independent, right hand dominant, 79yo WF who presented to the ED on 3/13 with persistent aphasia. She has a hx of a fib not on [facility] [address] due to prior GIB, GERD and hypothyroidism. She was in her kitchen making lunch yesterday and her  noted that she was having difficulty expressing her thoughts. At first, pt refused to allow him to call 911. When she developed right sided weakness shortly after, he called 911. She was brought in under Code S. Her NIHSS per Neurology was 8 but this may have been falsely increased due to left eye blindness. Head CT/perfusion was normal and CTA head/neck showed no evidence of LVO. She was given tPA which finished around 1430. She was placed on Cardene for BP control. She continued to have some difficulty expressing her thoughts but was able to say short sentences . She followed all commands and moved all extremities spontaneously. She follows with cardiology and was last seen in November at which point she was hesitant to restart [employer] so she was being considered for a Watchman. MRI shows no acute infarction. She does have chronic microangiopathy. F/u head CT is pending this a.m. PT and OT pending. ST has cleared for a regular consistency diet.    LDL is 51.6, chol 121 with trigl of 52 and HDL of 59. 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    CAD (coronary artery disease), native coronary artery May 2014    stent to LAD; 3 stents total    Depression 1/14/2013    GERD (gastroesophageal reflux disease)     Hypercholesteremia 1/14/2013    Hypertension     Hypothyroidism 1/14/2013    Osteoarthritis of back 1/2/2014    Osteoporosis     Pulmonary embolism ([facility] [address] 2009    after knee surgery    Sick sinus syndrome ([facility] [address] 1/7/2016    Tachycardia-Bradycardia      Family History   Problem Relation Age of Onset    Cancer Mother     Breast Cancer Mother     Cancer Father     Cancer Sister     Cancer Brother         pancreas    Breast Cancer Maternal Aunt       Social History     Tobacco Use    Smoking status: Never Smoker    Smokeless tobacco: Never Used   Substance Use Topics    Alcohol use: No     Alcohol/week: 0.0 oz   Lives with . Independent with gait and ADLs, still drives  Past Surgical History:   Procedure Laterality Date    COLONOSCOPY N/A 7/16/2018    COLONOSCOPY performed by Arlette Otero MD at [address] [place] [address] HX BREAST BIOPSY Bilateral     HX CATARACT REMOVAL Right 2010    HX CORONARY STENT PLACEMENT  05/2014    LAD X 3    HX HEART CATHETERIZATION  09/17/2015    HX HEENT      multiple eye surgeries - left - macular hoe, retinal detachment twice,     HX HYSTERECTOMY  1970    HX KNEE REPLACEMENT  2009    HX KNEE REPLACEMENT Left 01/12/2017    HX ORTHOPAEDIC  2006 and 2008    herniated disc    HX TONSIL AND ADENOIDECTOMY  1934      Prior to Admission medications    Medication Sig Start Date End Date Taking? Authorizing Provider   atorvastatin (LIPITOR) 20 mg tablet Take 20 mg by mouth every evening.    Yes Provider, Historical   levothyroxine (SYNTHROID) 50 mcg tablet Take 1 Tab by mouth Daily (before breakfast). 3/11/19  Yes Monae Mayfield MD   metoprolol succinate (TOPROL-XL) 25 mg XL tablet Take 0.5 Tabs by mouth daily. 1/24/19  Yes Monae Mayfield MD   nitrofurantoin (MACRODANTIN) 50 mg capsule Take 1 Cap by mouth daily. 12/6/18  Yes Monae Mayfield MD   clopidogrel (PLAVIX) 75 mg tab Take 75 mg by mouth daily. Yes Provider, Historical   potassium 99 mg tablet Take 99 mg by mouth daily. Yes Provider, Historical   vit C/vit E/lutein/min/omega-3 (OCUVITE PO) Take 1 Cap by mouth daily. Yes Provider, Historical   escitalopram oxalate (LEXAPRO) 10 mg tablet Take 1 Tab by mouth daily. Patient taking differently: Take 10 mg by mouth nightly. 4/16/18  Yes Monae Mayfield MD   acetaminophen (TYLENOL ARTHRITIS PAIN) 650 mg CR tablet Take 650 mg by mouth every six (6) hours as needed for Pain. Yes Provider, Historical   VIT A/VIT C/VIT E/ZINC/COPPER (ICAPS AREDS PO) Take  by mouth. Twice a day   Yes Provider, Historical   vitamin E (AQUA GEMS) 400 unit capsule Take  by mouth daily. Yes Provider, Historical   Calcium Carbonate-Vit D3-Min (CALTRATE 600+D PLUS MINERALS) 600 mg calcium- 400 unit Tab Take  by mouth daily. Yes Provider, Historical   Cholecalciferol, Vitamin D3, (VITAMIN D3) 1,000 unit cap Take  by mouth daily. Yes Provider, Historical   ferrous sulfate 324 mg (65 mg iron) tablet Take 65 mg by mouth Daily (before breakfast). Provider, Historical   zolpidem (AMBIEN) 10 mg tablet Take 1 Tab by mouth nightly as needed for Sleep. Max Daily Amount: 10 mg. 9/4/18   Monae Mayfield MD   atorvastatin (LIPITOR) 40 mg tablet TAKE 1 TABLET EVERY DAY  Patient taking differently: nightly. TAKE 1 TABLET EVERY DAY 4/30/18   Ronn Noel MD   nitroglycerin (NITROSTAT) 0.3 mg SL tablet 1 Tab by SubLINGual route every five (5) minutes as needed for Chest Pain. 8/4/17   Ronn Noel MD   cyanocobalamin (VITAMIN B-12) 100 mcg tablet Take 5,000 mcg by mouth daily.     Provider, Historical     Allergies   Allergen Reactions    Adhesive Rash        Review of Systems:   Review of Systems - Negative except word finding deficits, mild right temporal headache. She denies any weakness, double vision, difficulty swallowing. No cp,sob, MCCALL, orthopnea. Continent of b/b with no issues. She denies joint swelling or pain. Remote hx of PE after she had knee surgery . + blindness of left eye  Objective:     Vitals:  Blood pressure 158/67, pulse 64, temperature 99.2 °F (37.3 °C), resp. rate 20, height 5' 3\" (1.6 m), weight 145 lb (65.8 kg), SpO2 93 %. Temp (24hrs), Av.8 °F (37.1 °C), Min:98.5 °F (36.9 °C), Max:99.2 °F (37.3 °C)      Intake and Output:   1901 -  0700  In: 34.5 [I.V.:34.5]  Out: 400 [Urine:400]    Physical Exam:  General:  Alert, oriented and mood affect appropriate   Lungs:   Clear to auscultation bilaterally. Heart:  Regular rate and rhythm, S1, S2 stable, no murmur, click, rub or gallop. Abdomen:   Soft, non-tender. Bowel sounds present. No masses,  No organomegaly. Genitourinary: defer   Neuro Muscular: HEENT NCAT, left sclera clouded, blind  EOMI, no visual field cut  Strength symm bilaterally  Trace right drift, no dysmetria  No facial assym  Sensation intact thoughout  DTRs 2 symm  Speech is clear; she did have some word finding difficulties with full awareness of such. fcs well. Skin:  No rashes, lesions, or signs/symptoms or infection.  No edema , pulses 2+       Labs/Studies:  Recent Results (from the past 72 hour(s))   CBC WITH AUTOMATED DIFF    Collection Time: 19 12:53 PM   Result Value Ref Range    WBC 6.2 4.3 - 11.1 K/uL    RBC 4.41 4.05 - 5.2 M/uL    HGB 12.7 11.7 - 15.4 g/dL    HCT 38.0 35.8 - 46.3 %    MCV 86.2 79.6 - 97.8 FL    MCH 28.8 26.1 - 32.9 PG    MCHC 33.4 31.4 - 35.0 g/dL    RDW 14.1 11.9 - 14.6 %    PLATELET 016 794 - 823 K/uL    MPV 12.2 9.4 - 12.3 FL    ABSOLUTE NRBC 0.00 0.0 - 0.2 K/uL    DF AUTOMATED      NEUTROPHILS 54 43 - 78 %    LYMPHOCYTES 35 13 - 44 %    MONOCYTES 10 4.0 - 12.0 %    EOSINOPHILS 1 0.5 - 7.8 %    BASOPHILS 0 0.0 - 2.0 %    IMMATURE GRANULOCYTES 0 0.0 - 5.0 %    ABS. NEUTROPHILS 3.4 1.7 - 8.2 K/UL    ABS. LYMPHOCYTES 2.2 0.5 - 4.6 K/UL    ABS. MONOCYTES 0.6 0.1 - 1.3 K/UL    ABS. EOSINOPHILS 0.0 0.0 - 0.8 K/UL    ABS. BASOPHILS 0.0 0.0 - 0.2 K/UL    ABS. IMM. GRANS. 0.0 0.0 - 0.5 K/UL   METABOLIC PANEL, COMPREHENSIVE    Collection Time: 03/13/19 12:53 PM   Result Value Ref Range    Sodium 141 136 - 145 mmol/L    Potassium 3.6 3.5 - 5.1 mmol/L    Chloride 108 (H) 98 - 107 mmol/L    CO2 20 (L) 21 - 32 mmol/L    Anion gap 13 7 - 16 mmol/L    Glucose 137 (H) 65 - 100 mg/dL    BUN 18 8 - 23 MG/DL    Creatinine 1.00 0.6 - 1.0 MG/DL    GFR est AA >60 >60 ml/min/1.73m2    GFR est non-AA 55 (L) >60 ml/min/1.73m2    Calcium 9.3 8.3 - 10.4 MG/DL    Bilirubin, total 1.2 (H) 0.2 - 1.1 MG/DL    ALT (SGPT) 28 12 - 65 U/L    AST (SGOT) 25 15 - 37 U/L    Alk. phosphatase 81 50 - 136 U/L    Protein, total 7.6 6.3 - 8.2 g/dL    Albumin 3.6 3.2 - 4.6 g/dL    Globulin 4.0 (H) 2.3 - 3.5 g/dL    A-G Ratio 0.9 (L) 1.2 - 3.5     POC TROPONIN-I    Collection Time: 03/13/19 12:56 PM   Result Value Ref Range    Troponin-I (POC) 0 (L) 0.02 - 0.05 ng/ml   POC PT/INR    Collection Time: 03/13/19 12:59 PM   Result Value Ref Range    Prothrombin time (POC) 12.1 (H) 9.6 - 11.6 SECS    INR (POC) 1.0 0.9 - 1.2     POC LACTIC ACID    Collection Time: 03/13/19 12:59 PM   Result Value Ref Range    Lactic Acid (POC) 1.97 (H) 0.5 - 1.9 mmol/L   EKG, 12 LEAD, INITIAL    Collection Time: 03/13/19  1:34 PM   Result Value Ref Range    Ventricular Rate 78 BPM    Atrial Rate 49 BPM    QRS Duration 76 ms    Q-T Interval 356 ms    QTC Calculation (Bezet) 405 ms    Calculated R Axis 6 degrees    Calculated T Axis -131 degrees    Diagnosis       !! AGE AND GENDER SPECIFIC ECG ANALYSIS !!   Atrial fibrillation  Minimal voltage criteria for LVH, may be normal variant  Septal infarct (cited on or before 15-FEB-2018)  Marked ST abnormality, possible inferior subendocardial injury  Abnormal ECG  When compared with ECG of 15-FEB-2018 15:53,  Questionable change in initial forces of Septal leads  T wave inversion now evident in Anterolateral leads  Confirmed by Deonte Emerson ([id number]) on 3/14/2019 7:18:39 AM     LIPID PANEL    Collection Time: 03/14/19  3:27 AM   Result Value Ref Range    LIPID PROFILE          Cholesterol, total 121 <200 MG/DL    Triglyceride 52 35 - 150 MG/DL    HDL Cholesterol 59 40 - 60 MG/DL    LDL, calculated 51.6 <100 MG/DL    VLDL, calculated 10.4 6.0 - 23.0 MG/DL    CHOL/HDL Ratio 2.1     HEMOGLOBIN A1C WITH EAG    Collection Time: 03/14/19  3:27 AM   Result Value Ref Range    Hemoglobin A1c 6.2 (H) 4.8 - 6.0 %    Est. average glucose 255 mg/dL   METABOLIC PANEL, BASIC    Collection Time: 03/14/19  3:27 AM   Result Value Ref Range    Sodium 141 136 - 145 mmol/L    Potassium 3.6 3.5 - 5.1 mmol/L    Chloride 111 (H) 98 - 107 mmol/L    CO2 21 21 - 32 mmol/L    Anion gap 9 7 - 16 mmol/L    Glucose 110 (H) 65 - 100 mg/dL    BUN 19 8 - 23 MG/DL    Creatinine 0.83 0.6 - 1.0 MG/DL    GFR est AA >60 >60 ml/min/1.73m2    GFR est non-AA >60 >60 ml/min/1.73m2    Calcium 8.4 8.3 - 10.4 MG/DL   CBC W/O DIFF    Collection Time: 03/14/19  3:27 AM   Result Value Ref Range    WBC 6.7 4.3 - 11.1 K/uL    RBC 4.09 4.05 - 5.2 M/uL    HGB 11.8 11.7 - 15.4 g/dL    HCT 36.2 35.8 - 46.3 %    MCV 88.5 79.6 - 97.8 FL    MCH 28.9 26.1 - 32.9 PG    MCHC 32.6 31.4 - 35.0 g/dL    RDW 14.6 11.9 - 14.6 %    PLATELET 990 098 - 803 K/uL    MPV 11.8 9.4 - 12.3 FL    ABSOLUTE NRBC 0.00 0.0 - 0.2 K/uL         Functional Assessment:  Functional Assessment  Decline in Gait/Transfer/Balance: No  Decline in Capacity to Feed/Dress/Bathe: No  Developmental Delay: No  Chewing/Swallowing Problems: No  Difficulty with Secretions: No  Speech Slurred/Thick/Garbled: No     Barrera Score:        Speech Assessment:    Dysphagia Screening  Vocal Quality/Secretions: Normal  History of Dysphagia: No  O2 Saturation: Normal  Alertness: Normal  Pre-Swallow Assessment Score: 0  Purees: (!) Coughing/throat clearing  Aspiration Signs/Symptoms: None             Ambulation:  Activity and Safety  Activity Level: Bed Rest  Ambulate: No (Comment)  Activity: In bed, Resting quietly  Activity Assistance: Complete care  Weight Bearing Status: WBAT (Weight Bearing as Tolerated)  Mode of Transportation: Stretcher  Repositioned: Head of bed elevated (degrees), Extremity elevated, upper  Patient Turned: Turns self  Head of Bed Elevated: HOB 30  Assistive Device: Fall prevention device  Safety Measures: Bed/Chair alarm on, Bed/Chair-Wheels locked, Bed in low position, Call light within reach, Emergency bedside equipment, Nurse at bedside, Side rails X 3     Impression/Plan:     Active Problems:    Hypothyroidism (1/14/2013)      Hypercholesteremia (1/14/2013)      Paroxysmal atrial fibrillation (H[facility]) (1/14/2013)      Overview: Paroxysmal       GERD (gastroesophageal reflux disease) (3/6/2014)      CVA (cerebral vascular accident) ([facility] [address] (3/13/2019)      Received intravenous tissue plasminogen activator (tPA) in emergency department (3/13/2019)     Likely aborted cardioembolic stroke with hx of afib not on 934 ,  TPA    Recommendations: Continue Acute Rehab Program  Coordination of rehab/medical care  Counseling of PM & R care issues management  Monitoring and management of medical conditions per plan of care/orders   -suspect pt will do well. She is functionally neuro intact. No cerebellar signs to be concerned for ataxia. Has yet to mobilize with PT and OT; speech cleared for reg diet  -IRC not indicated; likely dc home with no rehab services but cannot determine definitively until pt is evaluated by therapies. ECHO pending.  F/u with Cardiology for possible Watchman device  -lipid profile is good  Thank you  Discussion with Family/Caregiver/Staff  Reviewed Therapies/Labs/Meds/Records    Signed By:  Lucille Gomez MD     March 14, 2019

## 2019-03-14 NOTE — PROGRESS NOTES
TRANSFER - OUT REPORT:    Verbal report given to JENNIFER Gong on Angel Daniel  being transferred to  for routine progression of care       Report consisted of patients Situation, Background, Assessment and   Recommendations(SBAR). Information from the following report(s) SBAR was reviewed with the receiving nurse. Lines:   Peripheral IV 03/13/19 Right Antecubital (Active)   Site Assessment Clean, dry, & intact 3/14/2019 11:00 AM   Phlebitis Assessment 0 3/14/2019 11:00 AM   Infiltration Assessment 0 3/14/2019 11:00 AM   Dressing Status Clean, dry, & intact 3/14/2019 11:00 AM   Dressing Type Transparent;Tape 3/14/2019 11:00 AM   Hub Color/Line Status Flushed;Patent 3/14/2019 11:00 AM   Alcohol Cap Used No 3/14/2019 11:00 AM       Peripheral IV 03/13/19 Left Antecubital (Active)   Site Assessment Clean, dry, & intact 3/14/2019 11:00 AM   Phlebitis Assessment 0 3/14/2019 11:00 AM   Infiltration Assessment 0 3/14/2019 11:00 AM   Dressing Status Clean, dry, & intact 3/14/2019 11:00 AM   Dressing Type Transparent;Tape 3/14/2019 11:00 AM   Hub Color/Line Status Flushed;Patent 3/14/2019 11:00 AM   Alcohol Cap Used No 3/14/2019 11:00 AM        Opportunity for questions and clarification was provided.       Patient transported with:   O2 @ 2 liters  Tech

## 2019-03-14 NOTE — PROGRESS NOTES
STG: Patient will participate in speech/language/cognitive evaluation     Speech language pathology: bedside swallow note: Initial Assessment    NAME/AGE/GENDER: Kevin Herndon is a 80 y.o. female  DATE: 3/14/2019  PRIMARY DIAGNOSIS: CVA (cerebral vascular accident) ([facility] [address] [I63.9]  Received intravenous tissue plasminogen activator (tPA) in emergency department [Z92.82]      ICD-10: Treatment Diagnosis: oropharyngeal dysphagia R13.12  INTERDISCIPLINARY COLLABORATION: Registered Nurse  PRECAUTIONS/ALLERGIES: Adhesive ASSESSMENT:Based on the objective data described below, Ms. Ellen Queen presents with no overt s/sx of airway compromise with liquid or solid consistencies. Patient presented with thin liquid via cup and straw, puree, mixed, and solid consistencies. Appropriate oral prep with all textures. Timely swallow initiation, and single swallows upon palpation. Adequate oral clearing. No overt signs or symptoms of airway compromise observed with liquid or solid textures. Recommend regular diet/thin liquids. Medications as tolerated. No dysphagia treatment indicated at this time. RN notified. Family reports patient \"was talking gibberish\", however it has improved today. Oriented x4. Speech 100% intelligible and No word finding errors observed during this evaluation. Patient will benefit Will follow up for cognitive linguistic evaluation to assess abilities. ?????? ? ? This section established at most recent assessment??????????  PROBLEM LIST (Impairments causing functional limitations):  1. Oropharyngeal dysphagia- No symptoms identified  REHABILITATION POTENTIAL FOR STATED GOALS: Good  PLAN OF CARE:   Patient will benefit from skilled intervention to address the following impairments.   RECOMMENDATIONS AND PLANNED INTERVENTIONS (Benefits and precautions of therapy have been discussed with the patient.):  · PO:  Regular  · Liquids:  regular thin  MEDICATIONS:  · as tolerated  ASPIRATION PRECAUTIONS:  · Slow rate of intake  · Small bites/sips  · Upright at 90 degrees during meal  OTHER RECOMMENDATIONS (including follow up treatment recommendations):   · Patient education  · cognitive linguistic evaluation  RECOMMENDED DIET MODIFICATIONS DISCUSSED WITH:  · Nursing  · Family  · Patient  FREQUENCY/DURATION: Speech therapy to follow up for assessment of cognitive-linguistic function. RECOMMENDED REHABILITATION/EQUIPMENT: (at time of discharge pending progress): Due to the probability of continued deficits (see above) this patient will likely need continued skilled speech therapy after discharge. SUBJECTIVE:   Oriented x4. Family present. \"I really want something to drink\"  History of Present Injury/Illness: Ms. Gabby Clark  has a past medical history of Abnormal cardiovascular function study (1/7/2016), Allergic rhinitis (1/14/2013), Anemia (11/13/2015), Arthritis, Atrial fibrillation ([address] (1/14/2013), Blind left eye, CAD (coronary artery disease), native coronary artery (May 2014), Depression (1/14/2013), GERD (gastroesophageal reflux disease), Hypercholesteremia (1/14/2013), Hypertension, Hypothyroidism (1/14/2013), Osteoarthritis of back (1/2/2014), Osteoporosis, Pulmonary embolism ([address] (2009), and Sick sinus syndrome ([address] (1/7/2016). .  She also  has a past surgical history that includes hx appendectomy (1965); hx hysterectomy (1970); hx tonsil and adenoidectomy (6649); hx heent; hx orthopaedic (2006 and 2008); hx cataract removal (Right, 2010); hx coronary stent placement (05/2014); hx heart catheterization (09/17/2015); hx knee replacement (2009); hx knee replacement (Left, 01/12/2017); hx breast biopsy (Bilateral); and colonoscopy (N/A, 7/16/2018). Present Symptoms:   Pain Scale 1: Numeric (0 - 10)  Pain Intensity 1: 0  Pain Location 1: Head  Pain Intervention(s) 1: Relaxation technique;Repositioned;MD notified (comment)  Current Medications:   No current facility-administered medications on file prior to encounter. Current Outpatient Medications on File Prior to Encounter   Medication Sig Dispense Refill    levothyroxine (SYNTHROID) 50 mcg tablet Take 1 Tab by mouth Daily (before breakfast). 30 Tab 0    metoprolol succinate (TOPROL-XL) 25 mg XL tablet Take 0.5 Tabs by mouth daily. 45 Tab 3    nitrofurantoin (MACRODANTIN) 50 mg capsule Take 1 Cap by mouth daily. 90 Cap 3    clopidogrel (PLAVIX) 75 mg tab Take 75 mg by mouth daily.  ferrous sulfate 324 mg (65 mg iron) tablet Take 65 mg by mouth Daily (before breakfast).  potassium 99 mg tablet Take 99 mg by mouth daily.  vit C/vit E/lutein/min/omega-3 (OCUVITE PO) Take 1 Cap by mouth daily.  zolpidem (AMBIEN) 10 mg tablet Take 1 Tab by mouth nightly as needed for Sleep. Max Daily Amount: 10 mg. 30 Tab 2    atorvastatin (LIPITOR) 40 mg tablet TAKE 1 TABLET EVERY DAY (Patient taking differently: nightly. TAKE 1 TABLET EVERY DAY) 90 Tab 3    escitalopram oxalate (LEXAPRO) 10 mg tablet Take 1 Tab by mouth daily. (Patient taking differently: Take 10 mg by mouth nightly.) 90 Tab 3    nitroglycerin (NITROSTAT) 0.3 mg SL tablet 1 Tab by SubLINGual route every five (5) minutes as needed for Chest Pain. 1 Bottle 3    acetaminophen (TYLENOL ARTHRITIS PAIN) 650 mg CR tablet Take 650 mg by mouth every six (6) hours as needed for Pain.  VIT A/VIT C/VIT E/ZINC/COPPER (ICAPS AREDS PO) Take  by mouth. Twice a day      vitamin E (AQUA GEMS) 400 unit capsule Take  by mouth daily.  Calcium Carbonate-Vit D3-Min (CALTRATE 600+D PLUS MINERALS) 600 mg calcium- 400 unit Tab Take  by mouth daily.  Cholecalciferol, Vitamin D3, (VITAMIN D3) 1,000 unit cap Take  by mouth daily.  cyanocobalamin (VITAMIN B-12) 100 mcg tablet Take 5,000 mcg by mouth daily.        Current Dietary Status:     NPO pending ST eval  Social History/Home Situation: [address] [facility]     OBJECTIVE:   Respiratory Status:  Nasal cannula  2 l/min  CTA Head Neck w wo contrast Results:No evidence of large vessel occlusion  MRI/CT Results:1. No evidence of acute infarction. 2. 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 Oral Motor Structure/Speech:  Oral-Motor Structure/Motor Speech  Dentition: Intact  Oral Hygiene: adequate  Lingual: No impairment    Cognitive and Communication Status:  Neurologic State: Alert  Orientation Level: Oriented X4  Cognition: Follows commands             BEDSIDE SWALLOW EVALUATION  Oral Assessment:  Oral Assessment  Dentition: Intact  Oral Hygiene: adequate  Lingual: No impairment  P.O. Trials:  Patient Position: upright in bed    The patient was given f the following:   Consistency Presented: Solid; Thin liquid;Puree; Ice chips;Mixed consistency  How Presented: Self-fed/presented;SLP-fed/presented;Spoon; Successive swallows;Cup/sip;Straw    ORAL PHASE:  Bolus Acceptance: No impairment  Bolus Formation/Control: No impairment  Propulsion: No impairment     Oral Residue: Less than 10% of bolus    PHARYNGEAL PHASE:  Initiation of Swallow: No impairment     Aspiration Signs/Symptoms: None  Vocal Quality: No impairment                OTHER OBSERVATIONS:  Rate/bite size: WNL   Endurance: WNL   Comments:       Tool Used: Dysphagia Outcome and Severity Scale (MADISON)    Score Comments   Normal Diet  [x] 7 With no strategies or extra time needed   Functional Swallow  [] 6 May have mild oral or pharyngeal delay       Mild Dysphagia    [] 5 Which may require one diet consistency restricted (those who demonstrate penetration which is entirely cleared on MBS would be included)   Mild-Moderate Dysphagia  [] 4 With 1-2 diet consistencies restricted       Moderate Dysphagia  [] 3 With 2 or more diet consistencies restricted       Moderately Severe Dysphagia  [] 2 With partial PO strategies (trials with ST only)       Severe Dysphagia  [] 1 With inability to tolerate any PO safely          Score:  Initial: 7 Most Recent: X (Date: --)   Interpretation of Tool: The Dysphagia Outcome and Severity Scale (MADISON) is a simple, easy-to-use, 7-point scale developed to systematically rate the functional severity of dysphagia based on objective assessment and make recommendations for diet level, independence level, and type of nutrition. [place] / [place] / Product Type: Medicare /     TREATMENT:    (In addition to Assessment/Re-Assessment sessions the following treatments were rendered)  Assessment/Reassessment only, no treatment provided today  MODALITIES:                                                                    ORAL MOTOR  EXERCISES:                                                                                                                                                                      LARYNGEAL / PHARYNGEAL EXERCISES:                                                                                                                                     __________________________________________________________________________________________________  Safety:   After treatment position/precautions:  · RN notified  · Family at bedside  · Upright in Bed  Treatment Assessment:  Participated in dysphagia evaluation without complications. Progression/Medical Necessity:   · no dysphagia treatment indicated. Will follow up with speech/language/cognitive evaluation  Compliance with Program/Exercises: Will assess as treatment progresses  Reason for Continuation of Services/Other Comments:  · Patient continues to require skilled intervention due to medical complications. Recommendations/Intent for next treatment session: \"Treatment next visit will focus on cognitive linguistic evaluation\".     Total Treatment Duration:  Time In: 0390  Time Out: [address] [facility] [employer] [facility], [facility] LAUREEN CHACON, CF-SLP

## 2019-03-14 NOTE — PROGRESS NOTES
Problem: Falls - Risk of  Goal: *Absence of Falls  Document Tim Fall Risk and appropriate interventions in the flowsheet. Outcome: Progressing Towards Goal  Fall Risk Interventions:  Mobility Interventions: Patient to call before getting OOB    Mentation Interventions: Bed/chair exit alarm    Medication Interventions: Evaluate medications/consider consulting pharmacy, Patient to call before getting OOB, Teach patient to arise slowly    Elimination Interventions: Call light in reach, Patient to call for help with toileting needs, Toileting schedule/hourly rounds    Patient remains free from falls at this time. Uses call light appropriately when needing assistance.

## 2019-03-14 NOTE — PROGRESS NOTES
Order received, chart reviewed. Pt received tPA, will follow up this PM after 14:00 when pt is out of 24 hr window per protocol.     Viviana Valentine, OT

## 2019-03-14 NOTE — PROGRESS NOTES
Ischemic Stroke with Activase    Activase Date and Time of Administration: 3/13/19 at 1320    VTE Prophylaxis: Yes: SCD's    Antiplatelet: No    Antiplatelet Started 24 Hours After Activase Completion: No    Statin if LDL Greater Than or Equal to100: Yes: Lipitor    BP Parameters: Less Than 180/105    Controlled With: Continuous IV    Dysphagia Screen Completed: Yes: Fail  Dysphagia Screening  Vocal Quality/Secretions: Normal  History of Dysphagia: No  O2 Saturation: Normal  Alertness: Normal  Pre-Swallow Assessment Score: 0    Patient has PEG, NG Tube, Feeding Tube: No    Medication orders per above route: Yes    Nutrition Status: NPO    NIH Stroke Scale Complete: Yes: 2    Frequency of Vital Signs: 15 minutes    Frequency of Neuro Checks: every 15 minutes    Daily Education/Care Plan Updated:  Yes    Elner Nageotte, RN

## 2019-03-14 NOTE — PROGRESS NOTES
Pt states she is having a headache. Received tylenol at 2155 for headache. Spoke with Dr. Hunter Kebede about no relief with tylenol and risk vs benefit of morphine on potentially making headache worse. Orders to offer 2mg morphine but if patient refuses, give tylenol again at next time it can be offered.

## 2019-03-14 NOTE — PROGRESS NOTES
Progress Note    Patient: Leeann Ortiz MRN: [id number]  SSN: [id number]    YOB: 1929  Age: 80 y.o. Sex: female      Admit Date: 3/13/2019    LOS: 1 day     Subjective:     CC: slurred speech     This is an 81 YO female patient with a PMH of Afib NOT on anticoagulation who came yesterday to the ER with a CC of slurred speech, aphasia and R sided weakness. She was seen by neurology and received IV TPA. She was transferred to the ICU for proper monitoring. Her symptoms resolved. Today her BP remains within acceptable limits ( permissive hypertension). Patient and family have refused [facility] in the past. Neurology following case. Will repeat CT scan today, if stable, she will be transferred to the medical floor. Objective:     Vitals:    03/14/19 1055 03/14/19 1058 03/14/19 1204 03/14/19 1301   BP: 157/82  142/88 149/66   Pulse: (!) 55  65 60   Resp: 16  18 18   Temp: 98.9 °F (37.2 °C)      SpO2: 95% 95% 97% 95%   Weight:       Height:            Intake and Output:  Current Shift: 03/14 0701 - 03/14 1900  In: 240 [P.O.:240]  Out: 350 [Urine:350]  Last three shifts: 03/12 1901 - 03/14 0700  In: 34.5 [I.V.:34.5]  Out: 400 [Urine:400]    Physical Exam:   GENERAL: alert, appears stated age  EYE: blindness of the left eye   LYMPHATIC: Cervical, supraclavicular, and axillary nodes normal.   THROAT & NECK: normal and no erythema or exudates noted. LUNG: clear to auscultation bilaterally  HEART: irregular rate and rhythm   ABDOMEN: soft, non-tender. Bowel sounds normal. No masses,  no organomegaly  EXTREMITIES:  extremities normal, atraumatic, no cyanosis or edema  SKIN: Normal.  NEUROLOGIC: no focal deficits   PSYCHIATRIC: non focal    Lab/Data Review: All lab results for the last 24 hours reviewed.          Assessment:     Active Problems:    Hypothyroidism (1/14/2013)      Hypercholesteremia (1/14/2013)      Paroxysmal atrial fibrillation (HCC) (1/14/2013)      Overview: Paroxysmal       GERD (gastroesophageal reflux disease) (3/6/2014)      CVA (cerebral vascular accident) ([address] (3/13/2019)      Received intravenous tissue plasminogen activator (tPA) in emergency department (3/13/2019)        Plan:     -monitor mental status   -CT scan today around 2 pm. Follow report. If stable, she can be transferred to the medical floor  -Afib controlled.  May need to discuss the use of [facility] again with family   -ECHO with NLVEF  -CTA showed no evidence of important carotid artery disease   -allow permissive hypertension   -case followed by neurology     Signed By: Basil Ellison MD     March 14, 2019

## 2019-03-14 NOTE — PROGRESS NOTES
Physical Therapy Note:    Orders received, chart reviewed. Patient received ACTIVASE yesterday, 3/13/19 at 1427. Will hold physical therapy evaluation until s/p 24 hours after receiving ACTIVASE. Will attempt this PM or tomorrow as patient is able and schedule allows.     Thank you,  Natalie Ely, RUPERTT

## 2019-03-14 NOTE — PROGRESS NOTES
Ischemic Stroke with Activase    Activase Date and Time of Administration: 3/13 at 1320    VTE Prophylaxis: Yes: scd    Antiplatelet: Yes: tpa    Antiplatelet Started 24 Hours After Activase Completion: No    Statin if LDL Greater Than or Equal to100: Yes: lipitor    BP Parameters: Less Than 180/105    Controlled With: Scheduled PO    Dysphagia Screen Completed: Yes: Pass  Dysphagia Screening  Vocal Quality/Secretions: Normal  History of Dysphagia: No  O2 Saturation: Normal  Alertness: Normal  Pre-Swallow Assessment Score: 0  Purees: (!) Coughing/throat clearing    Patient has PEG, NG Tube, Feeding Tube: No    Medication orders per above route: Yes    Nutrition Status: PO    NIH Stroke Scale Complete: Yes: 0    Frequency of Vital Signs: Every 4 hours    Frequency of Neuro Checks: Every 4 hours    Daily Education/Care Plan Updated: Yes    Ashley Addison RN

## 2019-03-15 LAB
ANION GAP SERPL CALC-SCNC: 9 MMOL/L (ref 7–16)
BUN SERPL-MCNC: 18 MG/DL (ref 8–23)
CALCIUM SERPL-MCNC: 8.3 MG/DL (ref 8.3–10.4)
CHLORIDE SERPL-SCNC: 111 MMOL/L (ref 98–107)
CO2 SERPL-SCNC: 21 MMOL/L (ref 21–32)
CREAT SERPL-MCNC: 0.68 MG/DL (ref 0.6–1)
ERYTHROCYTE [DISTWIDTH] IN BLOOD BY AUTOMATED COUNT: 14.5 % (ref 11.9–14.6)
GLUCOSE SERPL-MCNC: 120 MG/DL (ref 65–100)
HCT VFR BLD AUTO: 36 % (ref 35.8–46.3)
HGB BLD-MCNC: 11.6 G/DL (ref 11.7–15.4)
MCH RBC QN AUTO: 28.8 PG (ref 26.1–32.9)
MCHC RBC AUTO-ENTMCNC: 32.2 G/DL (ref 31.4–35)
MCV RBC AUTO: 89.3 FL (ref 79.6–97.8)
NRBC # BLD: 0 K/UL (ref 0–0.2)
PLATELET # BLD AUTO: 172 K/UL (ref 150–450)
PMV BLD AUTO: 11.9 FL (ref 9.4–12.3)
POTASSIUM SERPL-SCNC: 3.9 MMOL/L (ref 3.5–5.1)
RBC # BLD AUTO: 4.03 M/UL (ref 4.05–5.2)
SODIUM SERPL-SCNC: 141 MMOL/L (ref 136–145)
WBC # BLD AUTO: 6.8 K/UL (ref 4.3–11.1)

## 2019-03-15 PROCEDURE — 65270000029 HC RM PRIVATE

## 2019-03-15 PROCEDURE — 74011250637 HC RX REV CODE- 250/637: Performed by: INTERNAL MEDICINE

## 2019-03-15 PROCEDURE — 97165 OT EVAL LOW COMPLEX 30 MIN: CPT

## 2019-03-15 PROCEDURE — 99233 SBSQ HOSP IP/OBS HIGH 50: CPT | Performed by: PSYCHIATRY & NEUROLOGY

## 2019-03-15 PROCEDURE — 74011250637 HC RX REV CODE- 250/637: Performed by: NURSE PRACTITIONER

## 2019-03-15 PROCEDURE — 99231 SBSQ HOSP IP/OBS SF/LOW 25: CPT | Performed by: PHYSICAL MEDICINE & REHABILITATION

## 2019-03-15 PROCEDURE — 97530 THERAPEUTIC ACTIVITIES: CPT

## 2019-03-15 PROCEDURE — 74011250636 HC RX REV CODE- 250/636: Performed by: INTERNAL MEDICINE

## 2019-03-15 PROCEDURE — 92523 SPEECH SOUND LANG COMPREHEN: CPT

## 2019-03-15 PROCEDURE — 80048 BASIC METABOLIC PNL TOTAL CA: CPT

## 2019-03-15 PROCEDURE — 85027 COMPLETE CBC AUTOMATED: CPT

## 2019-03-15 PROCEDURE — 97535 SELF CARE MNGMENT TRAINING: CPT

## 2019-03-15 PROCEDURE — 97161 PT EVAL LOW COMPLEX 20 MIN: CPT

## 2019-03-15 PROCEDURE — 36415 COLL VENOUS BLD VENIPUNCTURE: CPT

## 2019-03-15 RX ORDER — LISINOPRIL 20 MG/1
20 TABLET ORAL DAILY
Status: DISCONTINUED | OUTPATIENT
Start: 2019-03-16 | End: 2019-03-16 | Stop reason: HOSPADM

## 2019-03-15 RX ORDER — LISINOPRIL 5 MG/1
10 TABLET ORAL DAILY
Status: DISCONTINUED | OUTPATIENT
Start: 2019-03-15 | End: 2019-03-15

## 2019-03-15 RX ORDER — NITROFURANTOIN MACROCRYSTALS 50 MG/1
50 CAPSULE ORAL DAILY
Status: DISCONTINUED | OUTPATIENT
Start: 2019-03-15 | End: 2019-03-16 | Stop reason: HOSPADM

## 2019-03-15 RX ORDER — LISINOPRIL 5 MG/1
10 TABLET ORAL ONCE
Status: COMPLETED | OUTPATIENT
Start: 2019-03-15 | End: 2019-03-15

## 2019-03-15 RX ORDER — AMLODIPINE BESYLATE 5 MG/1
5 TABLET ORAL DAILY
Status: DISCONTINUED | OUTPATIENT
Start: 2019-03-15 | End: 2019-03-16 | Stop reason: HOSPADM

## 2019-03-15 RX ORDER — HYDRALAZINE HYDROCHLORIDE 20 MG/ML
20 INJECTION INTRAMUSCULAR; INTRAVENOUS
Status: DISCONTINUED | OUTPATIENT
Start: 2019-03-15 | End: 2019-03-16 | Stop reason: HOSPADM

## 2019-03-15 RX ADMIN — METOPROLOL SUCCINATE 12.5 MG: 25 TABLET, EXTENDED RELEASE ORAL at 08:02

## 2019-03-15 RX ADMIN — AMLODIPINE BESYLATE 5 MG: 5 TABLET ORAL at 14:47

## 2019-03-15 RX ADMIN — ONDANSETRON 4 MG: 2 INJECTION INTRAMUSCULAR; INTRAVENOUS at 23:08

## 2019-03-15 RX ADMIN — LISINOPRIL 10 MG: 5 TABLET ORAL at 08:02

## 2019-03-15 RX ADMIN — RIVAROXABAN 20 MG: 20 TABLET, FILM COATED ORAL at 19:09

## 2019-03-15 RX ADMIN — ESCITALOPRAM OXALATE 10 MG: 10 TABLET ORAL at 21:54

## 2019-03-15 RX ADMIN — Medication 10 ML: at 14:48

## 2019-03-15 RX ADMIN — LEVOTHYROXINE SODIUM 50 MCG: 50 TABLET ORAL at 05:26

## 2019-03-15 RX ADMIN — Medication 5 ML: at 22:04

## 2019-03-15 RX ADMIN — ASPIRIN 81 MG: 81 TABLET, COATED ORAL at 21:54

## 2019-03-15 RX ADMIN — Medication 5 ML: at 05:26

## 2019-03-15 RX ADMIN — ATORVASTATIN CALCIUM 40 MG: 40 TABLET, FILM COATED ORAL at 21:54

## 2019-03-15 RX ADMIN — NITROFURANTOIN MACROCRYSTALS 50 MG: 50 CAPSULE ORAL at 08:43

## 2019-03-15 RX ADMIN — HYDRALAZINE HYDROCHLORIDE 20 MG: 20 INJECTION INTRAMUSCULAR; INTRAVENOUS at 22:00

## 2019-03-15 RX ADMIN — LISINOPRIL 10 MG: 5 TABLET ORAL at 14:47

## 2019-03-15 NOTE — PROGRESS NOTES
STG: Patient will participate in speech/language/cognitive evaluation. GOAL MET 3/15/19      Speech language pathology: Speech-language and cognitive note: Initial Assessment, Daily Note and Discharge    NAME/AGE/GENDER: Hina Aguilar is a 80 y.o. 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 Ms. Eyal Cordova presents with functional expressive and receptive language abilities. Receptive Language:    2 step commands: 5/5   Simple yes/no: 5/5   Complex yes/no: 5/5  Expressive Language:   Responsive namin/5   Divergent Namin items in 1 minute (normal=11)   Abstract Reasonin item similarities-2/2  Expressive and receptive language WFL. Speech 100% intelligible.  reports patient at baseline. No speech/language treatment indicated at this time. Please re-consult if new concerns arise. Discussed with RN, patient, and patient's . ?????? ? ? This section established at most recent assessment??????????  PROBLEM LIST (Impairments causing functional limitations):  1. Aphasia-resolved   REHABILITATION POTENTIAL FOR STATED GOALS: no goals identified   PLAN OF CARE:   Patient will benefit from skilled intervention to address the following impairments. INTERVENTIONS PLANNED: (Benefits and precautions of therapy have been discussed with the patient.)  1. No treatment indicated   FREQUENCY/DURATION: no treatment indicated at this time. RECOMMENDED REHABILITATION/EQUIPMENT: (at time of discharge pending progress): Due to the probability of continued deficits (see above) this patient will not likely need continued skilled speech therapy after discharge. SUBJECTIVE:   Oriented x4.  present.  RN inserting IV during start of session. History of Present Injury/Illness: Ms. Magalys Cowan  has a past medical history of Abnormal cardiovascular function study (1/7/2016), Allergic rhinitis (1/14/2013), Anemia (11/13/2015), Arthritis, Atrial fibrillation ([facility] [address] (1/14/2013), Blind left eye, CAD (coronary artery disease), native coronary artery (May 2014), Depression (1/14/2013), GERD (gastroesophageal reflux disease), Hypercholesteremia (1/14/2013), Hypertension, Hypothyroidism (1/14/2013), Osteoarthritis of back (1/2/2014), Osteoporosis, Pulmonary embolism ([facility] [address] (2009), and Sick sinus syndrome ([facility] [address] (1/7/2016). .  She also  has a past surgical history that includes hx appendectomy (1965); hx hysterectomy (1970); hx tonsil and adenoidectomy (0353); hx heent; hx orthopaedic (2006 and 2008); hx cataract removal (Right, 2010); hx coronary stent placement (05/2014); hx heart catheterization (09/17/2015); hx knee replacement (2009); hx knee replacement (Left, 01/12/2017); hx breast biopsy (Bilateral); and colonoscopy (N/A, 7/16/2018). Present Symptoms:    Pain Intensity 1: 0  Pain Location 1: Head  Pain Orientation 1: Anterior  Pain Intervention(s) 1: Relaxation technique, Repositioned, MD notified (comment)  Current Medications:   No current facility-administered medications on file prior to encounter. Current Outpatient Medications on File Prior to Encounter   Medication Sig Dispense Refill    atorvastatin (LIPITOR) 20 mg tablet Take 20 mg by mouth every evening.  levothyroxine (SYNTHROID) 50 mcg tablet Take 1 Tab by mouth Daily (before breakfast). 30 Tab 0    metoprolol succinate (TOPROL-XL) 25 mg XL tablet Take 0.5 Tabs by mouth daily. 45 Tab 3    nitrofurantoin (MACRODANTIN) 50 mg capsule Take 1 Cap by mouth daily. 90 Cap 3    clopidogrel (PLAVIX) 75 mg tab Take 75 mg by mouth daily.  potassium 99 mg tablet Take 99 mg by mouth daily.  vit C/vit E/lutein/min/omega-3 (OCUVITE PO) Take 1 Cap by mouth daily.       escitalopram oxalate (LEXAPRO) 10 mg tablet Take 1 Tab by mouth daily. (Patient taking differently: Take 10 mg by mouth nightly.) 90 Tab 3    acetaminophen (TYLENOL ARTHRITIS PAIN) 650 mg CR tablet Take 650 mg by mouth every six (6) hours as needed for Pain.  VIT A/VIT C/VIT E/ZINC/COPPER (ICAPS AREDS PO) Take  by mouth. Twice a day      vitamin E (AQUA GEMS) 400 unit capsule Take  by mouth daily.  Calcium Carbonate-Vit D3-Min (CALTRATE 600+D PLUS MINERALS) 600 mg calcium- 400 unit Tab Take  by mouth daily.  Cholecalciferol, Vitamin D3, (VITAMIN D3) 1,000 unit cap Take  by mouth daily.  ferrous sulfate 324 mg (65 mg iron) tablet Take 65 mg by mouth Daily (before breakfast).  zolpidem (AMBIEN) 10 mg tablet Take 1 Tab by mouth nightly as needed for Sleep. Max Daily Amount: 10 mg. 30 Tab 2    atorvastatin (LIPITOR) 40 mg tablet TAKE 1 TABLET EVERY DAY (Patient taking differently: nightly. TAKE 1 TABLET EVERY DAY) 90 Tab 3    nitroglycerin (NITROSTAT) 0.3 mg SL tablet 1 Tab by SubLINGual route every five (5) minutes as needed for Chest Pain. 1 Bottle 3    cyanocobalamin (VITAMIN B-12) 100 mcg tablet Take 5,000 mcg by mouth daily.        Current Dietary Status:  Regular/Thin        Social History/Home Situation:    Home Environment: Independent living  # Steps to Enter: 1  One/Two Story Residence: One story  Living Alone: No  Support Systems: Child(celena), Spouse/Significant Other/Partner  Patient Expects to be Discharged to[de-identified] Private residence  Current DME Used/Available at Home: [place] [employer], straight, Walker, rolling, Grab bars  Tub or Shower Type: Tub/Shower combination  Work/Activity History:   OBJECTIVE:   Oral Motor Structure/Speech:  Oral-Motor Structure/Motor Speech  Dentition: Intact  Oral Hygiene: adequate  Lingual: No impairment    SPEECH-LANGUAGE COGNITIVE EVALUATION  Tests Given:Aphasia Evaluation    Mental Status:  Neurologic State: Alert  Orientation Level: Oriented X4  Cognition: Follows commands  Perception: Appears intact  Perseveration: No perseveration noted  Safety/Judgement: Fall prevention    Motor Speech: Auditory Comprehension:   Auditory Comprehension  Auditory Impairment: No     Reading Comprehension:        Verbal Expression:   Verbal Expression  Primary Mode of Expression: Verbal  Initiation: No impairment  Automatic Speech Task: No impairment  Repetition: No impairment  Naming: No impairment    Written Expression:        Neuro-Linguistics:                         Pragmatics:          Assessment/Reassessment only, no treatment provided today    Tool Used: Functional [place] Measure (FIMTM)   Score Comments   Eating       Comprehension       Expression   Primary Mode of Expression: Verbal   Social Interaction       Problem Solving       Memory          Score:  Initial: 6 Most Recent: X (Date: -- )   Interpretation of Tool: Provides a uniform system of measurement for disability based on the International Classification of Impairment, Disabilities and Handicaps; measures the level of a patient's disability and indicates how much assistance is required for the individual to carry out activities of daily living. Score 7 6 5 4 3 2 1   Modifier CH CI CJ CK CL CM CN     [place] / [place] / Product Type: Medicare /   __________________________________________________________________________________________________  Safety:   After treatment position/precautions:  · Call light within reach  · RN notified  · Family at bedside  · Upright in Bed  Treatment Assessment:  Participated in language evaluation without complications. Progression/Medical Necessity:   · no speech/language treatment indicated at this time.  please re-consult if new concerns arise     Total Treatment Duration:  Time In: 1406  Time Out: [address] [facility] LAUREEN CHACON, LETI-SLP

## 2019-03-15 NOTE — PROGRESS NOTES
[facility] CRITICAL CARE OUTREACH NURSE PROGRESS REPORT      SUBJECTIVE: Called to assess patient secondary to transfer from ICU. MEWS Score: 1 (03/15/19 0800)  Vitals:    03/14/19 2000 03/15/19 0000 03/15/19 0400 03/15/19 0800   BP: 164/63 182/66 156/58 156/72   Pulse: (!) 59 67 65 75   Resp: 22 22 22 20   Temp: 98.4 °F (36.9 °C) 98.1 °F (36.7 °C) 98.7 °F (37.1 °C) 98.2 °F (36.8 °C)   SpO2: 96% 94% 95% 93%   Weight:       Height:            LAB DATA:    Recent Labs     03/15/19  0337 03/14/19  0327 03/13/19  1253    141 141   K 3.9 3.6 3.6   * 111* 108*   CO2 21 21 20*   AGAP 9 9 13   * 110* 137*   BUN 18 19 18   CREA 0.68 0.83 1.00   GFRAA >60 >60 >60   GFRNA >60 >60 55*   CA 8.3 8.4 9.3   ALB  --   --  3.6   TP  --   --  7.6   GLOB  --   --  4.0*   AGRAT  --   --  0.9*   ALT  --   --  28        Recent Labs     03/15/19  0337 03/14/19  0327 03/13/19  1253   WBC 6.8 6.7 6.2   HGB 11.6* 11.8 12.7   HCT 36.0 36.2 38.0    191 189          OBJECTIVE: On arrival to room, I found patient to be resting in bed with family at bedside. Pain Assessment  Pain Intensity 1: 0 (03/15/19 0940)  Pain Location 1: Head  Pain Intervention(s) 1: Relaxation technique, Repositioned, MD notified (comment)  Patient Stated Pain Goal: 0      ASSESSMENT:  Patient A&Ox4, follows commands,  equal. O2 sat 94% on room air, HR 64. NAD. Has been up walking around the floor. PLAN:  Will continue to follow per outreach protocol.

## 2019-03-15 NOTE — PROGRESS NOTES
Hospitalist Progress Note    3/15/2019  Admit Date: 3/13/2019 12:49 PM   NAME: Margie Keller   :  1929   DOS:              03/15/19  MRN:  [id number]   Attending: Geovany Dobson MD  PCP:  Omar Ortiz MD  Treatment Team: Attending Provider: Vicki Fox MD; Care Manager: Kenney Gleason, RN; Consulting Provider: Roxana Lazaro MD; Utilization Review: Santa Alcantar RN; Physical Therapist: Ximena Chapman DPT; Occupational Therapist: Lucinda Cannon, OTR/L; Speech Language Pathologist: Tru Posada    Full Code     SUBJECTIVE:   As previously documented:88 YO female patient with a PMH of Afib NOT on anticoagulation, hx of GI bleeding who came yesterday to the ER with a CC of slurred speech, aphasia and R sided weakness. She was seen by neurology and received IV TPA. She was transferred to the ICU for proper monitoring. Her symptoms resolved. Neurology following case. Will repeat CT scan today, if stable, she will be transferred to the medical floor. 03/15/19    Margie Keller stated her speech is much better. Denies weakness. Daughter at bedside all questions answered. 10+ ROS reviewed and negative except for positive in HPI.    Allergies   Allergen Reactions    Adhesive Rash     Current Facility-Administered Medications   Medication Dose Route Frequency    nitrofurantoin (MACRODANTIN) capsule 50 mg  50 mg Oral DAILY    hydrALAZINE (APRESOLINE) 20 mg/mL injection 20 mg  20 mg IntraVENous Q6H PRN    [START ON 3/16/2019] lisinopril (PRINIVIL, ZESTRIL) tablet 20 mg  20 mg Oral DAILY    lisinopril (PRINIVIL, ZESTRIL) tablet 10 mg  10 mg Oral ONCE    amLODIPine (NORVASC) tablet 5 mg  5 mg Oral DAILY    aspirin delayed-release tablet 81 mg  81 mg Oral DAILY    atorvastatin (LIPITOR) tablet 40 mg  40 mg Oral QHS    escitalopram oxalate (LEXAPRO) tablet 10 mg  10 mg Oral QHS    levothyroxine (SYNTHROID) tablet 50 mcg  50 mcg Oral ACB    metoprolol succinate (TOPROL-XL) XL tablet 12.5 mg  12.5 mg Oral DAILY    sodium chloride (NS) flush 5-40 mL  5-40 mL IntraVENous Q8H    sodium chloride (NS) flush 5-40 mL  5-40 mL IntraVENous PRN    ondansetron (ZOFRAN) injection 4 mg  4 mg IntraVENous Q6H PRN    acetaminophen (TYLENOL) tablet 650 mg  650 mg Oral Q4H PRN    tuberculin injection 5 Units  5 Units IntraDERMal ONCE    acetaminophen (TYLENOL) suppository 650 mg  650 mg Rectal Q4H PRN         Immunization History   Administered Date(s) Administered    Influenza High Dose Vaccine PF 2015, 2016, 2017, 2018    Influenza Vaccine 10/17/2013, 10/27/2014    Pneumococcal Conjugate (PCV-13) 01/15/2016    Pneumococcal Polysaccharide (PPSV-23) 2018    TB Skin Test (PPD) Intradermal 2017    Tdap 2014     Objective:     Patient Vitals for the past 24 hrs:   Temp Pulse Resp BP SpO2   03/15/19 0800 98.2 °F (36.8 °C) 75 20 156/72 93 %   03/15/19 0400 98.7 °F (37.1 °C) 65 22 156/58 95 %   03/15/19 0000 98.1 °F (36.7 °C) 67 22 182/66 94 %   19 2000 98.4 °F (36.9 °C) (!) 59 22 164/63 96 %   19 1649 98.2 °F (36.8 °C) (!) 59 22 149/60 93 %   19 1526 98.4 °F (36.9 °C)       19 1509  87 (!) 37 145/67 95 %   19 1402  61 (!) 38 154/65 95 %   19 1332  (!) 59 20 135/71 95 %     Temp (24hrs), Av.3 °F (36.8 °C), Min:98.1 °F (36.7 °C), Max:98.7 °F (37.1 °C)    Oxygen Therapy  O2 Sat (%): 93 % (03/15/19 0800)  Pulse via Oximetry: 68 beats per minute (19 1509)  O2 Device: Nasal cannula (19 1058)  O2 Flow Rate (L/min): 2 l/min (19 1058)  FIO2 (%): 28 % (19 0754)  Oxygen Therapy  O2 Sat (%): 93 % (03/15/19 0800)  Pulse via Oximetry: 68 beats per minute (19 1509)  O2 Device: Nasal cannula (19 1058)  O2 Flow Rate (L/min): 2 l/min (19 1058)  FIO2 (%): 28 % (19 0754)    Physical Exam:  General:         Alert, cooperative, no distress   HEENT: NCAT. No obvious deformity. Lungs: No wheezing/rhonchi/rales  Cardiovascular:   RRR. No m/r/g. No pedal edema b/l. Abdomen:       S/nt/nd. Bowel sounds normal. .   Skin:         No rashes or lesions. Not Jaundiced  Neurologic:    AAOx3. CN II- XII grossly WNL. No gross focal deficit. Strength 5/5 upper and lower extremities  Psychiatric:         Good mood. Normal affect. DIAGNOSTIC STUDIES      Data Review:   Recent Results (from the past 24 hour(s))   CBC W/O DIFF    Collection Time: 03/15/19  3:37 AM   Result Value Ref Range    WBC 6.8 4.3 - 11.1 K/uL    RBC 4.03 (L) 4.05 - 5.2 M/uL    HGB 11.6 (L) 11.7 - 15.4 g/dL    HCT 36.0 35.8 - 46.3 %    MCV 89.3 79.6 - 97.8 FL    MCH 28.8 26.1 - 32.9 PG    MCHC 32.2 31.4 - 35.0 g/dL    RDW 14.5 11.9 - 14.6 %    PLATELET 809 704 - 640 K/uL    MPV 11.9 9.4 - 12.3 FL    ABSOLUTE NRBC 0.00 0.0 - 0.2 K/uL   METABOLIC PANEL, BASIC    Collection Time: 03/15/19  3:37 AM   Result Value Ref Range    Sodium 141 136 - 145 mmol/L    Potassium 3.9 3.5 - 5.1 mmol/L    Chloride 111 (H) 98 - 107 mmol/L    CO2 21 21 - 32 mmol/L    Anion gap 9 7 - 16 mmol/L    Glucose 120 (H) 65 - 100 mg/dL    BUN 18 8 - 23 MG/DL    Creatinine 0.68 0.6 - 1.0 MG/DL    GFR est AA >60 >60 ml/min/1.73m2    GFR est non-AA >60 >60 ml/min/1.73m2    Calcium 8.3 8.3 - 10.4 MG/DL       All Micro Results     None          Imaging /Procedures /Studies:    CXR Results  (Last 48 hours)    None        CT Results  (Last 48 hours)               03/14/19 1458  CT HEAD WO CONT Final result    Impression:  IMPRESSION:  Negative for acute intracranial abnormality. Chronic changes. Narrative:  CT HEAD WITHOUT CONTRAST. INDICATION: Acute CVA, status post TPA follow-up. COMPARISON: Yesterday's exam.         TECHNIQUE:   5 mm axial scans from the skull base to the vertex.   Our CT   scanners use one or more of the following:  Automated exposure control,   adjustment of the mA and or kV according to patient size, iterative   reconstruction. FINDINGS:  No acute intraparenchymal hemorrhage or abnormal extra-axial fluid   collection. The ventricles are normal size. Bilateral white matter low   attenuation is present, nonspecific, likely chronic small vessel disease. No   midline shift mass effect. Included portion of the paranasal sinuses and orbits   grossly unremarkable. Ct Head Wo Cont    Result Date: 3/14/2019  IMPRESSION:  Negative for acute intracranial abnormality. Chronic changes. Results for orders placed or performed during the hospital encounter of 19   2D ECHO COMPLETE ADULT (TTE) W OR WO CONTR    Narrative    Scottie Meyer [address] [facility], [address] [facility] [facility]  [phone]    Transthoracic Echocardiogram  2D, M-mode, Doppler, and Color Doppler    Patient: Shayy Callejas  MR #: [id number]  : 20-Sep-1929  Age: 80 years  Gender: Female  Study date: 14-Mar-2019  Account #: [de-identified]  Height: 63 in  Weight: 144.8 lb  BSA: 1.69 mï¾²  Status:Routine  Location: [facility]  BP: 158/ 67    Allergies: ADHESIVE    Sonographer:  Fabiano Saucedo RD  Group:  [id number] Card[facility]  Referring Physician:  Gypsy Walker MD  Reading Physician:  Melchor Riley. [address] Keller MD [facility]    INDICATIONS: TIA. PROCEDURE: This was a routine study. A transthoracic echocardiogram was  performed. The study included complete 2D imaging, M-mode, complete spectral  Doppler, and color Doppler. Intravenous contrast (agitated saline) was  administered. Image quality was adequate. LEFT VENTRICLE: Size was normal. Systolic function was normal. Ejection  fraction was estimated in the range of 55 % to 60 %. There were no regional  wall motion abnormalities. Wall thickness was normal. The E/e' ratio was   16.6. Diastolic function was indeterminate.     RIGHT VENTRICLE: The size was normal. Systolic function was normal. Estimated  peak pressure was in the range of 25-30 mmHg. LEFT ATRIUM: Size was normal.    ATRIAL SEPTUM: Contrast injection was performed. There was no right-to-left  shunt, with provocative maneuvers to increase right atrial pressure. RIGHT ATRIUM: Size was normal.    SYSTEMIC VEINS: IVC: The inferior vena cava was normal in size and course. AORTIC VALVE: The valve was probably trileaflet. Leaflets exhibited mild  sclerosis. There was no evidence for stenosis. There was mild to moderate  regurgitation. MITRAL VALVE: There was mild annular calcification. There was no evidence for  stenosis. There was trivial regurgitation. TRICUSPID VALVE: The valve structure was normal. There was no evidence for  stenosis. There was trivial regurgitation. PULMONIC VALVE: Not well visualized. There was no evidence for stenosis. There  was mild regurgitation. PERICARDIUM: There was no pericardial effusion. AORTA: The root exhibited normal size. SUMMARY:    -  Left ventricle: Systolic function was normal. Ejection fraction was  estimated in the range of 55 % to 60 %. There were no regional wall motion  abnormalities. Wall thickness was normal. The E/e' ratio was 16.6. Diastolic  function was indeterminate. -  Atrial septum: Contrast injection was performed. There was no   right-to-left  shunt, with provocative maneuvers to increase right atrial pressure. -  Aortic valve: There was mild to moderate regurgitation.    -  Mitral valve: There was mild annular calcification.     SYSTEM MEASUREMENT TABLES    2D mode  AoR Diam (2D): 2.7 cm  LA Dimension (2D): 4 cm  Left Atrium Systolic Volume Index; Method of Disks, Biplane; 2D mode;: 27.8  ml/m2  IVS/LVPW (2D): 1  IVSd (2D): 1 cm  LVIDd (2D): 5.3 cm  LVIDs (2D): 3.5 cm  LVOT Area (2D): 3.1 cm2  LVPWd (2D): 1 cm  RVIDd (2D): 3.5 cm    Tissue Doppler Imaging  LV Peak Early Cooper Tissue Abilio; Lateral MA (TDI): 5.7 cm/s  LV Peak Early Cooper Tissue Abilio; Medial MA (TDI): 5.3 cm/s    Unspecified Scan Mode  Peak Grad; Mean; Antegrade Flow: 10 mm[Hg]  Vmax; Antegrade Flow: 157 cm/s  LVOT Diam: 2 cm  MV Peak Abilio/LV Peak Tissue Abilio E-Wave; Lateral MA: 16  MV Peak Abilio/LV Peak Tissue Abilio E-Wave; Medial MA: 17.2  Peak Grad; Mean; Antegrade Flow: 4 mm[Hg]  Vmax; Antegrade Flow: 85.8 cm/s  Vmax; Mean; Regurgitant Flow: 257 cm/s    Prepared and signed by    Adalberto Knox. Marvel Berwer MD [facility]  Signed 14-Mar-2019 12:39:30         Labs and Studies from previous 24 hours have been personally reviewed by myself    ASSESSMENT      Active Hospital Problems    Diagnosis Date Noted    [facility] 03/13/2019    Received intravenous tissue plasminogen activator (tPA) in emergency department 03/13/2019    GERD (gastroesophageal reflux disease) 03/06/2014    Hypercholesteremia 01/14/2013    Hypothyroidism 01/14/2013    Paroxysmal atrial fibrillation ([facility] 01/14/2013     Paroxysmal        Hospital Problems as of 3/15/2019 Date Reviewed: 3/13/2019          Codes Class Noted - Resolved POA    * (Principal) CVA (cerebral vascular accident) ([facility] ICD-10-CM: I63.9  ICD-9-CM: 434.91  3/13/2019 - Present Yes        Received intravenous tissue plasminogen activator (tPA) in emergency department ICD-10-CM: Z92.82  ICD-9-CM: V45.88  3/13/2019 - Present Yes        GERD (gastroesophageal reflux disease) ICD-10-CM: K21.9  ICD-9-CM: 530.81  3/6/2014 - Present Yes        Hypothyroidism ICD-10-CM: E03.9  ICD-9-CM: 244.9  1/14/2013 - Present Yes        Hypercholesteremia ICD-10-CM: E78.00  ICD-9-CM: 272.0  1/14/2013 - Present Yes        Paroxysmal atrial fibrillation ([facility] ICD-10-CM: I48.0  ICD-9-CM: 427.31  1/14/2013 - Present Yes    Overview Signed 11/13/2015 11:17 AM by Margie Clay     Paroxysmal                    A/P:    -CVA  S/p TPA with resolution of symptoms  Neuro follow up appreciated. Patient needs follow up at Cardiology for atrial appendage closure  ASA will be started today.  Cont high intensity statins  Possible discharge tomorrow if BP is better controlled    -HTN  Uncontrolled  Start lisinopril and amlodipine    -Afib  Rate controlled  Start on xarelto today  Needs close monitoring of CBC at PCP office due to hx of GI bleed. DVT Prophylaxis: xarelto  CODE Status: Full  Plan of Care Discussed with: patient.  Care team.      Neri Fox MD  03/15/19

## 2019-03-15 NOTE — PROGRESS NOTES
Problem: Falls - Risk of  Goal: *Absence of Falls  Document Tim Fall Risk and appropriate interventions in the flowsheet. Outcome: Progressing Towards Goal  Fall Risk Interventions:  Mobility Interventions: Patient to call before getting OOB    Mentation Interventions: Bed/chair exit alarm    Medication Interventions: Patient to call before getting OOB, Teach patient to arise slowly    Elimination Interventions: Call light in reach, Patient to call for help with toileting needs      Patient remains free from falls at this time. Uses call light appropriately when needing assistance. Problem: TIA/CVA Stroke: Day 2 Until Discharge  Goal: Medications  Outcome: Progressing Towards Goal  Patient is taking medication per protocol or will be starting medication at discharge.

## 2019-03-15 NOTE — PROGRESS NOTES
rNeurology Daily Progress Note   Neurology Physician Attestation Statement    I have seen and examined the patient along with Marija Crouch NP. I agree with the documentation as recorded. In addition I would add:    I have seen the patient independently during which time I reviewed the history and performed a physical examination, reviewed the NP documentation and discussed with the NP . The interval history obtained is notable for: continues to do well. Without complaint    The physical examination performed is notable for: alert oriented ×3. Speech fluent without paraphasic errors. Comprehension good. Cranial nerves II-12 intact. No weakness. MRI of brain reviewed no ischemic change minimal chronic small vessel ischemia. No cerebral atrophy    The medical decision making including assessment and recommendations is notable for:  .aborted stroke status post alteplase with complete resolution of all symptoms and no infarction on MRI left middle cerebral artery, embolic, secondary to A. Fib. Prior history of upper GI bleed with warfarin. We will treat patient temporarily with Xarelto through patient assistance while she undergoes evaluation for left atrial appendage closure. Kenney Zhang MD        Assessment:     Resolution of stroke symptoms s/p alteplase         Plan:       · Start ASA 81 mg daily after 24 hours  · Continue statin   · Start Xarelto 20mg po daily for atrial fibrillation. [facility] [address] [facility] for atrial appendage closure as outpatient. Discussed with Dr. Lorenzo De La Cruz. · Neurochecks Q4H  · Telemetry PT/OT/ST  · BP management - after 24 hours, normotensive BP management <140/90  · Depression Screening prior to discharge      Subjective: Interval history:    Patient is doing well today. Language has returned to baseline. No focal neurologic deficits noted. Family at bedside and updated.      History:    Mamadou Vargas is a 80 y.o. female who is being seen for stroke symptoms. Review of systems negative with exception of pertinent positives and negatives noted above.        Objective:     Vitals:    03/15/19 0000 03/15/19 0400 03/15/19 0800 03/15/19 1200   BP: 182/66 156/58 156/72 137/68   Pulse: 67 65 75 61   Resp: 22 22 20 18   Temp: 98.1 °F (36.7 °C) 98.7 °F (37.1 °C) 98.2 °F (36.8 °C) 98.7 °F (37.1 °C)   SpO2: 94% 95% 93% 93%   Weight:       Height:              Current Facility-Administered Medications:     nitrofurantoin (MACRODANTIN) capsule 50 mg, 50 mg, Oral, DAILY, Kalen Corona MD, 50 mg at 03/15/19 0843    hydrALAZINE (APRESOLINE) 20 mg/mL injection 20 mg, 20 mg, IntraVENous, Q6H PRN, Jyoti Corona MD    [START ON 3/16/2019] lisinopril (PRINIVIL, ZESTRIL) tablet 20 mg, 20 mg, Oral, DAILY, Kalen Corona MD    lisinopril (PRINIVIL, ZESTRIL) tablet 10 mg, 10 mg, Oral, ONCE, Kalen Corona MD    amLODIPine (NORVASC) tablet 5 mg, 5 mg, Oral, DAILY, Kalen Corona MD    aspirin delayed-release tablet 81 mg, 81 mg, Oral, DAILY, Enzo Bustillos MD    atorvastatin (LIPITOR) tablet 40 mg, 40 mg, Oral, QHS, Kinza Dorsey MD, 40 mg at 03/14/19 2055    escitalopram oxalate (LEXAPRO) tablet 10 mg, 10 mg, Oral, QHS, Kinza Dorsey MD, 10 mg at 03/14/19 2055    levothyroxine (SYNTHROID) tablet 50 mcg, 50 mcg, Oral, ACB, Kinza Dorsey MD, 50 mcg at 03/15/19 0526    metoprolol succinate (TOPROL-XL) XL tablet 12.5 mg, 12.5 mg, Oral, DAILY, Kinza Dorsey MD, 12.5 mg at 03/15/19 0802    sodium chloride (NS) flush 5-40 mL, 5-40 mL, IntraVENous, Q8H, Kinza Dorsey MD, 5 mL at 03/15/19 0526    sodium chloride (NS) flush 5-40 mL, 5-40 mL, IntraVENous, PRN, Kinza Dorsey MD    ondansetron [facility]) injection 4 mg, 4 mg, IntraVENous, Q6H PRN, Kinza Dorsey MD    acetaminophen (TYLENOL) tablet 650 mg, 650 mg, Oral, Q4H PRN, Kinza Dorsey MD    tuberculin injection 5 Units, 5 Units, IntraDERMal, ONCE, Nelida Lo MD, Stopped at 03/13/19 1700    acetaminophen (TYLENOL) suppository 650 mg, 650 mg, Rectal, Q4H PRN, Nelida Lo MD, 650 mg at 03/13/19 2155    Recent Results (from the past 12 hour(s))   CBC W/O DIFF    Collection Time: 03/15/19  3:37 AM   Result Value Ref Range    WBC 6.8 4.3 - 11.1 K/uL    RBC 4.03 (L) 4.05 - 5.2 M/uL    HGB 11.6 (L) 11.7 - 15.4 g/dL    HCT 36.0 35.8 - 46.3 %    MCV 89.3 79.6 - 97.8 FL    MCH 28.8 26.1 - 32.9 PG    MCHC 32.2 31.4 - 35.0 g/dL    RDW 14.5 11.9 - 14.6 %    PLATELET 301 135 - 503 K/uL    MPV 11.9 9.4 - 12.3 FL    ABSOLUTE NRBC 0.00 0.0 - 0.2 K/uL   METABOLIC PANEL, BASIC    Collection Time: 03/15/19  3:37 AM   Result Value Ref Range    Sodium 141 136 - 145 mmol/L    Potassium 3.9 3.5 - 5.1 mmol/L    Chloride 111 (H) 98 - 107 mmol/L    CO2 21 21 - 32 mmol/L    Anion gap 9 7 - 16 mmol/L    Glucose 120 (H) 65 - 100 mg/dL    BUN 18 8 - 23 MG/DL    Creatinine 0.68 0.6 - 1.0 MG/DL    GFR est AA >60 >60 ml/min/1.73m2    GFR est non-AA >60 >60 ml/min/1.73m2    Calcium 8.3 8.3 - 10.4 MG/DL       CT Results (most recent):  Results from Hospital Encounter encounter on 03/13/19   CT HEAD WO CONT    Narrative CT HEAD WITHOUT CONTRAST. INDICATION: Acute CVA, status post TPA follow-up. COMPARISON: Yesterday's exam.      TECHNIQUE:   5 mm axial scans from the skull base to the vertex. Our CT  scanners use one or more of the following:  Automated exposure control,  adjustment of the mA and or kV according to patient size, iterative  reconstruction. FINDINGS:  No acute intraparenchymal hemorrhage or abnormal extra-axial fluid  collection. The ventricles are normal size. Bilateral white matter low  attenuation is present, nonspecific, likely chronic small vessel disease. No  midline shift mass effect. Included portion of the paranasal sinuses and orbits  grossly unremarkable.       Impression IMPRESSION:  Negative for acute intracranial abnormality. Chronic changes. MRI Results (most recent):  Results from [place] encounter on 03/13/19   MRI BRAIN WO CONT    Narrative EXAMINATION: BRAIN MRI 3/13/2019 9:23 PM    ACCESSION NUMBER: [id number]    INDICATION: Stroke; stroke, aphasia    COMPARISON: Head CT 3/13/2019, CT perfusion 3/13/2019, CTA head and neck  3/13/2019, brain MRI 12/19/2018    TECHNIQUE: Multiplanar multisequence MRI of the brain without the administration  of intravenous contrast.    FINDINGS:    The ventricles are stable in caliber in comparison to the prior exam and are  appropriate for the mild degree of symmetric global brain parenchymal volume  loss. There is no midline shift. The basilar cisterns are patent. There is no  cerebellar tonsillar ectopia or herniation. Scattered T2 hyperintensities in the periventricular and subcortical white  matter are stable in comparison to the prior exam. This is a nonspecific finding  which most likely represents a mild to moderate burden of chronic  microangiopathy. Unchanged right maxillary sinus mucosal thickening. Prior bilateral lens replacement. There is T1 hyperintensity within the left  globe. This may represent intraocular silicone, and correlation with  ophthalmologic is history recommended. Left-sided scleral band. Diffusion imaging shows no evidence of acute infarction or other acute  abnormality. The expected large intracranial vascular flow voids are preserved. There is no  evidence of intracranial blood products. There are no suspicious osseous lesions. Impression  IMPRESSION:    1. No evidence of acute infarction. 2. 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 VOICE DICTATED BY: Dr. Geeta Yeh            Physical Exam:  General - Well developed, well nourished, in NAD. Pleasant and conversent. HEENT - Normocephalic, atraumatic. Conjunctiva are clear.    Neck - Supple without masses Extremities - Peripheral pulses intact. No edema and no rashes. Neurological examination - Alert and oriented x4. Comprehension, attention, memory and reasoning are intact. Language and speech are normal.   On cranial nerve examination,   (II, III, IV, VI) pupils are equal, round, and reactive to light. Visual acuity is adequate. Visual fields are full to finger confrontation. Extraocular motility is normal.  (V, VII) Face is symmetric and sensation is intact to light touch. (VIII) Hearing is intact. (IX, X) Palate and uvula elevate symmetrically. Voice is normal.   (XI) Shoulder shrug is strong and equal bilaterally.   (XII)Tongue is midline. Motor examination - There is normal muscle tone and bulk. Power is full throughout. Muscle stretch reflexes are normoactive and there are no pathological reflexes present. Plantar response is flexor. Sensation is intact to light touch, pinprick, vibration and proprioception in all extremities.  Cerebellar examination is normal.     Signed By: Ian Nelson NP     March 15, 2019

## 2019-03-15 NOTE — PROGRESS NOTES
Problem: Self Care Deficits Care Plan (Adult)  Goal: *Acute Goals and Plan of Care (Insert Text)  1. Patient will complete grooming in standing at sink independently. 2. Patient will complete total body dressing and bathing with modified independence and adaptive equipment as needed. 3. Patient will participate in [address] [place] [address] therapeutic activities to increase coordination in RUE to [facility] for bimanual fine motor ADLs. 4. Patient will complete toileting and toilet transfer with modified independence. 5. Patient will complete functional mobility of household distances with modified independence and adaptive equipment as needed. 6. Patient will complete functional transfers with modified independence and adaptive equipment as needed. Timeframe: 7 visits        OCCUPATIONAL THERAPY: Initial Assessment and Daily Note 3/15/2019  INPATIENT: OT Visit Days: 1  [place] / [place] / Product Type: Medicare /      NAME/AGE/GENDER: Dexter Cardoso is a 80 y.o. female   PRIMARY DIAGNOSIS:  CVA (cerebral vascular accident) ([facility] [I63.9]  Received intravenous tissue plasminogen activator (tPA) in emergency department [Z92.82] CVA (cerebral vascular accident) ([facility] [address] CVA (cerebral vascular accident) ([facility] [address]       ICD-10: Treatment Diagnosis:    · Other lack of cordination (R27.8)   Precautions/Allergies:    Adhesive      ASSESSMENT:     Ms. Watson Pineda is an 80year old female admitted with difficulty speaking, confusion, and R sided weakness. She received TPA and symptoms have improved. At baseline patient lives with spouse in independent living at [facility]. She is typically independent with ADLs and most IADLs. She drives short distances. She gets assistance with housework. She was ambulatory without assistive device. She enjoys using her computer and editing the [employer] newspaper. She is R hand dominant. Patient seated in chair after just finishing with PT. Agreeable to OT evaluation. Reports no pain. Alert and oriented. BUE assessment reveals ROM is WFL, strength is WFL and equal bilaterally, coordination decreased in RUE compared to LUE, light touch sensation intact. Balance is intact in sitting, impaired in standing (good/ fair). Treatment initiated to include toileting with SBA/ supervision, grooming in standing at sink with SBA, and functional mobility of household distances with CGA HHA due to slight balance deficit. Patient fatigued after activity. She is currently functioning below her baseline and would benefit from continued occupational therapy to increase independence and safety. Will follow. This section established at most recent assessment   PROBLEM LIST (Impairments causing functional limitations):  1. Decreased ADL/Functional Activities  2. Decreased Transfer Abilities  3. Decreased Ambulation Ability/Technique  4. Decreased Balance  5. Decreased Activity Tolerance  6. decreased coordination   INTERVENTIONS PLANNED: (Benefits and precautions of occupational therapy have been discussed with the patient.)  1. Activities of daily living training  2. Adaptive equipment training  3. Therapeutic activity  4. Therapeutic exercise     TREATMENT PLAN: Frequency/Duration: Follow patient 2x/week to address above goals. Rehabilitation Potential For Stated Goals: Good     RECOMMENDED REHABILITATION/EQUIPMENT: (at time of discharge pending progress): Due to the probability of continued deficits (see above) this patient will likely need continued skilled occupational therapy after discharge. Equipment:    None at this time              OCCUPATIONAL PROFILE AND HISTORY:   History of Present Injury/Illness (Reason for Referral):  Per H&P, \"Mrs. Letitia Adorno is a 81 y/o WF with a h/o AFib not on [address] [place] [address], hypothyroidism, GERD who presents today as Code S. Family is present and provide the history as the patient has persistent aphasia.  She was in the kitchen making lunch when the  noted she had difficulty speaking. He sat her down and wanted to call an ambulance but patient declined, however patient's aphasia persisted and she had right sided weakness at which point he called 911. Code S was called on arrival and neurology evaluated the patient at the bedside. Initial NIH was 9 but patient is also blind in the left eye. Head CT/perfusion was normal and CTA head/neck showed no evidence of LVO. She was given tPA which finished around 1430. She is on Cardene for BP control. She continues to have some difficulty expressing her thoughts but is able to say short sentences like, \"I'm OK\". She follows all commands and moves all extremities spontaneously. She follows with cardiology and was last seen in November at which point she was hesitant to restart [facility] so she was being considered for a Watchman. Hospitalist consulted for admission. \"      Past Medical History/Comorbidities:   Ms. [facility]  has a past medical history of Abnormal cardiovascular function study (1/7/2016), Allergic rhinitis (1/14/2013), Anemia (11/13/2015), Arthritis, Atrial fibrillation ([address] (1/14/2013), Blind left eye, CAD (coronary artery disease), native coronary artery (May 2014), Depression (1/14/2013), GERD (gastroesophageal reflux disease), Hypercholesteremia (1/14/2013), Hypertension, Hypothyroidism (1/14/2013), Osteoarthritis of back (1/2/2014), Osteoporosis, Pulmonary embolism ([address] (2009), and Sick sinus syndrome ([address] (1/7/2016). Ms. [facility]  has a past surgical history that includes hx appendectomy (1965); hx hysterectomy (1970); hx tonsil and adenoidectomy (1934); hx heent; hx orthopaedic (2006 and 2008); hx cataract removal (Right, 2010); hx coronary stent placement (05/2014); hx heart catheterization (09/17/2015); hx knee replacement (2009); hx knee replacement (Left, 01/12/2017); hx breast biopsy (Bilateral); and colonoscopy (N/A, 7/16/2018).   Social History/Living Environment:   Home Environment: Independent living  # Steps to Enter: 1  One/Two Story Residence: One story  Living Alone: No  Support Systems: Child(celena), Spouse/Significant Other/Partner  Patient Expects to be Discharged to[de-identified] Private residence  Current DME Used/Available at Home: Cane, straight, Grab bars, Walker, rolling  Tub or Shower Type: Tub/Shower combination  Prior Level of Function/Work/Activity:  At baseline patient lives with spouse in independent living at [facility]. She is typically independent with ADLs and most IADLs. She drives short distances. She gets assistance with housework. She was ambulatory without assistive device. She enjoys using her computer and editing the [employer]. She is R hand dominant. Number of Personal Factors/Comorbidities that affect the Plan of Care: Brief history (0):  LOW COMPLEXITY   ASSESSMENT OF OCCUPATIONAL PERFORMANCE[de-identified]   Activities of Daily Living:   Basic ADLs (From Assessment) Complex ADLs (From Assessment)   Feeding: Setup  Oral Facial Hygiene/Grooming: Setup  Bathing: Minimum assistance  Upper Body Dressing: Setup  Lower Body Dressing: Minimum assistance  Toileting: Supervision Instrumental ADL  Meal Preparation: Minimum assistance  Homemaking: Minimum assistance  Medication Management: Independent  Financial Management: Independent   Grooming/Bathing/Dressing Activities of Daily Living   Grooming  Grooming Assistance: Stand-by assistance(in standing at sink)  Washing Hands: Stand-by assistance Cognitive Retraining  Safety/Judgement: Awareness of environment; Fall prevention           Toileting  Toileting Assistance: Supervision  Bladder Hygiene: Supervision/set-up  Bowel Hygiene: Supervision/set-up  Clothing Management: Stand-by assistance     Functional Transfers  Bathroom Mobility: Stand-by assistance  Toilet Transfer : Stand-by assistance     Bed/Mat Mobility  Sit to Stand: Stand-by assistance  Stand to Sit: Stand-by assistance  Bed to Chair: Contact guard assistance       Most Recent Physical Functioning:   Gross Assessment:  AROM: Within functional limits(BUE)  Strength: Within functional limits(BUE)  Coordination: Generally decreased, functional(RUE)  Sensation: Intact(BUE)               Posture:     Balance:  Sitting: Intact  Standing: Impaired  Standing - Static: Good  Standing - Dynamic : Fair Bed Mobility:     Wheelchair Mobility:     Transfers:  Sit to Stand: Stand-by assistance  Stand to Sit: Stand-by assistance  Bed to Chair: Contact guard assistance            Patient Vitals for the past 6 hrs:   BP BP Patient Position SpO2 Pulse   03/15/19 0800 156/72 At rest 93 % 75       Mental Status  Neurologic State: Alert  Orientation Level: Oriented X4  Cognition: Follows commands  Perception: Appears intact  Perseveration: No perseveration noted  Safety/Judgement: Awareness of environment, Fall prevention                          Physical Skills Involved:  1. Balance  2. Activity Tolerance  3. Fine Motor Control Cognitive Skills Affected (resulting in the inability to perform in a timely and safe manner): 1. none Psychosocial Skills Affected:  1. Habits/Routines  2. Environmental Adaptation  3. Self-Awareness  4. Social Roles   Number of elements that affect the Plan of Care: 5+:  HIGH COMPLEXITY   CLINICAL DECISION MAKING:   [employer] AM-PAC 6 Clicks   Daily Activity Inpatient Short Form  How much help from another person does the patient currently need. .. Total A Lot A Little None   1. Putting on and taking off regular lower body clothing? [] 1   [] 2   [x] 3   [] 4   2. Bathing (including washing, rinsing, drying)? [] 1   [] 2   [x] 3   [] 4   3. Toileting, which includes using toilet, bedpan or urinal?   [] 1   [] 2   [x] 3   [] 4   4. Putting on and taking off regular upper body clothing? [] 1   [] 2   [] 3   [x] 4   5. Taking care of personal grooming such as brushing teeth? [] 1   [] 2   [] 3   [x] 4   6. Eating meals?    [] 1   [] 2   [] 3   [x] 4   © 2007, Trustees of [facility] [employer], under license to [employer]. All rights reserved      Score:  Initial: 21 Most Recent: X (Date: -- )    Interpretation of Tool:  Represents activities that are increasingly more difficult (i.e. Bed mobility, Transfers, Gait). Medical Necessity:     · Patient demonstrates good rehab potential due to higher previous functional level. Reason for Services/Other Comments:  · Patient continues to require present interventions due to patient's inability to care for self at baseline. Use of outcome tool(s) and clinical judgement create a POC that gives a: LOW COMPLEXITY         TREATMENT:   (In addition to Assessment/Re-Assessment sessions the following treatments were rendered)     Pre-treatment Symptoms/Complaints:    Pain: Initial:   Pain Intensity 1: 0  Post Session:  none     Self Care: (8 minutes): Procedure(s) (per grid) utilized to improve and/or restore self-care/home management as related to toileting, grooming and toilet transfer and functional mobility of household distances. Required minimal verbal cueing to facilitate activities of daily living skills. Braces/Orthotics/Lines/Etc:   · O2 Device: Nasal cannula  Treatment/Session Assessment:    · Response to Treatment:  No issues  · Interdisciplinary Collaboration:   o Occupational Therapist  o Registered Nurse  · After treatment position/precautions:   o Up in chair  o Bed alarm/tab alert on  o Bed/Chair-wheels locked  o Call light within reach  o RN notified  o Nurse at bedside   · Compliance with Program/Exercises: Compliant all of the time, Will assess as treatment progresses. · Recommendations/Intent for next treatment session: \"Next visit will focus on advancements to more challenging activities and reduction in assistance provided\".   Total Treatment Duration:  OT Patient Time In/Time Out  Time In: 0940  Time Out: [address]/L

## 2019-03-15 NOTE — PROGRESS NOTES
Date of Outreach Update:  Rory Belcher was seen and assessed. MEWS Score: 2 (03/15/19 0000)  Vitals:    03/14/19 1526 03/14/19 1649 03/14/19 2000 03/15/19 0000   BP:  149/60 164/63 182/66   Pulse:  (!) 59 (!) 59 67   Resp:  22 22 22   Temp: 98.4 °F (36.9 °C) 98.2 °F (36.8 °C) 98.4 °F (36.9 °C) 98.1 °F (36.7 °C)   SpO2:  93% 96% 94%   Weight:       Height:             Pain Assessment  Pain Intensity 1: 0 (03/14/19 1526)  Pain Location 1: Head  Pain Intervention(s) 1: Relaxation technique, Repositioned, MD notified (comment)  Patient Stated Pain Goal: 0      Previous Outreach assessment has been reviewed. There have been no significant clinical changes since the completion of the last dated Outreach assessment. Will continue to follow up per outreach protocol.     Signed By:   Celestino Sage RN    March 15, 2019 4:22 AM

## 2019-03-15 NOTE — PROGRESS NOTES
Problem: Mobility Impaired (Adult and Pediatric)  Goal: *Acute Goals and Plan of Care (Insert Text)  Discharge Goals:  (1.)Ms. Rukhsana Zafar will move from supine to sit and sit to supine , scoot up and down and roll side to side with MODIFIED INDEPENDENCE within 5 treatment day(s). (2.)Ms. Rukhsana Zafar will transfer from bed to chair and chair to bed with MODIFIED INDEPENDENCE using the least restrictive device within 5 treatment day(s). (3.)Ms. Rukhsana Zafar will ambulate with MODIFIED INDEPENDENCE for 500 feet with the least restrictive device within 5 treatment day(s). (4.)Ms. Luke Perdue will perform standing static and dynamic balance activities x 15 minutes with MODIFIED INDEPENDENCE to improve safety within 5 day(s). PHYSICAL THERAPY: Initial Assessment, Daily Note and AM 3/15/2019  INPATIENT: PT Visit Days : 1  [place] / [place] B / Product Type: Medicare /       NAME/AGE/GENDER: Shannon Calhoun is a 80 y.o. female   PRIMARY DIAGNOSIS: CVA (cerebral vascular accident) ([facility] [address] [I63.9]  Received intravenous tissue plasminogen activator (tPA) in emergency department [Z92.82] CVA (cerebral vascular accident) ([facility] [address] CVA (cerebral vascular accident) ([facility] [address]       ICD-10: Treatment Diagnosis:    · Difficulty in walking, Not elsewhere classified (R26.2)   Precaution/Allergies:  Adhesive      ASSESSMENT:     Ms. Rukhsana Zafar is a 80 y.o. female admitted for CVA and received tPA. She lives with spouse in [place] [address] at [facility]. She is typically independent with ambulation, ADLs, can drive short distances (legally blind in L eye). She has experienced 2 falls in the past 6 months tripping over a box and then falling out of the bathtub. Reports a history of orthostatic hypotension, but reports she was just instructed to stand for a little bit until the lightheadedness resolves. She is supine and agreeable to therapy.  Independent with bed mobility, exhibits 4/5 equal strength and intact sensation in BLE. She stood with stand by assistance and ambulated within room with contact guard assist. Noted increased trunk sway and path deviations. Treatment initiated to include ambulation x500' with stand by assist and verbal cues for ambulation in hallway. She returned to room and was educated on signs/symptoms of CVA. Left with OT performing evaluation. Perla De La Cruz is currently functioning below her baseline and would benefit from skilled PT during acute care stay to maximize safety and independence with functional mobility. This section established at most recent assessment   PROBLEM LIST (Impairments causing functional limitations):  1. Decreased Strength  2. Decreased ADL/Functional Activities  3. Decreased Transfer Abilities  4. Decreased Ambulation Ability/Technique  5. Decreased Balance  6. Increased Shortness of Breath  7. Decreased Knowledge of Precautions   INTERVENTIONS PLANNED: (Benefits and precautions of physical therapy have been discussed with the patient.)  1. Balance Exercise  2. Bed Mobility  3. Family Education  4. Gait Training  5. Group Therapy  6. Home Exercise Program (HEP)  7. Therapeutic Activites  8. Therapeutic Exercise/Strengthening  9. Transfer Training     TREATMENT PLAN: Frequency/Duration: 2 times a week for duration of hospital stay  Rehabilitation Potential For Stated Goals: Excellent     RECOMMENDED REHABILITATION/EQUIPMENT: (at time of discharge pending progress): Due to the probability of continued deficits (see above) this patient will not likely need continued skilled physical therapy after discharge. Equipment:    None at this time              HISTORY:   History of Present Injury/Illness (Reason for Referral):  Mrs. Letitia Adorno is a 81 y/o WF with a h/o AFib not on [address] [place] [address], hypothyroidism, GERD who presents today as Code S. Family is present and provide the history as the patient has persistent aphasia.  She was in the kitchen making lunch when the  noted she had difficulty speaking. He sat her down and wanted to call an ambulance but patient declined, however patient's aphasia persisted and she had right sided weakness at which point he called 911. Code S was called on arrival and neurology evaluated the patient at the bedside. Initial NIH was 9 but patient is also blind in the left eye. Head CT/perfusion was normal and CTA head/neck showed no evidence of LVO. She was given tPA which finished around 1430. She is on Cardene for BP control. She continues to have some difficulty expressing her thoughts but is able to say short sentences like, \"I'm OK\". She follows all commands and moves all extremities spontaneously. She follows with cardiology and was last seen in November at which point she was hesitant to restart [address] so she was being considered for a Watchman. Hospitalist consulted for admission. Past Medical History/Comorbidities:   Ms. Ruffin Pallas  has a past medical history of Abnormal cardiovascular function study (1/7/2016), Allergic rhinitis (1/14/2013), Anemia (11/13/2015), Arthritis, Atrial fibrillation ([address] (1/14/2013), Blind left eye, CAD (coronary artery disease), native coronary artery (May 2014), Depression (1/14/2013), GERD (gastroesophageal reflux disease), Hypercholesteremia (1/14/2013), Hypertension, Hypothyroidism (1/14/2013), Osteoarthritis of back (1/2/2014), Osteoporosis, Pulmonary embolism ([address] (2009), and Sick sinus syndrome ([address] (1/7/2016). Ms. Ruffin Pallas  has a past surgical history that includes hx appendectomy (1965); hx hysterectomy (1970); hx tonsil and adenoidectomy (1934); hx heent; hx orthopaedic (2006 and 2008); hx cataract removal (Right, 2010); hx coronary stent placement (05/2014); hx heart catheterization (09/17/2015); hx knee replacement (2009); hx knee replacement (Left, 01/12/2017); hx breast biopsy (Bilateral); and colonoscopy (N/A, 7/16/2018).   Social History/Living Environment:   Home Environment: Independent living  # Steps to Enter: 1  One/Two Story Residence: One story  Living Alone: No  Support Systems: Child(celena), Spouse/Significant Other/Partner  Patient Expects to be Discharged to[de-identified] Private residence  Current DME Used/Available at Home: [address], Ne, straight, Walker, rolling, Grab bars  Tub or Shower Type: Tub/Shower combination  Prior Level of Function/Work/Activity:  She lives with spouse in [place] [address] at [facility]. She is typically independent with ambulation, ADLs, can drive short distances (legally blind in L eye). She has experienced 2 falls in the past 6 months tripping over a box and then falling out of the bathtub. Reports a history of orthostatic hypotension, but reports she was just instructed to stand for a little bit until the lightheadedness resolves. Number of Personal Factors/Comorbidities that affect the Plan of Care: 3+: HIGH COMPLEXITY   EXAMINATION:   Most Recent Physical Functioning:   Gross Assessment:  AROM: Within functional limits  Strength: Within functional limits  Coordination: Within functional limits  Tone: Normal  Sensation: Intact               Posture:  Posture (WDL): Exceptions to WDL  Posture Assessment: Forward head  Balance:  Sitting: Intact  Standing: Impaired  Standing - Static: Good  Standing - Dynamic : Fair Bed Mobility:  Supine to Sit: Independent  Scooting: Independent  Wheelchair Mobility:     Transfers:  Sit to Stand: Stand-by assistance  Stand to Sit: Stand-by assistance  Bed to Chair: Contact guard assistance  Gait:     Base of Support: Center of gravity altered;Narrowed  Speed/Irena: Slow  Step Length: Right shortened;Left shortened  Gait Abnormalities: Decreased step clearance; Path deviations;Trunk sway increased  Distance (ft): 500 Feet (ft)  Ambulation - Level of Assistance: Stand-by assistance;Contact guard assistance  Interventions: Safety awareness training;Verbal cues      Body Structures Involved:  1. Nerves  2. Voice/Speech  3.  Muscles Body Functions Affected:  1. Voice and Speech  2. Neuromusculoskeletal  3. Movement Related Activities and Participation Affected:  1. Community, Social and [place]   Number of elements that affect the Plan of Care: 3: MODERATE COMPLEXITY   CLINICAL PRESENTATION:   Presentation: Stable and uncomplicated: LOW COMPLEXITY   CLINICAL DECISION MAKIN [facility] [address] [id number] AM-PAC 6 Clicks   Basic Mobility Inpatient Short Form  How much difficulty does the patient currently have. .. Unable A Lot A Little None   1. Turning over in bed (including adjusting bedclothes, sheets and blankets)? [] 1   [] 2   [] 3   [x] 4   2. Sitting down on and standing up from a chair with arms ( e.g., wheelchair, bedside commode, etc.)   [] 1   [] 2   [x] 3   [x] 4   3. Moving from lying on back to sitting on the side of the bed? [] 1   [] 2   [] 3   [x] 4   How much help from another person does the patient currently need. .. Total A Lot A Little None   4. Moving to and from a bed to a chair (including a wheelchair)? [] 1   [] 2   [x] 3   [] 4   5. Need to walk in hospital room? [] 1   [] 2   [x] 3   [] 4   6. Climbing 3-5 steps with a railing? [] 1   [] 2   [x] 3   [] 4   © , Trustees of [address] [place] [address] [address] [id number], under license to [employer]. All rights reserved      Score:  Initial: 21 Most Recent: X (Date: -- )    Interpretation of Tool:  Represents activities that are increasingly more difficult (i.e. Bed mobility, Transfers, Gait). Medical Necessity:     · Patient demonstrates excellent rehab potential due to higher previous functional level. Reason for Services/Other Comments:  · Patient continues to require modification of therapeutic interventions to increase complexity of exercises.    Use of outcome tool(s) and clinical judgement create a POC that gives a: Clear prediction of patient's progress: LOW COMPLEXITY            TREATMENT:   (In addition to Assessment/Re-Assessment sessions the following treatments were rendered) Pre-treatment Symptoms/Complaints:  none  Pain: Initial:   Pain Intensity 1: 0  Post Session:  0/10     Therapeutic Activity: (    8 minutes): Therapeutic activities including Ambulation on level ground and standing balance activities to improve mobility, strength and balance. Required minimal Safety awareness training;Verbal cues to promote static and dynamic balance in standing. Braces/Orthotics/Lines/Etc:   · O2 Device: Nasal cannula  Treatment/Session Assessment:    · Response to Treatment:  Patient ambulated 500' with SBA and no assistive device. · Interdisciplinary Collaboration:   o Physical Therapist  o Occupational Therapist  o Registered Nurse  · After treatment position/precautions:   o Up in chair  o Bed/Chair-wheels locked  o Bed in low position  o Call light within reach  o RN notified  o OT at bedside   · Compliance with Program/Exercises: Will assess as treatment progresses  · Recommendations/Intent for next treatment session: \"Next visit will focus on advancements to more challenging activities and reduction in assistance provided\".   Total Treatment Duration:  PT Patient Time In/Time Out  Time In: 0913  Time Out: [address], DPT

## 2019-03-15 NOTE — PROGRESS NOTES
PM&R Consult Progress Note      Patient: Magaly Scruggs  Admit Date: 3/13/2019  Admit Diagnosis: CVA (cerebral vascular accident) ([facility] [address] [I63.9]; Received intravenous tissue plasminogen activator (tPA) in emergency department [Z92.82]  Recommendations: Continue Acute Rehab Program, Coordination of rehab/medical care, Counseling of PM & R care issues management, Home/Outpatient Rehab  -pts symptoms have resolved s/p TPA, starting Xarelto for afib and pt will f/u with Cardiology to address the atrial appendage. BP goal <140/90  -rec HH pt/ot at [facility] where she resides  History/Subjective/Complaint:     Records reviewed.     Pain 1  Pain Scale 1: Numeric (0 - 10) (03/15/19 1300)  Pain Intensity 1: 0 (03/15/19 1300)  Patient Stated Pain Goal: 0 (03/14/19 1204)  Pain Reassessment 1: Yes (03/14/19 1204)  Pain Onset 1: acute (03/13/19 2332)  Pain Location 1: Head (03/13/19 2332)  Pain Orientation 1: Anterior (03/13/19 2332)  Pain Description 1: Aching (03/13/19 2332)  Pain Intervention(s) 1: Relaxation technique;Repositioned;MD notified (comment) (03/13/19 2332)     Objective:     Vitals:  Patient Vitals for the past 8 hrs:   BP Temp Pulse Resp SpO2   03/15/19 1200 137/68 98.7 °F (37.1 °C) 61 18 93 %   03/15/19 0800 156/72 98.2 °F (36.8 °C) 75 20 93 %      Intake and Output:  03/13 1901 - 03/15 0700  In: 480 [P.O.:480]  Out: 350 [Urine:350]    Allergies   Allergen Reactions    Adhesive Rash     Current Facility-Administered Medications   Medication Dose Route Frequency    nitrofurantoin (MACRODANTIN) capsule 50 mg  50 mg Oral DAILY    hydrALAZINE (APRESOLINE) 20 mg/mL injection 20 mg  20 mg IntraVENous Q6H PRN    [START ON 3/16/2019] lisinopril (PRINIVIL, ZESTRIL) tablet 20 mg  20 mg Oral DAILY    amLODIPine (NORVASC) tablet 5 mg  5 mg Oral DAILY    rivaroxaban (XARELTO) tablet 20 mg  20 mg Oral DAILY WITH DINNER    aspirin delayed-release tablet 81 mg  81 mg Oral DAILY    atorvastatin (LIPITOR) tablet 40 mg 40 mg Oral QHS    escitalopram oxalate (LEXAPRO) tablet 10 mg  10 mg Oral QHS    levothyroxine (SYNTHROID) tablet 50 mcg  50 mcg Oral ACB    metoprolol succinate (TOPROL-XL) XL tablet 12.5 mg  12.5 mg Oral DAILY    sodium chloride (NS) flush 5-40 mL  5-40 mL IntraVENous Q8H    sodium chloride (NS) flush 5-40 mL  5-40 mL IntraVENous PRN    ondansetron (ZOFRAN) injection 4 mg  4 mg IntraVENous Q6H PRN    acetaminophen (TYLENOL) tablet 650 mg  650 mg Oral Q4H PRN    tuberculin injection 5 Units  5 Units IntraDERMal ONCE    acetaminophen (TYLENOL) suppository 650 mg  650 mg Rectal Q4H PRN       Physical Exam:  No significant changes    Incision(s)/Wound(s): Wound Knee Left (Active)   Number of days: 792          Functional Assessment:  Gross Assessment  AROM: Within functional limits (03/15/19 1200)  Strength: Within functional limits (03/15/19 1200)  Coordination: Within functional limits (03/15/19 1200)  Tone: Normal (03/15/19 1200)  Sensation: Intact (03/15/19 1200)     Gait  Base of Support: Center of gravity altered;Narrowed (03/15/19 1200)  Speed/Irena: Slow (03/15/19 1200)  Step Length: Right shortened;Left shortened (03/15/19 1200)  Gait Abnormalities: Decreased step clearance; Path deviations;Trunk sway increased (03/15/19 1200)  Ambulation - Level of Assistance: Stand-by assistance;Contact guard assistance (03/15/19 1200)  Distance (ft): 500 Feet (ft) (03/15/19 1200)  Interventions: Safety awareness training;Verbal cues (03/15/19 1200)     Bed Mobility  Supine to Sit: Independent (03/15/19 1200)  Scooting: Independent (03/15/19 1200)     Balance  Sitting: Intact (03/15/19 1200)  Standing: Impaired (03/15/19 1200)  Standing - Static: Good (03/15/19 1200)  Standing - Dynamic : Fair (03/15/19 1200)     Grooming  Grooming Assistance: Stand-by assistance(in standing at sink) (03/15/19 0940)  Washing Hands: Stand-by assistance (03/15/19 0940)           Toileting  Toileting Assistance: Supervision (03/15/19 0940)  Bladder Hygiene: Supervision/set-up (03/15/19 0940)  Bowel Hygiene: Supervision/set-up (03/15/19 0940)  Clothing Management: Stand-by assistance (03/15/19 0940)     Bed/Mat Mobility  Supine to Sit: Independent (03/15/19 1200)  Sit to Stand: Stand-by assistance (03/15/19 1200)  Stand to Sit: Stand-by assistance (03/15/19 1200)  Bed to Chair: Contact guard assistance (03/15/19 1200)  Scooting: Independent (03/15/19 1200)     Labs/Studies:  Recent Results (from the past 72 hour(s))   CBC WITH AUTOMATED DIFF    Collection Time: 03/13/19 12:53 PM   Result Value Ref Range    WBC 6.2 4.3 - 11.1 K/uL    RBC 4.41 4.05 - 5.2 M/uL    HGB 12.7 11.7 - 15.4 g/dL    HCT 38.0 35.8 - 46.3 %    MCV 86.2 79.6 - 97.8 FL    MCH 28.8 26.1 - 32.9 PG    MCHC 33.4 31.4 - 35.0 g/dL    RDW 14.1 11.9 - 14.6 %    PLATELET 521 085 - 210 K/uL    MPV 12.2 9.4 - 12.3 FL    ABSOLUTE NRBC 0.00 0.0 - 0.2 K/uL    DF AUTOMATED      NEUTROPHILS 54 43 - 78 %    LYMPHOCYTES 35 13 - 44 %    MONOCYTES 10 4.0 - 12.0 %    EOSINOPHILS 1 0.5 - 7.8 %    BASOPHILS 0 0.0 - 2.0 %    IMMATURE GRANULOCYTES 0 0.0 - 5.0 %    ABS. NEUTROPHILS 3.4 1.7 - 8.2 K/UL    ABS. LYMPHOCYTES 2.2 0.5 - 4.6 K/UL    ABS. MONOCYTES 0.6 0.1 - 1.3 K/UL    ABS. EOSINOPHILS 0.0 0.0 - 0.8 K/UL    ABS. BASOPHILS 0.0 0.0 - 0.2 K/UL    ABS. IMM. GRANS. 0.0 0.0 - 0.5 K/UL   METABOLIC PANEL, COMPREHENSIVE    Collection Time: 03/13/19 12:53 PM   Result Value Ref Range    Sodium 141 136 - 145 mmol/L    Potassium 3.6 3.5 - 5.1 mmol/L    Chloride 108 (H) 98 - 107 mmol/L    CO2 20 (L) 21 - 32 mmol/L    Anion gap 13 7 - 16 mmol/L    Glucose 137 (H) 65 - 100 mg/dL    BUN 18 8 - 23 MG/DL    Creatinine 1.00 0.6 - 1.0 MG/DL    GFR est AA >60 >60 ml/min/1.73m2    GFR est non-AA 55 (L) >60 ml/min/1.73m2    Calcium 9.3 8.3 - 10.4 MG/DL    Bilirubin, total 1.2 (H) 0.2 - 1.1 MG/DL    ALT (SGPT) 28 12 - 65 U/L    AST (SGOT) 25 15 - 37 U/L    Alk.  phosphatase 81 50 - 136 U/L    Protein, total 7.6 6.3 - 8.2 g/dL    Albumin 3.6 3.2 - 4.6 g/dL    Globulin 4.0 (H) 2.3 - 3.5 g/dL    A-G Ratio 0.9 (L) 1.2 - 3.5     POC TROPONIN-I    Collection Time: 03/13/19 12:56 PM   Result Value Ref Range    Troponin-I (POC) 0 (L) 0.02 - 0.05 ng/ml   POC PT/INR    Collection Time: 03/13/19 12:59 PM   Result Value Ref Range    Prothrombin time (POC) 12.1 (H) 9.6 - 11.6 SECS    INR (POC) 1.0 0.9 - 1.2     POC LACTIC ACID    Collection Time: 03/13/19 12:59 PM   Result Value Ref Range    Lactic Acid (POC) 1.97 (H) 0.5 - 1.9 mmol/L   EKG, 12 LEAD, INITIAL    Collection Time: 03/13/19  1:34 PM   Result Value Ref Range    Ventricular Rate 78 BPM    Atrial Rate 49 BPM    QRS Duration 76 ms    Q-T Interval 356 ms    QTC Calculation (Bezet) 405 ms    Calculated R Axis 6 degrees    Calculated T Axis -131 degrees    Diagnosis       !! AGE AND GENDER SPECIFIC ECG ANALYSIS !!   Atrial fibrillation  Minimal voltage criteria for LVH, may be normal variant  Septal infarct (cited on or before 15-FEB-2018)  Marked ST abnormality, possible inferior subendocardial injury  Abnormal ECG  When compared with ECG of 15-FEB-2018 15:53,  Questionable change in initial forces of Septal leads  T wave inversion now evident in Anterolateral leads  Confirmed by Sandy Ji ([id number]) on 3/14/2019 7:18:39 AM     LIPID PANEL    Collection Time: 03/14/19  3:27 AM   Result Value Ref Range    LIPID PROFILE          Cholesterol, total 121 <200 MG/DL    Triglyceride 52 35 - 150 MG/DL    HDL Cholesterol 59 40 - 60 MG/DL    LDL, calculated 51.6 <100 MG/DL    VLDL, calculated 10.4 6.0 - 23.0 MG/DL    CHOL/HDL Ratio 2.1     HEMOGLOBIN A1C WITH EAG    Collection Time: 03/14/19  3:27 AM   Result Value Ref Range    Hemoglobin A1c 6.2 (H) 4.8 - 6.0 %    Est. average glucose 575 mg/dL   METABOLIC PANEL, BASIC    Collection Time: 03/14/19  3:27 AM   Result Value Ref Range    Sodium 141 136 - 145 mmol/L    Potassium 3.6 3.5 - 5.1 mmol/L    Chloride 111 (H) 98 - 107 mmol/L CO2 21 21 - 32 mmol/L    Anion gap 9 7 - 16 mmol/L    Glucose 110 (H) 65 - 100 mg/dL    BUN 19 8 - 23 MG/DL    Creatinine 0.83 0.6 - 1.0 MG/DL    GFR est AA >60 >60 ml/min/1.73m2    GFR est non-AA >60 >60 ml/min/1.73m2    Calcium 8.4 8.3 - 10.4 MG/DL   CBC W/O DIFF    Collection Time: 03/14/19  3:27 AM   Result Value Ref Range    WBC 6.7 4.3 - 11.1 K/uL    RBC 4.09 4.05 - 5.2 M/uL    HGB 11.8 11.7 - 15.4 g/dL    HCT 36.2 35.8 - 46.3 %    MCV 88.5 79.6 - 97.8 FL    MCH 28.9 26.1 - 32.9 PG    MCHC 32.6 31.4 - 35.0 g/dL    RDW 14.6 11.9 - 14.6 %    PLATELET 252 878 - 890 K/uL    MPV 11.8 9.4 - 12.3 FL    ABSOLUTE NRBC 0.00 0.0 - 0.2 K/uL   CBC W/O DIFF    Collection Time: 03/15/19  3:37 AM   Result Value Ref Range    WBC 6.8 4.3 - 11.1 K/uL    RBC 4.03 (L) 4.05 - 5.2 M/uL    HGB 11.6 (L) 11.7 - 15.4 g/dL    HCT 36.0 35.8 - 46.3 %    MCV 89.3 79.6 - 97.8 FL    MCH 28.8 26.1 - 32.9 PG    MCHC 32.2 31.4 - 35.0 g/dL    RDW 14.5 11.9 - 14.6 %    PLATELET 671 496 - 165 K/uL    MPV 11.9 9.4 - 12.3 FL    ABSOLUTE NRBC 0.00 0.0 - 0.2 K/uL   METABOLIC PANEL, BASIC    Collection Time: 03/15/19  3:37 AM   Result Value Ref Range    Sodium 141 136 - 145 mmol/L    Potassium 3.9 3.5 - 5.1 mmol/L    Chloride 111 (H) 98 - 107 mmol/L    CO2 21 21 - 32 mmol/L    Anion gap 9 7 - 16 mmol/L    Glucose 120 (H) 65 - 100 mg/dL    BUN 18 8 - 23 MG/DL    Creatinine 0.68 0.6 - 1.0 MG/DL    GFR est AA >60 >60 ml/min/1.73m2    GFR est non-AA >60 >60 ml/min/1.73m2    Calcium 8.3 8.3 - 10.4 MG/DL        Assessment:     Principal Problem:    CVA (cerebral vascular accident) ([facility] [address] (3/13/2019)    Active Problems:    Hypothyroidism (1/14/2013)      Hypercholesteremia (1/14/2013)      Paroxysmal atrial fibrillation (HCC) (1/14/2013)      Overview: Paroxysmal       GERD (gastroesophageal reflux disease) (3/6/2014)      Received intravenous tissue plasminogen activator (tPA) in emergency department (3/13/2019)        Plan:     Recommendations: Continue Acute Rehab Program  Coordination of rehab/medical care  Counseling of PM & R care issues management  Monitoring and management of medical conditions per plan of care/orders  Discussion with Family/Caregiver/Staff  Reviewed Therapies/Labs/Medications/Records    Signed By:  Vickie Gonzalez MD     March 15, 2019

## 2019-03-16 VITALS
BODY MASS INDEX: 25.69 KG/M2 | TEMPERATURE: 98.2 F | SYSTOLIC BLOOD PRESSURE: 137 MMHG | RESPIRATION RATE: 12 BRPM | HEIGHT: 63 IN | HEART RATE: 66 BPM | WEIGHT: 145 LBS | DIASTOLIC BLOOD PRESSURE: 60 MMHG | OXYGEN SATURATION: 94 %

## 2019-03-16 PROCEDURE — 74011250637 HC RX REV CODE- 250/637: Performed by: INTERNAL MEDICINE

## 2019-03-16 RX ORDER — AMLODIPINE BESYLATE 5 MG/1
5 TABLET ORAL DAILY
Qty: 30 TAB | Refills: 1 | Status: SHIPPED | OUTPATIENT
Start: 2019-03-17 | End: 2019-04-01

## 2019-03-16 RX ORDER — ASPIRIN 81 MG/1
81 TABLET ORAL DAILY
Qty: 30 TAB | Refills: 1 | Status: SHIPPED | OUTPATIENT
Start: 2019-03-17

## 2019-03-16 RX ORDER — METOPROLOL SUCCINATE 25 MG/1
12.5 TABLET, EXTENDED RELEASE ORAL DAILY
Qty: 30 TAB | Refills: 1 | Status: SHIPPED | OUTPATIENT
Start: 2019-03-17 | End: 2020-02-14 | Stop reason: SDUPTHER

## 2019-03-16 RX ORDER — ATORVASTATIN CALCIUM 40 MG/1
40 TABLET, FILM COATED ORAL DAILY
Qty: 30 TAB | Refills: 1 | Status: SHIPPED | OUTPATIENT
Start: 2019-03-16 | End: 2019-04-13

## 2019-03-16 RX ORDER — LISINOPRIL 20 MG/1
20 TABLET ORAL DAILY
Qty: 30 TAB | Refills: 1 | Status: SHIPPED | OUTPATIENT
Start: 2019-03-17 | End: 2019-05-07 | Stop reason: SDUPTHER

## 2019-03-16 RX ADMIN — AMLODIPINE BESYLATE 5 MG: 5 TABLET ORAL at 09:30

## 2019-03-16 RX ADMIN — METOPROLOL SUCCINATE 12.5 MG: 25 TABLET, EXTENDED RELEASE ORAL at 09:30

## 2019-03-16 RX ADMIN — LEVOTHYROXINE SODIUM 50 MCG: 50 TABLET ORAL at 05:45

## 2019-03-16 RX ADMIN — LISINOPRIL 20 MG: 20 TABLET ORAL at 09:30

## 2019-03-16 RX ADMIN — Medication 5 ML: at 05:45

## 2019-03-16 RX ADMIN — NITROFURANTOIN MACROCRYSTALS 50 MG: 50 CAPSULE ORAL at 09:30

## 2019-03-16 NOTE — PROGRESS NOTES
Problem: Falls - Risk of  Goal: *Absence of Falls  Document Tim Fall Risk and appropriate interventions in the flowsheet.   Outcome: Progressing Towards Goal  Fall Risk Interventions:  Mobility Interventions: Bed/chair exit alarm, Communicate number of staff needed for ambulation/transfer, Patient to call before getting OOB    Mentation Interventions: Bed/chair exit alarm, Door open when patient unattended, Toileting rounds    Medication Interventions: Patient to call before getting OOB, Evaluate medications/consider consulting pharmacy    Elimination Interventions: Bed/chair exit alarm, Call light in reach, Patient to call for help with toileting needs

## 2019-03-16 NOTE — PROGRESS NOTES
Date of Outreach Update:  Lena Ocasio was seen and assessed. Spoke with primary RN who has no concerns at this time. She states that patient got hydralazine earlier which made her nauseous. She was then given zofran and has slept well since. Patient found to be sleeping when I entered but she woke up easily. States that she is feeling much better and just needed to get up to go to the restroom. HR 93 and O2 Sat 94%. MEWS Score: 1 (03/16/19 0000)  Vitals:    03/15/19 1600 03/15/19 1935 03/16/19 0000 03/16/19 0202   BP: 143/76 158/61 136/62    Pulse: 64 62 79 93   Resp: 18 18 18    Temp: 97.5 °F (36.4 °C) 99 °F (37.2 °C) 97.6 °F (36.4 °C)    SpO2: 95% 91% 95% 94%   Weight:       Height:             Pain Assessment  Pain Intensity 1: 0 (03/16/19 0202)  Pain Location 1: Head  Pain Intervention(s) 1: Relaxation technique, Repositioned, MD notified (comment)  Patient Stated Pain Goal: 0      Previous Outreach assessment has been reviewed. There have been no significant clinical changes since the completion of the last dated Outreach assessment. Will continue to follow up per outreach protocol.     Signed By:   Lesa Prescott RN    March 16, 2019 2:02 AM

## 2019-03-16 NOTE — DISCHARGE INSTRUCTIONS
Patient needs a Xeralto card at d/c    PCP 3-5 days Hospital follow up for stroke  Cardiology Dr. Sami Vila 1-2 weeks- for  left atrial appendage closure        Stroke: After Your Visit     Your Care Instructions     You have had a stroke. Risk factors for stroke include being overweight, smoking, and sedentary lifestyle. This means that the blood flow to a part of your brain was blocked for some time, which damages the nerve cells in that part of the brain. The part of your body controlled by that part of your brain may not function properly now. The brain is an amazing organ that can heal itself to some degree. The stroke you had damaged part of your brain, but other parts of your brain may take over in some way for the damaged areas. You have already started this process. Going home may be hard for you and your family. The more you can try to do for yourself, the better. Remember to take each day one at a time. Follow-up care is a key part of your treatment and safety. Be sure to make and go to all appointments, and call your doctor if you are having problems. Its also a good idea to know your test results and keep a list of the medicines you take. How can you care for yourself at home? Enter a stroke rehabilitation (rehab) program, if your doctor recommends it. Physical, speech, and occupational therapies can help you manage bathing, dressing, eating, and other basics of daily living. Eat a heart-healthy diet that is low in cholesterol, saturated fat, and salt. Eat lots of fresh fruits and vegetables and foods high in fiber. Increase your activities slowly. Take short rest breaks when you get tired. Gradually increase the amount you walk. Start out by walking a little more than you did the day before. Do not drive until your doctor says it is okay. It is normal to feel sad or depressed after a stroke. If the blues last, talk to your doctor.   If you are having problems with urine leakage, go to the bathroom at regular times, including when you first wake up and at bedtime. Also, limit fluids after dinner. If you are constipated, drink plenty of fluids, enough so that your urine is light yellow or clear like water. If you have kidney, heart, or liver disease and have to limit fluids, talk with your doctor before you increase the amount of fluids you drink. Set up a regular time for using the toilet. If you continue to have constipation, your doctor may suggest using a bulking agent, such as Metamucil, or a stool softener, laxative, or enema. Medicines  Take your medicines exactly as prescribed. Call your doctor if you think you are having a problem with your medicine. You may be taking several medicines. ACE (angiotensin-converting enzyme) inhibitors, angiotensin II receptor blockers (ARBs), beta-blockers, diuretics (water pills), and calcium channel blockers control your blood pressure. Statins help lower cholesterol. Your doctor may also prescribe medicines for depression, pain, sleep problems, anxiety, or agitation. If your doctor has given you medicine that prevents blood clots, such as warfarin (Coumadin), aspirin combined with extended-release dipyridamole (Aggrenox), clopidogrel (Plavix), or aspirin to prevent another stroke, you should:  Tell your dentist, pharmacist, and other health professionals that you take these medicines. Watch for unusual bruising or bleeding, such as blood in your urine, red or black stools, or bleeding from your nose or gums. Get regular blood tests to check your clotting time if you are taking Coumadin. Wear medical alert jewelry that says you take blood thinners. You can buy this at most drugstores. Do not take any over-the-counter medicines or herbal products without talking to your doctor first.  If you take birth control pills or hormone replacement therapy, talk to your doctor about whether they are right for you.     For family members and caregivers  Make the home safe. Set up a room so that your loved one does not have to climb stairs. Be sure the bathroom is on the same floor. Move throw rugs and furniture that could cause falls, and make sure that the lighting is good. Put grab bars and seats in tubs and showers. Find out what your loved one can do and what he or she needs help with. Try not to do things for your loved one that your loved one can do on his or her own. Help him or her learn and practice new skills. Visit and talk with your loved one often. Try doing activities together that you both enjoy, such as playing cards or board games. Keep in touch with your loved one's friends as much as you can, and encourage them to visit. Take care of yourself. Do not try to do everything yourself. Ask other family members to help. Eat well, get enough rest, and take time to do things that you enjoy. Keep up with your own doctor visits, and make sure to take your medicines regularly. Get out of the house as much as you can. Join a local support group. Find out if you qualify for home health care visits to help with rehab or for adult day care. When should you call for help? Call 911 anytime you think you may need emergency care. For example, call if:  You have signs of another stroke. These may include:  Sudden numbness, paralysis, or weakness in your face, arm, or leg, especially on only one side of your body. New problems with walking or balance. Sudden vision changes. Drooling or slurred speech. New problems speaking or understanding simple statements, or you feel confused. A sudden, severe headache that is different from past headaches. Call 911 even if these symptoms go away in a few minutes. You cough up blood. You vomit blood or what looks like coffee grounds. You pass maroon or very bloody stools. Call your doctor now or seek immediate medical care if:  You have new bruises or blood spots under your skin.   You have a nosebleed. Your gums bleed when you brush your teeth. You have blood in your urine. Your stools are black and tarlike or have streaks of blood. You have vaginal bleeding when you are not having your period, or heavy period bleeding. You have new symptoms that may be related to your stroke, such as falls or trouble swallowing. Watch closely for changes in your health, and be sure to contact your doctor if you have any problems. Where can you learn more? Go to [employer]    Enter C294  in the search box to learn more about \"Stroke: After Your Visit\". © 8441-1414 Healthwise, [employer]orporated. Care instructions adapted under license by [facility] [employer] (which disclaims liability or warranty for this information). This care instruction is for use with your licensed healthcare professional. If you have questions about a medical condition or this instruction, always ask your healthcare professional. Rosa Levo any warranty or liability for your use of this information. DISCHARGE SUMMARY from Nurse    PATIENT INSTRUCTIONS:    After general anesthesia or intravenous sedation, for 24 hours or while taking prescription Narcotics:  · Limit your activities  · Do not drive and operate hazardous machinery  · Do not make important personal or business decisions  · Do  not drink alcoholic beverages  · If you have not urinated within 8 hours after discharge, please contact your surgeon on call.     Report the following to your surgeon:  · Excessive pain, swelling, redness or odor of or around the surgical area  · Temperature over 100.5  · Nausea and vomiting lasting longer than 4 hours or if unable to take medications  · Any signs of decreased circulation or nerve impairment to extremity: change in color, persistent  numbness, tingling, coldness or increase pain  · Any questions    What to do at Home:  Recommended activity: Activity as tolerated, per MD instructions    If you experience any of the following symptoms fever > 100.5, nausea, vomiting, pain, stroke like symptoms, chest pain and/or shortness of breath to ER please follow up with MD.    *  Please give a list of your current medications to your Primary Care Provider. *  Please update this list whenever your medications are discontinued, doses are      changed, or new medications (including over-the-counter products) are added. *  Please carry medication information at all times in case of emergency situations. These are general instructions for a healthy lifestyle:    No smoking/ No tobacco products/ Avoid exposure to second hand smoke  Surgeon General's Warning:  Quitting smoking now greatly reduces serious risk to your health. Obesity, smoking, and sedentary lifestyle greatly increases your risk for illness    A healthy diet, regular physical exercise & weight monitoring are important for maintaining a healthy lifestyle    You may be retaining fluid if you have a history of heart failure or if you experience any of the following symptoms:  Weight gain of 3 pounds or more overnight or 5 pounds in a week, increased swelling in our hands or feet or shortness of breath while lying flat in bed. Please call your doctor as soon as you notice any of these symptoms; do not wait until your next office visit. Recognize signs and symptoms of STROKE:    F-face looks uneven    A-arms unable to move or move unevenly    S-speech slurred or non-existent    T-time-call 911 as soon as signs and symptoms begin-DO NOT go       Back to bed or wait to see if you get better-TIME IS BRAIN. Warning Signs of HEART ATTACK     Call 911 if you have these symptoms:   Chest discomfort. Most heart attacks involve discomfort in the center of the chest that lasts more than a few minutes, or that goes away and comes back. It can feel like uncomfortable pressure, squeezing, fullness, or pain.  Discomfort in other areas of the upper body. Symptoms can include pain or discomfort in one or both arms, the back, neck, jaw, or stomach.  Shortness of breath with or without chest discomfort.  Other signs may include breaking out in a cold sweat, nausea, or lightheadedness. Don't wait more than five minutes to call 911 - MINUTES MATTER! Fast action can save your life. Calling 911 is almost always the fastest way to get lifesaving treatment. Emergency Medical Services staff can begin treatment when they arrive -- up to an hour sooner than if someone gets to the hospital by car. The discharge information has been reviewed with the patient. The patient verbalized understanding. Discharge medications reviewed with the patient and appropriate educational materials and side effects teaching were provided.   ___________________________________________________________________________________________________________________________________

## 2019-03-16 NOTE — DISCHARGE SUMMARY
Hospitalist Discharge Summary     Admit Date:  3/13/2019 12:49 PM   Name:  Griselda Andrade   Age:  80 y.o.  :  1929   MRN:  [id number]   PCP:  Jackson Brooks MD  Treatment Team: Attending Provider: Yvette Manjarrez MD; Care Manager: Irina Orozco RN; Consulting Provider: Oscar Westfall MD; Utilization Review: Lalitha Miller RN    Problem List for this Hospitalization:  Hospital Problems as of 3/16/2019 Date Reviewed: 3/13/2019          Codes Class Noted - Resolved POA    * (Principal) CVA (cerebral vascular accident) [facility]) ICD-10-CM: I63.9  ICD-9-CM: 434.91  3/13/2019 - Present Yes        Received intravenous tissue plasminogen activator (tPA) in emergency department ICD-10-CM: Z92.82  ICD-9-CM: V45.88  3/13/2019 - Present Yes        GERD (gastroesophageal reflux disease) ICD-10-CM: K21.9  ICD-9-CM: 530.81  3/6/2014 - Present Yes        Hypothyroidism ICD-10-CM: E03.9  ICD-9-CM: 244.9  2013 - Present Yes        Hypercholesteremia ICD-10-CM: E78.00  ICD-9-CM: 272.0  2013 - Present Yes        Paroxysmal atrial fibrillation ([facility] [address] ICD-10-CM: I48.0  ICD-9-CM: 427.31  2013 - Present Yes    Overview Signed 2015 11:17 AM by Arelis Gaitan     Paroxysmal                      Admission HPI from 3/13/2019:    \"  Please refer to HPI for more details\". Hospital Course:    81 YO female patient with a PMH of Afib NOT on anticoagulation, hx of GI bleeding who came yesterday to the ER with a CC of slurred speech, aphasia and R sided weakness. She was seen by neurology and received IV TPA for suspected ischemic stroke. She was transferred to the ICU for proper monitoring. Her symptoms resolved. Neurology recommended to be temporary on xarelto for left atrial appendage closure. Case was dicussed with Dr. Karime Tang Cardiologist. Patient is stable to be discharged home. Coupon for free 30 days Xarelto given at bedside. Please follow up blood pressure.        Follow up instructions below. Plan was discussed with patient/family/careteam.  All questions answered. Patient was stable at time of discharge and was instructed to call or return if there are any concerns or recurrence of symptoms. Diagnostic Imaging/Tests:        All Micro Results     None          Labs: Results:       BMP, Mg, Phos Recent Labs     03/15/19  0337 03/14/19  0327 03/13/19  1253    141 141   K 3.9 3.6 3.6   * 111* 108*   CO2 21 21 20*   AGAP 9 9 13   BUN 18 19 18   CREA 0.68 0.83 1.00   CA 8.3 8.4 9.3   * 110* 137*      CBC Recent Labs     03/15/19  0337 03/14/19  0327 03/13/19  1253   WBC 6.8 6.7 6.2   RBC 4.03* 4.09 4.41   HGB 11.6* 11.8 12.7   HCT 36.0 36.2 38.0    191 189   GRANS  --   --  54   LYMPH  --   --  35   EOS  --   --  1   MONOS  --   --  10   BASOS  --   --  0   IG  --   --  0   ANEU  --   --  3.4   ABL  --   --  2.2   SAMMY  --   --  0.0   ABM  --   --  0.6   ABB  --   --  0.0   AIG  --   --  0.0      LFT Recent Labs     03/13/19  1253   SGOT 25   ALT 28   AP 81   TP 7.6   ALB 3.6   GLOB 4.0*   AGRAT 0.9*      Cardiac Testing Lab Results   Component Value Date/Time     02/15/2018 03:58 PM    Troponin-I, Qt. <0.02 (L) 07/23/2017 01:40 PM    Troponin-I, Qt. <0.02 (L) 07/10/2015 02:42 PM    Troponin-I, Qt. <0.02 (L) 05/03/2014 12:25 PM      Coagulation Tests Lab Results   Component Value Date/Time    Prothrombin time 21.0 (H) 07/15/2018 05:24 AM    Prothrombin time 24.7 (H) 07/13/2018 06:56 PM    Prothrombin time 10.6 12/19/2016 11:00 AM    INR 1.7 07/15/2018 05:24 AM    INR 2.1 07/13/2018 06:56 PM    INR 1.0 12/19/2016 11:00 AM    INR (POC) 1.0 03/13/2019 12:59 PM    INR POC 2.2 07/13/2018 09:37 AM    INR POC 2.6 06/29/2018 09:55 AM    INR POC 3.9 06/21/2018 09:50 AM    aPTT 23.3 (L) 12/19/2016 11:00 AM      A1c Lab Results   Component Value Date/Time    Hemoglobin A1c 6.2 (H) 03/14/2019 03:27 AM      Lipid Panel Lab Results   Component Value Date/Time Cholesterol, total 121 03/14/2019 03:27 AM    HDL Cholesterol 59 03/14/2019 03:27 AM    LDL, calculated 51.6 03/14/2019 03:27 AM    VLDL, calculated 10.4 03/14/2019 03:27 AM    Triglyceride 52 03/14/2019 03:27 AM    CHOL/HDL Ratio 2.1 03/14/2019 03:27 AM      Thyroid Panel Lab Results   Component Value Date/Time    TSH 2.690 03/13/2018 11:05 AM    TSH 2.280 02/17/2017 11:14 AM        Most Recent UA Lab Results   Component Value Date/Time    Color YELLOW 01/12/2017 02:07 PM    Appearance CLOUDY 01/12/2017 02:07 PM    Specific gravity 1.009 01/12/2017 02:07 PM    pH (UA) 7.0 01/12/2017 02:07 PM    Protein NEGATIVE  01/12/2017 02:07 PM    Glucose NEGATIVE  01/12/2017 02:07 PM    Ketone TRACE (A) 01/12/2017 02:07 PM    Bilirubin NEGATIVE  01/12/2017 02:07 PM    Blood NEGATIVE  01/12/2017 02:07 PM    Urobilinogen 0.2 01/12/2017 02:07 PM    Nitrites NEGATIVE  01/12/2017 02:07 PM    Leukocyte Esterase TRACE (A) 01/12/2017 02:07 PM        Allergies   Allergen Reactions    Adhesive Rash     Immunization History   Administered Date(s) Administered    Influenza High Dose Vaccine PF 11/05/2015, 11/09/2016, 11/13/2017, 09/06/2018    Influenza Vaccine 10/17/2013, 10/27/2014    Pneumococcal Conjugate (PCV-13) 01/15/2016    Pneumococcal Polysaccharide (PPSV-23) 05/16/2018    TB Skin Test (PPD) Intradermal 01/12/2017    Tdap 06/01/2014       All Labs from Last 24 Hrs:  No results found for this or any previous visit (from the past 24 hour(s)).     Discharge Exam:  Patient Vitals for the past 24 hrs:   Temp Pulse Resp BP SpO2   03/16/19 0800 98.2 °F (36.8 °C) 66 12 137/60 94 %   03/16/19 0400 98.6 °F (37 °C) 81 18 131/66 92 %   03/16/19 0202  93   94 %   03/16/19 0000 97.6 °F (36.4 °C) 79 18 136/62 95 %   03/15/19 1935 99 °F (37.2 °C) 62 18 158/61 91 %   03/15/19 1600 97.5 °F (36.4 °C) 64 18 143/76 95 %   03/15/19 1200 98.7 °F (37.1 °C) 61 18 137/68 93 %     Oxygen Therapy  O2 Sat (%): 94 % (03/16/19 0800)  Pulse via Oximetry: 68 beats per minute (03/14/19 1509)  O2 Device: Nasal cannula (03/14/19 1058)  O2 Flow Rate (L/min): 2 l/min (03/14/19 1058)  FIO2 (%): 28 % (03/14/19 0754)    Intake/Output Summary (Last 24 hours) at 3/16/2019 1108  Last data filed at 3/16/2019 0944  Gross per 24 hour   Intake 360 ml   Output    Net 360 ml       Patient stated having some nausea this mornig but overall feels fine. Patient denies any other complaints. General:               Alert, cooperative, no distress   HEENT:               NCAT. No obvious deformity. Lungs: No wheezing/rhonchi/rales  Cardiovascular:   RRR. No m/r/g. No pedal edema b/l. Abdomen:            S/nt/nd. Bowel sounds normal. .   Skin:                    No rashes or lesions. Not Jaundiced  Neurologic:         AAOx3. CN II- XII grossly WNL. No gross focal deficit. Psychiatric:         Good mood. Normal affect        Discharge Info:   Current Discharge Medication List      START taking these medications    Details   amLODIPine (NORVASC) 5 mg tablet Take 1 Tab by mouth daily. Qty: 30 Tab, Refills: 1      aspirin delayed-release 81 mg tablet Take 1 Tab by mouth daily. Qty: 30 Tab, Refills: 1      lisinopril (PRINIVIL, ZESTRIL) 20 mg tablet Take 1 Tab by mouth daily. Qty: 30 Tab, Refills: 1      rivaroxaban (XARELTO) 20 mg tab tablet Take 1 Tab by mouth daily (with dinner). Qty: 30 Tab, Refills: 0         CONTINUE these medications which have CHANGED    Details   atorvastatin (LIPITOR) 40 mg tablet Take 1 Tab by mouth daily. TAKE 1 TABLET EVERY DAY  Qty: 30 Tab, Refills: 1      metoprolol succinate (TOPROL-XL) 25 mg XL tablet Take 0.5 Tabs by mouth daily. Qty: 30 Tab, Refills: 1         CONTINUE these medications which have NOT CHANGED    Details   levothyroxine (SYNTHROID) 50 mcg tablet Take 1 Tab by mouth Daily (before breakfast). Qty: 30 Tab, Refills: 0      nitrofurantoin (MACRODANTIN) 50 mg capsule Take 1 Cap by mouth daily.   Qty: 90 Cap, Refills: 3    Associated Diagnoses: History of recurrent UTIs      potassium 99 mg tablet Take 99 mg by mouth daily. vit C/vit E/lutein/min/omega-3 (OCUVITE PO) Take 1 Cap by mouth daily. escitalopram oxalate (LEXAPRO) 10 mg tablet Take 1 Tab by mouth daily. Qty: 90 Tab, Refills: 3      acetaminophen (TYLENOL ARTHRITIS PAIN) 650 mg CR tablet Take 650 mg by mouth every six (6) hours as needed for Pain. VIT A/VIT C/VIT E/ZINC/COPPER (ICAPS AREDS PO) Take  by mouth. Twice a day      vitamin E (AQUA GEMS) 400 unit capsule Take  by mouth daily. Calcium Carbonate-Vit D3-Min (CALTRATE 600+D PLUS MINERALS) 600 mg calcium- 400 unit Tab Take  by mouth daily. ferrous sulfate 324 mg (65 mg iron) tablet Take 65 mg by mouth Daily (before breakfast). zolpidem (AMBIEN) 10 mg tablet Take 1 Tab by mouth nightly as needed for Sleep. Max Daily Amount: 10 mg.  Qty: 30 Tab, Refills: 2    Associated Diagnoses: Insomnia, unspecified type      nitroglycerin (NITROSTAT) 0.3 mg SL tablet 1 Tab by SubLINGual route every five (5) minutes as needed for Chest Pain. Qty: 1 Bottle, Refills: 3      cyanocobalamin (VITAMIN B-12) 100 mcg tablet Take 5,000 mcg by mouth daily. STOP taking these medications       clopidogrel (PLAVIX) 75 mg tab Comments:   Reason for Stopping:         Cholecalciferol, Vitamin D3, (VITAMIN D3) 1,000 unit cap Comments:   Reason for Stopping:                 Disposition: home  Activity: PT/OT per Home Health  Diet: Cardiac Diet    Follow-up Information     Follow up With Specialties Details Why Contact Info    Marjorie Elizondo MD [address] Irma Tolliver  [phone]                Signed:   aTtiana Griffiths MD

## 2019-03-18 ENCOUNTER — PATIENT OUTREACH (OUTPATIENT)
Dept: CASE MANAGEMENT | Age: 84
End: 2019-03-18

## 2019-03-18 NOTE — PROGRESS NOTES
This note will not be viewable in [address] Transition of Care Discharge Follow-up Questionnaire   Date/Time of Call:   3/18/19   2:25pm   What was the patient hospitalized for? CVA     Does the patient understand his/her diagnosis and/or treatment and what happened during the hospitalization? Yes, spoke with patient, she states understanding of diagnosis and treatment; and is agreeable to call. Patient states she is doing well, a little weak, afternoon is better than morning   Did the patient receive discharge instructions? Yes    CM Assessed Risk for Readmission:       Patient stated Risk for Readmission:      low r/t diagnosis and/or comorbidities       none stated   Review any discharge instructions (see discharge instructions/AVS in [facility]). Ask patient if they understand these. Do they have any questions? Reviewed, understanding is stated, no questions at this time       Were home services ordered (nursing, PT, OT, ST, etc.)? No     If so, has the first visit occurred? If not, why? (Assist with coordination of services if necessary.)   N/A   Was any DME ordered? No     If so, has it been received? If not, why?  (Assist patient in obtaining DME orders &/or equipment if necessary.) N/A   Complete a review of all medications (new, continued and discontinued meds per the D/C instructions and medication tab in [facility]). Completed  START taking:  amLODIPine 5 mg tablet (NORVASC)  Start taking on: 3/17/2019  aspirin delayed-release 81 mg tablet  Start taking on: 3/17/2019  lisinopril 20 mg tablet (Valene Pencil)  Start taking on: 3/17/2019  rivaroxaban 20 mg Tab tablet (Leti Creeks) CHANGE how you take:  atorvastatin 40 mg tablet (LIPITOR)   STOP taking:  PLAVIX 75 mg Tab  VITAMIN D3 1,000 unit Cap   Were all new prescriptions filled?   If not, why?  (Assist patient in obtaining medications if necessary  escalate for CCM &/or SW if ongoing issues are verbalized by pt or anticipated) Yes    Does the patient understand the purpose and dosing instructions for all medications? (If patient has questions, provide explanation and education.)   Yes      Does the patient have any problems in performing ADLs? (If patient is unable to perform ADLs  what is the limiting factor(s)? Do they have a support system that can assist? If no support system is present, discuss possible assistance that they may be able to obtain. Escalate for CCM/SW if ongoing issues are verbalized by pt or anticipated)   Independent with ADLs   Does the patient have all follow-up appointments scheduled? 7 day f/up with PCP?   (f/up with PCP may be w/in 14 days if patient has a f/up with their specialist w/in 7 days)    7-14 day f/up with specialist?   (or per discharge instructions)    If f/up has not been made  what actions has the care coordinator made to accomplish this? Has transportation been arranged? Yes        Dr. Leatha Herbert 3/20/19    Dr. Tree Maki cardiology- patient has called for follow up, waiting on call back to schedule                Yes, no transportation needs identified at this time    Any other questions or concerns expressed by the patient? No other needs or concerns identified. Patient states her gratitude for follow up. Contact information for [facility] was given, instructed to call with new questions or concerns. Schedule next appointment with [address] Coordinator or refer to RN Case Manager/ per the workflow guidelines. When is care coordinators next follow-up call scheduled? If referred for CCM  what RN care manager was the referral assigned?    Community Care Coordinator will follow up per workflow guidelines          Within 30 days       [facility] Call Completed By: Mere Walker, [address] [place] [address] Coordinator

## 2019-03-19 ENCOUNTER — APPOINTMENT (OUTPATIENT)
Dept: CT IMAGING | Age: 84
End: 2019-03-19
Attending: EMERGENCY MEDICINE
Payer: MEDICARE

## 2019-03-19 ENCOUNTER — HOSPITAL ENCOUNTER (EMERGENCY)
Age: 84
Discharge: HOME | End: 2019-03-19
Attending: EMERGENCY MEDICINE
Payer: MEDICARE

## 2019-03-19 VITALS
WEIGHT: 145 LBS | OXYGEN SATURATION: 99 % | TEMPERATURE: 98 F | SYSTOLIC BLOOD PRESSURE: 179 MMHG | BODY MASS INDEX: 25.69 KG/M2 | HEART RATE: 56 BPM | DIASTOLIC BLOOD PRESSURE: 77 MMHG | HEIGHT: 63 IN | RESPIRATION RATE: 20 BRPM

## 2019-03-19 DIAGNOSIS — R53.81 MALAISE AND FATIGUE: ICD-10-CM

## 2019-03-19 DIAGNOSIS — R51.9 NONINTRACTABLE EPISODIC HEADACHE, UNSPECIFIED HEADACHE TYPE: Primary | ICD-10-CM

## 2019-03-19 DIAGNOSIS — R53.83 MALAISE AND FATIGUE: ICD-10-CM

## 2019-03-19 DIAGNOSIS — N30.00 ACUTE CYSTITIS WITHOUT HEMATURIA: ICD-10-CM

## 2019-03-19 LAB
ALBUMIN SERPL-MCNC: 3 G/DL (ref 3.2–4.6)
ALBUMIN/GLOB SERPL: 0.7 {RATIO} (ref 1.2–3.5)
ALP SERPL-CCNC: 74 U/L (ref 50–136)
ALT SERPL-CCNC: 23 U/L (ref 12–65)
ANION GAP SERPL CALC-SCNC: 11 MMOL/L (ref 7–16)
AST SERPL-CCNC: 24 U/L (ref 15–37)
BACTERIA URNS QL MICRO: 0 /HPF
BASOPHILS # BLD: 0.1 K/UL (ref 0–0.2)
BASOPHILS NFR BLD: 1 % (ref 0–2)
BILIRUB SERPL-MCNC: 0.8 MG/DL (ref 0.2–1.1)
BUN SERPL-MCNC: 20 MG/DL (ref 8–23)
CALCIUM SERPL-MCNC: 9.6 MG/DL (ref 8.3–10.4)
CASTS URNS QL MICRO: 0 /LPF
CHLORIDE SERPL-SCNC: 107 MMOL/L (ref 98–107)
CO2 SERPL-SCNC: 22 MMOL/L (ref 21–32)
CREAT SERPL-MCNC: 0.85 MG/DL (ref 0.6–1)
DIFFERENTIAL METHOD BLD: ABNORMAL
EOSINOPHIL # BLD: 0.1 K/UL (ref 0–0.8)
EOSINOPHIL NFR BLD: 2 % (ref 0.5–7.8)
EPI CELLS #/AREA URNS HPF: NORMAL /HPF
ERYTHROCYTE [DISTWIDTH] IN BLOOD BY AUTOMATED COUNT: 14.2 % (ref 11.9–14.6)
GLOBULIN SER CALC-MCNC: 4.2 G/DL (ref 2.3–3.5)
GLUCOSE SERPL-MCNC: 92 MG/DL (ref 65–100)
HCT VFR BLD AUTO: 34 % (ref 35.8–46.3)
HGB BLD-MCNC: 11.1 G/DL (ref 11.7–15.4)
IMM GRANULOCYTES # BLD AUTO: 0 K/UL (ref 0–0.5)
IMM GRANULOCYTES NFR BLD AUTO: 0 % (ref 0–5)
LYMPHOCYTES # BLD: 2.7 K/UL (ref 0.5–4.6)
LYMPHOCYTES NFR BLD: 39 % (ref 13–44)
MCH RBC QN AUTO: 28.9 PG (ref 26.1–32.9)
MCHC RBC AUTO-ENTMCNC: 32.6 G/DL (ref 31.4–35)
MCV RBC AUTO: 88.5 FL (ref 79.6–97.8)
MONOCYTES # BLD: 0.8 K/UL (ref 0.1–1.3)
MONOCYTES NFR BLD: 11 % (ref 4–12)
NEUTS SEG # BLD: 3.2 K/UL (ref 1.7–8.2)
NEUTS SEG NFR BLD: 47 % (ref 43–78)
NRBC # BLD: 0 K/UL (ref 0–0.2)
PLATELET # BLD AUTO: 200 K/UL (ref 150–450)
PMV BLD AUTO: 12 FL (ref 9.4–12.3)
POTASSIUM SERPL-SCNC: 4 MMOL/L (ref 3.5–5.1)
PROT SERPL-MCNC: 7.2 G/DL (ref 6.3–8.2)
RBC # BLD AUTO: 3.84 M/UL (ref 4.05–5.2)
RBC #/AREA URNS HPF: NORMAL /HPF
SODIUM SERPL-SCNC: 140 MMOL/L (ref 136–145)
WBC # BLD AUTO: 6.8 K/UL (ref 4.3–11.1)
WBC URNS QL MICRO: NORMAL /HPF

## 2019-03-19 PROCEDURE — 80053 COMPREHEN METABOLIC PANEL: CPT

## 2019-03-19 PROCEDURE — 87086 URINE CULTURE/COLONY COUNT: CPT

## 2019-03-19 PROCEDURE — 99285 EMERGENCY DEPT VISIT HI MDM: CPT | Performed by: EMERGENCY MEDICINE

## 2019-03-19 PROCEDURE — 81001 URINALYSIS AUTO W/SCOPE: CPT

## 2019-03-19 PROCEDURE — 96365 THER/PROPH/DIAG IV INF INIT: CPT | Performed by: EMERGENCY MEDICINE

## 2019-03-19 PROCEDURE — 96375 TX/PRO/DX INJ NEW DRUG ADDON: CPT | Performed by: EMERGENCY MEDICINE

## 2019-03-19 PROCEDURE — 81003 URINALYSIS AUTO W/O SCOPE: CPT | Performed by: EMERGENCY MEDICINE

## 2019-03-19 PROCEDURE — 70450 CT HEAD/BRAIN W/O DYE: CPT

## 2019-03-19 PROCEDURE — 74011250636 HC RX REV CODE- 250/636: Performed by: EMERGENCY MEDICINE

## 2019-03-19 PROCEDURE — 85025 COMPLETE CBC W/AUTO DIFF WBC: CPT

## 2019-03-19 PROCEDURE — 74011000258 HC RX REV CODE- 258: Performed by: EMERGENCY MEDICINE

## 2019-03-19 RX ORDER — KETOROLAC TROMETHAMINE 30 MG/ML
15 INJECTION, SOLUTION INTRAMUSCULAR; INTRAVENOUS
Status: COMPLETED | OUTPATIENT
Start: 2019-03-19 | End: 2019-03-19

## 2019-03-19 RX ORDER — CEPHALEXIN 500 MG/1
500 CAPSULE ORAL 3 TIMES DAILY
Qty: 21 CAP | Refills: 0 | Status: SHIPPED | OUTPATIENT
Start: 2019-03-19 | End: 2019-03-26

## 2019-03-19 RX ADMIN — SODIUM CHLORIDE 500 ML: 900 INJECTION, SOLUTION INTRAVENOUS at 18:43

## 2019-03-19 RX ADMIN — KETOROLAC TROMETHAMINE 15 MG: 30 INJECTION, SOLUTION INTRAMUSCULAR; INTRAVENOUS at 18:44

## 2019-03-19 RX ADMIN — CEFTRIAXONE SODIUM 1 G: 1 INJECTION, POWDER, FOR SOLUTION INTRAMUSCULAR; INTRAVENOUS at 18:44

## 2019-03-19 NOTE — ED PROVIDER NOTES
Patient presents to the ER with family for generalized weakness and fatigue. Patient had a remote hospitalization for an acute CVA. Reports she had been doing fine recently. However, since yesterday she felt more fatigued. Reports her arms and legs both feel heavy. Denies any focal weakness. After having her ophthalmology appointment today where she had her eyes dilated felt that her weakness worsened. She denies any fevers. Does report some mild headache. Denies any abdominal pain or vomiting. Denies any obvious hematochezia or melena The history is provided by the patient. Fatigue This is a new problem. The current episode started yesterday. The problem has been gradually worsening. Pertinent negatives include no focal weakness, no movement disorder, no agitation, no mental status change, no unresponsiveness and no disorientation. There has been no fever. Pertinent negatives include no shortness of breath, no chest pain, no vomiting, no altered mental status, no confusion, no nausea and no bowel incontinence. Associated medical issues include CVA. Past Medical History:  
Diagnosis Date  Abnormal cardiovascular function study 1/7/2016  Allergic rhinitis 1/14/2013  Anemia 11/13/2015  Arthritis  Atrial fibrillation ([facility] [address] 1/14/2013  Blind left eye   
 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  
 stent to LAD; 3 stents total  
 Depression 1/14/2013  GERD (gastroesophageal reflux disease)  Hypercholesteremia 1/14/2013  Hypertension  Hypothyroidism 1/14/2013  Osteoarthritis of back 1/2/2014  Osteoporosis  Pulmonary embolism ([address] 2009  
 after knee surgery  Sick sinus syndrome ([facility] [address] 1/7/2016 Tachycardia-Bradycardia Past Surgical History:  
Procedure Laterality Date  COLONOSCOPY N/A 7/16/2018 COLONOSCOPY performed by Andrei Aparicio MD at [facility]  HX BREAST BIOPSY Bilateral   
 HX CATARACT REMOVAL Right 2010  HX CORONARY STENT PLACEMENT  05/2014 LAD X 3  
 HX HEART CATHETERIZATION  09/17/2015  HX HEENT    
 multiple eye surgeries - left - macular hoe, retinal detachment twice,   
[address]  HX KNEE REPLACEMENT  2009  HX KNEE REPLACEMENT Left 01/12/2017  HX ORTHOPAEDIC  2006 and 2008  
 herniated disc  HX TONSIL AND ADENOIDECTOMY  [id number] Family History:  
Problem Relation Age of Onset  Cancer Mother  Breast Cancer Mother  Cancer Father  Cancer Sister  Cancer Brother   
     pancreas  Breast Cancer Maternal Aunt Social History Socioeconomic History  Marital status:  Spouse name: Not on file  Number of children: Not on file  Years of education: Not on file  Highest education level: Not on file Social Needs  Financial resource strain: Not on file  Food insecurity - worry: Not on file  Food insecurity - inability: Not on file  Transportation needs - medical: Not on file  Transportation needs - non-medical: Not on file Occupational History  Not on file Tobacco Use  Smoking status: Never Smoker  Smokeless tobacco: Never Used Substance and Sexual Activity  Alcohol use: No  
  Alcohol/week: 0.0 oz  Drug use: No  
 Sexual activity: Not on file Other Topics Concern  Not on file Social History Narrative No family/social/cultural issues pertinent to care ALLERGIES: Adhesive Review of Systems Constitutional: Positive for fatigue. HENT: Negative for congestion and dental problem. Eyes: Negative for photophobia, pain and redness. Respiratory: Negative for chest tightness, shortness of breath and stridor. Cardiovascular: Negative for chest pain and leg swelling. Gastrointestinal: Negative for abdominal pain, bowel incontinence, nausea and vomiting. Endocrine: Negative for polyphagia and polyuria. Genitourinary: Negative for flank pain, frequency and urgency. Musculoskeletal: Negative for back pain and gait problem. Skin: Negative for color change and pallor. Neurological: Negative for focal weakness, light-headedness and numbness. Hematological: Negative for adenopathy. Does not bruise/bleed easily. Psychiatric/Behavioral: Negative for agitation and confusion. All other systems reviewed and are negative. Vitals:  
 03/19/19 1453 BP: 158/78 Pulse: 63 Resp: 18 Temp: 97.8 °F (36.6 °C) SpO2: 99% Weight: 65.8 kg (145 lb) Height: 5' 3\" (1.6 m) Physical Exam  
Constitutional: She is oriented to person, place, and time. She appears well-developed and well-nourished. HENT:  
Head: Normocephalic and atraumatic. Eyes: EOM are normal. Pupils are equal, round, and reactive to light. Cardiovascular: Normal rate and regular rhythm. Exam reveals no friction rub. No murmur heard. Pulmonary/Chest: Effort normal and breath sounds normal.  
Abdominal: Soft. Bowel sounds are normal. She exhibits no distension. There is no tenderness. Neurological: She is alert and oriented to person, place, and time. No cranial nerve deficit. Skin: Skin is warm. No erythema. Nursing note and vitals reviewed. MDM Number of Diagnoses or Management Options Diagnosis management comments: Nonfocal neurological exam 
We will obtain CT scan of head, basic labs as well as urinalysis 6:39 PM 
Labs fairly stable including a normal hemoglobin and hematocrit. Normal chemistry panel. CT scan of head shows no acute abnormalities. Urinalysis concerning for possible UTI. Discussed with family results of testing. We will treat her with antibiotics, fluids and Toradol. Reassess Amount and/or Complexity of Data Reviewed Clinical lab tests: ordered and reviewed Tests in the radiology section of CPT®: ordered and reviewed Risk of Complications, Morbidity, and/or Mortality Presenting problems: moderate Diagnostic procedures: low Management options: low Patient Progress Patient progress: stable Procedures Results Include: 
 
Recent Results (from the past 24 hour(s)) CBC WITH AUTOMATED DIFF Collection Time: 03/19/19  3:13 PM  
Result Value Ref Range WBC 6.8 4.3 - 11.1 K/uL  
 RBC 3.84 (L) 4.05 - 5.2 M/uL  
 HGB 11.1 (L) 11.7 - 15.4 g/dL HCT 34.0 (L) 35.8 - 46.3 % MCV 88.5 79.6 - 97.8 FL  
 MCH 28.9 26.1 - 32.9 PG  
 MCHC 32.6 31.4 - 35.0 g/dL  
 RDW 14.2 11.9 - 14.6 % PLATELET 410 412 - 643 K/uL MPV 12.0 9.4 - 12.3 FL ABSOLUTE NRBC 0.00 0.0 - 0.2 K/uL  
 DF AUTOMATED NEUTROPHILS 47 43 - 78 % LYMPHOCYTES 39 13 - 44 % MONOCYTES 11 4.0 - 12.0 % EOSINOPHILS 2 0.5 - 7.8 % BASOPHILS 1 0.0 - 2.0 % IMMATURE GRANULOCYTES 0 0.0 - 5.0 %  
 ABS. NEUTROPHILS 3.2 1.7 - 8.2 K/UL  
 ABS. LYMPHOCYTES 2.7 0.5 - 4.6 K/UL  
 ABS. MONOCYTES 0.8 0.1 - 1.3 K/UL  
 ABS. EOSINOPHILS 0.1 0.0 - 0.8 K/UL  
 ABS. BASOPHILS 0.1 0.0 - 0.2 K/UL  
 ABS. IMM. GRANS. 0.0 0.0 - 0.5 K/UL METABOLIC PANEL, COMPREHENSIVE Collection Time: 03/19/19  3:13 PM  
Result Value Ref Range Sodium 140 136 - 145 mmol/L Potassium 4.0 3.5 - 5.1 mmol/L Chloride 107 98 - 107 mmol/L  
 CO2 22 21 - 32 mmol/L Anion gap 11 7 - 16 mmol/L Glucose 92 65 - 100 mg/dL BUN 20 8 - 23 MG/DL Creatinine 0.85 0.6 - 1.0 MG/DL  
 GFR est AA >60 >60 ml/min/1.73m2 GFR est non-AA >60 >60 ml/min/1.73m2 Calcium 9.6 8.3 - 10.4 MG/DL Bilirubin, total 0.8 0.2 - 1.1 MG/DL  
 ALT (SGPT) 23 12 - 65 U/L  
 AST (SGOT) 24 15 - 37 U/L Alk. phosphatase 74 50 - 136 U/L Protein, total 7.2 6.3 - 8.2 g/dL Albumin 3.0 (L) 3.2 - 4.6 g/dL Globulin 4.2 (H) 2.3 - 3.5 g/dL A-G Ratio 0.7 (L) 1.2 - 3.5 Voice dictation software was used during the making of this note.   This software is not perfect and grammatical and other typographical errors may be present. This note has been proofread, but may still contain errors.  
Ashley Andersen MD; 3/19/2019 @6:40 PM  
===================================================================

## 2019-03-19 NOTE — DISCHARGE INSTRUCTIONS
Take medications as prescribed  Drink plenty of fluids  Call and arrange follow-up with your primary care physician    Fatigue: Care Instructions  Your Care Instructions    Fatigue is a feeling of tiredness, exhaustion, or lack of energy. You may feel fatigue because of too much or not enough activity. It can also come from stress, lack of sleep, boredom, and poor diet. Many medical problems, such as viral infections, can cause fatigue. Emotional problems, especially depression, are often the cause of fatigue. Fatigue is most often a symptom of another problem. Treatment for fatigue depends on the cause. For example, if you have fatigue because you have a certain health problem, treating this problem also treats your fatigue. If depression or anxiety is the cause, treatment may help. Follow-up care is a key part of your treatment and safety. Be sure to make and go to all appointments, and call your doctor if you are having problems. It's also a good idea to know your test results and keep a list of the medicines you take. How can you care for yourself at home? · Get regular exercise. But don't overdo it. Go back and forth between rest and exercise. · Get plenty of rest.  · Eat a healthy diet. Do not skip meals, especially breakfast.  · Reduce your use of caffeine, tobacco, and alcohol. Caffeine is most often found in coffee, tea, cola drinks, and chocolate. · Limit medicines that can cause fatigue. This includes tranquilizers and cold and allergy medicines. When should you call for help? Watch closely for changes in your health, and be sure to contact your doctor if:    · You have new symptoms such as fever or a rash.     · Your fatigue gets worse.     · You have been feeling down, depressed, or hopeless. Or you may have lost interest in things that you usually enjoy.     · You are not getting better as expected. Where can you learn more? Go to http://gómez-maxine.info/.   Enter E129 in the search box to learn more about \"Fatigue: Care Instructions. \"  Current as of: September 23, 2018  Content Version: 11.9  © 2765-2896 [employer]. Care instructions adapted under license by [employer] (which disclaims liability or warranty for this information). If you have questions about a medical condition or this instruction, always ask your healthcare professional. [address] any warranty or liability for your use of this information. Patient Education        Headache: Care Instructions  Your Care Instructions    Headaches have many possible causes. Most headaches aren't a sign of a more serious problem, and they will get better on their own. Home treatment may help you feel better faster. The doctor has checked you carefully, but problems can develop later. If you notice any problems or new symptoms, get medical treatment right away. Follow-up care is a key part of your treatment and safety. Be sure to make and go to all appointments, and call your doctor if you are having problems. It's also a good idea to know your test results and keep a list of the medicines you take. How can you care for yourself at home? · Do not drive if you have taken a prescription pain medicine. · Rest in a quiet, dark room until your headache is gone. Close your eyes and try to relax or go to sleep. Don't watch TV or read. · Put a cold, moist cloth or cold pack on the painful area for 10 to 20 minutes at a time. Put a thin cloth between the cold pack and your skin. · Use a warm, moist towel or a heating pad set on low to relax tight shoulder and neck muscles. · Have someone gently massage your neck and shoulders. · Take pain medicines exactly as directed. ? If the doctor gave you a prescription medicine for pain, take it as prescribed. ? If you are not taking a prescription pain medicine, ask your doctor if you can take an over-the-counter medicine.   · Be careful not to take pain medicine more often than the instructions allow, because you may get worse or more frequent headaches when the medicine wears off. · Do not ignore new symptoms that occur with a headache, such as a fever, weakness or numbness, vision changes, or confusion. These may be signs of a more serious problem. To prevent headaches  · Keep a headache diary so you can figure out what triggers your headaches. Avoiding triggers may help you prevent headaches. Record when each headache began, how long it lasted, and what the pain was like (throbbing, aching, stabbing, or dull). Write down any other symptoms you had with the headache, such as nausea, flashing lights or dark spots, or sensitivity to bright light or loud noise. Note if the headache occurred near your period. List anything that might have triggered the headache, such as certain foods (chocolate, cheese, wine) or odors, smoke, bright light, stress, or lack of sleep. · Find healthy ways to deal with stress. Headaches are most common during or right after stressful times. Take time to relax before and after you do something that has caused a headache in the past.  · Try to keep your muscles relaxed by keeping good posture. Check your jaw, face, neck, and shoulder muscles for tension, and try relaxing them. When sitting at a desk, change positions often, and stretch for 30 seconds each hour. · Get plenty of sleep and exercise. · Eat regularly and well. Long periods without food can trigger a headache. · Treat yourself to a massage. Some people find that regular massages are very helpful in relieving tension. · Limit caffeine by not drinking too much coffee, tea, or soda. But don't quit caffeine suddenly, because that can also give you headaches. · Reduce eyestrain from computers by blinking frequently and looking away from the computer screen every so often.  Make sure you have proper eyewear and that your monitor is set up properly, about an arm's length away. · Seek help if you have depression or anxiety. Your headaches may be linked to these conditions. Treatment can both prevent headaches and help with symptoms of anxiety or depression. When should you call for help? Call 911 anytime you think you may need emergency care. For example, call if:    · You have signs of a stroke. These may include:  ? Sudden numbness, paralysis, or weakness in your face, arm, or leg, especially on only one side of your body. ? Sudden vision changes. ? Sudden trouble speaking. ? Sudden confusion or trouble understanding simple statements. ? Sudden problems with walking or balance. ? A sudden, severe headache that is different from past headaches.    Call your doctor now or seek immediate medical care if:    · You have a new or worse headache.     · Your headache gets much worse. Where can you learn more? Go to http://gómez-maxine.info/. Enter M271 in the search box to learn more about \"Headache: Care Instructions. \"  Current as of: Myriam 3, 2018  Content Version: 11.9  © 0206-5567 [employer]. Care instructions adapted under license by [employer] (which disclaims liability or warranty for this information). If you have questions about a medical condition or this instruction, always ask your healthcare professional. [place] [address] any warranty or liability for your use of this information. Patient Education        Urinary Tract Infection in Women: Care Instructions  Your Care Instructions    A urinary tract infection, or UTI, is a general term for an infection anywhere between the kidneys and the urethra (where urine comes out). Most UTIs are bladder infections. They often cause pain or burning when you urinate. UTIs are caused by bacteria and can be cured with antibiotics. Be sure to complete your treatment so that the infection goes away.   Follow-up care is a key part of your treatment and safety. Be sure to make and go to all appointments, and call your doctor if you are having problems. It's also a good idea to know your test results and keep a list of the medicines you take. How can you care for yourself at home? · Take your antibiotics as directed. Do not stop taking them just because you feel better. You need to take the full course of antibiotics. · Drink extra water and other fluids for the next day or two. This may help wash out the bacteria that are causing the infection. (If you have kidney, heart, or liver disease and have to limit fluids, talk with your doctor before you increase your fluid intake.)  · Avoid drinks that are carbonated or have caffeine. They can irritate the bladder. · Urinate often. Try to empty your bladder each time. · To relieve pain, take a hot bath or lay a heating pad set on low over your lower belly or genital area. Never go to sleep with a heating pad in place. To prevent UTIs  · Drink plenty of water each day. This helps you urinate often, which clears bacteria from your system. (If you have kidney, heart, or liver disease and have to limit fluids, talk with your doctor before you increase your fluid intake.)  · Urinate when you need to. · Urinate right after you have sex. · Change sanitary pads often. · Avoid douches, bubble baths, feminine hygiene sprays, and other feminine hygiene products that have deodorants. · After going to the bathroom, wipe from front to back. When should you call for help? Call your doctor now or seek immediate medical care if:    · Symptoms such as fever, chills, nausea, or vomiting get worse or appear for the first time.     · You have new pain in your back just below your rib cage.  This is called flank pain.     · There is new blood or pus in your urine.     · You have any problems with your antibiotic medicine.    Watch closely for changes in your health, and be sure to contact your doctor if:    · You are not getting better after taking an antibiotic for 2 days.     · Your symptoms go away but then come back. Where can you learn more? Go to http://gómez-maxine.info/. Enter J220 in the search box to learn more about \"Urinary Tract Infection in Women: Care Instructions. \"  Current as of: March 20, 2018  Content Version: 11.9  © 5279-0730 [employer]. Care instructions adapted under license by [employer] (which disclaims liability or warranty for this information). If you have questions about a medical condition or this instruction, always ask your healthcare professional. [place] [address] any warranty or liability for your use of this information.

## 2019-03-19 NOTE — ED TRIAGE NOTES
Pt reports coming from eye doctor office, where pupils were dilated. Reports increased weakness all over and headache. Stroke assessment completed in triage by this RN. No unilateral  strength noted. No difficulty speaking or facial droop. MD to triage for assessment

## 2019-03-20 NOTE — ED NOTES
I have reviewed discharge instructions with the patient. The patient verbalized understanding. Patient left ED via Discharge Method: wheelchair to Home with daughter, driving patient home. Opportunity for questions and clarification provided. Patient given 1 scripts. To continue your aftercare when you leave the hospital, you may receive an automated call from our care team to check in on how you are doing. This is a free service and part of our promise to provide the best care and service to meet your aftercare needs.  If you have questions, or wish to unsubscribe from this service please call [phone]. Thank you for Choosing our [facility] Emergency Department.

## 2019-03-21 ENCOUNTER — PATIENT OUTREACH (OUTPATIENT)
Dept: CASE MANAGEMENT | Age: 84
End: 2019-03-21

## 2019-03-21 PROBLEM — Z92.82 RECEIVED INTRAVENOUS TISSUE PLASMINOGEN ACTIVATOR (TPA) IN EMERGENCY DEPARTMENT: Status: RESOLVED | Noted: 2019-03-13 | Resolved: 2019-03-21

## 2019-03-21 NOTE — PROGRESS NOTES
Direct PCP Referral 
Patient was in for PCP follow up visit today Initial assessment completed via telephone - spoke with   reports: The two of them reside at [address] [place] [address] Street Their residence provides opportunity for emergency back up assistance should they need it - such as for a fall. Daughter lives in the area - very supportive Patient has had 1 fall in last year - no injuries  states no residual from CVA - hospitalized 3/13 - 3/16 Hospital discharge notes state Home with [place] -  denies that [place] is in the home, [facility] [place] Call also states no HH. Patient has a cane that she is using when out of the house.  reports that the help they need right now is with cooking. However, they do have access to the [address] dining room Patient is not having an difficulties swallowing medications (or food). Plan - call in next 10-14 days Assess for [place] needs - in the meantime determine if patient is eligible for PT/OT services at [address].

## 2019-03-22 LAB
BACTERIA SPEC CULT: NORMAL
SERVICE CMNT-IMP: NORMAL

## 2019-04-10 ENCOUNTER — APPOINTMENT (OUTPATIENT)
Dept: CT IMAGING | Age: 84
DRG: 069 | End: 2019-04-10
Attending: STUDENT IN AN ORGANIZED HEALTH CARE EDUCATION/TRAINING PROGRAM
Payer: MEDICARE

## 2019-04-10 ENCOUNTER — APPOINTMENT (OUTPATIENT)
Dept: GENERAL RADIOLOGY | Age: 84
DRG: 069 | End: 2019-04-10
Attending: STUDENT IN AN ORGANIZED HEALTH CARE EDUCATION/TRAINING PROGRAM
Payer: MEDICARE

## 2019-04-10 ENCOUNTER — HOSPITAL ENCOUNTER (INPATIENT)
Age: 84
LOS: 1 days | Discharge: HOME HEALTH | DRG: 069 | End: 2019-04-13
Attending: STUDENT IN AN ORGANIZED HEALTH CARE EDUCATION/TRAINING PROGRAM | Admitting: INTERNAL MEDICINE
Payer: MEDICARE

## 2019-04-10 DIAGNOSIS — F51.01 PRIMARY INSOMNIA: ICD-10-CM

## 2019-04-10 DIAGNOSIS — G45.9 TIA (TRANSIENT ISCHEMIC ATTACK): Primary | ICD-10-CM

## 2019-04-10 LAB
ALBUMIN SERPL-MCNC: 3.6 G/DL (ref 3.2–4.6)
ALBUMIN/GLOB SERPL: 1.1 {RATIO}
ALP SERPL-CCNC: 74 U/L (ref 50–130)
ALT SERPL-CCNC: 29 U/L (ref 12–65)
ANION GAP SERPL CALC-SCNC: 11 MMOL/L
APTT PPP: 25.7 SEC (ref 24.7–39.8)
AST SERPL-CCNC: 27 U/L (ref 15–37)
ATRIAL RATE: 63 BPM
BASOPHILS # BLD: 0 K/UL (ref 0–0.2)
BASOPHILS NFR BLD: 1 % (ref 0–2)
BILIRUB SERPL-MCNC: 0.8 MG/DL (ref 0.2–1.1)
BUN SERPL-MCNC: 22 MG/DL (ref 8–23)
CALCIUM SERPL-MCNC: 10 MG/DL (ref 8.3–10.4)
CALCULATED P AXIS, ECG09: 97 DEGREES
CALCULATED R AXIS, ECG10: 34 DEGREES
CALCULATED T AXIS, ECG11: 48 DEGREES
CHLORIDE SERPL-SCNC: 108 MMOL/L (ref 98–107)
CO2 SERPL-SCNC: 21 MMOL/L (ref 21–32)
CREAT SERPL-MCNC: 0.98 MG/DL (ref 0.6–1)
DIAGNOSIS, 93000: NORMAL
DIFFERENTIAL METHOD BLD: ABNORMAL
EOSINOPHIL # BLD: 0.1 K/UL (ref 0–0.8)
EOSINOPHIL NFR BLD: 1 % (ref 0.5–7.8)
ERYTHROCYTE [DISTWIDTH] IN BLOOD BY AUTOMATED COUNT: 16 % (ref 11.9–14.6)
GLOBULIN SER CALC-MCNC: 3.3 G/DL (ref 2.3–3.5)
GLUCOSE BLD STRIP.AUTO-MCNC: 108 MG/DL (ref 65–100)
GLUCOSE SERPL-MCNC: 100 MG/DL (ref 65–100)
HCT VFR BLD AUTO: 26.9 % (ref 35.8–46.3)
HGB BLD-MCNC: 8.4 G/DL (ref 11.7–15.4)
IMM GRANULOCYTES # BLD AUTO: 0 K/UL (ref 0–0.5)
IMM GRANULOCYTES NFR BLD AUTO: 0 % (ref 0–5)
INR BLD: 1.1 (ref 0.9–1.2)
INR PPP: 1.3
LYMPHOCYTES # BLD: 2.1 K/UL (ref 0.5–4.6)
LYMPHOCYTES NFR BLD: 33 % (ref 13–44)
MCH RBC QN AUTO: 28.6 PG (ref 26.1–32.9)
MCHC RBC AUTO-ENTMCNC: 31.2 G/DL (ref 31.4–35)
MCV RBC AUTO: 91.5 FL (ref 79.6–97.8)
MONOCYTES # BLD: 0.7 K/UL (ref 0.1–1.3)
MONOCYTES NFR BLD: 11 % (ref 4–12)
NEUTS SEG # BLD: 3.4 K/UL (ref 1.7–8.2)
NEUTS SEG NFR BLD: 54 % (ref 43–78)
NRBC # BLD: 0 K/UL (ref 0–0.2)
P-R INTERVAL, ECG05: 184 MS
PLATELET # BLD AUTO: 232 K/UL (ref 150–450)
PMV BLD AUTO: 11.4 FL (ref 9.4–12.3)
POTASSIUM SERPL-SCNC: 4.2 MMOL/L (ref 3.5–5.1)
PROT SERPL-MCNC: 6.9 G/DL
PROTHROMBIN TIME: 15.9 SEC (ref 11.7–14.5)
PT BLD: 13.1 SECS (ref 9.6–11.6)
Q-T INTERVAL, ECG07: 480 MS
QRS DURATION, ECG06: 90 MS
QTC CALCULATION (BEZET), ECG08: 491 MS
RBC # BLD AUTO: 2.94 M/UL (ref 4.05–5.2)
SODIUM SERPL-SCNC: 140 MMOL/L (ref 136–145)
TROPONIN I BLD-MCNC: 0.01 NG/ML (ref 0.02–0.05)
VENTRICULAR RATE, ECG03: 63 BPM
WBC # BLD AUTO: 6.4 K/UL (ref 4.3–11.1)

## 2019-04-10 PROCEDURE — 99285 EMERGENCY DEPT VISIT HI MDM: CPT | Performed by: STUDENT IN AN ORGANIZED HEALTH CARE EDUCATION/TRAINING PROGRAM

## 2019-04-10 PROCEDURE — 70450 CT HEAD/BRAIN W/O DYE: CPT

## 2019-04-10 PROCEDURE — 85025 COMPLETE CBC W/AUTO DIFF WBC: CPT

## 2019-04-10 PROCEDURE — 85730 THROMBOPLASTIN TIME PARTIAL: CPT

## 2019-04-10 PROCEDURE — 93005 ELECTROCARDIOGRAM TRACING: CPT | Performed by: STUDENT IN AN ORGANIZED HEALTH CARE EDUCATION/TRAINING PROGRAM

## 2019-04-10 PROCEDURE — 74011250637 HC RX REV CODE- 250/637: Performed by: INTERNAL MEDICINE

## 2019-04-10 PROCEDURE — 84484 ASSAY OF TROPONIN QUANT: CPT

## 2019-04-10 PROCEDURE — 71045 X-RAY EXAM CHEST 1 VIEW: CPT

## 2019-04-10 PROCEDURE — 99218 HC RM OBSERVATION: CPT

## 2019-04-10 PROCEDURE — 80053 COMPREHEN METABOLIC PANEL: CPT

## 2019-04-10 PROCEDURE — 85610 PROTHROMBIN TIME: CPT

## 2019-04-10 PROCEDURE — 82962 GLUCOSE BLOOD TEST: CPT

## 2019-04-10 RX ORDER — ESCITALOPRAM OXALATE 10 MG/1
10 TABLET ORAL DAILY
Status: DISCONTINUED | OUTPATIENT
Start: 2019-04-11 | End: 2019-04-12 | Stop reason: HOSPADM

## 2019-04-10 RX ORDER — ZOLPIDEM TARTRATE 5 MG/1
10 TABLET ORAL
Status: DISCONTINUED | OUTPATIENT
Start: 2019-04-10 | End: 2019-04-10

## 2019-04-10 RX ORDER — SODIUM CHLORIDE 0.9 % (FLUSH) 0.9 %
5-40 SYRINGE (ML) INJECTION AS NEEDED
Status: DISCONTINUED | OUTPATIENT
Start: 2019-04-10 | End: 2019-04-12 | Stop reason: HOSPADM

## 2019-04-10 RX ORDER — SODIUM CHLORIDE 0.9 % (FLUSH) 0.9 %
5-40 SYRINGE (ML) INJECTION EVERY 8 HOURS
Status: DISCONTINUED | OUTPATIENT
Start: 2019-04-10 | End: 2019-04-12 | Stop reason: HOSPADM

## 2019-04-10 RX ORDER — LISINOPRIL 20 MG/1
20 TABLET ORAL DAILY
Status: DISCONTINUED | OUTPATIENT
Start: 2019-04-11 | End: 2019-04-12 | Stop reason: HOSPADM

## 2019-04-10 RX ORDER — FAMOTIDINE 20 MG/1
20 TABLET, FILM COATED ORAL 2 TIMES DAILY
Status: DISCONTINUED | OUTPATIENT
Start: 2019-04-10 | End: 2019-04-11

## 2019-04-10 RX ORDER — ACETAMINOPHEN 325 MG/1
650 TABLET ORAL
Status: DISCONTINUED | OUTPATIENT
Start: 2019-04-10 | End: 2019-04-12 | Stop reason: HOSPADM

## 2019-04-10 RX ORDER — LEVOTHYROXINE SODIUM 50 UG/1
50 TABLET ORAL
Status: DISCONTINUED | OUTPATIENT
Start: 2019-04-11 | End: 2019-04-12 | Stop reason: HOSPADM

## 2019-04-10 RX ORDER — HYDRALAZINE HYDROCHLORIDE 25 MG/1
25 TABLET, FILM COATED ORAL
Status: DISCONTINUED | OUTPATIENT
Start: 2019-04-10 | End: 2019-04-12 | Stop reason: HOSPADM

## 2019-04-10 RX ORDER — GUAIFENESIN 100 MG/5ML
81 LIQUID (ML) ORAL DAILY
Status: DISCONTINUED | OUTPATIENT
Start: 2019-04-11 | End: 2019-04-12 | Stop reason: HOSPADM

## 2019-04-10 RX ORDER — PRAVASTATIN SODIUM 20 MG/1
80 TABLET ORAL
Status: DISCONTINUED | OUTPATIENT
Start: 2019-04-10 | End: 2019-04-12 | Stop reason: HOSPADM

## 2019-04-10 RX ORDER — METOPROLOL SUCCINATE 25 MG/1
12.5 TABLET, EXTENDED RELEASE ORAL DAILY
Status: DISCONTINUED | OUTPATIENT
Start: 2019-04-11 | End: 2019-04-12 | Stop reason: HOSPADM

## 2019-04-10 RX ADMIN — Medication 10 ML: at 22:57

## 2019-04-10 RX ADMIN — FAMOTIDINE 20 MG: 20 TABLET ORAL at 21:04

## 2019-04-10 RX ADMIN — PRAVASTATIN SODIUM 80 MG: 20 TABLET ORAL at 21:04

## 2019-04-10 NOTE — ED NOTES
TRANSFER - OUT REPORT: 
 
Verbal report given to Dank(name) on Cinthia Thurman  being transferred to (unit) for routine progression of care Report consisted of patients Situation, Background, Assessment and  
Recommendations(SBAR). Information from the following report(s) ED Summary was reveiwed with the receiving nurse. Opportunity for questions and clarification was provided. Ischemic Stroke without Activase/TIA BP Parameters: Less Than 220/120 for 24 hours, then consult MD for parameters Controlled With: None Dysphagia Screen Completed: Yes: Pass Dysphagia Screening Vocal Quality/Secretions: Normal 
History of Dysphagia: No 
O2 Saturation: Normal 
Alertness: Normal 
Pre-Swallow Assessment Score: 0 Purees: No difficulty noted Water by Cup: No difficulty noted Water by Straw: No difficulty noted NIH Stroke Scale Complete: yes Frequency of Vital Signs: Every 30 minutes for 6 hours Frequency of Neuro Checks: Every 30 minutes for 6 hours

## 2019-04-10 NOTE — H&P
Hospitalist H&P Note Admit Date:  4/10/2019  4:11 PM  
Name:  Chloe Weber Age:  80 y.o. 
:  1929 MRN:  [id number] PCP:  Ximena Gates MD 
Treatment Team: Attending Provider: Mary Jade; Primary Nurse: Reena Knox, RN 
CC: blurred vision memory impairment and slurred speech today -  
HPI:  
80yr female with pmhx sig for cva s/p tpa on 2019, afib on xarelto- awaiting eval for watchman was at home using the computer when she developed blurred vision, difficulty remembering what she was doing and memory impairment lasted about 15 min - family brought to the er. Symptoms resolved by the time she was seen. Ct head negative - hospitalist asked to admit 10 systems reviewed and negative except as noted in HPI. Past Medical History:  
Diagnosis Date  Abnormal cardiovascular function study 2016  Allergic rhinitis 2013  Anemia 2015  Arthritis  Atrial fibrillation ([address] 2013  Blind left eye   
 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  
 stent to LAD; 3 stents total  
 Depression 2013  GERD (gastroesophageal reflux disease)  Hypercholesteremia 2013  Hypertension  Hypothyroidism 2013  Osteoarthritis of back 2014  Osteoporosis  Pulmonary embolism ([address] 2009  
 after knee surgery  Sick sinus syndrome ([address] 2016 Tachycardia-Bradycardia Past Surgical History:  
Procedure Laterality Date  COLONOSCOPY N/A 2018 COLONOSCOPY performed by Nena Merino MD at [address]  HX BREAST BIOPSY Bilateral   
 HX CATARACT REMOVAL Right   HX CORONARY STENT PLACEMENT  2014 LAD X 3  
 HX HEART CATHETERIZATION  2015  HX HEENT    
 multiple eye surgeries - left - macular hoe, retinal detachment twice,   
[address]  HX KNEE REPLACEMENT    HX KNEE REPLACEMENT Left 01/12/2017  HX ORTHOPAEDIC  2006 and 2008  
 herniated disc  HX TONSIL AND ADENOIDECTOMY  [id number] Allergies Allergen Reactions  Adhesive Rash Social History Tobacco Use  Smoking status: Never Smoker  Smokeless tobacco: Never Used Substance Use Topics  Alcohol use: No  
  Alcohol/week: 0.0 oz Family History Problem Relation Age of Onset  Cancer Mother  Breast Cancer Mother  Cancer Father  Cancer Sister  Cancer Brother   
     pancreas  Breast Cancer Maternal Aunt Immunization History Administered Date(s) Administered  Influenza High Dose Vaccine PF 11/05/2015, 11/09/2016, 11/13/2017, 09/06/2018  Influenza Vaccine 10/17/2013, 10/27/2014  Pneumococcal Conjugate (PCV-13) 01/15/2016  Pneumococcal Polysaccharide (PPSV-23) 05/16/2018  TB Skin Test (PPD) Intradermal 01/12/2017  Tdap 06/01/2014 PTA Medications: 
Prior to Admission Medications Prescriptions Last Dose Informant Patient Reported? Taking? Biotin 2,500 mcg cap   Yes No  
Sig: Take  by mouth. Calcium Carbonate-Vit D3-Min (CALTRATE 600+D PLUS MINERALS) 600 mg calcium- 400 unit Tab   Yes No  
Sig: Take  by mouth daily. VIT A/VIT C/VIT E/ZINC/COPPER (ICAPS AREDS PO)   Yes No  
Sig: Take  by mouth. Twice a day  
acetaminophen (TYLENOL ARTHRITIS PAIN) 650 mg CR tablet   Yes No  
Sig: Take 650 mg by mouth every six (6) hours as needed for Pain. aspirin delayed-release 81 mg tablet   No No  
Sig: Take 1 Tab by mouth daily. atorvastatin (LIPITOR) 40 mg tablet   No No  
Sig: Take 1 Tab by mouth daily. TAKE 1 TABLET EVERY DAY  
escitalopram oxalate (LEXAPRO) 10 mg tablet   No No  
Sig: Take 1 Tab by mouth daily. levothyroxine (SYNTHROID) 50 mcg tablet   No No  
Sig: Take 1 Tab by mouth Daily (before breakfast). lisinopril (PRINIVIL, ZESTRIL) 20 mg tablet   No No  
Sig: Take 1 Tab by mouth daily. metoprolol succinate (TOPROL-XL) 25 mg XL tablet   No No  
Sig: Take 0.5 Tabs by mouth daily. multivitamin (ONE A DAY) tablet   Yes No  
Sig: Take 1 Tab by mouth daily. nitrofurantoin (MACRODANTIN) 50 mg capsule   Yes No  
Sig: Take  by mouth four (4) times daily. nitroglycerin (NITROSTAT) 0.3 mg SL tablet   No No  
Si Tab by SubLINGual route every five (5) minutes as needed for Chest Pain.  
potassium 99 mg tablet   Yes No  
Sig: Take 99 mg by mouth daily. raNITIdine (ZANTAC) 150 mg tablet   Yes No  
Sig: Take 150 mg by mouth two (2) times a day. rivaroxaban (XARELTO) 20 mg tab tablet   No No  
Sig: Take 1 Tab by mouth daily (with dinner). vit C/vit E/lutein/min/omega-3 (OCUVITE PO)   Yes No  
Sig: Take 1 Cap by mouth daily. vitamin E (AQUA GEMS) 400 unit capsule   Yes No  
Sig: Take  by mouth daily. zolpidem (AMBIEN) 10 mg tablet   No No  
Sig: Take 1 Tab by mouth nightly as needed for Sleep. Max Daily Amount: 10 mg. Facility-Administered Medications: None Objective:  
 
Patient Vitals for the past 24 hrs: 
 Temp Pulse Resp BP SpO2  
04/10/19 1621     99 % 04/10/19 1614 98.7 °F (37.1 °C) (!) 57 20 186/79 99 % Oxygen Therapy O2 Sat (%): 99 % (04/10/19 162) O2 Device: Room air (04/10/19 1621) No intake or output data in the 24 hours ending 04/10/19 1817 Physical Exam: 
General:    Well nourished. Alert. Eyes:   Normal sclera. Extraocular movements intact. ENT:  Normocephalic, atraumatic. Moist mucous membranes CV:   RRR. No m/r/g. Peripheral pulses present. Capillary refill normal 
Lungs:  CTAB. No wheezing, rhonchi, or rales. Abdomen: Soft, nontender, nondistended. Bowel sounds normal.  
Extremities: Warm and dry. No cyanosis or edema. Neurologic: CN II-XII grossly intact. Sensation intact. Skin:     No rashes or jaundice. Normal coloration Psych:  Normal mood and affect.  
 
I reviewed the labs, imaging, EKGs, telemetry, and other studies done this admission. Data Review:  
Recent Results (from the past 24 hour(s)) CBC WITH AUTOMATED DIFF Collection Time: 04/10/19  4:40 PM  
Result Value Ref Range WBC 6.4 4.3 - 11.1 K/uL  
 RBC 2.94 (L) 4.05 - 5.2 M/uL HGB 8.4 (L) 11.7 - 15.4 g/dL HCT 26.9 (L) 35.8 - 46.3 % MCV 91.5 79.6 - 97.8 FL  
 MCH 28.6 26.1 - 32.9 PG  
 MCHC 31.2 (L) 31.4 - 35.0 g/dL  
 RDW 16.0 (H) 11.9 - 14.6 % PLATELET 917 899 - 075 K/uL MPV 11.4 9.4 - 12.3 FL ABSOLUTE NRBC 0.00 0.0 - 0.2 K/uL  
 DF AUTOMATED NEUTROPHILS 54 43 - 78 % LYMPHOCYTES 33 13 - 44 % MONOCYTES 11 4.0 - 12.0 % EOSINOPHILS 1 0.5 - 7.8 % BASOPHILS 1 0.0 - 2.0 % IMMATURE GRANULOCYTES 0 0.0 - 5.0 %  
 ABS. NEUTROPHILS 3.4 1.7 - 8.2 K/UL  
 ABS. LYMPHOCYTES 2.1 0.5 - 4.6 K/UL  
 ABS. MONOCYTES 0.7 0.1 - 1.3 K/UL  
 ABS. EOSINOPHILS 0.1 0.0 - 0.8 K/UL  
 ABS. BASOPHILS 0.0 0.0 - 0.2 K/UL  
 ABS. IMM. GRANS. 0.0 0.0 - 0.5 K/UL METABOLIC PANEL, COMPREHENSIVE Collection Time: 04/10/19  4:40 PM  
Result Value Ref Range Sodium 140 136 - 145 mmol/L Potassium 4.2 3.5 - 5.1 mmol/L Chloride 108 (H) 98 - 107 mmol/L  
 CO2 21 21 - 32 mmol/L Anion gap 11 mmol/L Glucose 100 65 - 100 mg/dL BUN 22 8 - 23 MG/DL Creatinine 0.98 0.6 - 1.0 MG/DL  
 GFR est AA >60 >60 ml/min/1.73m2 GFR est non-AA 57 ml/min/1.73m2 Calcium 10.0 8.3 - 10.4 MG/DL Bilirubin, total 0.8 0.2 - 1.1 MG/DL  
 ALT (SGPT) 29 12 - 65 U/L  
 AST (SGOT) 27 15 - 37 U/L Alk. phosphatase 74 50 - 130 U/L Protein, total 6.9 g/dL Albumin 3.6 3.2 - 4.6 g/dL Globulin 3.3 2.3 - 3.5 g/dL A-G Ratio 1.1 PROTHROMBIN TIME + INR Collection Time: 04/10/19  4:40 PM  
Result Value Ref Range Prothrombin time 15.9 (H) 11.7 - 14.5 sec INR 1.3 PTT Collection Time: 04/10/19  4:40 PM  
Result Value Ref Range aPTT 25.7 24.7 - 39.8 SEC  
POC PT/INR Collection Time: 04/10/19  4:54 PM  
Result Value Ref Range Prothrombin time (POC) 13.1 (H) 9.6 - 11.6 SECS  
 INR (POC) 1.1 0.9 - 1.2 POC TROPONIN-I Collection Time: 04/10/19  4:54 PM  
Result Value Ref Range Troponin-I (POC) 0.01 (L) 0.02 - 0.05 ng/ml GLUCOSE, POC Collection Time: 04/10/19  4:55 PM  
Result Value Ref Range Glucose (POC) 108 (H) 65 - 100 mg/dL All Micro Results None Other Studies: 
Ct Head Wo Cont Result Date: 4/10/2019 NONCONTRAST HEAD CT CLINICAL HISTORY:  Slurred speech with recent TPA for stroke. TECHNIQUE:  Axial images were obtained with spiral technique. Radiation dose reduction was achieved using one or all of the following techniques: automated exposure control, weight-based dosing, iterative reconstruction. COMPARISON:  March 19, 2019. REPORT:   Standard non contrast head CT demonstrates no definite intracranial mass effect, hemorrhage, or evidence of acute geographic infarction. White matter hypodensities are most consistent with small vessel ischemic disease. The ventricles are normal in size and configuration, accounting for the patient's age. Prominent mucosal thickening is again noted in the right maxillary antrum. The other paranasal sinuses are clear where imaged. Hyperdense left globe is again noted. Bone windows demonstrate no definite fracture or destruction. IMPRESSION:     SMALL VESSEL ISCHEMIC DISEASE WITH NO ACUTE INTRACRANIAL ABNORMALITY IDENTIFIED AT NONCONTRAST CT. Xr Chest [facility] Result Date: 4/10/2019 CHEST X-RAY, single portable view  4/10/2019 History: Slurred speech. Technique: Single frontal view of the chest. Comparison: Chest x-ray 2/15/2018 Findings: The cardiac silhouette is mildly enlarged although stable. The lungs are expanded without evidence for pneumothorax. No consolidative airspace process or pleural effusion is seen. Only new mild basilar atelectatic appearing changes are seen.   
 
IMPRESSION: 1.  Stable cardiomegaly and new mild basilar atelectatic appearing changes. No significant acute findings are seen. Assessment and Plan:  
 
Hospital Problems as of 4/10/2019 Date Reviewed: 4/1/2019 Codes Class Noted - Resolved POA  
 TIA (transient ischemic attack) ICD-10-CM: G45.9 ICD-9-CM: 435.9  4/10/2019 - Present Unknown CVA (cerebral vascular accident) [facility]) ICD-10-CM: I63.9 ICD-9-CM: 434.91  3/13/2019 - Present Unknown PLAN: 
· TIA vs cva- admit, continue secondary ppx, neuro consult mri- - recent full workup and suspected symptoms secondary to afib- awaiting eval for watchman · continue other appropriate home meds · Further workup and mgt based on her clinical course DVT ppx:  xarelto Anticipated DC needs:  none Code status:  Full Estimated LOS:  Greater than 2 midnights Risk:  high Signed: 
Orestes Hodge MD

## 2019-04-10 NOTE — ED PROVIDER NOTES
22-year-old female patient with a history of CVA, receiving TPA on 3/13/2019 presents to the emergency department with reports of mumbled speech and visual change. Patient states she was seated at home working on computer when she suddenly developed loss of peripheral vision in the lateral aspect of her right visual field. Followed by this episode she developed garbled, slurred speech that was witnessed by her  who is at bedside. Patient and family estimates these symptoms lasted for approximately 20 minutes prior to spontaneous resolution. Patient denies any other symptoms with these issues. She was recently evaluated downtown for similar episode at which time she was evaluated by tele-neurology and given TPA for suspected CVA. Patient states she fully recovered and has been doing well since. She has a history of atrial fibrillation and is currently on anticoagulation for this issue. Patient's cardiologist and evaluating physicians during previous episode feel that her symptoms may be related to underlying A. Fib. The history is provided by the patient. No  was used. TIA This is a new problem. The current episode started 1 to 2 hours ago. The problem has been resolved. There was no focality noted. Primary symptoms include slurred speech, speech difficulty and visual change. Pertinent negatives include no focal weakness, no loss of sensation, no loss of balance, no memory loss, no movement disorder, no agitation, no auditory change, no mental status change, no unresponsiveness and no disorientation. There has been no fever. Pertinent negatives include no shortness of breath, no chest pain, no vomiting, no altered mental status, no confusion, no headaches, no choking, no nausea, no bowel incontinence and no bladder incontinence. Associated medical issues include CVA. Past Medical History:  
Diagnosis Date  Abnormal cardiovascular function study 1/7/2016  Allergic rhinitis 1/14/2013  Anemia 11/13/2015  Arthritis  Atrial fibrillation ([address] 1/14/2013  Blind left eye   
 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  
 stent to LAD; 3 stents total  
 Depression 1/14/2013  GERD (gastroesophageal reflux disease)  Hypercholesteremia 1/14/2013  Hypertension  Hypothyroidism 1/14/2013  Osteoarthritis of back 1/2/2014  Osteoporosis  Pulmonary embolism ([facility] 2009  
 after knee surgery  Sick sinus syndrome ([facility] 1/7/2016 Tachycardia-Bradycardia Past Surgical History:  
Procedure Laterality Date  COLONOSCOPY N/A 7/16/2018 COLONOSCOPY performed by Marcie Burden MD at [address]  HX BREAST BIOPSY Bilateral   
 HX CATARACT REMOVAL Right 2010  HX CORONARY STENT PLACEMENT  05/2014 LAD X 3  
 HX HEART CATHETERIZATION  09/17/2015  HX HEENT    
 multiple eye surgeries - left - macular hoe, retinal detachment twice,   
[address]  HX KNEE REPLACEMENT  2009  HX KNEE REPLACEMENT Left 01/12/2017  HX ORTHOPAEDIC  2006 and 2008  
 herniated disc  HX TONSIL AND ADENOIDECTOMY  [id number] Family History:  
Problem Relation Age of Onset  Cancer Mother  Breast Cancer Mother  Cancer Father  Cancer Sister  Cancer Brother   
     pancreas  Breast Cancer Maternal Aunt Social History Socioeconomic History  Marital status:  Spouse name: Not on file  Number of children: Not on file  Years of education: Not on file  Highest education level: Not on file Occupational History  Not on file Social Needs  Financial resource strain: Not on file  Food insecurity:  
  Worry: Not on file Inability: Not on file  Transportation needs:  
  Medical: Not on file Non-medical: Not on file Tobacco Use  Smoking status: Never Smoker  Smokeless tobacco: Never Used Substance and Sexual Activity  Alcohol use: No  
  Alcohol/week: 0.0 oz  Drug use: No  
 Sexual activity: Not on file Lifestyle  Physical activity:  
  Days per week: Not on file Minutes per session: Not on file  Stress: Not on file Relationships  Social connections:  
  Talks on phone: Not on file Gets together: Not on file Attends Worship service: Not on file Active member of club or organization: Not on file Attends meetings of clubs or organizations: Not on file Relationship status: Not on file  Intimate partner violence:  
  Fear of current or ex partner: Not on file Emotionally abused: Not on file Physically abused: Not on file Forced sexual activity: Not on file Other Topics Concern  Not on file Social History Narrative No family/social/cultural issues pertinent to care ALLERGIES: Adhesive Review of Systems Constitutional: Negative for chills, diaphoresis and fever. HENT: Negative for congestion, sneezing and sore throat. Eyes: Positive for visual disturbance. Respiratory: Negative for cough, choking, chest tightness, shortness of breath and wheezing. Cardiovascular: Negative for chest pain and leg swelling. Gastrointestinal: Negative for abdominal pain, blood in stool, bowel incontinence, diarrhea, nausea and vomiting. Endocrine: Negative for polyuria. Genitourinary: Negative for bladder incontinence, difficulty urinating, dysuria, flank pain, hematuria and urgency. Musculoskeletal: Negative for back pain, myalgias, neck pain and neck stiffness. Skin: Negative for color change and rash. Neurological: Positive for speech difficulty. Negative for dizziness, focal weakness, syncope, weakness, light-headedness, numbness, headaches and loss of balance. Psychiatric/Behavioral: Negative for agitation, behavioral problems, confusion and memory loss. All other systems reviewed and are negative. Vitals: 04/10/19 1614 04/10/19 1621 BP: 186/79 Pulse: (!) 57 Resp: 20 Temp: 98.7 °F (37.1 °C) SpO2: 99% 99% Weight: 67.1 kg (148 lb) Height: 5' 3\" (1.6 m) Physical Exam  
Constitutional: She is oriented to person, place, and time. She appears well-developed and well-nourished. No distress. Elderly, well-appearing female patient. Alert and oriented to person place and time. No acute distress, speaks in clear, fluid sentences. HENT:  
Head: Normocephalic and atraumatic. Right Ear: External ear normal.  
Left Ear: External ear normal.  
Nose: Nose normal.  
Eyes: Pupils are equal, round, and reactive to light. EOM are normal.  
Neck: Normal range of motion. Cardiovascular: Normal rate, regular rhythm, normal heart sounds and intact distal pulses. Exam reveals no gallop and no friction rub. No murmur heard. Pulmonary/Chest: Effort normal and breath sounds normal. No respiratory distress. She has no wheezes. She has no rales. She exhibits no tenderness. Abdominal: Soft. She exhibits no distension and no mass. There is no tenderness. There is no rebound and no guarding. No hernia. Musculoskeletal: Normal range of motion. She exhibits no edema, tenderness or deformity. Neurological: She is alert and oriented to person, place, and time. She has normal strength. No cranial nerve deficit or sensory deficit. Coordination and gait normal. GCS eye subscore is 4. GCS verbal subscore is 5. GCS motor subscore is 6. No focal neuro deficits. Skin: Skin is warm and dry. She is not diaphoretic. Nursing note and vitals reviewed. MDM Number of Diagnoses or Management Options TIA (transient ischemic attack): new and requires workup Diagnosis management comments: CT imaging is unremarkable. Laboratory evaluation stable Patient is slightly hypertensive, we'll continue to monitor. Symptoms consistent with TIA.   Patient is not a TPA candidate as she is currently anticoagulated for atrial fibrillation, her symptoms have completely resolved and she received TPA within 1 month. The hospitalist concerning admission for this patient. Patient notified of this plan of care. Voice dictation software was used during the making of this note. This software is not perfect and grammatical and other typographical errors may be present. This note has been proofread, but may still contain errors. [address] [facility] [place]; 4/10/2019 @5:39 PM  
=================================================================== Amount and/or Complexity of Data Reviewed Clinical lab tests: ordered and reviewed Tests in the radiology section of CPT®: ordered and reviewed Tests in the medicine section of CPT®: ordered and reviewed Review and summarize past medical records: yes Discuss the patient with other providers: yes Independent visualization of images, tracings, or specimens: yes Risk of Complications, Morbidity, and/or Mortality Presenting problems: moderate Diagnostic procedures: low Management options: moderate Patient Progress Patient progress: stable Procedures

## 2019-04-10 NOTE — PROGRESS NOTES
Bedside, Verbal and Written shift change report given to Keisha Ny RN (oncoming nurse) by Delmi Ang RN (offgoing nurse). Report included the following information SBAR, Kardex, ED Summary, Intake/Output, MAR, Accordion, Recent Results, Med Rec Status, Cardiac Rhythm SR/SB, Alarm Parameters  and Quality Measures. Dual NIH performed with Delmi Ang RN; patient alert/oriented x4 NIH 0 
 
MD [facility] Yohannes paged regarding ordered MRI; per MD plan for MRI tomorrow 4/11/19. Orders received for hydralazine 25 mg PO for SBP>190, regular diet (STAND Screen passed). MD also updated on hgb 8.4 (Greater than 11 on previous admission in March); orders received for fecal occult if patient passes stool and to continue to monitor.

## 2019-04-10 NOTE — PROGRESS NOTES
Bedside and Verbal shift change report given to Jessie Jackson (oncoming nurse) by Henry Hein (offgoing nurse). Report included the following information SBAR, Kardex, ED Summary, MAR, Recent Results and Med Rec Status.

## 2019-04-10 NOTE — PROGRESS NOTES
TRANSFER - IN REPORT: 
 
Verbal report received from [place], RN on Cesar Caruso  being received from ED(unit) for routine progression of care Report consisted of patients Situation, Background, Assessment and  
Recommendations(SBAR). Information from the following report(s) SBAR, Kardex, ED Summary, [facility] and Recent Results was reviewed with the receiving nurse. Opportunity for questions and clarification was provided. Assessment completed upon patients arrival to unit and care assumed.

## 2019-04-10 NOTE — ED TRIAGE NOTES
Pt arrives via EMS from home with c\o \"couldn't get her words out\", Slurred speech. Treated in March at [facility] for stroke with TPA.  
20 ga left hand Last known normal 1 hour prior to arrival

## 2019-04-11 ENCOUNTER — ANESTHESIA EVENT (OUTPATIENT)
Dept: ENDOSCOPY | Age: 84
DRG: 069 | End: 2019-04-11
Payer: MEDICARE

## 2019-04-11 ENCOUNTER — APPOINTMENT (OUTPATIENT)
Dept: MRI IMAGING | Age: 84
DRG: 069 | End: 2019-04-11
Attending: INTERNAL MEDICINE
Payer: MEDICARE

## 2019-04-11 ENCOUNTER — PATIENT OUTREACH (OUTPATIENT)
Dept: CASE MANAGEMENT | Age: 84
End: 2019-04-11

## 2019-04-11 LAB
ANION GAP SERPL CALC-SCNC: 7 MMOL/L
BUN SERPL-MCNC: 22 MG/DL (ref 8–23)
CALCIUM SERPL-MCNC: 8.4 MG/DL (ref 8.3–10.4)
CHLORIDE SERPL-SCNC: 110 MMOL/L (ref 98–107)
CHOLEST SERPL-MCNC: 96 MG/DL
CO2 SERPL-SCNC: 23 MMOL/L (ref 21–32)
CREAT SERPL-MCNC: 0.97 MG/DL (ref 0.6–1)
ERYTHROCYTE [DISTWIDTH] IN BLOOD BY AUTOMATED COUNT: 16 % (ref 11.9–14.6)
FERRITIN SERPL-MCNC: 19 NG/ML (ref 8–388)
GLUCOSE SERPL-MCNC: 106 MG/DL (ref 65–100)
HCT VFR BLD AUTO: 23.1 % (ref 35.8–46.3)
HCT VFR BLD AUTO: 23.3 % (ref 35.8–46.3)
HDLC SERPL-MCNC: 47 MG/DL (ref 40–60)
HDLC SERPL: 2 {RATIO}
HGB BLD-MCNC: 7.2 G/DL (ref 11.7–15.4)
HGB BLD-MCNC: 7.2 G/DL (ref 11.7–15.4)
IRON SATN MFR SERPL: 7 %
IRON SERPL-MCNC: 18 UG/DL
LDLC SERPL CALC-MCNC: 38.4 MG/DL
LIPID PROFILE,FLP: NORMAL
MAGNESIUM SERPL-MCNC: 2 MG/DL (ref 1.8–2.4)
MCH RBC QN AUTO: 28.7 PG (ref 26.1–32.9)
MCHC RBC AUTO-ENTMCNC: 31.2 G/DL (ref 31.4–35)
MCV RBC AUTO: 92 FL (ref 79.6–97.8)
NRBC # BLD: 0 K/UL (ref 0–0.2)
PLATELET # BLD AUTO: 187 K/UL (ref 150–450)
PMV BLD AUTO: 11 FL (ref 9.4–12.3)
POTASSIUM SERPL-SCNC: 4.4 MMOL/L (ref 3.5–5.1)
RBC # BLD AUTO: 2.51 M/UL (ref 4.05–5.2)
SODIUM SERPL-SCNC: 140 MMOL/L (ref 136–145)
TIBC SERPL-MCNC: 252 UG/DL (ref 250–450)
TRIGL SERPL-MCNC: 53 MG/DL (ref 35–150)
TSH SERPL DL<=0.005 MIU/L-ACNC: 3.41 UIU/ML
VLDLC SERPL CALC-MCNC: 10.6 MG/DL (ref 6–23)
WBC # BLD AUTO: 4.9 K/UL (ref 4.3–11.1)

## 2019-04-11 PROCEDURE — 85027 COMPLETE CBC AUTOMATED: CPT

## 2019-04-11 PROCEDURE — 97165 OT EVAL LOW COMPLEX 30 MIN: CPT

## 2019-04-11 PROCEDURE — 86902 BLOOD TYPE ANTIGEN DONOR EA: CPT

## 2019-04-11 PROCEDURE — 74011250637 HC RX REV CODE- 250/637

## 2019-04-11 PROCEDURE — 36430 TRANSFUSION BLD/BLD COMPNT: CPT

## 2019-04-11 PROCEDURE — 84443 ASSAY THYROID STIM HORMONE: CPT

## 2019-04-11 PROCEDURE — 70551 MRI BRAIN STEM W/O DYE: CPT

## 2019-04-11 PROCEDURE — 83540 ASSAY OF IRON: CPT

## 2019-04-11 PROCEDURE — 77030039270 HC TU BLD FLTR CARD -A

## 2019-04-11 PROCEDURE — 86870 RBC ANTIBODY IDENTIFICATION: CPT

## 2019-04-11 PROCEDURE — 86900 BLOOD TYPING SEROLOGIC ABO: CPT

## 2019-04-11 PROCEDURE — 77030033269 HC SLV COMPR SCD KNE2 CARD -B

## 2019-04-11 PROCEDURE — 86921 COMPATIBILITY TEST INCUBATE: CPT

## 2019-04-11 PROCEDURE — 99218 HC RM OBSERVATION: CPT

## 2019-04-11 PROCEDURE — 86920 COMPATIBILITY TEST SPIN: CPT

## 2019-04-11 PROCEDURE — 80048 BASIC METABOLIC PNL TOTAL CA: CPT

## 2019-04-11 PROCEDURE — 74011250637 HC RX REV CODE- 250/637: Performed by: FAMILY MEDICINE

## 2019-04-11 PROCEDURE — 97535 SELF CARE MNGMENT TRAINING: CPT

## 2019-04-11 PROCEDURE — 86905 BLOOD TYPING RBC ANTIGENS: CPT

## 2019-04-11 PROCEDURE — P9016 RBC LEUKOCYTES REDUCED: HCPCS

## 2019-04-11 PROCEDURE — 92610 EVALUATE SWALLOWING FUNCTION: CPT

## 2019-04-11 PROCEDURE — 86922 COMPATIBILITY TEST ANTIGLOB: CPT

## 2019-04-11 PROCEDURE — 83735 ASSAY OF MAGNESIUM: CPT

## 2019-04-11 PROCEDURE — 80061 LIPID PANEL: CPT

## 2019-04-11 PROCEDURE — 97161 PT EVAL LOW COMPLEX 20 MIN: CPT

## 2019-04-11 PROCEDURE — 82728 ASSAY OF FERRITIN: CPT

## 2019-04-11 PROCEDURE — 30233N1 TRANSFUSION OF NONAUTOLOGOUS RED BLOOD CELLS INTO PERIPHERAL VEIN, PERCUTANEOUS APPROACH: ICD-10-PCS | Performed by: INTERNAL MEDICINE

## 2019-04-11 PROCEDURE — 85018 HEMOGLOBIN: CPT

## 2019-04-11 PROCEDURE — 36415 COLL VENOUS BLD VENIPUNCTURE: CPT

## 2019-04-11 PROCEDURE — 74011250636 HC RX REV CODE- 250/636: Performed by: INTERNAL MEDICINE

## 2019-04-11 PROCEDURE — 74011250637 HC RX REV CODE- 250/637: Performed by: INTERNAL MEDICINE

## 2019-04-11 PROCEDURE — 77030027138 HC INCENT SPIROMETER -A

## 2019-04-11 RX ORDER — TEMAZEPAM 15 MG/1
15 CAPSULE ORAL
Status: DISCONTINUED | OUTPATIENT
Start: 2019-04-11 | End: 2019-04-12 | Stop reason: HOSPADM

## 2019-04-11 RX ORDER — SODIUM CHLORIDE 9 MG/ML
250 INJECTION, SOLUTION INTRAVENOUS AS NEEDED
Status: DISCONTINUED | OUTPATIENT
Start: 2019-04-11 | End: 2019-04-12 | Stop reason: HOSPADM

## 2019-04-11 RX ORDER — ACETAMINOPHEN 325 MG/1
650 TABLET ORAL ONCE
Status: COMPLETED | OUTPATIENT
Start: 2019-04-11 | End: 2019-04-11

## 2019-04-11 RX ORDER — SODIUM CHLORIDE 0.9 % (FLUSH) 0.9 %
5-40 SYRINGE (ML) INJECTION EVERY 8 HOURS
Status: CANCELLED | OUTPATIENT
Start: 2019-04-11

## 2019-04-11 RX ORDER — TEMAZEPAM 15 MG/1
CAPSULE ORAL
Status: COMPLETED
Start: 2019-04-11 | End: 2019-04-11

## 2019-04-11 RX ORDER — SODIUM CHLORIDE, SODIUM LACTATE, POTASSIUM CHLORIDE, CALCIUM CHLORIDE 600; 310; 30; 20 MG/100ML; MG/100ML; MG/100ML; MG/100ML
100 INJECTION, SOLUTION INTRAVENOUS CONTINUOUS
Status: CANCELLED | OUTPATIENT
Start: 2019-04-11

## 2019-04-11 RX ORDER — FUROSEMIDE 10 MG/ML
20 INJECTION INTRAMUSCULAR; INTRAVENOUS
Status: COMPLETED | OUTPATIENT
Start: 2019-04-11 | End: 2019-04-11

## 2019-04-11 RX ORDER — SODIUM CHLORIDE 0.9 % (FLUSH) 0.9 %
5-40 SYRINGE (ML) INJECTION AS NEEDED
Status: CANCELLED | OUTPATIENT
Start: 2019-04-11

## 2019-04-11 RX ORDER — DIPHENHYDRAMINE HCL 25 MG
25 CAPSULE ORAL ONCE
Status: COMPLETED | OUTPATIENT
Start: 2019-04-11 | End: 2019-04-11

## 2019-04-11 RX ORDER — PANTOPRAZOLE SODIUM 40 MG/1
40 TABLET, DELAYED RELEASE ORAL
Status: DISCONTINUED | OUTPATIENT
Start: 2019-04-12 | End: 2019-04-12 | Stop reason: HOSPADM

## 2019-04-11 RX ADMIN — FAMOTIDINE 20 MG: 20 TABLET ORAL at 08:32

## 2019-04-11 RX ADMIN — LISINOPRIL 20 MG: 20 TABLET ORAL at 08:30

## 2019-04-11 RX ADMIN — PRAVASTATIN SODIUM 80 MG: 20 TABLET ORAL at 22:35

## 2019-04-11 RX ADMIN — Medication 10 ML: at 22:35

## 2019-04-11 RX ADMIN — ACETAMINOPHEN 650 MG: 325 TABLET, FILM COATED ORAL at 14:17

## 2019-04-11 RX ADMIN — TEMAZEPAM 15 MG: 15 CAPSULE ORAL at 02:03

## 2019-04-11 RX ADMIN — DIPHENHYDRAMINE HYDROCHLORIDE 25 MG: 25 CAPSULE ORAL at 14:17

## 2019-04-11 RX ADMIN — Medication 10 ML: at 05:06

## 2019-04-11 RX ADMIN — METOPROLOL SUCCINATE 12.5 MG: 25 TABLET, EXTENDED RELEASE ORAL at 08:30

## 2019-04-11 RX ADMIN — FUROSEMIDE 20 MG: 10 INJECTION, SOLUTION INTRAVENOUS at 16:33

## 2019-04-11 RX ADMIN — RIVAROXABAN 15 MG: 15 TABLET, FILM COATED ORAL at 16:34

## 2019-04-11 RX ADMIN — ASPIRIN 81 MG 81 MG: 81 TABLET ORAL at 08:32

## 2019-04-11 RX ADMIN — LEVOTHYROXINE SODIUM 50 MCG: 50 TABLET ORAL at 08:29

## 2019-04-11 RX ADMIN — Medication 10 ML: at 14:28

## 2019-04-11 RX ADMIN — ESCITALOPRAM OXALATE 10 MG: 10 TABLET ORAL at 08:30

## 2019-04-11 RX ADMIN — TEMAZEPAM 15 MG: 15 CAPSULE ORAL at 22:35

## 2019-04-11 NOTE — CONSULTS
[facility]  [address] [facility]  [address] [facility], [address] [facility]          Chief Complaint   Patient presents with    TIA       Jorge Luis Davis is a 80 y.o. female who episode yesterday in which she accurately describes the onset of a right homonymous hemianopsia is seen for repeat neurological evaluation. Followed by difficulties with dyscalculia and apraxia. No associated motor difficulty. Patient did recently been in the hospital and had received TPA for an acute left brain syndrome with excellent resolution they are of. On this occasion she was not given TPA as she is on Xarelto for atrial fibrillation and is waiting for consideration of a watchman device. Investigations during the course of this admission include the definition of a significant anemia and the patient is complaining about a degree of generalized weakness and numbness and tingling in her extremities    Patient is reporting that her balance is a little off.     With reference to anemia she thinks that her bowel movements may have been darker than usual.  Stool guaiac was obtained yesterday result is not yet available in the chart      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    CAD (coronary artery disease), native coronary artery May 2014    stent to LAD; 3 stents total    Depression 1/14/2013    GERD (gastroesophageal reflux disease)     Hypercholesteremia 1/14/2013    Hypertension     Hypothyroidism 1/14/2013    Osteoarthritis of back 1/2/2014    Osteoporosis     Pulmonary embolism ([address] 2009    after knee surgery    Sick sinus syndrome ([address] 1/7/2016    Tachycardia-Bradycardia       Past Surgical History:   Procedure Laterality Date    COLONOSCOPY N/A 7/16/2018    COLONOSCOPY performed by Mack Singh Sandoval Villasenor MD at [address] [place] [address] HX BREAST BIOPSY Bilateral     HX CATARACT REMOVAL Right 2010    HX CORONARY STENT PLACEMENT  05/2014    LAD X 3    HX HEART CATHETERIZATION  09/17/2015    HX HEENT      multiple eye surgeries - left - macular hoe, retinal detachment twice,     HX HYSTERECTOMY  1970    HX KNEE REPLACEMENT  2009    HX KNEE REPLACEMENT Left 01/12/2017    HX ORTHOPAEDIC  2006 and 2008    herniated disc    HX TONSIL AND ADENOIDECTOMY  1934       Family History   Problem Relation Age of Onset    Cancer Mother     Breast Cancer Mother     Cancer Father     Cancer Sister     Cancer Brother         pancreas    Breast Cancer Maternal Aunt        Social History     Socioeconomic History    Marital status:      Spouse name: Not on file    Number of children: Not on file    Years of education: Not on file    Highest education level: Not on file   Tobacco Use    Smoking status: Never Smoker    Smokeless tobacco: Never Used   Substance and Sexual Activity    Alcohol use: No     Alcohol/week: 0.0 oz    Drug use: No   Social History Narrative    No family/social/cultural issues pertinent to care           Current Facility-Administered Medications:     temazepam (RESTORIL) capsule 15 mg, 15 mg, Oral, QHS PRN, Bucky Bravo MD, 15 mg at 04/11/19 0203    sodium chloride (NS) flush 5-40 mL, 5-40 mL, IntraVENous, Q8H, Kiara James MD, 10 mL at 04/11/19 0506    sodium chloride (NS) flush 5-40 mL, 5-40 mL, IntraVENous, PRN, Halima Hernandez MD    aspirin chewable tablet 81 mg, 81 mg, Oral, DAILY, Halima Hernandez MD, 81 mg at 04/11/19 5560    pravastatin (PRAVACHOL) tablet 80 mg, 80 mg, Oral, QHS, Halima Hernandez MD, 80 mg at 04/10/19 2104    acetaminophen (TYLENOL) tablet 650 mg, 650 mg, Oral, Q4H PRN, Halima Hernandez MD    levothyroxine (SYNTHROID) tablet 50 mcg, 50 mcg, Oral, ACB, Polo James MD, 50 mcg at 04/11/19 0829    escitalopram oxalate (LEXAPRO) tablet 10 mg, 10 mg, Oral, DAILY, Kiara Jackson MD, 10 mg at 04/11/19 0830    lisinopril (PRINIVIL, ZESTRIL) tablet 20 mg, 20 mg, Oral, DAILY, Kiara Jackson MD, 20 mg at 04/11/19 0830    metoprolol succinate (TOPROL-XL) XL tablet 12.5 mg, 12.5 mg, Oral, DAILY, Kiara Jackson MD, 12.5 mg at 04/11/19 0830    rivaroxaban (XARELTO) tablet 15 mg, 15 mg, Oral, DAILY WITH DINNER, Jacob, Nadine Yun MD    famotidine (PEPCID) tablet 20 mg, 20 mg, Oral, BID, Allison Carias MD, 20 mg at 04/11/19 2706    hydrALAZINE (APRESOLINE) tablet 25 mg, 25 mg, Oral, Q6H PRN, Flaquita Dennis MD    Allergies   Allergen Reactions    Adhesive Rash       Review of Systems    See HPI. No additional information obtained in addition to the admission history and physical exam    Visit Vitals  /54   Pulse 68   Temp 98 °F (36.7 °C)   Resp 16   Ht 5' 3\" (1.6 m)   Wt 147 lb 14.9 oz (67.1 kg)   SpO2 95%   BMI 26.20 kg/m²       Neurologic Exam  Was examined via telemedicine. She is bright alert cooperative and oriented she has no evidence of a visual field cut no evidence of facial asymmetry. Speech is normal and has normal articulation she is able to repeat a complex sentence without any difficulty. Motor examination demonstrates no evidence of focal motor drift.     In the lower extremities she has some degree of proximal hip discomfort which limits movement to a degree    On systemic exam she appears somewhat pale    Most recent MRI  Results from [place] encounter on 04/10/19   MRI BRAIN WO CONT    Narrative MRI BRAIN WITHOUT CONTRAST 4/11/2019    HISTORY: 80year-old with history of stroke and atrial fibrillation with sudden  onset of blurred vision and memory impairment    TECHNIQUE: Sagittal and axial T1-weighted, axial T2-weighted, axial and coronal  FLAIR, axial T2-weighted gradient-echo, axial diffusion weighted images with ADC  maps of the brain. COMPARISON: MRI BRAIN 3/13/2019 AND HEAD CT 4/10/2019    FINDINGS: There is no acute infarction, acute intracranial hemorrhage,  hydrocephalus, intra-axial mass, or abnormal extra axial fluid collection. Injected silicone is present in the left eye. Mucosal thickening is present in  the right maxillary sinus. Moderate cerebral volume loss is present without  disproportionate hippocampal atrophy. On the T2-weighted and FLAIR sequences,  there are stable punctate white matter hyperintensities compatible with chronic  small vessel ischemic disease. Impression IMPRESSION:    1. No acute infarction. 2. White matter hyperintensities compatible with mild chronic small vessel  ischemic disease. 3. Mucosal thickening in the right maxillary sinus. Most recent MRA  Results from [place] encounter on 04/10/19   MRI BRAIN WO CONT    Narrative MRI BRAIN WITHOUT CONTRAST 4/11/2019    HISTORY: 80-year-old with history of stroke and atrial fibrillation with sudden  onset of blurred vision and memory impairment    TECHNIQUE: Sagittal and axial T1-weighted, axial T2-weighted, axial and coronal  FLAIR, axial T2-weighted gradient-echo, axial diffusion weighted images with ADC  maps of the brain. COMPARISON: MRI BRAIN 3/13/2019 AND HEAD CT 4/10/2019    FINDINGS: There is no acute infarction, acute intracranial hemorrhage,  hydrocephalus, intra-axial mass, or abnormal extra axial fluid collection. Injected silicone is present in the left eye. Mucosal thickening is present in  the right maxillary sinus. Moderate cerebral volume loss is present without  disproportionate hippocampal atrophy. On the T2-weighted and FLAIR sequences,  there are stable punctate white matter hyperintensities compatible with chronic  small vessel ischemic disease. Impression IMPRESSION:    1. No acute infarction.     2. White matter hyperintensities compatible with mild chronic small vessel  ischemic disease. 3. Mucosal thickening in the right maxillary sinus. Most recent CTA  Results from [place] encounter on 04/10/19   CT HEAD WO CONT    Narrative NONCONTRAST HEAD CT    CLINICAL HISTORY:  Slurred speech with recent TPA for stroke. TECHNIQUE:  Axial images were obtained with spiral technique. Radiation dose  reduction was achieved using one or all of the following techniques: automated  exposure control, weight-based dosing, iterative reconstruction. COMPARISON:  March 19, 2019. REPORT:   Standard non contrast head CT demonstrates no definite intracranial  mass effect, hemorrhage, or evidence of acute geographic infarction. White  matter hypodensities are most consistent with small vessel ischemic disease. The ventricles are normal in size and configuration, accounting for the  patient's age. Prominent mucosal thickening is again noted in the right  maxillary antrum. The other paranasal sinuses are clear where imaged. Hyperdense left globe is again noted. Bone windows demonstrate no definite  fracture or destruction. Impression IMPRESSION:     SMALL VESSEL ISCHEMIC DISEASE WITH NO ACUTE INTRACRANIAL  ABNORMALITY IDENTIFIED AT NONCONTRAST CT. Most recent Echo  No results found for this visit on 04/10/19. Most recent lipid panels  Lab Results   Component Value Date/Time    Cholesterol, total 96 04/11/2019 04:12 AM    HDL Cholesterol 47 04/11/2019 04:12 AM    LDL, calculated 38.4 04/11/2019 04:12 AM    VLDL, calculated 10.6 04/11/2019 04:12 AM    Triglyceride 53 04/11/2019 04:12 AM    CHOL/HDL Ratio 2.0 04/11/2019 04:12 AM       Most recent Hgb A1C  Lab Results   Component Value Date/Time    Hemoglobin A1c 6.2 (H) 03/14/2019 03:27 AM    Hemoglobin A1c (POC) 5.9 02/23/2017 11:30 AM                 Diagnoses and all orders for this visit:    1.  TIA (transient ischemic attack)    Other orders  -     NOTIFY PROVIDER: SPECIFY; Standing  - VITAL SIGNS; Standing  -     CBC WITH AUTOMATED DIFF; Standing  -     METABOLIC PANEL, COMPREHENSIVE; Standing  -     POC GLUCOSE; Standing  -     PROTHROMBIN TIME + INR; Standing  -     PTT; Standing  -     SALINE LOCK IV; Standing  -     DYSPHAGIA SCREENING; Standing  -     CT HEAD WO CONT; Standing  -     EKG, 12 LEAD, INITIAL; Standing  -     XR CHEST PORT; Standing  -     POC PT/INR;  Standing  -     POC TROPONIN-I; Standing  -     GLUCOSE, POC; Standing  -     EKG, 12 LEAD, INITIAL; Standing  -     INITIAL PHYSICIAN ORDER: OBSERVATION/OUTPATIENT IN A BED; Standing  -     CARDIAC MONITORING; Standing  -     NURSING-MISCELLANEOUS:; Standing  -     VITAL SIGNS PER UNIT ROUTINE; Standing  -     NOTIFY PROVIDER: VITAL SIGNS CHANGES; Standing  -     NEURO/VASCULAR CHECKS; Standing  -     INTAKE AND OUTPUT; Standing  -     MEASURE HEIGHT; Standing  -     WEIGH PATIENT; Standing  -     FALL PRECAUTIONS; Standing  -     NURSING-MISCELLANEOUS:; Standing  -     NURSING-MISCELLANEOUS:; Standing  -     sodium chloride (NS) flush 5-40 mL  -     sodium chloride (NS) flush 5-40 mL  -     LIPID PANEL; Standing  -     IP CONSULT TO STROKE COORDINATOR; Standing  -     IP CONSULT TO CASE MANAGEMENT; Standing  -     SLP--EVAL, DEVISE PLAN OF CARE AND TREAT; Standing  -     PT--EVAL, DEVISE PLAN OF CARE AND TREAT; Standing  -     OT--EVAL, DEVISE PLAN OF CARE AND TREAT; Standing  -     FULL CODE; Standing  -     aspirin chewable tablet 81 mg  -     pravastatin (PRAVACHOL) tablet 80 mg  -     acetaminophen (TYLENOL) tablet 650 mg  -     NO VTE PROPHYLAXIS NEEDED; Standing  -     CBC W/O DIFF; Standing  -     METABOLIC PANEL, BASIC; Standing  -     MAGNESIUM; Standing  -     TSH 3RD GENERATION; Standing  -     MRI BRAIN WO CONT; Standing  -     levothyroxine (SYNTHROID) tablet 50 mcg  -     escitalopram oxalate (LEXAPRO) tablet 10 mg  -     lisinopril (PRINIVIL, ZESTRIL) tablet 20 mg  -     metoprolol succinate (TOPROL-XL) XL tablet 12.5 mg  -     rivaroxaban (XARELTO) tablet 15 mg  -     famotidine (PEPCID) tablet 20 mg  -     hydrALAZINE (APRESOLINE) tablet 25 mg  -     DIET REGULAR; Standing  -     NURSING-MISCELLANEOUS:; Standing  -     OCCULT BLOOD, STOOL; Standing  -     temazepam (RESTORIL) capsule 15 mg  -     temazepam (RESTORIL) 15 mg capsule  -     APPLY SEQUENTIAL COMPRESSION DEVICE; Standing  -     APPLY/MAINTAIN SEQUENTIAL COMPRESSION DEVICE; Standing  -     IP CONSULT TO NEUROLOGY; Standing  -     HGB & HCT; Standing  -     TYPE & SCREEN; Standing    Impression  1 yesterday's event appears to be a transient ischemic attack  2 acute anemia probably blood loss anemia  3 cardiac source of embolism    Recommendations  1 GI opinion with reference to the source of blood loss anemia  2 given the recurrent nature of the brain event and the fact that there may be pertinent issues which forbid the long-term usage of anticoagulation cardiac reevaluation with reference to watchman device on a time emergent basis is suggested  3 Dr. Danette Millan will follow in the office            Porfirio Andrade MD

## 2019-04-11 NOTE — H&P (VIEW-ONLY)
[facility] Consult Note Primary GI Physician: Dr. Lamar Georges Consulting GI Physician: Dr. Papo Branch Referring Provider:  Dr. Bruno Smith Consult Date:  4/11/2019 Admit Date:  4/10/2019 Chief Complaint:  Anemia of unknown source Subjective:  
 
History of Present Illness:  Patient is a 80 y.o. female with PMH of HTN, HLD, Hypothyroidism, CAD s/p PCI x3 2014, PE following knee surgery in 2009, AFib on Xarelto (now awaiting potential Watchman device) with recent history of CVA 3/13 treated with TPA and now admitted 4/10/19 after presenting with slurred speech and vision changes- diagnosed with TIA and neurology following. She is currently being seen in GI consult for anemia of unknown source with presenting Hgb of 8.4 (decreased from Hgb 11.1 on 3/19/19) with normal MCV 92. Since Hgb decreased to 7.2. She is currently receiving a total of 2u pRBCs. BUN/Creatinine, LFTs, INR were normal. She is currently on Pepcid 20mg b.i.d. She remains on Xarelto. She was seen in GI consult 7/2018 for anemia and heme + stool- then no iron studies and endoscopic evaluation was negative for obvious source of GI blood loss. EGD 7/16/18 revealed erosive gastritis. Colonoscopy 7/17/18 revealed IH, sigmoid diverticulosis, 6mm transverse colon polyp, five (4-6mm) ascending colon polyps with path that revealed hyperplastic and tubular adenomas. Capsule endoscopy was discussed though pt requested to defer. Currently she reports dark stool 2-3days ago and otherwise normal BMs every few days. No iron supplementation or use of Pepto bismol prior to this admission. She was on oral iron at the end of 2018 though this was discontinued once Hgb improved per pt. No BRBPR. She will have some lower abdominal cramping at times with difficulty passing stool which generally improves after she is able to have a BM.  She does have a hx of GERD. She was taking Zantac however over the past week changed this to Omeprazole daily (dose unknown) secondary to frequent breakthrough reflux symptoms. She denies frequent use of NSAIDs. She is on ASA 81mg in addition to her Xarelto. She denies tobacco or heavy ETOH use. She denies other abdominal pains, N/V, dysphagia, anorexia or unexplained weight loss. PMH: 
Past Medical History:  
Diagnosis Date  Abnormal cardiovascular function study 1/7/2016  Allergic rhinitis 1/14/2013  Anemia 11/13/2015  Arthritis  Atrial fibrillation ([address] 1/14/2013  Blind left eye   
 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  
 stent to LAD; 3 stents total  
 Depression 1/14/2013  GERD (gastroesophageal reflux disease)  Hypercholesteremia 1/14/2013  Hypertension  Hypothyroidism 1/14/2013  Osteoarthritis of back 1/2/2014  Osteoporosis  Pulmonary embolism ([facility] 2009  
 after knee surgery  Sick sinus syndrome ([facility] 1/7/2016 Tachycardia-Bradycardia PSH: 
Past Surgical History:  
Procedure Laterality Date  COLONOSCOPY N/A 7/16/2018 COLONOSCOPY performed by Mimi Rosenbaum MD at [address]  HX BREAST BIOPSY Bilateral   
 HX CATARACT REMOVAL Right 2010  HX CORONARY STENT PLACEMENT  05/2014 LAD X 3  
 HX HEART CATHETERIZATION  09/17/2015  HX HEENT    
 multiple eye surgeries - left - macular hoe, retinal detachment twice,   
[address]  HX KNEE REPLACEMENT  2009  HX KNEE REPLACEMENT Left 01/12/2017  HX ORTHOPAEDIC  2006 and 2008  
 herniated disc  HX TONSIL AND ADENOIDECTOMY  [id number] Allergies: Allergies Allergen Reactions  Adhesive Rash Home Medications: 
Prior to Admission medications Medication Sig Start Date End Date Taking? Authorizing Provider Biotin 2,500 mcg cap Take  by mouth.     Provider, Historical  
 multivitamin (ONE A DAY) tablet Take 1 Tab by mouth daily. Provider, Historical  
raNITIdine (ZANTAC) 150 mg tablet Take 150 mg by mouth two (2) times a day. Provider, Historical  
escitalopram oxalate (LEXAPRO) 10 mg tablet Take 1 Tab by mouth daily. 4/1/19   Sydnee Austin MD  
zolpidem (AMBIEN) 10 mg tablet Take 1 Tab by mouth nightly as needed for Sleep. Max Daily Amount: 10 mg. 4/1/19   Sydnee Austin MD  
nitrofurantoin (MACRODANTIN) 50 mg capsule Take  by mouth four (4) times daily. Provider, Historical  
levothyroxine (SYNTHROID) 50 mcg tablet Take 1 Tab by mouth Daily (before breakfast). 3/22/19   Sydnee Austin MD  
atorvastatin (LIPITOR) 40 mg tablet Take 1 Tab by mouth daily. TAKE 1 TABLET EVERY DAY 3/16/19   Laura Allen MD  
metoprolol succinate (TOPROL-XL) 25 mg XL tablet Take 0.5 Tabs by mouth daily. 3/17/19   Laura Allen MD  
aspirin delayed-release 81 mg tablet Take 1 Tab by mouth daily. 3/17/19   Laura Allen MD  
lisinopril (PRINIVIL, ZESTRIL) 20 mg tablet Take 1 Tab by mouth daily. 3/17/19   Laura Allen MD  
rivaroxaban (XARELTO) 20 mg tab tablet Take 1 Tab by mouth daily (with dinner). 3/16/19   Laura Allen MD  
potassium 99 mg tablet Take 99 mg by mouth daily. Provider, Historical  
vit C/vit E/lutein/min/omega-3 (OCUVITE PO) Take 1 Cap by mouth daily. Provider, Historical  
nitroglycerin (NITROSTAT) 0.3 mg SL tablet 1 Tab by SubLINGual route every five (5) minutes as needed for Chest Pain. 8/4/17   Bryan Valverde MD  
acetaminophen (TYLENOL ARTHRITIS PAIN) 650 mg CR tablet Take 650 mg by mouth every six (6) hours as needed for Pain. Provider, Historical  
VIT A/VIT C/VIT E/ZINC/COPPER (ICAPS AREDS PO) Take  by mouth. Twice a day    Provider, Historical  
vitamin E (AQUA GEMS) 400 unit capsule Take  by mouth daily.     Provider, Historical  
Calcium Carbonate-Vit D3-Min (CALTRATE 600+D PLUS MINERALS) 600 mg calcium- 400 unit Tab Take  by mouth daily. Provider, Historical  
 
 
Hospital Medications: 
Current Facility-Administered Medications Medication Dose Route Frequency  temazepam (RESTORIL) capsule 15 mg  15 mg Oral QHS PRN  
 0.9% sodium chloride infusion 250 mL  250 mL IntraVENous PRN  
 furosemide (LASIX) injection 20 mg  20 mg IntraVENous ONCE PRN  
 sodium chloride (NS) flush 5-40 mL  5-40 mL IntraVENous Q8H  
 sodium chloride (NS) flush 5-40 mL  5-40 mL IntraVENous PRN  
 aspirin chewable tablet 81 mg  81 mg Oral DAILY  pravastatin (PRAVACHOL) tablet 80 mg  80 mg Oral QHS  acetaminophen (TYLENOL) tablet 650 mg  650 mg Oral Q4H PRN  
 levothyroxine (SYNTHROID) tablet 50 mcg  50 mcg Oral ACB  escitalopram oxalate (LEXAPRO) tablet 10 mg  10 mg Oral DAILY  lisinopril (PRINIVIL, ZESTRIL) tablet 20 mg  20 mg Oral DAILY  metoprolol succinate (TOPROL-XL) XL tablet 12.5 mg  12.5 mg Oral DAILY  rivaroxaban (XARELTO) tablet 15 mg  15 mg Oral DAILY WITH DINNER  
 famotidine (PEPCID) tablet 20 mg  20 mg Oral BID  hydrALAZINE (APRESOLINE) tablet 25 mg  25 mg Oral Q6H PRN Social History: 
Social History Tobacco Use  Smoking status: Never Smoker  Smokeless tobacco: Never Used Substance Use Topics  Alcohol use: No  
  Alcohol/week: 0.0 oz Pt denies any history of drug use, blood transfusions, or tattoos. Family History: 
Family History Problem Relation Age of Onset  Cancer Mother  Breast Cancer Mother  Cancer Father  Cancer Sister  Cancer Brother   
     pancreas  Breast Cancer Maternal Aunt Review of Systems: A detailed 10 system ROS is obtained, with pertinent positives as listed above. All others are negative. Diet:  Regular Objective:  
 
Physical Exam: 
Vitals: 
Visit Vitals /53 Pulse 60 Temp 98.3 °F (36.8 °C) Resp 18 Ht 5' 3\" (1.6 m) Wt 67.1 kg (147 lb 14.9 oz) SpO2 97% BMI 26.20 kg/m² Gen:  Pt is alert, cooperative, no acute distress Skin:  Face reveal no rashes. HEENT: Sclerae anicteric. Cardiovascular: Regular rate and rhythm. Respiratory:  Comfortable breathing with no accessory muscle use. Clear breath sounds anteriorly with no wheezes, rales, or rhonchi. GI:  Abdomen nondistended, soft, and Nontender. Normal active bowel sounds. No obvioua enlargement of the liver or spleen. No masses palpable. Rectal:  Deferred Musculoskeletal:  No pitting edema of the lower legs. Neurological:  Gross memory appears intact. Patient is alert and oriented. Psychiatric:  Mood appears appropriate with judgement intact. Laboratory:   
Recent Labs 04/11/19 
1000 04/11/19 
0412 04/10/19 
1654 04/10/19 
1640 WBC  --  4.9  --  6.4 HGB 7.2* 7.2*  --  8.4* HCT 23.3* 23.1*  --  26.9*  
PLT  --  187  --  232 MCV  --  92.0  --  91.5 NA  --  140  --  140 K  --  4.4  --  4.2 CL  --  110*  --  108* CO2  --  23  --  21 BUN  --  22  --  22  
CREA  --  0.97  --  0.98  
CA  --  8.4  --  10.0 MG  --  2.0  --   --   
GLU  --  106*  --  100 AP  --   --   --  76 SGOT  --   --   --  27 ALT  --   --   --  29  
TBILI  --   --   --  0.8 ALB  --   --   --  3.6 TP  --   --   --  6.9 PTP  --   --   --  15.9* INR  --   --  1.1 1.3 APTT  --   --   --  25.7 CT a/p w contrast 7/14/18 IMPRESSION:   
1. No acute CT findings in the abdomen or pelvis. 2. Stable subcentimeter cystic lesion in the pancreatic tail. Noncontrast CT Head 4/10/19 IMPRESSION: SMALL VESSEL ISCHEMIC DISEASE WITH NO ACUTE INTRACRANIAL ABNORMALITY IDENTIFIED AT NONCONTRAST CT. MRI Brain wo contrast 4/11/19 IMPRESSION:1. No acute infarction. 2. White matter hyperintensities compatible with mild chronic small vessel ischemic disease. 3. Mucosal thickening in the right maxillary sinus. 
  
Assessment:  
 
Active Problems: 
  CVA (cerebral vascular accident) ([facility] (3/13/2019) TIA (transient ischemic attack) (4/10/2019) 80 y.o. female with PMH of HTN, HLD, Hypothyroidism, CAD s/p PCI x3 2014, PE following knee surgery in 2009, AFib on Xarelto (now awaiting potential Watchman device) with recent history of CVA 3/13 treated with TPA and now admitted 4/10/19 after presenting with slurred speech and vision changes- diagnosed with TIA and neurology following. She is currently being seen in GI consult for anemia of unknown source with presenting Hgb of 8.4 (decreased from Hgb 11.1 on 3/19/19) with normal MCV 92. Since Hgb decreased to 7.2. She is currently receiving a total of 2u pRBCs. No recent iron studies. BUN/Creatinine, LFTs, INR were normal. She reports darker stool 2-3days ago without recent use of oral iron or pepto bismol. She has experienced frequent breakthrough reflux symptoms despite Zantac and recent switch to Omeprazole qAM over the past week (dose unknown). On ASA 81mg every day, Xarelto. No other blood thinners. No NSAIDs, tobacco or ETOH. On this admission she is currently on Pepcid 20mg b.i.d. She remains on Xarelto. She is s/p EGD & Colonoscopy 7/2018 for anemia and heme + stool without obvious source of GI blood loss. EGD revealed erosive gastritis. Colonoscopy revealed IH, sigmoid diverticulosis, 6mm transverse colon polyp, five (4-6mm) ascending colon polyps with path that revealed hyperplastic and tubular adenomas. Capsule endoscopy was discussed though pt requested to defer. Plan:  
 
-Will see if we can add on iron studies to blood drawn from AM labs. -Agree with transfusion 2u pRBC. Follow response. Monitor trend of H/H afterwards and consider additional transfusion pRBC if needed 
-Monitor for overt GI bleeding 
-D/C Pepcid and instead will start PPI qAM with Protonix 40mg one tab po qAM. 
-Plan EGD tomorrow Further recommendations will be based upon pt clinical course and EGD findings. Edgard Barreto PA-C [facility]

## 2019-04-11 NOTE — PROGRESS NOTES
Neurology to see patient via tele-neuro.   Dr Maicol Small notified pt will be having MRI so consultt to be delayed until MRI completed so imaging may be reviewed by MD.  Pt currently without need for urgent assessment by Neurologist.

## 2019-04-11 NOTE — PROGRESS NOTES
Problem: Mobility Impaired (Adult and Pediatric) Goal: *Acute Goals and Plan of Care (Insert Text) Note: STG: 
(1.)Ms. Dylan Brown will move from supine to sit and sit to supine  with INDEPENDENCE within 5 treatment day(s). (2.)Ms. Dylan Brown will transfer from bed to chair and chair to bed with INDEPENDENT using the least restrictive device within 5 treatment day(s). (3.)Ms. Dylan Brown will ambulate with INDEPENDENCE for 350 feet with the least restrictive device within 5 treatment day(s). (4.)Pt. Will increase B LE strength 1/2 grade within 5 days (5.)Pt. Will improve standing balance to tolerate dynamic standing balance exs with CGA within 5 days 
 
 
________________________________________________________________________________________________ PHYSICAL THERAPY: Initial Assessment and AM 4/11/2019 OBSERVATION:   
[place] / [place] / Product Type: Medicare /   
  
NAME/AGE/GENDER: Jorge Luis Davis is a 80 y.o. female PRIMARY DIAGNOSIS: TIA (transient ischemic attack) [G45.9] CVA (cerebral vascular accident) ([facility] [I63.9] <principal problem not specified> 
 <principal problem not specified> 
 
  
  
ICD-10: Treatment Diagnosis:  
 Generalized Muscle Weakness (M62.81) Other abnormalities of gait and mobility (R26.89) Precaution/Allergies: 
Adhesive ASSESSMENT:  
 
Ms. Dylan Brown presents with sudden onset of blurred vision and problems with thought. She has been feeling a heaviness in her arms and legs since the beginning of March. She was hospitalized mid march with a CVA. She currently demonstrates a slight decline in her premorbid level of function. She presents with decreased LE strength, standing balance and the need for CGA for her functional mobility. She does describe some dizziness during transitions. This am her spouse and dtr are present during eval. Both are very supportive. This am, she just finishes breakfast and is willing to walk around the ICU. This section established at most recent assessment PROBLEM LIST (Impairments causing functional limitations): 
Decreased Strength Decreased Transfer Abilities Decreased Ambulation Ability/Technique Decreased Balance Decreased Activity Tolerance Increased Fatigue Decreased Flexibility/Joint Mobility Decreased [place] with Home Exercise Program 
 INTERVENTIONS PLANNED: (Benefits and precautions of physical therapy have been discussed with the patient.) Balance Exercise Bed Mobility Family Education Gait Training Home Exercise Program (HEP) Range of Motion (ROM) Therapeutic Activites Therapeutic Exercise/Strengthening Transfer Training TREATMENT PLAN: Frequency/Duration: daily for duration of hospital stay Rehabilitation Potential For Stated Goals: Good RECOMMENDED REHABILITATION/EQUIPMENT: (at time of discharge pending progress): Due to the probability of continued deficits (see above) this patient will not likely need continued skilled physical therapy after discharge. Equipment:  
Has a RW and str cane HISTORY:  
History of Present Injury/Illness (Reason for Referral): 
Sudden onset of blurred vision and memory loss. Past Medical History/Comorbidities: Ms. Elizabeth Cruz  has a past medical history of Abnormal cardiovascular function study (1/7/2016), Allergic rhinitis (1/14/2013), Anemia (11/13/2015), Arthritis, Atrial fibrillation ([address] (1/14/2013), Blind left eye, CAD (coronary artery disease), native coronary artery (May 2014), Depression (1/14/2013), GERD (gastroesophageal reflux disease), Hypercholesteremia (1/14/2013), Hypertension, Hypothyroidism (1/14/2013), Osteoarthritis of back (1/2/2014), Osteoporosis, Pulmonary embolism ([address] (2009), and Sick sinus syndrome ([address] (1/7/2016).   Ms. Elizabeth Cruz  has a past surgical history that includes hx appendectomy (1965); hx hysterectomy (1970); hx tonsil and adenoidectomy (1934); hx heent; hx orthopaedic (2006 and 2008); hx cataract removal (Right, 2010); hx coronary stent placement (05/2014); hx heart catheterization (09/17/2015); hx knee replacement (2009); hx knee replacement (Left, 01/12/2017); hx breast biopsy (Bilateral); and colonoscopy (N/A, 7/16/2018). Social History/Living Environment:  
Home Environment: Private residence # Steps to Enter: 0 One/Two Story Residence: One story Living Alone: No 
Support Systems: Child(celena), Spouse/Significant Other/Partner Patient Expects to be Discharged to[de-identified] Private residence Current DME Used/Available at Home: Walker, rolling, Cane, straight Tub or Shower Type: Tub/Shower combination Prior Level of Function/Work/Activity: 
Functionally I with ADls and amb, cooking Dominant Side:  
      RIGHT Number of Personal Factors/Comorbidities that affect the Plan of Care: 1-2: MODERATE COMPLEXITY EXAMINATION:  
Most Recent Physical Functioning:  
Gross Assessment: 
AROM: Generally decreased, functional(B LEs) Strength: Generally decreased, functional(B LEs grossly 3+-4/5) Sensation: Impaired(B feet) Posture: 
Posture Assessment: Forward head, Rounded shoulders Balance: 
Sitting: Intact Standing: With support Bed Mobility: 
Rolling: Contact guard assistance Supine to Sit: Contact guard assistance Sit to Supine: Contact guard assistance Wheelchair Mobility: 
  
Transfers: 
Sit to Stand: Contact guard assistance Stand to Sit: Contact guard assistance Gait: 
  
Base of Support: Widened Speed/Irena: Pace decreased (<100 feet/min) Gait Abnormalities: Decreased step clearance;Shuffling gait Distance (ft): 75 Feet (ft) Ambulation - Level of Assistance: Contact guard assistance Interventions: Safety awareness training;Verbal cues Body Structures Involved: Eyes and Ears Joints Muscles Body Functions Affected: 
Neuromusculoskeletal 
Movement Related Activities and Participation Affected: 
General Tasks and Demands Mobility Self Care Number of elements that affect the Plan of Care: 4+: HIGH COMPLEXITY CLINICAL PRESENTATION:  
Presentation: Stable and uncomplicated: LOW COMPLEXITY CLINICAL DECISION MAKIN[address] [place] [address] AM-PAC? ?6 Clicks? Basic Mobility Inpatient Short Form How much difficulty does the patient currently have. .. Unable A Lot A Little None 1. Turning over in bed (including adjusting bedclothes, sheets and blankets)? ? 1   ? 2   ? 3   ? 4  
2. Sitting down on and standing up from a chair with arms ( e.g., wheelchair, bedside commode, etc.)   ? 1   ? 2   ? 3   ? 4  
3. Moving from lying on back to sitting on the side of the bed?   ? 1   ? 2   ? 3   ? 4 How much help from another person does the patient currently need. .. Total A Lot A Little None 4. Moving to and from a bed to a chair (including a wheelchair)? ? 1   ? 2   ? 3   ? 4  
5. Need to walk in hospital room? ? 1   ? 2   ? 3   ? 4  
6. Climbing 3-5 steps with a railing? ? 1   ? 2   ? 3   ? 4  
© , Trustees of [address] [place] [address], under license to [employer]. All rights reserved Score:  Initial: 19 Most Recent: X (Date: -- ) Interpretation of Tool:  Represents activities that are increasingly more difficult (i.e. Bed mobility, Transfers, Gait). Medical Necessity:    
Patient demonstrates good 
 rehab potential due to higher previous functional level. Reason for Services/Other Comments: 
Patient continues to require present interventions due to patient's inability to perform functional mobility independently Yovana Po Use of outcome tool(s) and clinical judgement create a POC that gives a: Clear prediction of patient's progress: LOW COMPLEXITY  
  
 
 
 
TREATMENT:  
(In addition to Assessment/Re-Assessment sessions the following treatments were rendered) Pre-treatment Symptoms/Complaints:  feels weak today. Heavy arms and knees. Dizzy during transitions Pain: Initial:  
Pain Intensity 1: 0 Pain Location 1: Arm, Knee Pain Intervention(s) 1: Ambulation/Increased Activity, Elevation, Repositioned  Post Session:  0 Assessment/Reassessment only, no treatment provided today Braces/Orthotics/Lines/Etc:  
Telemetry lines O2 Device: Room air(placed to RA) Treatment/Session Assessment:   
Response to Treatment:  tolerates ambulation well. Interdisciplinary Collaboration:  
Physical Therapist 
Occupational Therapist 
Registered Nurse After treatment position/precautions:  
Supine in bed Bed/Chair-wheels locked Bed in low position Call light within reach RN notified Family at bedside Side rails x 3 Compliance with Program/Exercises: Will assess as treatment progresses Recommendations/Intent for next treatment session: \"Next visit will focus on advancements to more challenging activities and reduction in assistance provided\". Total Treatment Duration: PT Patient Time In/Time Out Time In: 0900 Time Out: 9920 Tasia Skaggs, PT

## 2019-04-11 NOTE — PROGRESS NOTES
Dr Rima Goldberg notified of follow up H&H results. Orders for 2 units PRBC, with premed of tylenol 650mg and benadryl 25mg and lasix 20mg in between units of blood. Also notified of neurology recommendations for GI consult for anemia and cardiology consult for possible cardiac source of emboli and possible watchman.

## 2019-04-11 NOTE — PROGRESS NOTES
Hospitalist Progress Note Admit Date:  4/10/2019  4:11 PM  
Name:  Shelia Workman Age:  80 y.o. 
:  1929 MRN:  [id number] PCP:  Adeel Mooney MD 
Treatment Team: Attending Provider: Sage Pickard MD; Utilization Review: Tata Vora RN 
CC: blurred vision memory impairment and slurred speech today -  
HPI:  
80yr female with pmhx sig for cva s/p tpa on 2019, afib on xarelto- awaiting eval for watchman was at home using the computer when she developed blurred vision, difficulty remembering what she was doing and memory impairment lasted about 15 min - family brought to the er. Symptoms resolved by the time she was seen. Ct head negative - hospitalist asked to admit. 
 
 
2019- pt seen - neuro status intact- mri pending; hb low 10 systems reviewed and negative except as noted in HPI. Past Medical History:  
Diagnosis Date  Abnormal cardiovascular function study 2016  Allergic rhinitis 2013  Anemia 2015  Arthritis  Atrial fibrillation ([address] 2013  Blind left eye   
 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  
 stent to LAD; 3 stents total  
 Depression 2013  GERD (gastroesophageal reflux disease)  Hypercholesteremia 2013  Hypertension  Hypothyroidism 2013  Osteoarthritis of back 2014  Osteoporosis  Pulmonary embolism ([address] 2009  
 after knee surgery  Sick sinus syndrome ([address] 2016 Tachycardia-Bradycardia Past Surgical History:  
Procedure Laterality Date  COLONOSCOPY N/A 2018 COLONOSCOPY performed by Carolyne Galeazzi, MD at [address]  HX BREAST BIOPSY Bilateral   
 HX CATARACT REMOVAL Right   HX CORONARY STENT PLACEMENT  2014 LAD X 3  
 HX HEART CATHETERIZATION  2015  HX HEENT    
 multiple eye surgeries - left - macular hoe, retinal detachment twice,   
 [address]  HX KNEE REPLACEMENT  2009  HX KNEE REPLACEMENT Left 01/12/2017  HX ORTHOPAEDIC  2006 and 2008  
 herniated disc  HX TONSIL AND ADENOIDECTOMY  [id number] Allergies Allergen Reactions  Adhesive Rash Social History Tobacco Use  Smoking status: Never Smoker  Smokeless tobacco: Never Used Substance Use Topics  Alcohol use: No  
  Alcohol/week: 0.0 oz Family History Problem Relation Age of Onset  Cancer Mother  Breast Cancer Mother  Cancer Father  Cancer Sister  Cancer Brother   
     pancreas  Breast Cancer Maternal Aunt Immunization History Administered Date(s) Administered  Influenza High Dose Vaccine PF 11/05/2015, 11/09/2016, 11/13/2017, 09/06/2018  Influenza Vaccine 10/17/2013, 10/27/2014  Pneumococcal Conjugate (PCV-13) 01/15/2016  Pneumococcal Polysaccharide (PPSV-23) 05/16/2018  TB Skin Test (PPD) Intradermal 01/12/2017  Tdap 06/01/2014 PTA Medications: 
Prior to Admission Medications Prescriptions Last Dose Informant Patient Reported? Taking? Biotin 2,500 mcg cap   Yes No  
Sig: Take  by mouth. Calcium Carbonate-Vit D3-Min (CALTRATE 600+D PLUS MINERALS) 600 mg calcium- 400 unit Tab   Yes No  
Sig: Take  by mouth daily. VIT A/VIT C/VIT E/ZINC/COPPER (ICAPS AREDS PO)   Yes No  
Sig: Take  by mouth. Twice a day  
acetaminophen (TYLENOL ARTHRITIS PAIN) 650 mg CR tablet   Yes No  
Sig: Take 650 mg by mouth every six (6) hours as needed for Pain. aspirin delayed-release 81 mg tablet   No No  
Sig: Take 1 Tab by mouth daily. atorvastatin (LIPITOR) 40 mg tablet   No No  
Sig: Take 1 Tab by mouth daily. TAKE 1 TABLET EVERY DAY  
escitalopram oxalate (LEXAPRO) 10 mg tablet   No No  
Sig: Take 1 Tab by mouth daily. levothyroxine (SYNTHROID) 50 mcg tablet   No No  
Sig: Take 1 Tab by mouth Daily (before breakfast).   
lisinopril (PRINIVIL, ZESTRIL) 20 mg tablet   No No  
 Sig: Take 1 Tab by mouth daily. metoprolol succinate (TOPROL-XL) 25 mg XL tablet   No No  
Sig: Take 0.5 Tabs by mouth daily. multivitamin (ONE A DAY) tablet   Yes No  
Sig: Take 1 Tab by mouth daily. nitrofurantoin (MACRODANTIN) 50 mg capsule   Yes No  
Sig: Take  by mouth four (4) times daily. nitroglycerin (NITROSTAT) 0.3 mg SL tablet   No No  
Si Tab by SubLINGual route every five (5) minutes as needed for Chest Pain.  
potassium 99 mg tablet   Yes No  
Sig: Take 99 mg by mouth daily. raNITIdine (ZANTAC) 150 mg tablet   Yes No  
Sig: Take 150 mg by mouth two (2) times a day. rivaroxaban (XARELTO) 20 mg tab tablet   No No  
Sig: Take 1 Tab by mouth daily (with dinner). vit C/vit E/lutein/min/omega-3 (OCUVITE PO)   Yes No  
Sig: Take 1 Cap by mouth daily. vitamin E (AQUA GEMS) 400 unit capsule   Yes No  
Sig: Take  by mouth daily. zolpidem (AMBIEN) 10 mg tablet   No No  
Sig: Take 1 Tab by mouth nightly as needed for Sleep. Max Daily Amount: 10 mg. Facility-Administered Medications: None Objective:  
 
Patient Vitals for the past 24 hrs: 
 Temp Pulse Resp BP SpO2  
19 0803  63 16 136/58 96 % 19 0733  65 15 149/56 95 % 19 0703 98 °F (36.7 °C) 70 17 154/64 98 % 19 0633  74 17 146/68 97 % 19 0603  76 15 139/60 97 % 19 0533  77 18 153/67 97 % 19 0305 98.4 °F (36.9 °C) 90 22 105/51 91 % 19 0200  76 23 115/58 92 % 19 0100  (!) 59 13 119/52 98 % 04/10/19 2300 98.6 °F (37 °C) (!) 59 14 122/48 95 % 04/10/19 2200  63 21 118/52 98 % 04/10/19 2146    175/63   
04/10/19 2130  63 21 148/65 99 % 04/10/19 2100  61 29 138/63 99 % 04/10/19 2030  (!) 56 18 167/63 100 % 04/10/19 2000  (!) 56 22 173/78 100 % 04/10/19 1930  (!) 59 17 166/67 100 % 04/10/19 1912 98 °F (36.7 °C) 72 17 177/63 100 % 04/10/19 1812  71 24 183/74 100 % 04/10/19 1621     99 % 04/10/19 1614 98.7 °F (37.1 °C) (!) 57 20 186/79 99 % Oxygen Therapy O2 Sat (%): 96 % (04/11/19 0803) Pulse via Oximetry: 62 beats per minute (04/11/19 0803) O2 Device: Room air(placed to RA) (04/11/19 0334) O2 Flow Rate (L/min): 2 l/min (04/11/19 0703) No intake or output data in the 24 hours ending 04/11/19 0903 Physical Exam: 
General:    Well nourished. Alert. Eyes:   Normal sclera. Extraocular movements intact. ENT:  Normocephalic, atraumatic. Moist mucous membranes CV:   RRR. No m/r/g. Peripheral pulses present. Capillary refill normal 
Lungs:  CTAB. No wheezing, rhonchi, or rales. Abdomen: Soft, nontender, nondistended. Bowel sounds normal.  
Extremities: Warm and dry. No cyanosis or edema. Neurologic: CN II-XII grossly intact. Sensation intact. Skin:     No rashes or jaundice. Normal coloration Psych:  Normal mood and affect. I reviewed the labs, imaging, EKGs, telemetry, and other studies done this admission. Data Review:  
Recent Results (from the past 24 hour(s)) CBC WITH AUTOMATED DIFF Collection Time: 04/10/19  4:40 PM  
Result Value Ref Range WBC 6.4 4.3 - 11.1 K/uL  
 RBC 2.94 (L) 4.05 - 5.2 M/uL HGB 8.4 (L) 11.7 - 15.4 g/dL HCT 26.9 (L) 35.8 - 46.3 % MCV 91.5 79.6 - 97.8 FL  
 MCH 28.6 26.1 - 32.9 PG  
 MCHC 31.2 (L) 31.4 - 35.0 g/dL  
 RDW 16.0 (H) 11.9 - 14.6 % PLATELET 149 206 - 074 K/uL MPV 11.4 9.4 - 12.3 FL ABSOLUTE NRBC 0.00 0.0 - 0.2 K/uL  
 DF AUTOMATED NEUTROPHILS 54 43 - 78 % LYMPHOCYTES 33 13 - 44 % MONOCYTES 11 4.0 - 12.0 % EOSINOPHILS 1 0.5 - 7.8 % BASOPHILS 1 0.0 - 2.0 % IMMATURE GRANULOCYTES 0 0.0 - 5.0 %  
 ABS. NEUTROPHILS 3.4 1.7 - 8.2 K/UL  
 ABS. LYMPHOCYTES 2.1 0.5 - 4.6 K/UL  
 ABS. MONOCYTES 0.7 0.1 - 1.3 K/UL  
 ABS. EOSINOPHILS 0.1 0.0 - 0.8 K/UL  
 ABS. BASOPHILS 0.0 0.0 - 0.2 K/UL  
 ABS. IMM. GRANS. 0.0 0.0 - 0.5 K/UL METABOLIC PANEL, COMPREHENSIVE  Collection Time: 04/10/19  4:40 PM  
 Result Value Ref Range Sodium 140 136 - 145 mmol/L Potassium 4.2 3.5 - 5.1 mmol/L Chloride 108 (H) 98 - 107 mmol/L  
 CO2 21 21 - 32 mmol/L Anion gap 11 mmol/L Glucose 100 65 - 100 mg/dL BUN 22 8 - 23 MG/DL Creatinine 0.98 0.6 - 1.0 MG/DL  
 GFR est AA >60 >60 ml/min/1.73m2 GFR est non-AA 57 ml/min/1.73m2 Calcium 10.0 8.3 - 10.4 MG/DL Bilirubin, total 0.8 0.2 - 1.1 MG/DL  
 ALT (SGPT) 29 12 - 65 U/L  
 AST (SGOT) 27 15 - 37 U/L Alk. phosphatase 74 50 - 130 U/L Protein, total 6.9 g/dL Albumin 3.6 3.2 - 4.6 g/dL Globulin 3.3 2.3 - 3.5 g/dL A-G Ratio 1.1 PROTHROMBIN TIME + INR Collection Time: 04/10/19  4:40 PM  
Result Value Ref Range Prothrombin time 15.9 (H) 11.7 - 14.5 sec INR 1.3 PTT Collection Time: 04/10/19  4:40 PM  
Result Value Ref Range aPTT 25.7 24.7 - 39.8 SEC  
POC PT/INR Collection Time: 04/10/19  4:54 PM  
Result Value Ref Range Prothrombin time (POC) 13.1 (H) 9.6 - 11.6 SECS  
 INR (POC) 1.1 0.9 - 1.2 POC TROPONIN-I Collection Time: 04/10/19  4:54 PM  
Result Value Ref Range Troponin-I (POC) 0.01 (L) 0.02 - 0.05 ng/ml GLUCOSE, POC Collection Time: 04/10/19  4:55 PM  
Result Value Ref Range Glucose (POC) 108 (H) 65 - 100 mg/dL EKG, 12 LEAD, INITIAL Collection Time: 04/10/19  5:56 PM  
Result Value Ref Range Ventricular Rate 63 BPM  
 Atrial Rate 63 BPM  
 P-R Interval 184 ms QRS Duration 90 ms Q-T Interval 480 ms QTC Calculation (Bezet) 491 ms Calculated P Axis 97 degrees Calculated R Axis 34 degrees Calculated T Axis 48 degrees Diagnosis Sinus rhythm with Premature atrial complexes Possible Anterior infarct (cited on or before 15-FEB-2018) Abnormal ECG When compared with ECG of 10-APR-2019 17:42, 
Premature atrial complexes are now Present Confirmed by Yadira Dumont ([id number]) on 4/10/2019 8:50:38 PM 
  
LIPID PANEL  Collection Time: 04/11/19  4:12 AM  
 Result Value Ref Range LIPID PROFILE Cholesterol, total 96 MG/DL Triglyceride 53 35 - 150 MG/DL  
 HDL Cholesterol 47 40 - 60 MG/DL  
 LDL, calculated 38.4 <100 MG/DL VLDL, calculated 10.6 6.0 - 23.0 MG/DL  
 CHOL/HDL Ratio 2.0 <200 CBC W/O DIFF Collection Time: 04/11/19  4:12 AM  
Result Value Ref Range WBC 4.9 4.3 - 11.1 K/uL  
 RBC 2.51 (L) 4.05 - 5.2 M/uL HGB 7.2 (L) 11.7 - 15.4 g/dL HCT 23.1 (L) 35.8 - 46.3 % MCV 92.0 79.6 - 97.8 FL  
 MCH 28.7 26.1 - 32.9 PG  
 MCHC 31.2 (L) 31.4 - 35.0 g/dL  
 RDW 16.0 (H) 11.9 - 14.6 % PLATELET 982 982 - 895 K/uL MPV 11.0 9.4 - 12.3 FL ABSOLUTE NRBC 0.00 0.0 - 0.2 K/uL METABOLIC PANEL, BASIC Collection Time: 04/11/19  4:12 AM  
Result Value Ref Range Sodium 140 136 - 145 mmol/L Potassium 4.4 3.5 - 5.1 mmol/L Chloride 110 (H) 98 - 107 mmol/L  
 CO2 23 21 - 32 mmol/L Anion gap 7 mmol/L Glucose 106 (H) 65 - 100 mg/dL BUN 22 8 - 23 MG/DL Creatinine 0.97 0.6 - 1.0 MG/DL  
 GFR est AA >60 >60 ml/min/1.73m2 GFR est non-AA 57 ml/min/1.73m2 Calcium 8.4 8.3 - 10.4 MG/DL MAGNESIUM Collection Time: 04/11/19  4:12 AM  
Result Value Ref Range Magnesium 2.0 1.8 - 2.4 mg/dL TSH 3RD GENERATION Collection Time: 04/11/19  4:12 AM  
Result Value Ref Range TSH 3.410 uIU/mL All Micro Results None Other Studies: 
Ct Head Wo Cont Result Date: 4/10/2019 NONCONTRAST HEAD CT CLINICAL HISTORY:  Slurred speech with recent TPA for stroke. TECHNIQUE:  Axial images were obtained with spiral technique. Radiation dose reduction was achieved using one or all of the following techniques: automated exposure control, weight-based dosing, iterative reconstruction. COMPARISON:  March 19, 2019. REPORT:   Standard non contrast head CT demonstrates no definite intracranial mass effect, hemorrhage, or evidence of acute geographic infarction.   White matter hypodensities are most consistent with small vessel ischemic disease. The ventricles are normal in size and configuration, accounting for the patient's age. Prominent mucosal thickening is again noted in the right maxillary antrum. The other paranasal sinuses are clear where imaged. Hyperdense left globe is again noted. Bone windows demonstrate no definite fracture or destruction. IMPRESSION:     SMALL VESSEL ISCHEMIC DISEASE WITH NO ACUTE INTRACRANIAL ABNORMALITY IDENTIFIED AT NONCONTRAST CT. Xr Chest [facility] Result Date: 4/10/2019 CHEST X-RAY, single portable view  4/10/2019 History: Slurred speech. Technique: Single frontal view of the chest. Comparison: Chest x-ray 2/15/2018 Findings: The cardiac silhouette is mildly enlarged although stable. The lungs are expanded without evidence for pneumothorax. No consolidative airspace process or pleural effusion is seen. Only new mild basilar atelectatic appearing changes are seen. IMPRESSION: 1.  Stable cardiomegaly and new mild basilar atelectatic appearing changes. No significant acute findings are seen. Assessment and Plan:  
 
Hospital Problems as of 4/11/2019 Date Reviewed: 4/1/2019 Codes Class Noted - Resolved POA  
 TIA (transient ischemic attack) ICD-10-CM: G45.9 ICD-9-CM: 435.9  4/10/2019 - Present Unknown CVA (cerebral vascular accident) [facility]) ICD-10-CM: I63.9 ICD-9-CM: 434.91  3/13/2019 - Present Unknown PLAN: 
· TIA vs cva- admit, continue secondary ppx, mri- neruo consult pending  recent full workup and suspected symptoms secondary to afib- awaiting eval for watchman · Hb low - send repeat, occult blood- if low will transfuse · Pt/ot eval  
· continue other appropriate home meds · Further workup and mgt based on her clinical course DVT ppx:  xarelto Anticipated DC needs:  none Code status:  Full Estimated LOS:  Greater than 2 midnights Risk:  high Signed: 
Silvino Bennett MD

## 2019-04-11 NOTE — PROGRESS NOTES
Speech language pathology: bedside swallow note: Initial Assessment and Discharge NAME/AGE/GENDER: Aleja Escobar is a 80 y.o. female DATE: 4/11/2019 PRIMARY DIAGNOSIS: TIA (transient ischemic attack) [G45.9] CVA (cerebral vascular accident) ([facility] [I63.9] ICD-10: Treatment Diagnosis: R13.12 Oropharyngeal Dysphagia. INTERDISCIPLINARY COLLABORATION: Registered Nurse PRECAUTIONS/ALLERGIES: Adhesive ASSESSMENT:Based on the objective data described below, Ms. Morrison Cranker presents with swallow function that is within normal limits. Patient presented with thin liquid via cup and straw, puree, mixed, and solid consistencies. Appropriate oral prep with all textures. Timely swallow initiation, and single swallows upon palpation. Adequate oral clearing. No overt signs or symptoms of airway compromise observed with liquid or solid textures. Recommend regular diet/thin liquids. Medications with liquid wash. No dysphagia treatment indicated. CT and MRI negative for acute changes. Patient denies current changes to speech/language/cognitive function. No further ST indicated at this time. ?????? ? ? This section established at most recent assessment?????????? 
PROBLEM LIST (Impairments causing functional limitations): 1. Oropharyngeal dysphagia- No symptoms identified 2. REHABILITATION POTENTIAL FOR STATED GOALS: Excellent PLAN OF CARE:  
Patient will benefit from skilled intervention to address the following impairments. RECOMMENDATIONS AND PLANNED INTERVENTIONS (Benefits and precautions of therapy have been discussed with the patient.): 
· PO:  Regular · Liquids:  regular thin MEDICATIONS: 
· With liquid ASPIRATION PRECAUTIONS: 
· Slow rate of intake · Small bites/sips · Upright at 90 degrees during meal 
COMPENSATORY STRATEGIES/MODIFICATIONS INCLUDING: 
· None OTHER RECOMMENDATIONS (including follow up treatment recommendations):  
· Patient education RECOMMENDED DIET MODIFICATIONS DISCUSSED WITH: 
· Nursing · Patient FREQUENCY/DURATION: No further ST indicated at this time. Please consider re-consult if new concerns arise. RECOMMENDED REHABILITATION/EQUIPMENT: (at time of discharge pending progress): Due to the probability of continued deficits (see above) this patient will not likely need continued skilled speech therapy after discharge. SUBJECTIVE:  
\"I am doing better today. All the symptoms went away by the time EMS got there\" History of Present Injury/Illness: Ms. Kathleen Rowe  has a past medical history of Abnormal cardiovascular function study (1/7/2016), Allergic rhinitis (1/14/2013), Anemia (11/13/2015), Arthritis, Atrial fibrillation ([facility] (1/14/2013), Blind left eye, CAD (coronary artery disease), native coronary artery (May 2014), Depression (1/14/2013), GERD (gastroesophageal reflux disease), Hypercholesteremia (1/14/2013), Hypertension, Hypothyroidism (1/14/2013), Osteoarthritis of back (1/2/2014), Osteoporosis, Pulmonary embolism ([facility] (2009), and Sick sinus syndrome ([facility] (1/7/2016). .  She also  has a past surgical history that includes hx appendectomy (1965); hx hysterectomy (1970); hx tonsil and adenoidectomy (9444); hx heent; hx orthopaedic (2006 and 2008); hx cataract removal (Right, 2010); hx coronary stent placement (05/2014); hx heart catheterization (09/17/2015); hx knee replacement (2009); hx knee replacement (Left, 01/12/2017); hx breast biopsy (Bilateral); and colonoscopy (N/A, 7/16/2018). Present Symptoms: Oropharyngeal dysphagia- No symptoms identified Pain Scale 1: Numeric (0 - 10) Pain Intensity 1: 0 Current Medications: No current facility-administered medications on file prior to encounter. Current Outpatient Medications on File Prior to Encounter Medication Sig Dispense Refill  Biotin 2,500 mcg cap Take  by mouth.  multivitamin (ONE A DAY) tablet Take 1 Tab by mouth daily.  raNITIdine (ZANTAC) 150 mg tablet Take 150 mg by mouth two (2) times a day.  escitalopram oxalate (LEXAPRO) 10 mg tablet Take 1 Tab by mouth daily. 90 Tab 3  
 zolpidem (AMBIEN) 10 mg tablet Take 1 Tab by mouth nightly as needed for Sleep. Max Daily Amount: 10 mg. 30 Tab 2  
 nitrofurantoin (MACRODANTIN) 50 mg capsule Take  by mouth four (4) times daily.  levothyroxine (SYNTHROID) 50 mcg tablet Take 1 Tab by mouth Daily (before breakfast). 90 Tab 3  
 atorvastatin (LIPITOR) 40 mg tablet Take 1 Tab by mouth daily. TAKE 1 TABLET EVERY DAY 30 Tab 1  
 metoprolol succinate (TOPROL-XL) 25 mg XL tablet Take 0.5 Tabs by mouth daily. 30 Tab 1  
 aspirin delayed-release 81 mg tablet Take 1 Tab by mouth daily. 30 Tab 1  
 lisinopril (PRINIVIL, ZESTRIL) 20 mg tablet Take 1 Tab by mouth daily. 30 Tab 1  rivaroxaban (XARELTO) 20 mg tab tablet Take 1 Tab by mouth daily (with dinner). 30 Tab 0  
 potassium 99 mg tablet Take 99 mg by mouth daily.  vit C/vit E/lutein/min/omega-3 (OCUVITE PO) Take 1 Cap by mouth daily.  nitroglycerin (NITROSTAT) 0.3 mg SL tablet 1 Tab by SubLINGual route every five (5) minutes as needed for Chest Pain. 1 Bottle 3  
 acetaminophen (TYLENOL ARTHRITIS PAIN) 650 mg CR tablet Take 650 mg by mouth every six (6) hours as needed for Pain.  VIT A/VIT C/VIT E/ZINC/COPPER (ICAPS AREDS PO) Take  by mouth. Twice a day  vitamin E (AQUA GEMS) 400 unit capsule Take  by mouth daily.  Calcium Carbonate-Vit D3-Min (CALTRATE 600+D PLUS MINERALS) 600 mg calcium- 400 unit Tab Take  by mouth daily. Current Dietary Status:   
 Regular/thin OBJECTIVE:  
Respiratory Status:  Room air(placed to RA)  2 l/min Oral Motor Structure/Speech:  Oral-Motor Structure/Motor Speech Labial: No impairment Dentition: Intact, Natural 
Lingual: No impairment Cognitive and Communication Status: 
Neurologic State: Alert Orientation Level: Oriented X4 Cognition: Appropriate decision making; Follows commands Perception: Appears intact Perseveration: No perseveration noted Safety/Judgement: Awareness of environment BEDSIDE SWALLOW EVALUATIONOral Assessment: 
Oral Assessment Labial: No impairment Dentition: Intact; Natural 
Lingual: No impairment P.O. Trials: 
Patient Position: Upright in bed The patient was given tsp-small bite amounts of the following:  
Consistency Presented: Thin liquid; Solid;Puree;Mixed consistency How Presented: Self-fed/presented;Cup/sip;Spoon;Straw;Successive swallows ORAL PHASE: 
Bolus Acceptance: No impairment Bolus Formation/Control: No impairment Propulsion: No impairment Oral Residue: None PHARYNGEAL PHASE: 
Initiation of Swallow: No impairment Laryngeal Elevation: Functional 
Aspiration Signs/Symptoms: None Vocal Quality: No impairment Effective Modifications: None Pharyngeal Phase Characteristics: No impairment, issues, or problems OTHER OBSERVATIONS: 
Rate/bite size: WNL Endurance: WNL Comments: Tool Used: Dysphagia Outcome and Severity Scale (MADISON) Score Comments Normal Diet  [x] 7 With no strategies or extra time needed Functional Swallow  [] 6 May have mild oral or pharyngeal delay Mild Dysphagia 
  [] 5 Which may require one diet consistency restricted (those who demonstrate penetration which is entirely cleared on MBS would be included) Mild-Moderate Dysphagia  [] 4 With 1-2 diet consistencies restricted Moderate Dysphagia  [] 3 With 2 or more diet consistencies restricted Moderately Severe Dysphagia  [] 2 With partial PO strategies (trials with ST only) Severe Dysphagia  [] 1 With inability to tolerate any PO safely Score:  Initial: **7* Most Recent: X (Date: --) Interpretation of Tool: The Dysphagia Outcome and Severity Scale (MADISON) is a simple, easy-to-use, 7-point scale developed to systematically rate the functional severity of dysphagia based on objective assessment and make recommendations for diet level, independence level, and type of nutrition. [place] / [place] / Product Type: Medicare /  
 
TREATMENT:  
 (In addition to Assessment/Re-Assessment sessions the following treatments were rendered) Assessment/Reassessment only, no treatment provided today MODALITIES:  
  
  
  
  
  
  
  
  
  
  
    
  
  
  
  
  
  
  
  
   
 
ORAL MOTOR  EXERCISES: 
  
  
  
  
  
  
  
  
  
  
  
  
  
  
  
  
  
  
  
  
  
  
  
  
  
  
    
  
  
  
  
  
  
  
  
  
  
  
  
  
  
  
  
  
  
  
  
  
  
  
  
  
   
 
LARYNGEAL / PHARYNGEAL EXERCISES: 
  
  
  
  
  
  
  
  
  
  
  
  
  
  
  
  
  
  
  
  
  
    
  
  
  
  
  
  
  
  
  
  
  
  
  
  
  
  
  
  
  
   
 
__________________________________________________________________________________________________ Safety: After treatment position/precautions: · Upright in Bed · Recommendations/Intent for next treatment session: No further ST indicated at this time. Please re-consult if new concerns arise. Total Treatment Duration: 
Time In: 5642 Time Out: [address] CCC-SLP

## 2019-04-11 NOTE — PROGRESS NOTES
Lab called to inquire why STAT H&H and type and screen from 0800 not completed. Lab stated the time populated was noon, so the labs would be considered STAT at noon. Lab was asked to send a phlebotomist to draw STAT H&H and STAT type and screen now.

## 2019-04-11 NOTE — PROGRESS NOTES
Problem: Self Care Deficits Care Plan (Adult) Goal: *Acute Goals and Plan of Care (Insert Text) Description 1. Patient will perform grooming with supervision. 2. Patient will perform Upper body dressing with supervision 3. Patient will perform lower body dressing with supervision 4. Patient will perform upper and lower body bathing with supervision. 5. Patient will perform toilet transfers with CGA. 6. Patient will perform shower transfer with CGA. 7. Patient will participate in 30 + minutes of ADL/ therapeutic exercise/therapeutic activity with min rest breaks to increase activity tolerance for self care. 8. Patient will perform ADL functional mobility in room with CGA. Goals to be achieved in 7 days. Outcome: Progressing Towards Goal 
  
OCCUPATIONAL THERAPY: Initial Assessment, Daily Note and AM 4/11/2019 OBSERVATION:   
[place] / [place] / Product Type: Medicare /  
  
NAME/AGE/GENDER: Humberto Elaine is a 80 y.o. female PRIMARY DIAGNOSIS:  TIA (transient ischemic attack) [G45.9] CVA (cerebral vascular accident) ([facility] [address] [I63.9] <principal problem not specified> 
 <principal problem not specified> 
 
  
  
ICD-10: Treatment Diagnosis:  
 · Generalized Muscle Weakness (M62.81) Precautions/Allergies: 
   Adhesive ASSESSMENT:  
Ms. Duyen Arshad presents with above diagnosis and was seen in ICU room with  and daughter present. Pt reported and was noted to have moderate generalized weakness and decreased balance. Pt would benefit from OT while in the hospital to maximize safety and independence with self care. Pt was up to bathroom area in ICU and toileted and groomed as noted in grid. This section established at most recent assessment PROBLEM LIST (Impairments causing functional limitations): 1. Decreased Strength 2. Decreased ADL/Functional Activities 3. Decreased Balance  INTERVENTIONS PLANNED: (Benefits and precautions of occupational therapy have been discussed with the patient.) 1. Activities of daily living training 2. Adaptive equipment training 3. Balance training 4. Therapeutic activity 5. Therapeutic exercise TREATMENT PLAN: Frequency/Duration: Follow patient 3 times per week to address above goals. Rehabilitation Potential For Stated Goals: Good RECOMMENDED REHABILITATION/EQUIPMENT: (at time of discharge pending progress): Due to the probability of continued deficits (see above) this patient will not likely need continued skilled occupational therapy after discharge. Equipment:  
? None at this time OCCUPATIONAL PROFILE AND HISTORY:  
History of Present Injury/Illness (Reason for Referral): 
weakness Past Medical History/Comorbidities: Ms. Samantha Levy  has a past medical history of Abnormal cardiovascular function study (1/7/2016), Allergic rhinitis (1/14/2013), Anemia (11/13/2015), Arthritis, Atrial fibrillation ([facility] [address] (1/14/2013), Blind left eye, CAD (coronary artery disease), native coronary artery (May 2014), Depression (1/14/2013), GERD (gastroesophageal reflux disease), Hypercholesteremia (1/14/2013), Hypertension, Hypothyroidism (1/14/2013), Osteoarthritis of back (1/2/2014), Osteoporosis, Pulmonary embolism ([address] (2009), and Sick sinus syndrome ([facility] [address] (1/7/2016). Ms. Samantha Levy  has a past surgical history that includes hx appendectomy (1965); hx hysterectomy (1970); hx tonsil and adenoidectomy (1934); hx heent; hx orthopaedic (2006 and 2008); hx cataract removal (Right, 2010); hx coronary stent placement (05/2014); hx heart catheterization (09/17/2015); hx knee replacement (2009); hx knee replacement (Left, 01/12/2017); hx breast biopsy (Bilateral); and colonoscopy (N/A, 7/16/2018). Social History/Living Environment:  
Home Environment: Private residence # Steps to Enter: 0 One/Two Story Residence: One story Living Alone: No 
Support Systems: Child(celena), Spouse/Significant Other/Partner Patient Expects to be Discharged to[de-identified] Private residence Current DME Used/Available at Home: [place], rolling, Cane, straight Tub or Shower Type: Tub/Shower combination Prior Level of Function/Work/Activity: 
Independent Number of Personal Factors/Comorbidities that affect the Plan of Care: Brief history (0):  LOW COMPLEXITY ASSESSMENT OF OCCUPATIONAL PERFORMANCE[de-identified]  
Activities of Daily Living:  
Basic ADLs (From Assessment) Complex ADLs (From Assessment) Feeding: Independent Oral Facial Hygiene/Grooming: Supervision Bathing: Minimum assistance Upper Body Dressing: Stand-by assistance Lower Body Dressing: Minimum assistance Toileting: Contact guard assistance Grooming/Bathing/Dressing Activities of Daily Living Cognitive Retraining Safety/Judgement: Awareness of environment Functional Transfers Bathroom Mobility: Contact guard assistance Toilet Transfer : Contact guard assistance Shower Transfer: Contact guard assistance Bed/Mat Mobility Rolling: Contact guard assistance Supine to Sit: Contact guard assistance Sit to Supine: Contact guard assistance Sit to Stand: Contact guard assistance Stand to Sit: Contact guard assistance Most Recent Physical Functioning:  
Gross Assessment: 
  
         
  
Posture: 
Posture Assessment: Forward head, Rounded shoulders Balance: 
Sitting: Intact Standing: With support Bed Mobility: 
Rolling: Contact guard assistance Supine to Sit: Contact guard assistance Sit to Supine: Contact guard assistance Wheelchair Mobility: 
  
Transfers: 
Sit to Stand: Contact guard assistance Stand to Sit: Contact guard assistance Patient Vitals for the past 6 hrs: 
 BP SpO2 O2 Flow Rate (L/min) Pulse 04/11/19 0633 146/68 97 %  74  
04/11/19 0703 154/64 98 % 2 l/min 70  
04/11/19 0733 149/56 95 %  65  
04/11/19 0803 136/58 96 %  63  
04/11/19 0833 127/62 97 %  72  
04/11/19 0903 126/53 96 %  68  
 19 0933 120/54 95 %  68  
19 1107 146/61   64 Mental Status Neurologic State: Alert Orientation Level: Oriented X4 Cognition: Appropriate decision making Perception: Appears intact Perseveration: No perseveration noted Safety/Judgement: Awareness of environment Physical Skills Involved: 
1. Balance 2. Strength 3. Activity Tolerance Cognitive Skills Affected (resulting in the inability to perform in a timely and safe manner): 1. none  Psychosocial Skills Affected: 1. Environmental Adaptation Number of elements that affect the Plan of Care: 3-5:  MODERATE COMPLEXITY CLINICAL DECISION MAKIN[address] [place] [address] AM-PAC 6 Clicks Daily Activity Inpatient Short Form How much help from another person does the patient currently need. .. Total A Lot A Little None 1. Putting on and taking off regular lower body clothing? ? 1   ? 2   ? 3   ? 4  
2. Bathing (including washing, rinsing, drying)? ? 1   ? 2   ? 3   ? 4  
3. Toileting, which includes using toilet, bedpan or urinal?   ? 1   ? 2   ? 3   ? 4  
4. Putting on and taking off regular upper body clothing? ? 1   ? 2   ? 3   ? 4  
5. Taking care of personal grooming such as brushing teeth? ? 1   ? 2   ? 3   ? 4  
6. Eating meals? ? 1   ? 2   ? 3   ? 4  
© , Trustees of [address] [place] [address], under license to [employer]. All rights reserved Score:  Initial: 20 Most Recent: X (Date: -- ) Interpretation of Tool:  Represents activities that are increasingly more difficult (i.e. Bed mobility, Transfers, Gait). Medical Necessity:    
· Patient is expected to demonstrate progress in strength and balance ·  to increase independence with self care · . Reason for Services/Other Comments: 
· would benefit from OT while in the hospital to maximize safety and independence with self care Use of outcome tool(s) and clinical judgement create a POC that gives a: LOW COMPLEXITY TREATMENT:  
(In addition to Assessment/Re-Assessment sessions the following treatments were rendered) Pre-treatment Symptoms/Complaints:   
Pain: Initial:  
Pain Intensity 1: 0 0 Post Session:  0 Self Care: (15min): Procedure(s) (per grid) utilized to improve and/or restore self-care/home management as related to toileting and grooming. Required minimal verbal and   cueing to facilitate activities of daily living skills and compensatory activities. OT evaluation completed Braces/Orthotics/Lines/Etc:  
· O2 Device: Room air(placed to RA) Treatment/Session Assessment:   
· Response to Treatment:  pt up for grooming and toileting tolerated well with complaint of weakness · Interdisciplinary Collaboration:  
o Physical Therapist 
o Occupational Therapist 
o Registered Nurse · After treatment position/precautions:  
o Supine in bed 
o Bed/Chair-wheels locked 
o Bed in low position 
o Call light within reach 
o RN notified 
o Family at bedside 
o ICU monitors intact · Compliance with Program/Exercises: Compliant all of the time. · Recommendations/Intent for next treatment session: \"Next visit will focus on advancements to more challenging activities and reduction in assistance provided\". Total Treatment Duration: OT Patient Time In/Time Out Time In: 0830 Time Out: 0900 Anya Tinsley OT

## 2019-04-11 NOTE — CONSULTS
[facility] Consult Note       Primary GI Physician: Dr. Kristi Ding Physician: Dr. Reyes Wallace    Referring Provider:  Dr. Nate Landry Date:  4/11/2019    Admit Date:  4/10/2019    Chief Complaint:  Anemia of unknown source    Subjective:     History of Present Illness:  Patient is a 80 y.o. female with PMH of HTN, HLD, Hypothyroidism, CAD s/p PCI x3 2014, PE following knee surgery in 2009, AFib on Xarelto (now awaiting potential Watchman device) with recent history of CVA 3/13 treated with TPA and now admitted 4/10/19 after presenting with slurred speech and vision changes- diagnosed with TIA and neurology following. She is currently being seen in GI consult for anemia of unknown source with presenting Hgb of 8.4 (decreased from Hgb 11.1 on 3/19/19) with normal MCV 92. Since Hgb decreased to 7.2. She is currently receiving a total of 2u pRBCs. BUN/Creatinine, LFTs, INR were normal. She is currently on Pepcid 20mg b.i.d. She remains on Xarelto. She was seen in GI consult 7/2018 for anemia and heme + stool- then no iron studies and endoscopic evaluation was negative for obvious source of GI blood loss. EGD 7/16/18 revealed erosive gastritis. Colonoscopy 7/17/18 revealed IH, sigmoid diverticulosis, 6mm transverse colon polyp, five (4-6mm) ascending colon polyps with path that revealed hyperplastic and tubular adenomas. Capsule endoscopy was discussed though pt requested to defer. Currently she reports dark stool 2-3days ago and otherwise normal BMs every few days. No iron supplementation or use of Pepto bismol prior to this admission. She was on oral iron at the end of 2018 though this was discontinued once Hgb improved per pt. No BRBPR. She will have some lower abdominal cramping at times with difficulty passing stool which generally improves after she is able to have a BM. She does have a hx of GERD.  She was taking Zantac however over the past week changed this to Omeprazole daily (dose unknown) secondary to frequent breakthrough reflux symptoms. She denies frequent use of NSAIDs. She is on ASA 81mg in addition to her Xarelto. She denies tobacco or heavy ETOH use. She denies other abdominal pains, N/V, dysphagia, anorexia or unexplained weight loss. PMH:  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    CAD (coronary artery disease), native coronary artery May 2014    stent to LAD; 3 stents total    Depression 1/14/2013    GERD (gastroesophageal reflux disease)     Hypercholesteremia 1/14/2013    Hypertension     Hypothyroidism 1/14/2013    Osteoarthritis of back 1/2/2014    Osteoporosis     Pulmonary embolism ([address] 2009    after knee surgery    Sick sinus syndrome ([address] 1/7/2016    Tachycardia-Bradycardia       PSH:  Past Surgical History:   Procedure Laterality Date    COLONOSCOPY N/A 7/16/2018    COLONOSCOPY performed by Camron Bansal MD at [address] [place] [address] HX BREAST BIOPSY Bilateral     HX CATARACT REMOVAL Right 2010    HX CORONARY STENT PLACEMENT  05/2014    LAD X 3    HX HEART CATHETERIZATION  09/17/2015    HX HEENT      multiple eye surgeries - left - macular hoe, retinal detachment twice,     HX HYSTERECTOMY  1970    HX KNEE REPLACEMENT  2009    HX KNEE REPLACEMENT Left 01/12/2017    HX ORTHOPAEDIC  2006 and 2008    herniated disc    HX TONSIL AND ADENOIDECTOMY  1934       Allergies: Allergies   Allergen Reactions    Adhesive Rash       Home Medications:  Prior to Admission medications    Medication Sig Start Date End Date Taking? Authorizing Provider   Biotin 2,500 mcg cap Take  by mouth. Provider, Historical   multivitamin (ONE A DAY) tablet Take 1 Tab by mouth daily.     Provider, Historical raNITIdine (ZANTAC) 150 mg tablet Take 150 mg by mouth two (2) times a day. Provider, Historical   escitalopram oxalate (LEXAPRO) 10 mg tablet Take 1 Tab by mouth daily. 4/1/19   Ana Hutchison MD   zolpidem (AMBIEN) 10 mg tablet Take 1 Tab by mouth nightly as needed for Sleep. Max Daily Amount: 10 mg. 4/1/19   Ana Hutchison MD   nitrofurantoin (MACRODANTIN) 50 mg capsule Take  by mouth four (4) times daily. Provider, Historical   levothyroxine (SYNTHROID) 50 mcg tablet Take 1 Tab by mouth Daily (before breakfast). 3/22/19   Ana Hutchison MD   atorvastatin (LIPITOR) 40 mg tablet Take 1 Tab by mouth daily. TAKE 1 TABLET EVERY DAY 3/16/19   Taye Mcgrath MD   metoprolol succinate (TOPROL-XL) 25 mg XL tablet Take 0.5 Tabs by mouth daily. 3/17/19   Taye Mcgrath MD   aspirin delayed-release 81 mg tablet Take 1 Tab by mouth daily. 3/17/19   Taye Mcgrath MD   lisinopril (PRINIVIL, ZESTRIL) 20 mg tablet Take 1 Tab by mouth daily. 3/17/19   Taye Mcgrath MD   rivaroxaban (XARELTO) 20 mg tab tablet Take 1 Tab by mouth daily (with dinner). 3/16/19   Taye Mcgrath MD   potassium 99 mg tablet Take 99 mg by mouth daily. Provider, Historical   vit C/vit E/lutein/min/omega-3 (OCUVITE PO) Take 1 Cap by mouth daily. Provider, Historical   nitroglycerin (NITROSTAT) 0.3 mg SL tablet 1 Tab by SubLINGual route every five (5) minutes as needed for Chest Pain. 8/4/17   Keely Mcclelland MD   acetaminophen (TYLENOL ARTHRITIS PAIN) 650 mg CR tablet Take 650 mg by mouth every six (6) hours as needed for Pain. Provider, Historical   VIT A/VIT C/VIT E/ZINC/COPPER (ICAPS AREDS PO) Take  by mouth. Twice a day    Provider, Historical   vitamin E (AQUA GEMS) 400 unit capsule Take  by mouth daily. Provider, Historical   Calcium Carbonate-Vit D3-Min (CALTRATE 600+D PLUS MINERALS) 600 mg calcium- 400 unit Tab Take  by mouth daily.     Provider, [facility] Medications:  Current Facility-Administered Medications   Medication Dose Route Frequency    temazepam (RESTORIL) capsule 15 mg  15 mg Oral QHS PRN    0.9% sodium chloride infusion 250 mL  250 mL IntraVENous PRN    furosemide (LASIX) injection 20 mg  20 mg IntraVENous ONCE PRN    sodium chloride (NS) flush 5-40 mL  5-40 mL IntraVENous Q8H    sodium chloride (NS) flush 5-40 mL  5-40 mL IntraVENous PRN    aspirin chewable tablet 81 mg  81 mg Oral DAILY    pravastatin (PRAVACHOL) tablet 80 mg  80 mg Oral QHS    acetaminophen (TYLENOL) tablet 650 mg  650 mg Oral Q4H PRN    levothyroxine (SYNTHROID) tablet 50 mcg  50 mcg Oral ACB    escitalopram oxalate (LEXAPRO) tablet 10 mg  10 mg Oral DAILY    lisinopril (PRINIVIL, ZESTRIL) tablet 20 mg  20 mg Oral DAILY    metoprolol succinate (TOPROL-XL) XL tablet 12.5 mg  12.5 mg Oral DAILY    rivaroxaban (XARELTO) tablet 15 mg  15 mg Oral DAILY WITH DINNER    famotidine (PEPCID) tablet 20 mg  20 mg Oral BID    hydrALAZINE (APRESOLINE) tablet 25 mg  25 mg Oral Q6H PRN       Social History:  Social History     Tobacco Use    Smoking status: Never Smoker    Smokeless tobacco: Never Used   Substance Use Topics    Alcohol use: No     Alcohol/week: 0.0 oz       Pt denies any history of drug use, blood transfusions, or tattoos. Family History:  Family History   Problem Relation Age of Onset    Cancer Mother     Breast Cancer Mother     Cancer Father     Cancer Sister     Cancer Brother         pancreas    Breast Cancer Maternal Aunt        Review of Systems:  A detailed 10 system ROS is obtained, with pertinent positives as listed above. All others are negative. Diet:  Regular    Objective:     Physical Exam:  Vitals:  Visit Vitals  /53   Pulse 60   Temp 98.3 °F (36.8 °C)   Resp 18   Ht 5' 3\" (1.6 m)   Wt 67.1 kg (147 lb 14.9 oz)   SpO2 97%   BMI 26.20 kg/m²     Gen:  Pt is alert, cooperative, no acute distress  Skin:  Face reveal no rashes.    HEENT: Sclerae anicteric. Cardiovascular: Regular rate and rhythm. Respiratory:  Comfortable breathing with no accessory muscle use. Clear breath sounds anteriorly with no wheezes, rales, or rhonchi. GI:  Abdomen nondistended, soft, and Nontender. Normal active bowel sounds. No obvioua enlargement of the liver or spleen. No masses palpable. Rectal:  Deferred  Musculoskeletal:  No pitting edema of the lower legs. Neurological:  Gross memory appears intact. Patient is alert and oriented. Psychiatric:  Mood appears appropriate with judgement intact. Laboratory:    Recent Labs     04/11/19  1000 04/11/19  0412 04/10/19  1654 04/10/19  1640   WBC  --  4.9  --  6.4   HGB 7.2* 7.2*  --  8.4*   HCT 23.3* 23.1*  --  26.9*   PLT  --  187  --  232   MCV  --  92.0  --  91.5   NA  --  140  --  140   K  --  4.4  --  4.2   CL  --  110*  --  108*   CO2  --  23  --  21   BUN  --  22  --  22   CREA  --  0.97  --  0.98   CA  --  8.4  --  10.0   MG  --  2.0  --   --    GLU  --  106*  --  100   AP  --   --   --  74   SGOT  --   --   --  27   ALT  --   --   --  29   TBILI  --   --   --  0.8   ALB  --   --   --  3.6   TP  --   --   --  6.9   PTP  --   --   --  15.9*   INR  --   --  1.1 1.3   APTT  --   --   --  25.7      CT a/p w contrast 7/14/18  IMPRESSION:    1. No acute CT findings in the abdomen or pelvis. 2. Stable subcentimeter cystic lesion in the pancreatic tail. Noncontrast CT Head 4/10/19 IMPRESSION: SMALL VESSEL ISCHEMIC DISEASE WITH NO ACUTE INTRACRANIAL ABNORMALITY IDENTIFIED AT NONCONTRAST CT.    MRI Brain wo contrast 4/11/19 IMPRESSION:1. No acute infarction. 2. White matter hyperintensities compatible with mild chronic small vessel ischemic disease.  3. Mucosal thickening in the right maxillary sinus.     Assessment:     Active Problems:    CVA (cerebral vascular accident) ([facility] [address] (3/13/2019)      TIA (transient ischemic attack) (4/10/2019)      80 y.o. female with PMH of HTN, HLD, Hypothyroidism, CAD s/p PCI x3 2014, PE following knee surgery in 2009, AFib on Xarelto (now awaiting potential Watchman device) with recent history of CVA 3/13 treated with TPA and now admitted 4/10/19 after presenting with slurred speech and vision changes- diagnosed with TIA and neurology following. She is currently being seen in GI consult for anemia of unknown source with presenting Hgb of 8.4 (decreased from Hgb 11.1 on 3/19/19) with normal MCV 92. Since Hgb decreased to 7.2. She is currently receiving a total of 2u pRBCs. No recent iron studies. BUN/Creatinine, LFTs, INR were normal. She reports darker stool 2-3days ago without recent use of oral iron or pepto bismol. She has experienced frequent breakthrough reflux symptoms despite Zantac and recent switch to Omeprazole qAM over the past week (dose unknown). On ASA 81mg every day, Xarelto. No other blood thinners. No NSAIDs, tobacco or ETOH. On this admission she is currently on Pepcid 20mg b.i.d. She remains on Xarelto. She is s/p EGD & Colonoscopy 7/2018 for anemia and heme + stool without obvious source of GI blood loss. EGD revealed erosive gastritis. Colonoscopy revealed IH, sigmoid diverticulosis, 6mm transverse colon polyp, five (4-6mm) ascending colon polyps with path that revealed hyperplastic and tubular adenomas. Capsule endoscopy was discussed though pt requested to defer. Plan:     -Will see if we can add on iron studies to blood drawn from AM labs. -Agree with transfusion 2u pRBC. Follow response. Monitor trend of H/H afterwards and consider additional transfusion pRBC if needed  -Monitor for overt GI bleeding  -D/C Pepcid and instead will start PPI qAM with Protonix 40mg one tab po qAM.  -Plan EGD tomorrow   Further recommendations will be based upon pt clinical course and EGD findings.   Marcell Winchester PA-C  [facility]

## 2019-04-11 NOTE — PROGRESS NOTES
Delay in blood bank finding matching blood d/t detected antibiodies. First unit of blood started after pre-meds administered. Pt aware of rxns and will notify RN staff if any experienced.

## 2019-04-11 NOTE — PROGRESS NOTES
This note will not be viewable in [address] Attempted outreach follow up, noted per [facility] patient is currently admitted. No further outreach is indicated at this time. Patient will be reassigned pending discharge disposition.

## 2019-04-12 ENCOUNTER — HOSPITAL ENCOUNTER (OUTPATIENT)
Age: 84
Setting detail: OUTPATIENT SURGERY
Discharge: ANOTHER HOSPITAL | DRG: 069 | End: 2019-04-12
Attending: INTERNAL MEDICINE | Admitting: INTERNAL MEDICINE
Payer: MEDICARE

## 2019-04-12 ENCOUNTER — ANESTHESIA (OUTPATIENT)
Dept: ENDOSCOPY | Age: 84
DRG: 069 | End: 2019-04-12
Payer: MEDICARE

## 2019-04-12 VITALS
RESPIRATION RATE: 17 BRPM | TEMPERATURE: 97.6 F | DIASTOLIC BLOOD PRESSURE: 74 MMHG | OXYGEN SATURATION: 94 % | HEART RATE: 62 BPM | SYSTOLIC BLOOD PRESSURE: 161 MMHG

## 2019-04-12 PROBLEM — D64.9 ANEMIA: Status: ACTIVE | Noted: 2019-04-12

## 2019-04-12 LAB
ANION GAP SERPL CALC-SCNC: 8 MMOL/L
BUN SERPL-MCNC: 22 MG/DL (ref 8–23)
CALCIUM SERPL-MCNC: 8 MG/DL (ref 8.3–10.4)
CHLORIDE SERPL-SCNC: 111 MMOL/L (ref 98–107)
CO2 SERPL-SCNC: 23 MMOL/L (ref 21–32)
CREAT SERPL-MCNC: 0.99 MG/DL (ref 0.6–1)
ERYTHROCYTE [DISTWIDTH] IN BLOOD BY AUTOMATED COUNT: 15.9 % (ref 11.9–14.6)
GLUCOSE SERPL-MCNC: 105 MG/DL (ref 65–100)
HCT VFR BLD AUTO: 31.4 % (ref 35.8–46.3)
HGB BLD-MCNC: 10 G/DL (ref 11.7–15.4)
MCH RBC QN AUTO: 28.3 PG (ref 26.1–32.9)
MCHC RBC AUTO-ENTMCNC: 31.8 G/DL (ref 31.4–35)
MCV RBC AUTO: 89 FL (ref 79.6–97.8)
NRBC # BLD: 0 K/UL (ref 0–0.2)
PLATELET # BLD AUTO: 155 K/UL (ref 150–450)
PMV BLD AUTO: 11 FL (ref 9.4–12.3)
POTASSIUM SERPL-SCNC: 3.7 MMOL/L (ref 3.5–5.1)
RBC # BLD AUTO: 3.53 M/UL (ref 4.05–5.2)
SODIUM SERPL-SCNC: 142 MMOL/L (ref 136–145)
WBC # BLD AUTO: 4.7 K/UL (ref 4.3–11.1)

## 2019-04-12 PROCEDURE — 76060000031 HC ANESTHESIA FIRST 0.5 HR: Performed by: INTERNAL MEDICINE

## 2019-04-12 PROCEDURE — 85027 COMPLETE CBC AUTOMATED: CPT

## 2019-04-12 PROCEDURE — 36415 COLL VENOUS BLD VENIPUNCTURE: CPT

## 2019-04-12 PROCEDURE — 74011250636 HC RX REV CODE- 250/636

## 2019-04-12 PROCEDURE — 80048 BASIC METABOLIC PNL TOTAL CA: CPT

## 2019-04-12 PROCEDURE — 65660000000 HC RM CCU STEPDOWN

## 2019-04-12 PROCEDURE — 99218 HC RM OBSERVATION: CPT

## 2019-04-12 PROCEDURE — 76040000025: Performed by: INTERNAL MEDICINE

## 2019-04-12 PROCEDURE — 0DJ08ZZ INSPECTION OF UPPER INTESTINAL TRACT, VIA NATURAL OR ARTIFICIAL OPENING ENDOSCOPIC: ICD-10-PCS | Performed by: INTERNAL MEDICINE

## 2019-04-12 PROCEDURE — 74011250636 HC RX REV CODE- 250/636: Performed by: ANESTHESIOLOGY

## 2019-04-12 PROCEDURE — 74011000250 HC RX REV CODE- 250: Performed by: ANESTHESIOLOGY

## 2019-04-12 PROCEDURE — 74011250637 HC RX REV CODE- 250/637: Performed by: INTERNAL MEDICINE

## 2019-04-12 RX ORDER — ACETAMINOPHEN 325 MG/1
650 TABLET ORAL
Status: DISCONTINUED | OUTPATIENT
Start: 2019-04-12 | End: 2019-04-13 | Stop reason: HOSPADM

## 2019-04-12 RX ORDER — SODIUM CHLORIDE, SODIUM LACTATE, POTASSIUM CHLORIDE, CALCIUM CHLORIDE 600; 310; 30; 20 MG/100ML; MG/100ML; MG/100ML; MG/100ML
100 INJECTION, SOLUTION INTRAVENOUS CONTINUOUS
Status: DISCONTINUED | OUTPATIENT
Start: 2019-04-12 | End: 2019-04-12 | Stop reason: HOSPADM

## 2019-04-12 RX ORDER — METOPROLOL SUCCINATE 25 MG/1
12.5 TABLET, EXTENDED RELEASE ORAL DAILY
Status: DISCONTINUED | OUTPATIENT
Start: 2019-04-13 | End: 2019-04-13 | Stop reason: HOSPADM

## 2019-04-12 RX ORDER — GUAIFENESIN 100 MG/5ML
81 LIQUID (ML) ORAL DAILY
Status: DISCONTINUED | OUTPATIENT
Start: 2019-04-13 | End: 2019-04-13 | Stop reason: HOSPADM

## 2019-04-12 RX ORDER — SODIUM CHLORIDE 0.9 % (FLUSH) 0.9 %
5-40 SYRINGE (ML) INJECTION AS NEEDED
Status: DISCONTINUED | OUTPATIENT
Start: 2019-04-12 | End: 2019-04-13 | Stop reason: HOSPADM

## 2019-04-12 RX ORDER — SODIUM CHLORIDE 0.9 % (FLUSH) 0.9 %
5-40 SYRINGE (ML) INJECTION EVERY 8 HOURS
Status: DISCONTINUED | OUTPATIENT
Start: 2019-04-12 | End: 2019-04-13 | Stop reason: HOSPADM

## 2019-04-12 RX ORDER — TEMAZEPAM 15 MG/1
15 CAPSULE ORAL
Status: DISCONTINUED | OUTPATIENT
Start: 2019-04-12 | End: 2019-04-13 | Stop reason: HOSPADM

## 2019-04-12 RX ORDER — LISINOPRIL 20 MG/1
20 TABLET ORAL DAILY
Status: DISCONTINUED | OUTPATIENT
Start: 2019-04-13 | End: 2019-04-13 | Stop reason: HOSPADM

## 2019-04-12 RX ORDER — PANTOPRAZOLE SODIUM 40 MG/1
40 TABLET, DELAYED RELEASE ORAL
Status: DISCONTINUED | OUTPATIENT
Start: 2019-04-13 | End: 2019-04-12

## 2019-04-12 RX ORDER — LIDOCAINE HYDROCHLORIDE 20 MG/ML
INJECTION, SOLUTION EPIDURAL; INFILTRATION; INTRACAUDAL; PERINEURAL AS NEEDED
Status: DISCONTINUED | OUTPATIENT
Start: 2019-04-12 | End: 2019-04-12 | Stop reason: HOSPADM

## 2019-04-12 RX ORDER — HYDRALAZINE HYDROCHLORIDE 25 MG/1
25 TABLET, FILM COATED ORAL
Status: DISCONTINUED | OUTPATIENT
Start: 2019-04-12 | End: 2019-04-13 | Stop reason: HOSPADM

## 2019-04-12 RX ORDER — SODIUM CHLORIDE 9 MG/ML
10 INJECTION, SOLUTION INTRAVENOUS CONTINUOUS
Status: DISCONTINUED | OUTPATIENT
Start: 2019-04-12 | End: 2019-04-12 | Stop reason: HOSPADM

## 2019-04-12 RX ORDER — LEVOTHYROXINE SODIUM 50 UG/1
50 TABLET ORAL
Status: DISCONTINUED | OUTPATIENT
Start: 2019-04-13 | End: 2019-04-13 | Stop reason: HOSPADM

## 2019-04-12 RX ORDER — SODIUM CHLORIDE 9 MG/ML
250 INJECTION, SOLUTION INTRAVENOUS AS NEEDED
Status: DISCONTINUED | OUTPATIENT
Start: 2019-04-12 | End: 2019-04-13 | Stop reason: HOSPADM

## 2019-04-12 RX ORDER — ESCITALOPRAM OXALATE 10 MG/1
10 TABLET ORAL DAILY
Status: DISCONTINUED | OUTPATIENT
Start: 2019-04-13 | End: 2019-04-13 | Stop reason: HOSPADM

## 2019-04-12 RX ORDER — PANTOPRAZOLE SODIUM 40 MG/1
40 TABLET, DELAYED RELEASE ORAL
Status: DISCONTINUED | OUTPATIENT
Start: 2019-04-12 | End: 2019-04-13 | Stop reason: HOSPADM

## 2019-04-12 RX ORDER — PRAVASTATIN SODIUM 80 MG/1
80 TABLET ORAL
Status: DISCONTINUED | OUTPATIENT
Start: 2019-04-12 | End: 2019-04-13 | Stop reason: HOSPADM

## 2019-04-12 RX ORDER — PROPOFOL 10 MG/ML
INJECTION, EMULSION INTRAVENOUS AS NEEDED
Status: DISCONTINUED | OUTPATIENT
Start: 2019-04-12 | End: 2019-04-12 | Stop reason: HOSPADM

## 2019-04-12 RX ADMIN — LIDOCAINE HYDROCHLORIDE 50 MG: 20 INJECTION, SOLUTION EPIDURAL; INFILTRATION; INTRACAUDAL; PERINEURAL at 12:19

## 2019-04-12 RX ADMIN — APIXABAN 5 MG: 5 TABLET, FILM COATED ORAL at 17:57

## 2019-04-12 RX ADMIN — PANTOPRAZOLE SODIUM 40 MG: 40 TABLET, DELAYED RELEASE ORAL at 15:59

## 2019-04-12 RX ADMIN — LEVOTHYROXINE SODIUM 50 MCG: 50 TABLET ORAL at 08:26

## 2019-04-12 RX ADMIN — FAMOTIDINE 20 MG: 10 INJECTION, SOLUTION INTRAVENOUS at 12:10

## 2019-04-12 RX ADMIN — Medication 10 ML: at 22:26

## 2019-04-12 RX ADMIN — PROPOFOL 30 MG: 10 INJECTION, EMULSION INTRAVENOUS at 12:19

## 2019-04-12 RX ADMIN — PRAVASTATIN SODIUM 80 MG: 20 TABLET ORAL at 22:26

## 2019-04-12 RX ADMIN — Medication 10 ML: at 16:00

## 2019-04-12 RX ADMIN — SODIUM CHLORIDE, SODIUM LACTATE, POTASSIUM CHLORIDE, AND CALCIUM CHLORIDE 100 ML/HR: 600; 310; 30; 20 INJECTION, SOLUTION INTRAVENOUS at 12:05

## 2019-04-12 NOTE — PROGRESS NOTES
RN called [facility] at this time to check on status of transport, [facility] staff states somebody will be here \"in a few minutes\".

## 2019-04-12 NOTE — PROGRESS NOTES
Spoke with GI lab concerning patient's morning meds. Patient to take Synthroid and hold other morning meds at this time. Patient to go for EGD today.

## 2019-04-12 NOTE — PROGRESS NOTES
Patient left with daughter and [facility] to [facility]. [facility] has EMTALA and copy of EGD consent. Patient's daughter states she will be responsible for patient's hearing aids and eye glasses.

## 2019-04-12 NOTE — PROGRESS NOTES
TRANSFER - OUT REPORT: 
 
Verbal report given to Olaf(name) loreto Horne  being transferred [address] for ordered procedure Report consisted of patients Situation, Background, Assessment and  
Recommendations(SBAR). Information from the following report(s) SBAR was reviewed with the receiving nurse. Opportunity for questions and clarification was provided.    
 
Patient transported with: 
Daughter  
20 g IV in [facility]

## 2019-04-12 NOTE — ANESTHESIA PREPROCEDURE EVALUATION
Relevant Problems No relevant active problems Anesthetic History No history of anesthetic complications Review of Systems / Medical History Patient summary reviewed and pertinent labs reviewed Pulmonary Within defined limits Neuro/Psych  
 
 
 
TIA Comments: Blind in left eye Cardiovascular Hypertension: well controlled Dysrhythmias : atrial fibrillation CAD, cardiac stents (x 3) and hyperlipidemia Exercise tolerance: >4 METS Comments: SSS  
GI/Hepatic/Renal 
  
GERD: well controlled Endo/Other Hypothyroidism Arthritis and anemia Other Findings Comments: Recent TIA, symptoms resolved; placed on [facility], now with anemia Physical Exam 
 
Airway Mallampati: II 
TM Distance: 4 - 6 cm Neck ROM: normal range of motion Mouth opening: Normal 
 
 Cardiovascular Rhythm: irregular Rate: normal 
 
 
 
 Dental 
 
Dentition: Caps/crowns Pulmonary Breath sounds clear to auscultation Abdominal 
GI exam deferred Other Findings Anesthetic Plan ASA: 3 Anesthesia type: total IV anesthesia Induction: Intravenous

## 2019-04-12 NOTE — PROGRESS NOTES
Bedside shift report received from [facility]. Patient is alert and oriented X4. NIH 0. Patient has no complaints currently.

## 2019-04-12 NOTE — PROGRESS NOTES
This RN contacted [facility] ambulance at this time to transfer pt back to [place]. [employer]. Brannon Braxton stated 45min-1hour for  time. Family and pt updated.

## 2019-04-12 NOTE — PROGRESS NOTES
Hospitalist Progress Note Admit Date:  4/10/2019  4:11 PM  
Name:  Pierre Jackson Age:  80 y.o. 
:  1929 MRN:  [id number] PCP:  Baljinder Gupta MD 
Treatment Team: Attending Provider: Apryl Grant MD; Consulting Provider: Nura Luo MD; Utilization Review: Azalia Schaefer RN; Consulting Provider: Estefani Cesar MD 
CC: blurred vision memory impairment and slurred speech today -  
HPI:  
80yr female with pmhx sig for cva s/p tpa on 2019, afib on xarelto- awaiting eval for watchman was at home using the computer when she developed blurred vision, difficulty remembering what she was doing and memory impairment lasted about 15 min - family brought to the er. Symptoms resolved by the time she was seen. Ct head negative - hospitalist asked to admit. 
 
 
2019- pt seen - neuro status intact- mri pending; hb low 2019- pt seen - neuro status the same. Got 2 units of prbcs yesterday - hb woody appropriately. Gi plans to scope today 10 systems reviewed and negative except as noted in HPI. Past Medical History:  
Diagnosis Date  Abnormal cardiovascular function study 2016  Allergic rhinitis 2013  Anemia 2015  Arthritis  Atrial fibrillation ([address] 2013  Blind left eye   
 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  
 stent to LAD; 3 stents total  
 Depression 2013  GERD (gastroesophageal reflux disease)  Hypercholesteremia 2013  Hypertension  Hypothyroidism 2013  Osteoarthritis of back 2014  Osteoporosis  Pulmonary embolism ([address] 2009  
 after knee surgery  Sick sinus syndrome ([address] 2016 Tachycardia-Bradycardia Past Surgical History:  
Procedure Laterality Date  COLONOSCOPY N/A 2018 COLONOSCOPY performed by Dave Mcintosh MD at [address] [facility]  HX BREAST BIOPSY Bilateral   
  HX CATARACT REMOVAL Right 2010  HX CORONARY STENT PLACEMENT  05/2014 LAD X 3  
 HX HEART CATHETERIZATION  09/17/2015  HX HEENT    
 multiple eye surgeries - left - macular hoe, retinal detachment twice,   
[id number]  HX KNEE REPLACEMENT  2009  HX KNEE REPLACEMENT Left 01/12/2017  HX ORTHOPAEDIC  2006 and 2008  
 herniated disc  HX TONSIL AND ADENOIDECTOMY  [id number] Allergies Allergen Reactions  Adhesive Rash Social History Tobacco Use  Smoking status: Never Smoker  Smokeless tobacco: Never Used Substance Use Topics  Alcohol use: No  
  Alcohol/week: 0.0 oz Family History Problem Relation Age of Onset  Cancer Mother  Breast Cancer Mother  Cancer Father  Cancer Sister  Cancer Brother   
     pancreas  Breast Cancer Maternal Aunt Immunization History Administered Date(s) Administered  Influenza High Dose Vaccine PF 11/05/2015, 11/09/2016, 11/13/2017, 09/06/2018  Influenza Vaccine 10/17/2013, 10/27/2014  Pneumococcal Conjugate (PCV-13) 01/15/2016  Pneumococcal Polysaccharide (PPSV-23) 05/16/2018  TB Skin Test (PPD) Intradermal 01/12/2017  Tdap 06/01/2014 PTA Medications: 
Prior to Admission Medications Prescriptions Last Dose Informant Patient Reported? Taking? Biotin 2,500 mcg cap   Yes No  
Sig: Take  by mouth. Calcium Carbonate-Vit D3-Min (CALTRATE 600+D PLUS MINERALS) 600 mg calcium- 400 unit Tab   Yes No  
Sig: Take  by mouth daily. VIT A/VIT C/VIT E/ZINC/COPPER (ICAPS AREDS PO)   Yes No  
Sig: Take  by mouth. Twice a day  
acetaminophen (TYLENOL ARTHRITIS PAIN) 650 mg CR tablet   Yes No  
Sig: Take 650 mg by mouth every six (6) hours as needed for Pain. aspirin delayed-release 81 mg tablet   No No  
Sig: Take 1 Tab by mouth daily. atorvastatin (LIPITOR) 40 mg tablet   No No  
Sig: Take 1 Tab by mouth daily.  TAKE 1 TABLET EVERY DAY  
 escitalopram oxalate (LEXAPRO) 10 mg tablet   No No  
Sig: Take 1 Tab by mouth daily. levothyroxine (SYNTHROID) 50 mcg tablet   No No  
Sig: Take 1 Tab by mouth Daily (before breakfast). lisinopril (PRINIVIL, ZESTRIL) 20 mg tablet   No No  
Sig: Take 1 Tab by mouth daily. metoprolol succinate (TOPROL-XL) 25 mg XL tablet   No No  
Sig: Take 0.5 Tabs by mouth daily. multivitamin (ONE A DAY) tablet   Yes No  
Sig: Take 1 Tab by mouth daily. nitrofurantoin (MACRODANTIN) 50 mg capsule   Yes No  
Sig: Take  by mouth four (4) times daily. nitroglycerin (NITROSTAT) 0.3 mg SL tablet   No No  
Si Tab by SubLINGual route every five (5) minutes as needed for Chest Pain.  
potassium 99 mg tablet   Yes No  
Sig: Take 99 mg by mouth daily. raNITIdine (ZANTAC) 150 mg tablet   Yes No  
Sig: Take 150 mg by mouth two (2) times a day. rivaroxaban (XARELTO) 20 mg tab tablet   No No  
Sig: Take 1 Tab by mouth daily (with dinner). vit C/vit E/lutein/min/omega-3 (OCUVITE PO)   Yes No  
Sig: Take 1 Cap by mouth daily. vitamin E (AQUA GEMS) 400 unit capsule   Yes No  
Sig: Take  by mouth daily. zolpidem (AMBIEN) 10 mg tablet   No No  
Sig: Take 1 Tab by mouth nightly as needed for Sleep. Max Daily Amount: 10 mg. Facility-Administered Medications: None Objective:  
 
Patient Vitals for the past 24 hrs: 
 Temp Pulse Resp BP SpO2  
19 0811 98 °F (36.7 °C)      
19 0700  (!) 59 18 170/70 94 % 19 0630  70 16 182/70 93 % 19 0600  62 30 151/68 93 % 19 0530  (!) 59 20 164/68 93 % 19 0500  63 19 170/68 93 % 19 0430  (!) 58 28 142/71 92 % 19 0400 98.2 °F (36.8 °C) (!) 56 24 160/64 92 % 19 0330  60 20 147/63 93 % 19 0300  63 21 150/67 94 % 19 0230  (!) 59 (!) 35 145/63 91 % 19 0200  61 (!) 36 137/60 92 % 19 0130  63 18 151/66 92 % 19 0100  66 25 156/66 92 % 19 0030  61 20 138/60 91 % 04/12/19 0000 98.2 °F (36.8 °C) (!) 59 23 149/66 90 % 04/11/19 2330  60 30 169/72 92 % 04/11/19 2300  (!) 59 20 168/73 95 % 04/11/19 2230  (!) 59 19 147/62 92 % 04/11/19 2200  (!) 57 16 143/59 91 % 04/11/19 2130  62 16 148/58 91 % 04/11/19 2100  62 16 138/63 92 % 04/11/19 2030  62 (!) 39 140/59 92 % 04/11/19 2000  60 24 155/63 95 % 04/11/19 1930 97.8 °F (36.6 °C) (!) 59 18 118/62 95 % 04/11/19 1900  (!) 59 18 158/72 97 % 04/11/19 1805 98.4 °F (36.9 °C)      
04/11/19 1735 98.5 °F (36.9 °C)      
04/11/19 1637 98.8 °F (37.1 °C)      
04/11/19 1633  63  132/59   
04/11/19 1630  61 23 132/59 96 % 04/11/19 1600  65 16 123/66 96 % 04/11/19 1530  65 21 133/56 95 % 04/11/19 1500  64 (!) 32 119/81 98 % 04/11/19 1444  61 16 116/52 96 % 04/11/19 1442 98.4 °F (36.9 °C)      
04/11/19 1430  60 18 124/53 97 % 04/11/19 1420 98.3 °F (36.8 °C)      
04/11/19 1416  61 15 120/56 97 % 04/11/19 1400  (!) 58 17 134/59 97 % 04/11/19 1330  66 (!) 38 149/68 98 % 04/11/19 1300  (!) 58 28 136/58 99 % 04/11/19 1230  (!) 58 28 127/59 99 % 04/11/19 1227 98.4 °F (36.9 °C) 66 26 118/57 99 % 04/11/19 1107  64 17 146/61   
04/11/19 0933  68 16 120/54 95 % 04/11/19 0903  68 27 126/53 96 % Oxygen Therapy O2 Sat (%): 94 % (04/12/19 0700) Pulse via Oximetry: 60 beats per minute (04/12/19 0700) O2 Device: Room air (04/11/19 1930) O2 Flow Rate (L/min): 2 l/min (04/11/19 0703) Intake/Output Summary (Last 24 hours) at 4/12/2019 2999 Last data filed at 4/11/2019 2045 Gross per 24 hour Intake 880.8 ml Output  Net 880.8 ml Physical Exam: 
General:    Well nourished. Alert. Eyes:   Normal sclera. Extraocular movements intact. ENT:  Normocephalic, atraumatic. Moist mucous membranes CV:   RRR. No m/r/g. Peripheral pulses present. Capillary refill normal 
Lungs:  CTAB. No wheezing, rhonchi, or rales. Abdomen: Soft, nontender, nondistended. Bowel sounds normal.  
Extremities: Warm and dry. No cyanosis or edema. Neurologic: CN II-XII grossly intact. Sensation intact. Skin:     No rashes or jaundice. Normal coloration Psych:  Normal mood and affect. I reviewed the labs, imaging, EKGs, telemetry, and other studies done this admission. Data Review:  
Recent Results (from the past 24 hour(s)) HGB & HCT Collection Time: 04/11/19 10:00 AM  
Result Value Ref Range HGB 7.2 (L) 11.7 - 15.4 g/dL HCT 23.3 (L) 35.8 - 46.3 % TYPE & SCREEN Collection Time: 04/11/19 10:02 AM  
Result Value Ref Range Crossmatch Expiration 04/14/2019 ABO/Rh(D) O POSITIVE Antibody screen POS Antibody ID ANTI-C Comment POSITIVE ANTIBODY CALLED TO [facility] RN 3RD FLOOR AT [id number] ON 4/11/19 KDJ Unit number [id number] Blood component type  LR Unit division 00 Status of unit TRANSFUSED ANTIGEN/ANTIBODY INFO C NEGATIVE, Crossmatch result Compatible Unit number [id number] Blood component type  LR Unit division 00 Status of unit TRANSFUSED ANTIGEN/ANTIBODY INFO C NEGATIVE, Crossmatch result Compatible CBC W/O DIFF Collection Time: 04/12/19  3:56 AM  
Result Value Ref Range WBC 4.7 4.3 - 11.1 K/uL  
 RBC 3.53 (L) 4.05 - 5.2 M/uL  
 HGB 10.0 (L) 11.7 - 15.4 g/dL HCT 31.4 (L) 35.8 - 46.3 % MCV 89.0 79.6 - 97.8 FL  
 MCH 28.3 26.1 - 32.9 PG  
 MCHC 31.8 31.4 - 35.0 g/dL  
 RDW 15.9 (H) 11.9 - 14.6 % PLATELET 224 978 - 653 K/uL MPV 11.0 9.4 - 12.3 FL ABSOLUTE NRBC 0.00 0.0 - 0.2 K/uL METABOLIC PANEL, BASIC Collection Time: 04/12/19  3:56 AM  
Result Value Ref Range Sodium 142 136 - 145 mmol/L Potassium 3.7 3.5 - 5.1 mmol/L Chloride 111 (H) 98 - 107 mmol/L  
 CO2 23 21 - 32 mmol/L Anion gap 8 mmol/L Glucose 105 (H) 65 - 100 mg/dL BUN 22 8 - 23 MG/DL  Creatinine 0.99 0.6 - 1.0 MG/DL  
 GFR est AA >60 >60 ml/min/1.73m2 GFR est non-AA 56 ml/min/1.73m2 Calcium 8.0 (L) 8.3 - 10.4 MG/DL All Micro Results None Other Studies: 
Mri Brain Wo Cont Result Date: 4/11/2019 MRI BRAIN WITHOUT CONTRAST 4/11/2019 HISTORY: 80year-old with history of stroke and atrial fibrillation with sudden onset of blurred vision and memory impairment TECHNIQUE: Sagittal and axial T1-weighted, axial T2-weighted, axial and coronal FLAIR, axial T2-weighted gradient-echo, axial diffusion weighted images with ADC maps of the brain. COMPARISON: MRI BRAIN 3/13/2019 AND HEAD CT 4/10/2019 FINDINGS: There is no acute infarction, acute intracranial hemorrhage, hydrocephalus, intra-axial mass, or abnormal extra axial fluid collection. Injected silicone is present in the left eye. Mucosal thickening is present in the right maxillary sinus. Moderate cerebral volume loss is present without disproportionate hippocampal atrophy. On the T2-weighted and FLAIR sequences, there are stable punctate white matter hyperintensities compatible with chronic small vessel ischemic disease. IMPRESSION: 1. No acute infarction. 2. White matter hyperintensities compatible with mild chronic small vessel ischemic disease. 3. Mucosal thickening in the right maxillary sinus. Assessment and Plan:  
 
Hospital Problems as of 4/12/2019 Date Reviewed: 4/1/2019 Codes Class Noted - Resolved POA  
 TIA (transient ischemic attack) ICD-10-CM: G45.9 ICD-9-CM: 435.9  4/10/2019 - Present Unknown CVA (cerebral vascular accident) [facility]) ICD-10-CM: I63.9 ICD-9-CM: 434.91  3/13/2019 - Present Unknown PLAN: 
· TIA likely CVA excluded-  continue secondary ppx, mri- neruo consult episodes likely secondary to the afib and nothing left to offer except the watchman- d/w cardiology and they will see her in clinic on Tuesday to try and expedite it. · Anemia- secondary to blood loss- s/p 2 units prbcs- for egd today · Cards recommending switching to eliquis from xarelto- likely a lower bleeding profile- pharmacy consulted · Htn - uncontrolled- consider adding norvasc or increasing lisinopril after the egd · afib- continue meds, continue anticoagulation · Monitor H&H · continue other appropriate home meds · Further workup and mgt based on her clinical course DVT ppx:  xarelto Anticipated DC needs:  none Code status:  Full Estimated LOS: likely dc vini after EGD and switch to eliquis and hb stable Risk:  high Signed: 
Gretchen Hameed MD

## 2019-04-12 NOTE — PHYSICIAN ADVISORY
Letter of Determination: Upgrade from Observation to Inpatient Status This patient was originally hospitalized as Outpatient Status with Observation Services on 4/10/2019 for transient ischemic attack. This patient now meets for Inpatient Admission in accordance with CMS regulation Section 43 .3. Specifically, patient's stay is now over Two Midnights and was medically necessary. The patient's stay was medically necessary based on extreme advanced age, and vital signs significant for respiratory rate of 38 breaths per minute. Consistent with CMS guidelines, patient meets for inpatient status. It is our recommendation that this patient's hospitalization status should be upgraded from OBSERVATION to INPATIENT status.  
  
The final decision regarding the patient's hospitalization status depends on the attending physician's judgement. Willis Arredondo MD, [place], Physician Advisor [address] [place] [address].

## 2019-04-12 NOTE — PROGRESS NOTES
[facility] arrived at this time to be transported back to room at [facility]. EMTALA form completed, copy attached to pt chart. VSS. Family at bedside. All belongings taken with pt.

## 2019-04-12 NOTE — PROGRESS NOTES
TRANSFER - IN REPORT: 
 
Verbal report received from Cristiana(name) on Aleja Escobar  being received from [facility](unit) for ordered procedure Report consisted of patients Situation, Background, Assessment and  
Recommendations(SBAR). Information from the following report(s) SBAR was reviewed with the receiving nurse. Opportunity for questions and clarification was provided. Assessment completed upon patients arrival to unit and care assumed.

## 2019-04-12 NOTE — PROGRESS NOTES
Interdisciplinary team rounds were held 4/12/2019 with the following team members:Nursing, Pastoral Care, Physical Therapy, Physician and Clinical Coordinator and the patient and child(celena). Plan of care discussed. See clinical pathway and/or care plan for interventions and desired outcomes.

## 2019-04-12 NOTE — PROGRESS NOTES
TRANSFER - OUT REPORT: 
 
Verbal report given to [facility] RN on Cesar Caruso  being transferred to [facility] ICU for routine post - op Report consisted of patients Situation, Background, Assessment and  
Recommendations(SBAR). Information from the following report(s) SBAR and MAR was reviewed with the receiving nurse. Lines:  
Peripheral IV 04/10/19 Right Antecubital (Active) Site Assessment Clean, dry, & intact 4/12/2019  7:30 AM  
Phlebitis Assessment 0 4/12/2019  7:30 AM  
Infiltration Assessment 0 4/12/2019  7:30 AM  
Dressing Status Clean, dry, & intact 4/12/2019  7:30 AM  
Dressing Type Tape;Transparent 4/12/2019  7:30 AM  
Hub Color/Line Status Flushed;Patent 4/12/2019  7:30 AM  
Action Taken Other (comment) 4/10/2019  8:47 PM  
Alcohol Cap Used No 4/12/2019  7:30 AM  
  
 
Opportunity for questions and clarification was provided. Patient transported with: 
Patient, IV, chart.

## 2019-04-12 NOTE — INTERVAL H&P NOTE
H&P Update: 
Reina Hamilton was seen and examined. History and physical has been reviewed. The patient has been examined.  There have been no significant clinical changes since the completion of the originally dated History and Physical.

## 2019-04-12 NOTE — PROGRESS NOTES
TRANSFER - IN REPORT: 
 
Verbal report received from Anamika(name) on Cary Lamas  being received from [address] [place] [address]) for routine post - op Report consisted of patients Situation, Background, Assessment and  
Recommendations(SBAR). Information from the following report(s) SBAR was reviewed with the receiving nurse. Opportunity for questions and clarification was provided. Assessment completed upon patients arrival to unit and care assumed.

## 2019-04-12 NOTE — PROCEDURES
DATE OF PROCEDURE: 4/12/19    REQUESTING PHYSICIAN: Dr Kimberley Garcia: Esophagogastroduodenoscopy. ENDOSCOPIST: Shayd Liu M.D. PREOPERATIVE DIAGNOSIS: Blood loss anemia    POSTOPERATIVE DIAGNOSIS: Gastritis with ulcerated tissue    INSTRUMENTS: GIF H190    SEDATION: MAC    DESCRIPTION: After informed consent was obtained, the patient was taken to the endoscopy suite and placed in the left lateral decubitus position. Intravenous sedation was administered as above and posterior pharynx was anesthetized with local anesthetic spray. After adequate sedation had been achieved the endoscope was inserted over the tongue and through the posterior pharynx under direct visualization down the esophagus to the stomach and into the second portion of the duodenum. The endoscopic was withdrawn from that point, performing a careful survey of the mucosa. Retroflexion was performed in the gastric fundus. FINDINGS:  Esophagus: Normal esophageal mucosa without esophagitis. Stomach: Normal proximal mucosa. Moderate gastritis in the antrum with several areas of superficial ulcerated tissue. Duodenum: Normal duodenal mucosa. Estimated blood loss:  0 cc-minimal    IMPRESSION:   Gastritis with ulcerated tissue (this could certainly slowing bleed in the setting of anticoagulation and result in blood loss anemia)    PLAN:  - Stop Pepcid and start Protonix 40mg bid (will need to be discharged on this)  - Start regular diet  - Avoid any NSAIDs  - Proceed with Watchman procedure (but would benefit stopping anticoagulation when safe in the future)  - Check H pylori stool Ag and treat if +  - Hold on capsule endo for now  - GI will sign off. Please call with questions. I will arrange an outpt appt to see me upon d/c.      Shady Liu MD

## 2019-04-12 NOTE — ANESTHESIA POSTPROCEDURE EVALUATION
Procedure(s): ESOPHAGOGASTRODUODENOSCOPY (EGD)/ 26/ . total IV anesthesia Anesthesia Post Evaluation Multimodal analgesia: multimodal analgesia used between 6 hours prior to anesthesia start to PACU discharge Patient location during evaluation: bedside Patient participation: complete - patient participated Level of consciousness: awake Pain management: adequate Airway patency: patent Anesthetic complications: no 
Cardiovascular status: acceptable and stable Respiratory status: acceptable and room air Hydration status: acceptable Post anesthesia nausea and vomiting:  none Vitals Value Taken Time /78 4/12/2019 12:47 PM  
Temp 36.4 °C (97.6 °F) 4/12/2019 12:27 PM  
Pulse 60 4/12/2019 12:55 PM  
Resp 16 4/12/2019 12:27 PM  
SpO2 97 % 4/12/2019 12:55 PM  
Vitals shown include unvalidated device data.

## 2019-04-13 VITALS
TEMPERATURE: 97.5 F | HEART RATE: 61 BPM | DIASTOLIC BLOOD PRESSURE: 69 MMHG | OXYGEN SATURATION: 95 % | RESPIRATION RATE: 18 BRPM | HEIGHT: 63 IN | WEIGHT: 147.49 LBS | BODY MASS INDEX: 26.13 KG/M2 | SYSTOLIC BLOOD PRESSURE: 149 MMHG

## 2019-04-13 PROCEDURE — 74011250637 HC RX REV CODE- 250/637: Performed by: INTERNAL MEDICINE

## 2019-04-13 PROCEDURE — 97535 SELF CARE MNGMENT TRAINING: CPT

## 2019-04-13 RX ORDER — PANTOPRAZOLE SODIUM 40 MG/1
40 TABLET, DELAYED RELEASE ORAL
Qty: 60 TAB | Refills: 2 | Status: SHIPPED | OUTPATIENT
Start: 2019-04-13 | End: 2019-05-13

## 2019-04-13 RX ORDER — GUAIFENESIN/DEXTROMETHORPHAN 100-10MG/5
5 SYRUP ORAL
Status: DISCONTINUED | OUTPATIENT
Start: 2019-04-13 | End: 2019-04-13 | Stop reason: HOSPADM

## 2019-04-13 RX ORDER — TEMAZEPAM 15 MG/1
15 CAPSULE ORAL
Qty: 15 CAP | Refills: 0 | Status: SHIPPED | OUTPATIENT
Start: 2019-04-13 | End: 2019-04-28

## 2019-04-13 RX ORDER — PRAVASTATIN SODIUM 80 MG/1
80 TABLET ORAL
Qty: 30 TAB | Refills: 2 | Status: SHIPPED | OUTPATIENT
Start: 2019-04-13 | End: 2019-05-07 | Stop reason: SDUPTHER

## 2019-04-13 RX ADMIN — Medication 5 ML: at 07:30

## 2019-04-13 RX ADMIN — LISINOPRIL 20 MG: 20 TABLET ORAL at 09:15

## 2019-04-13 RX ADMIN — APIXABAN 5 MG: 5 TABLET, FILM COATED ORAL at 09:16

## 2019-04-13 RX ADMIN — PANTOPRAZOLE SODIUM 40 MG: 40 TABLET, DELAYED RELEASE ORAL at 09:15

## 2019-04-13 RX ADMIN — LEVOTHYROXINE SODIUM 50 MCG: 50 TABLET ORAL at 09:16

## 2019-04-13 RX ADMIN — ESCITALOPRAM OXALATE 10 MG: 10 TABLET ORAL at 09:16

## 2019-04-13 RX ADMIN — METOPROLOL SUCCINATE 12.5 MG: 25 TABLET, EXTENDED RELEASE ORAL at 09:16

## 2019-04-13 RX ADMIN — TEMAZEPAM 15 MG: 15 CAPSULE ORAL at 00:16

## 2019-04-13 RX ADMIN — ASPIRIN 81 MG 81 MG: 81 TABLET ORAL at 09:15

## 2019-04-13 NOTE — DISCHARGE INSTRUCTIONS
DISCHARGE SUMMARY from Nurse    PATIENT INSTRUCTIONS:    After general anesthesia or intravenous sedation, for 24 hours or while taking prescription Narcotics:  · Limit your activities  · Do not drive and operate hazardous machinery  · Do not make important personal or business decisions  · Do  not drink alcoholic beverages  · If you have not urinated within 8 hours after discharge, please contact your surgeon on call. Report the following to your surgeon:  · Excessive pain, swelling, redness or odor of or around the surgical area  · Temperature over 100.5  · Nausea and vomiting lasting longer than 4 hours or if unable to take medications  · Any signs of decreased circulation or nerve impairment to extremity: change in color, persistent  numbness, tingling, coldness or increase pain  · Any questions    What to do at Home:    *  Please give a list of your current medications to your Primary Care Provider. *  Please update this list whenever your medications are discontinued, doses are      changed, or new medications (including over-the-counter products) are added. *  Please carry medication information at all times in case of emergency situations. These are general instructions for a healthy lifestyle:    No smoking/ No tobacco products/ Avoid exposure to second hand smoke  Surgeon General's Warning:  Quitting smoking now greatly reduces serious risk to your health. Obesity, smoking, and sedentary lifestyle greatly increases your risk for illness    A healthy diet, regular physical exercise & weight monitoring are important for maintaining a healthy lifestyle    You may be retaining fluid if you have a history of heart failure or if you experience any of the following symptoms:  Weight gain of 3 pounds or more overnight or 5 pounds in a week, increased swelling in our hands or feet or shortness of breath while lying flat in bed.   Please call your doctor as soon as you notice any of these symptoms; do not wait until your next office visit. Recognize signs and symptoms of STROKE:    F-face looks uneven    A-arms unable to move or move unevenly    S-speech slurred or non-existent    T-time-call 911 as soon as signs and symptoms begin-DO NOT go       Back to bed or wait to see if you get better-TIME IS BRAIN. Warning Signs of HEART ATTACK     Call 911 if you have these symptoms:   Chest discomfort. Most heart attacks involve discomfort in the center of the chest that lasts more than a few minutes, or that goes away and comes back. It can feel like uncomfortable pressure, squeezing, fullness, or pain.  Discomfort in other areas of the upper body. Symptoms can include pain or discomfort in one or both arms, the back, neck, jaw, or stomach.  Shortness of breath with or without chest discomfort.  Other signs may include breaking out in a cold sweat, nausea, or lightheadedness. Don't wait more than five minutes to call 911 - MINUTES MATTER! Fast action can save your life. Calling 911 is almost always the fastest way to get lifesaving treatment. Emergency Medical Services staff can begin treatment when they arrive -- up to an hour sooner than if someone gets to the hospital by car. The discharge information has been reviewed with the patient. The patient verbalized understanding. Discharge medications reviewed with the patient and appropriate educational materials and side effects teaching were provided. ___________________________________________________________________________________________________________________________________  Stroke: After Your Visit     Your Care Instructions     You have had a stroke. Risk factors for stroke include being overweight, smoking, and sedentary lifestyle. This means that the blood flow to a part of your brain was blocked for some time, which damages the nerve cells in that part of the brain.  The part of your body controlled by that part of your brain may not function properly now. The brain is an amazing organ that can heal itself to some degree. The stroke you had damaged part of your brain, but other parts of your brain may take over in some way for the damaged areas. You have already started this process. Going home may be hard for you and your family. The more you can try to do for yourself, the better. Remember to take each day one at a time. Follow-up care is a key part of your treatment and safety. Be sure to make and go to all appointments, and call your doctor if you are having problems. Its also a good idea to know your test results and keep a list of the medicines you take. How can you care for yourself at home? Enter a stroke rehabilitation (rehab) program, if your doctor recommends it. Physical, speech, and occupational therapies can help you manage bathing, dressing, eating, and other basics of daily living. Eat a heart-healthy diet that is low in cholesterol, saturated fat, and salt. Eat lots of fresh fruits and vegetables and foods high in fiber. Increase your activities slowly. Take short rest breaks when you get tired. Gradually increase the amount you walk. Start out by walking a little more than you did the day before. Do not drive until your doctor says it is okay. It is normal to feel sad or depressed after a stroke. If the blues last, talk to your doctor. If you are having problems with urine leakage, go to the bathroom at regular times, including when you first wake up and at bedtime. Also, limit fluids after dinner. If you are constipated, drink plenty of fluids, enough so that your urine is light yellow or clear like water. If you have kidney, heart, or liver disease and have to limit fluids, talk with your doctor before you increase the amount of fluids you drink. Set up a regular time for using the toilet.  If you continue to have constipation, your doctor may suggest using a bulking agent, such as Metamucil, or a stool softener, laxative, or enema. Medicines  Take your medicines exactly as prescribed. Call your doctor if you think you are having a problem with your medicine. You may be taking several medicines. ACE (angiotensin-converting enzyme) inhibitors, angiotensin II receptor blockers (ARBs), beta-blockers, diuretics (water pills), and calcium channel blockers control your blood pressure. Statins help lower cholesterol. Your doctor may also prescribe medicines for depression, pain, sleep problems, anxiety, or agitation. If your doctor has given you medicine that prevents blood clots, such as warfarin (Coumadin), aspirin combined with extended-release dipyridamole (Aggrenox), clopidogrel (Plavix), or aspirin to prevent another stroke, you should:  Tell your dentist, pharmacist, and other health professionals that you take these medicines. Watch for unusual bruising or bleeding, such as blood in your urine, red or black stools, or bleeding from your nose or gums. Get regular blood tests to check your clotting time if you are taking Coumadin. Wear medical alert jewelry that says you take blood thinners. You can buy this at most [employer]. Do not take any over-the-counter medicines or herbal products without talking to your doctor first.  If you take birth control pills or hormone replacement therapy, talk to your doctor about whether they are right for you. For family members and caregivers  Make the home safe. Set up a room so that your loved one does not have to climb stairs. Be sure the bathroom is on the same floor. Move throw rugs and furniture that could cause falls, and make sure that the lighting is good. Put grab bars and seats in tubs and showers. Find out what your loved one can do and what he or she needs help with. Try not to do things for your loved one that your loved one can do on his or her own. Help him or her learn and practice new skills. Visit and talk with your loved one often.  Try doing activities together that you both enjoy, such as playing cards or board games. Keep in touch with your loved one's friends as much as you can, and encourage them to visit. Take care of yourself. Do not try to do everything yourself. Ask other family members to help. Eat well, get enough rest, and take time to do things that you enjoy. Keep up with your own doctor visits, and make sure to take your medicines regularly. Get out of the house as much as you can. Join a local support group. Find out if you qualify for home health care visits to help with rehab or for adult day care. When should you call for help? Call 911 anytime you think you may need emergency care. For example, call if:  You have signs of another stroke. These may include:  Sudden numbness, paralysis, or weakness in your face, arm, or leg, especially on only one side of your body. New problems with walking or balance. Sudden vision changes. Drooling or slurred speech. New problems speaking or understanding simple statements, or you feel confused. A sudden, severe headache that is different from past headaches. Call 911 even if these symptoms go away in a few minutes. You cough up blood. You vomit blood or what looks like coffee grounds. You pass maroon or very bloody stools. Call your doctor now or seek immediate medical care if:  You have new bruises or blood spots under your skin. You have a nosebleed. Your gums bleed when you brush your teeth. You have blood in your urine. Your stools are black and tarlike or have streaks of blood. You have vaginal bleeding when you are not having your period, or heavy period bleeding. You have new symptoms that may be related to your stroke, such as falls or trouble swallowing. Watch closely for changes in your health, and be sure to contact your doctor if you have any problems. Where can you learn more?     Go to [employer]    Enter C294  in the search box to learn more about \"Stroke: After Your Visit\". © 1447-3513 [employer]. Care instructions adapted under license by [facility] (which disclaims liability or warranty for this information). This care instruction is for use with your licensed healthcare professional. If you have questions about a medical condition or this instruction, always ask your healthcare professional. Gaviota Sampson any warranty or liability for your use of this information.

## 2019-04-13 NOTE — PROGRESS NOTES
Problem: Self Care Deficits Care Plan (Adult) Goal: *Acute Goals and Plan of Care (Insert Text) Description 1. Patient will perform grooming with supervision. GOAL MET 4/13/2019 2. Patient will perform Upper body dressing with supervision GOAL MET 4/13/2019 3. Patient will perform lower body dressing with supervision GOAL MET 4/13/2019 4. Patient will perform upper and lower body bathing with supervision. GOAL MET 4/13/2019 5. Patient will perform toilet transfers with CGA. 6. Patient will perform shower transfer with CGA. GOAL MET 4/13/2019 7. Patient will participate in 30 + minutes of ADL/ therapeutic exercise/therapeutic activity with min rest breaks to increase activity tolerance for self care. 8. Patient will perform ADL functional mobility in room with CGA. Goals to be achieved in 7 days. Outcome: Progressing Towards Goal 
  
OCCUPATIONAL THERAPY: Daily Note and AM 4/13/2019 INPATIENT: OT Visit Days: 1 [place] / [place] B / Product Type: Medicare /  
  
NAME/AGE/GENDER: Katherine De La Torre is a 80 y.o. female PRIMARY DIAGNOSIS:  TIA (transient ischemic attack) [G45.9] CVA (cerebral vascular accident) ([facility] [address] [I63.9] TIA (transient ischemic attack) [G45.9] Anemia [D64.9] <principal problem not specified> 
 <principal problem not specified> 
 
 
  
ICD-10: Treatment Diagnosis:  
 · Generalized Muscle Weakness (M62.81) Precautions/Allergies: 
   Adhesive ASSESSMENT:  
Ms. Matt Saldaña presents with above diagnosis and was seen in room with  present. Pt completed bed mobility and functional mobility with supervision. Pt completed ADL with supervision. Pt has met 5 goals. Continue POC. This section established at most recent assessment PROBLEM LIST (Impairments causing functional limitations): 1. Decreased Strength 2. Decreased ADL/Functional Activities 3. Decreased Balance  INTERVENTIONS PLANNED: (Benefits and precautions of occupational therapy have been discussed with the patient.) 1. Activities of daily living training 2. Adaptive equipment training 3. Balance training 4. Therapeutic activity 5. Therapeutic exercise TREATMENT PLAN: Frequency/Duration: Follow patient 3 times per week to address above goals. Rehabilitation Potential For Stated Goals: Good RECOMMENDED REHABILITATION/EQUIPMENT: (at time of discharge pending progress): Due to the probability of continued deficits (see above) this patient will not likely need continued skilled occupational therapy after discharge. Equipment:  
? None at this time OCCUPATIONAL PROFILE AND HISTORY:  
History of Present Injury/Illness (Reason for Referral): 
weakness Past Medical History/Comorbidities: Ms. Amber Kurtz  has a past medical history of Abnormal cardiovascular function study (1/7/2016), Allergic rhinitis (1/14/2013), Anemia (11/13/2015), Arthritis, Atrial fibrillation ([facility] [address] (1/14/2013), Blind left eye, CAD (coronary artery disease), native coronary artery (May 2014), Depression (1/14/2013), GERD (gastroesophageal reflux disease), Hypercholesteremia (1/14/2013), Hypertension, Hypothyroidism (1/14/2013), Osteoarthritis of back (1/2/2014), Osteoporosis, Pulmonary embolism ([address] (2009), and Sick sinus syndrome ([facility] [address] (1/7/2016). Ms. Amber Kurtz  has a past surgical history that includes hx appendectomy (1965); hx hysterectomy (1970); hx tonsil and adenoidectomy (1934); hx heent; hx orthopaedic (2006 and 2008); hx cataract removal (Right, 2010); hx coronary stent placement (05/2014); hx heart catheterization (09/17/2015); hx knee replacement (2009); hx knee replacement (Left, 01/12/2017); hx breast biopsy (Bilateral); and colonoscopy (N/A, 7/16/2018). Social History/Living Environment:  
Home Environment: Private residence # Steps to Enter: 0 One/Two Story Residence: One story Living Alone: No 
Support Systems: Child(celena), Spouse/Significant Other/Partner Patient Expects to be Discharged to[de-identified] Private residence Current DME Used/Available at Home: Walker, rolling, Cane, straight Tub or Shower Type: Tub/Shower combination Prior Level of Function/Work/Activity: 
Independent Number of Personal Factors/Comorbidities that affect the Plan of Care: Brief history (0):  LOW COMPLEXITY ASSESSMENT OF OCCUPATIONAL PERFORMANCE[de-identified]  
Activities of Daily Living:  
Basic ADLs (From Assessment) Complex ADLs (From Assessment) Feeding: Independent Oral Facial Hygiene/Grooming: Supervision Bathing: Minimum assistance Upper Body Dressing: Stand-by assistance Lower Body Dressing: Minimum assistance Toileting: Contact guard assistance Grooming/Bathing/Dressing Activities of Daily Living Grooming Brushing/Combing Hair: Independent Cognitive Retraining Safety/Judgement: Fall prevention Upper Body Bathing Bathing Assistance: Supervision Position Performed: Seated in chair Adaptive Equipment: Shower chair Lower Body Bathing Bathing Assistance: Supervision Perineal  : Supervision/set-up Lower Body : Supervision/set-up Upper Body Dressing Assistance Dressing Assistance: Supervision Hospital Gown: Supervision/ set-up Functional Transfers Shower Transfer: Supervision Lower Body Dressing Assistance Dressing Assistance: Supervision/set up Socks: Supervision/set-up Bed/Mat Mobility Supine to Sit: Supervision Sit to Stand: Supervision Stand to Sit: Supervision Bed to Chair: Supervision Most Recent Physical Functioning:  
Gross Assessment: 
  
         
  
Posture: 
Posture Assessment: Forward head, Rounded shoulders Balance: 
Sitting: Intact Standing: Intact Bed Mobility: 
Supine to Sit: Supervision Wheelchair Mobility: 
  
Transfers: 
Sit to Stand: Supervision Stand to Sit: Supervision Bed to Chair: Supervision Patient Vitals for the past 6 hrs: 
 BP BP Patient Position SpO2 Pulse 04/13/19 0700 155/67 At rest 95 % 62 Mental Status Neurologic State: Alert Orientation Level: Oriented X4 Cognition: Appropriate decision making, Appropriate for age attention/concentration Perception: Appears intact Perseveration: No perseveration noted Safety/Judgement: Fall prevention Physical Skills Involved: 
1. Balance 2. Strength 3. Activity Tolerance Cognitive Skills Affected (resulting in the inability to perform in a timely and safe manner): 1. none  Psychosocial Skills Affected: 1. Environmental Adaptation Number of elements that affect the Plan of Care: 3-5:  MODERATE COMPLEXITY CLINICAL DECISION MAKING:  
[facility] AM-PAC 6 Clicks Daily Activity Inpatient Short Form How much help from another person does the patient currently need. .. Total A Lot A Little None 1. Putting on and taking off regular lower body clothing? ? 1   ? 2   ? 3   ? 4  
2. Bathing (including washing, rinsing, drying)? ? 1   ? 2   ? 3   ? 4  
3. Toileting, which includes using toilet, bedpan or urinal?   ? 1   ? 2   ? 3   ? 4  
4. Putting on and taking off regular upper body clothing? ? 1   ? 2   ? 3   ? 4  
5. Taking care of personal grooming such as brushing teeth? ? 1   ? 2   ? 3   ? 4  
6. Eating meals? ? 1   ? 2   ? 3   ? 4  
© 2007, Trustees of [facility], under license to [employer] All rights reserved Score:  Initial: 20 Most Recent: X (Date: -- ) Interpretation of Tool:  Represents activities that are increasingly more difficult (i.e. Bed mobility, Transfers, Gait). Medical Necessity:    
· Patient is expected to demonstrate progress in strength and balance ·  to increase independence with self care · . Reason for Services/Other Comments: 
· would benefit from OT while in the hospital to maximize safety and independence with self care Use of outcome tool(s) and clinical judgement create a POC that gives a: LOW COMPLEXITY  
 
 
 
TREATMENT:  
 (In addition to Assessment/Re-Assessment sessions the following treatments were rendered) Pre-treatment Symptoms/Complaints:   
Pain: Initial:  
  0 Post Session:  0 Self Care: (24 min): Procedure(s) (per grid) utilized to improve and/or restore self-care/home management as related to dressing, bathing and grooming. Required minimal verbal cueing to facilitate activities of daily living skills and compensatory activities. Braces/Orthotics/Lines/Etc:  
  IV Treatment/Session Assessment:   
· Response to Treatment:  Tolerated well · Interdisciplinary Collaboration:  
o Certified Occupational Therapy Assistant 
o Registered Nurse · After treatment position/precautions:  
o Up in chair 
o Bed alarm/tab alert on 
o Bed/Chair-wheels locked 
o Bed in low position 
o Call light within reach 
o RN notified 
o Family at bedside · Compliance with Program/Exercises: Compliant all of the time. · Recommendations/Intent for next treatment session: \"Next visit will focus on advancements to more challenging activities and reduction in assistance provided\". Total Treatment Duration: OT Patient Time In/Time Out Time In: 1006 Time Out: 1030 Julio C Francis

## 2019-04-13 NOTE — PROGRESS NOTES
TRANSFER - IN REPORT: 
 
Verbal report received from Linda Sewell, RN(name) on Welton Sacks  being received from ICU for routine progression of care Report consisted of patients Situation, Background, Assessment and  
Recommendations(SBAR). Information from the following report(s) SBAR, Kardex, MAR and Med Rec Status was reviewed with the receiving nurse. Opportunity for questions and clarification was provided. Assessment to be completed upon patients arrival to unit and care to be assumed.

## 2019-04-13 NOTE — PROGRESS NOTES
Resting quietly, awake, resp even, unlab, skin warm, dry. Assessment noted with no neuro deficits. Oriented to nurse call light, bed controls. Aware to call for asst to be out of bed, rationale given. No c/o, no distress.

## 2019-04-13 NOTE — PROGRESS NOTES
Care Management Interventions Mode of Transport at Discharge: Self Transition of Care Consult (CM Consult): Home Health [facility] - INPATIENT: No 
Reason Outside [place]: Patient already serviced by other home care/hospice agency Current Support Network: Own Home Confirm Follow Up Transport: Self Plan discussed with Pt/Family/Caregiver: Yes Freedom of Choice Offered: Yes Discharge Location Discharge Placement: Home with home health(Independent Living at [facility] ) CHEPE Lawrence  [email or web address]

## 2019-04-13 NOTE — PROGRESS NOTES
04/13/19 0025 Dual Skin Pressure Injury Assessment Dual Skin Pressure Injury Assessment WDL Second Care Provider (Based on Facility Policy) Slava Nazario RN Skin Integumentary Skin Integumentary (WDL) X Pressure  Injury Documentation No Pressure Injury Noted-Pressure Ulcer Prevention Initiated Skin Color Ecchymosis (comment) (bilateral upper extremities) Skin Condition/Temp Dry; Warm  
Skin Integrity Intact

## 2019-04-13 NOTE — PROGRESS NOTES
Discharge instructions reviewed with patient. Opportunity for questions given. Pt instructed to call on Monday for follow up appt with PCP, pt voiced understanding. Multiple Rx given. Pt informed that [facility] will be contacting them for home visits.

## 2019-04-13 NOTE — PROGRESS NOTES
Report received from off going RN. Patient resting comfortably in room. No distress observed. Denies numbness or tingling in extremities. NIH =0   
 
Pain. ...denies Position. ..resting in bed Potty. ..voids in bathroom

## 2019-04-13 NOTE — PROGRESS NOTES
MD advises that patient will need PT at discharge. Patient lives in independent living at [place] and would appreciate a referral there. SW called [facility] to confirm fax number for the PT department ([phone]) and sent orders/referral. Patient notified, no additional needs at this time. Laney Valentin, NICOLASAW  [email or web address]

## 2019-04-13 NOTE — PROGRESS NOTES
Patient states she is just weak and dry hacking cough. States throat dry due to  ? Allergies. DR. Viral Rudolph notified about cough and orders received. Patient lives in [place] [facility] [place] and will get PT services there. Tuesday when pt follows up with cardiology she is to have her H&H checked.

## 2019-04-13 NOTE — PROGRESS NOTES
Awoke easily. Dual NIH assessment performed with Chad Dodd RN during bedside report. No c/o. No distress.

## 2019-04-13 NOTE — PROGRESS NOTES
TRANSFER - OUT REPORT: 
 
Verbal report given to David Bell RN(name) on Bre Lima  being transferred to 366(unit) for routine progression of care Report consisted of patients Situation, Background, Assessment and  
Recommendations(SBAR). Information from the following report(s) SBAR, Kardex, ED Summary, Intake/Output, MAR, Recent Results, Med Rec Status and Cardiac Rhythm NSR was reviewed with the receiving nurse. Lines:  
Peripheral IV 04/10/19 Right Antecubital (Active) Site Assessment Clean, dry, & intact 4/12/2019  7:30 PM  
Phlebitis Assessment 0 4/12/2019  7:30 PM  
Infiltration Assessment 0 4/12/2019  7:30 PM  
Dressing Status Clean, dry, & intact 4/12/2019  7:30 PM  
Dressing Type Transparent;Tape 4/12/2019  7:30 PM  
Hub Color/Line Status Pink;Patent; Flushed 4/12/2019  7:30 PM  
Action Taken Other (comment) 4/10/2019  8:47 PM  
Alcohol Cap Used No 4/12/2019  7:30 PM  
  
 
Opportunity for questions and clarification was provided.

## 2019-04-17 ENCOUNTER — PATIENT OUTREACH (OUTPATIENT)
Dept: CASE MANAGEMENT | Age: 84
End: 2019-04-17

## 2019-04-17 NOTE — PROGRESS NOTES
Follow up with patient during PCP Office Visit. Subsequent telephone follow up with patient's daughter Care Coordination communication with physical therapy at [facility] (referral made there by PEACE MAS). Explained role of  to patient and daughter Established contact information. Current concerns: 
Meals - patient not cooking much,  does not cook, daughter doing some cooking and grocery shopping. [facility] is an option but apparently  is a picky eater. Transportation -  still doing the driving, patient drives only to her nail appointments. Daughter/family have concerns about the driving issue but have opted not to address as yet. Let patient and daughter know that there are options for transportation should they need to explore that. Initial assessment completed Addendum - see below Referral Source & Reason  Angel Eid. Gayle Gipson. HRRP Referral  
Primary concerns per patients family See note Recent Hospitalization(s)/ED Visits 3/13  hospitalized for stroke 3/19  ED visit 4/10  hospitalized for CVA Current with Home Health? No  
Social Needs: - Able to afford medications? - Financial assessment? 
- Access to care? Transportation? Able to afford medications Able to transport self  see note Nutritional Assessment - Appetite? - Obesity? 
- Failure to Thrive? - Bowels ? See progress note Cognitive Assessment 
- History of dementia 
- Health literacy -   Cognition well-intact Mobility/Activity Assessment - Bed/chair bound? - Make use of assistive devices? - Does patient still drive? Walks with a cane Will have PT at [address] Plan/Interventions/Education - Follow up Appointments - Referrals Provided contact information Cardiology next week to ascertain if candidate for Watchman Procedure Plan - follow up with PT and with patient in 7-10 days

## 2019-04-19 ENCOUNTER — PATIENT OUTREACH (OUTPATIENT)
Dept: CASE MANAGEMENT | Age: 84
End: 2019-04-19

## 2019-04-19 NOTE — PROGRESS NOTES
Follow up call with patient She reports that [facility] has contacted her and today is the first session - \"they are supposed to be here any minute. \" 
Made daughter aware of this appointment Patient has rescheduled her JANICE for 4/26 @ 09:30 Plan - follow up with patient and follow up with PT in 2 weeks

## 2019-04-20 LAB
ABO + RH BLD: NORMAL
ANTIGENS PRESENT BLD: NORMAL
ANTIGENS PRESENT RBC DONR: NORMAL
ANTIGENS PRESENT RBC DONR: NORMAL
BLD PROD TYP BPU: NORMAL
BLD PROD TYP BPU: NORMAL
BLOOD BANK CMNT PATIENT-IMP: NORMAL
BLOOD GROUP ANTIBODIES SERPL: NORMAL
BLOOD GROUP ANTIBODIES SERPL: NORMAL
BPU ID: NORMAL
BPU ID: NORMAL
CROSSMATCH RESULT,%XM: NORMAL
CROSSMATCH RESULT,%XM: NORMAL
SPECIMEN EXP DATE BLD: NORMAL
STATUS OF UNIT,%ST: NORMAL
STATUS OF UNIT,%ST: NORMAL
UNIT DIVISION, %UDIV: 0
UNIT DIVISION, %UDIV: 0

## 2019-04-25 NOTE — PROGRESS NOTES
Patient pre-assessment complete for JANICE with Dr Cassie Hewitt scheduled for 19 at 9:30am, arrival time 7:30am. Patient verified using . Patient instructed to bring all home medications in labeled bottles on the day of procedure. NPO status reinforced. P Instructed they can take all other medications excluding vitamins & supplements. Patient verbalizes understanding of all instructions & denies any questions at this time.

## 2019-04-26 ENCOUNTER — HOSPITAL ENCOUNTER (OUTPATIENT)
Dept: CARDIAC CATH/INVASIVE PROCEDURES | Age: 84
Discharge: HOME | End: 2019-04-26
Attending: INTERNAL MEDICINE | Admitting: INTERNAL MEDICINE
Payer: MEDICARE

## 2019-04-26 VITALS
HEIGHT: 63 IN | SYSTOLIC BLOOD PRESSURE: 184 MMHG | DIASTOLIC BLOOD PRESSURE: 74 MMHG | HEART RATE: 56 BPM | RESPIRATION RATE: 15 BRPM | WEIGHT: 148 LBS | BODY MASS INDEX: 26.22 KG/M2 | OXYGEN SATURATION: 93 %

## 2019-04-26 LAB
ATRIAL RATE: 53 BPM
CALCULATED P AXIS, ECG09: 48 DEGREES
CALCULATED R AXIS, ECG10: 0 DEGREES
CALCULATED T AXIS, ECG11: 49 DEGREES
DIAGNOSIS, 93000: NORMAL
P-R INTERVAL, ECG05: 206 MS
Q-T INTERVAL, ECG07: 480 MS
QRS DURATION, ECG06: 88 MS
QTC CALCULATION (BEZET), ECG08: 450 MS
VENTRICULAR RATE, ECG03: 53 BPM

## 2019-04-26 PROCEDURE — 93312 ECHO TRANSESOPHAGEAL: CPT

## 2019-04-26 PROCEDURE — 99152 MOD SED SAME PHYS/QHP 5/>YRS: CPT

## 2019-04-26 PROCEDURE — 93005 ELECTROCARDIOGRAM TRACING: CPT | Performed by: INTERNAL MEDICINE

## 2019-04-26 PROCEDURE — 74011250636 HC RX REV CODE- 250/636

## 2019-04-26 RX ORDER — MIDAZOLAM HYDROCHLORIDE 1 MG/ML
.5-2 INJECTION, SOLUTION INTRAMUSCULAR; INTRAVENOUS
Status: DISCONTINUED | OUTPATIENT
Start: 2019-04-26 | End: 2019-04-26 | Stop reason: HOSPADM

## 2019-04-26 RX ORDER — SODIUM CHLORIDE 9 MG/ML
75 INJECTION, SOLUTION INTRAVENOUS CONTINUOUS
Status: DISCONTINUED | OUTPATIENT
Start: 2019-04-26 | End: 2019-04-26 | Stop reason: HOSPADM

## 2019-04-26 RX ORDER — FENTANYL CITRATE 50 UG/ML
25-50 INJECTION, SOLUTION INTRAMUSCULAR; INTRAVENOUS
Status: DISCONTINUED | OUTPATIENT
Start: 2019-04-26 | End: 2019-04-26 | Stop reason: HOSPADM

## 2019-04-26 RX ADMIN — MIDAZOLAM HYDROCHLORIDE 1 MG: 1 INJECTION, SOLUTION INTRAMUSCULAR; INTRAVENOUS at 09:52

## 2019-04-26 RX ADMIN — MIDAZOLAM HYDROCHLORIDE 2 MG: 1 INJECTION, SOLUTION INTRAMUSCULAR; INTRAVENOUS at 09:50

## 2019-04-26 RX ADMIN — FENTANYL CITRATE 25 MCG: 50 INJECTION, SOLUTION INTRAMUSCULAR; INTRAVENOUS at 09:50

## 2019-04-26 NOTE — DISCHARGE INSTRUCTIONS
AFTER YOU TRANSESOPHAGEAL ECHOCARDIOGRAM    Be sure someone else drives you home. You may feel drowsy for several hours. Do not eat or drink for at least two hours after your procedure. Your throat will be numb and there is a risk you might have difficulty swallowing for a while. Be careful when you do eat or drink for the first time especially with hot fluids since you could easily burn your throat. Call your doctor if:    · You are bleeding from your throat or mouth. · You have trouble breathing all of a sudden. · You have chest pain or any pain that spreads to your neck, jaw, or arms. · You have questions or concerns. · You have a fever greater than 101°F.    Doctor: [facility] Cardiology [phone]    Special Instructions:    No driving for 24 hours.   Do not eat or drink until 1200

## 2019-04-26 NOTE — PROGRESS NOTES
TRANSFER - OUT REPORT: 
 
Verbal report given to Vanita Garcia RN on Olga Doe  being transferred to [facility] for routine progression of care Report consisted of patients Situation, Background, Assessment and Recommendations(SBAR). Information from the following report(s) SBAR, Kardex, Procedure Summary and MAR was reviewed with the receiving nurse. Opportunity for questions and clarification was provided. JANICE with Dr Hackett Host  
3 versed 25 fentanyl

## 2019-04-26 NOTE — PROCEDURES
Brief Cardiac Procedure Note    Patient: Julienne Schwartz MRN: [id number]  SSN: [id number]    YOB: 1929  Age: 80 y.o. Sex: female      Date of Procedure: 4/26/2019     Pre-procedure Diagnosis: Atrial Fibrillation/Atrial Flutter    Post-procedure Diagnosis: afib    Procedure: Transesophageal Echocardiogram    Brief Description of Procedure: As above    Performed By: Deena Arroyo MD     Assistants: None    Anesthesia: Moderate Sedation    Estimated Blood Loss: None    Specimens: None    Implants: None    Findings: No clot in LA or BHARATH. Anatomy appears suitable for BHARATH occlusion. Complications: None    Recommendations: Continue medical therapy.     Signed By: Deena Arroyo MD     April 26, 2019

## 2019-04-26 NOTE — PROGRESS NOTES
TRANSFER - IN REPORT: 
 
Verbal report received from Deena RODRIGUEZ(name) on Vinh Drafts  being received from cath lab(unit) for routine progression of care Report consisted of patients Situation, Background, Assessment and  
Recommendations(SBAR). Information from the following report(s) Procedure Summary was reviewed with the receiving nurse. Opportunity for questions and clarification was provided. Assessment completed upon patients arrival to unit and care assumed.

## 2019-04-26 NOTE — PROGRESS NOTES
Patient received to [address] [place] [address] room # 8  Ambulatory from [facility]. Patient scheduled for JANICE 
 today with Dr Marleni Rivera. Procedure reviewed & questions answered, voiced good understanding consent obtained & placed on chart. All medications and medical history reviewed. Will prep patient per orders. Patient & family updated on plan of care. The patient has a fraility score of 3-MANAGING WELL, based on ability to perform ADLS by self

## 2019-05-06 ENCOUNTER — PATIENT OUTREACH (OUTPATIENT)
Dept: CASE MANAGEMENT | Age: 84
End: 2019-05-06

## 2019-05-06 NOTE — PROGRESS NOTES
Follow up communication with patient's daughter who reports: 
Patient is having some good days and some not so good. Feels weak and wobbly but having some slow improvement Patient more inclined to use her cane - which in the past she has resisted. Patient still current with physical therapy - and feels that this is aiding her recovery. Per chart review patient communicating with PCP regarding medication refills. Hgb is on the low side of normal limits at 11.5 (4/17) Education provided regarding the importance of provider follow up, medication adherence, fall prevention and continuing PT Plan - follow up in about 2 weeks to monitor for improvement

## 2019-05-15 RX ORDER — NITROFURANTOIN MACROCRYSTALS 25 MG/1
25 CAPSULE ORAL DAILY
COMMUNITY
End: 2020-01-08 | Stop reason: SDUPTHER

## 2019-05-15 RX ORDER — PANTOPRAZOLE SODIUM 20 MG/1
40 TABLET, DELAYED RELEASE ORAL DAILY
COMMUNITY
End: 2019-10-10 | Stop reason: SDUPTHER

## 2019-05-15 NOTE — PROGRESS NOTES
Patient pre-assessment complete for LAAO scheduled for 19, arrival time 0600. Patient verified using . Patient instructed to bring all home medications in labeled bottles on the day of procedure. NPO status reinforced. Patient's last dose of eliquis will be on 19. She will take lexapro, levothyroxine, metoprolol, and protonix  with a small sip of water, the morning of her procedure. Patient instructed to HOLD all other medications and supplements. Patient verbalizes understanding of all instructions & denies any questions at this time. Patient has watchman contact info if he/she were to have any other questions.

## 2019-05-20 ENCOUNTER — HOSPITAL ENCOUNTER (OUTPATIENT)
Dept: LAB | Age: 84
Discharge: HOME | DRG: 274 | End: 2019-05-20
Payer: MEDICARE

## 2019-05-20 ENCOUNTER — ANESTHESIA EVENT (OUTPATIENT)
Dept: SURGERY | Age: 84
DRG: 274 | End: 2019-05-20
Payer: MEDICARE

## 2019-05-20 DIAGNOSIS — I48.0 PAROXYSMAL ATRIAL FIBRILLATION (HCC): ICD-10-CM

## 2019-05-20 DIAGNOSIS — I63.9 CEREBROVASCULAR ACCIDENT (CVA), UNSPECIFIED MECHANISM (HCC): ICD-10-CM

## 2019-05-20 LAB
ANION GAP SERPL CALC-SCNC: 9 MMOL/L (ref 7–16)
BASOPHILS # BLD: 0 K/UL (ref 0–0.2)
BASOPHILS NFR BLD: 1 % (ref 0–2)
BUN SERPL-MCNC: 17 MG/DL (ref 8–23)
CALCIUM SERPL-MCNC: 8.9 MG/DL (ref 8.3–10.4)
CHLORIDE SERPL-SCNC: 110 MMOL/L (ref 98–107)
CO2 SERPL-SCNC: 23 MMOL/L (ref 21–32)
CREAT SERPL-MCNC: 1 MG/DL (ref 0.6–1)
DIFFERENTIAL METHOD BLD: ABNORMAL
EOSINOPHIL # BLD: 0.1 K/UL (ref 0–0.8)
EOSINOPHIL NFR BLD: 2 % (ref 0.5–7.8)
ERYTHROCYTE [DISTWIDTH] IN BLOOD BY AUTOMATED COUNT: 14.1 % (ref 11.9–14.6)
GLUCOSE SERPL-MCNC: 103 MG/DL (ref 65–100)
HCT VFR BLD AUTO: 32.1 % (ref 35.8–46.3)
HGB BLD-MCNC: 10.2 G/DL (ref 11.7–15.4)
IMM GRANULOCYTES # BLD AUTO: 0 K/UL (ref 0–0.5)
IMM GRANULOCYTES NFR BLD AUTO: 0 % (ref 0–5)
INR PPP: 1.1
LYMPHOCYTES # BLD: 1.6 K/UL (ref 0.5–4.6)
LYMPHOCYTES NFR BLD: 31 % (ref 13–44)
MCH RBC QN AUTO: 27.8 PG (ref 26.1–32.9)
MCHC RBC AUTO-ENTMCNC: 31.8 G/DL (ref 31.4–35)
MCV RBC AUTO: 87.5 FL (ref 79.6–97.8)
MONOCYTES # BLD: 0.7 K/UL (ref 0.1–1.3)
MONOCYTES NFR BLD: 13 % (ref 4–12)
NEUTS SEG # BLD: 2.6 K/UL (ref 1.7–8.2)
NEUTS SEG NFR BLD: 53 % (ref 43–78)
NRBC # BLD: 0 K/UL (ref 0–0.2)
PLATELET # BLD AUTO: 215 K/UL (ref 150–450)
PMV BLD AUTO: 11 FL (ref 9.4–12.3)
POTASSIUM SERPL-SCNC: 4 MMOL/L (ref 3.5–5.1)
PROTHROMBIN TIME: 13.9 SEC (ref 11.7–14.5)
RBC # BLD AUTO: 3.67 M/UL (ref 4.05–5.2)
SODIUM SERPL-SCNC: 142 MMOL/L (ref 136–145)
WBC # BLD AUTO: 5 K/UL (ref 4.3–11.1)

## 2019-05-20 PROCEDURE — 85610 PROTHROMBIN TIME: CPT

## 2019-05-20 PROCEDURE — 80048 BASIC METABOLIC PNL TOTAL CA: CPT

## 2019-05-20 PROCEDURE — 86870 RBC ANTIBODY IDENTIFICATION: CPT

## 2019-05-20 PROCEDURE — 86920 COMPATIBILITY TEST SPIN: CPT

## 2019-05-20 PROCEDURE — 86900 BLOOD TYPING SEROLOGIC ABO: CPT

## 2019-05-20 PROCEDURE — 36415 COLL VENOUS BLD VENIPUNCTURE: CPT

## 2019-05-20 PROCEDURE — 86922 COMPATIBILITY TEST ANTIGLOB: CPT

## 2019-05-20 PROCEDURE — 85025 COMPLETE CBC W/AUTO DIFF WBC: CPT

## 2019-05-20 PROCEDURE — 86902 BLOOD TYPE ANTIGEN DONOR EA: CPT

## 2019-05-20 PROCEDURE — 86921 COMPATIBILITY TEST INCUBATE: CPT

## 2019-05-20 NOTE — PROGRESS NOTES
Patient called stating that she has post nasal drip and a cough. She denies fever, productive cough and wheezing. This information given to Dr. Art García and states that patient should be fine as long as she has not had a fever, wheezing, or productive cough. Patient is to arrive at 0600 tomorrow. She has no other questions at this time.

## 2019-05-21 ENCOUNTER — HOSPITAL ENCOUNTER (INPATIENT)
Dept: CARDIAC CATH/INVASIVE PROCEDURES | Age: 84
Discharge: HOME | DRG: 274 | End: 2019-05-21
Payer: MEDICARE

## 2019-05-21 ENCOUNTER — HOSPITAL ENCOUNTER (INPATIENT)
Age: 84
LOS: 1 days | Discharge: HOME | DRG: 274 | End: 2019-05-22
Attending: INTERNAL MEDICINE | Admitting: INTERNAL MEDICINE
Payer: MEDICARE

## 2019-05-21 ENCOUNTER — PATIENT OUTREACH (OUTPATIENT)
Dept: CASE MANAGEMENT | Age: 84
End: 2019-05-21

## 2019-05-21 ENCOUNTER — ANESTHESIA (OUTPATIENT)
Dept: SURGERY | Age: 84
DRG: 274 | End: 2019-05-21
Payer: MEDICARE

## 2019-05-21 PROBLEM — D50.0 ANEMIA DUE TO GI BLOOD LOSS: Status: RESOLVED | Noted: 2018-07-13 | Resolved: 2019-05-21

## 2019-05-21 PROBLEM — I48.91 A-FIB (HCC): Status: ACTIVE | Noted: 2019-05-21

## 2019-05-21 PROBLEM — I48.91 A-FIB (HCC): Status: RESOLVED | Noted: 2019-05-21 | Resolved: 2019-05-21

## 2019-05-21 LAB
ACT BLD: 246 SECS (ref 70–128)
ATRIAL RATE: 55 BPM
CALCULATED P AXIS, ECG09: 64 DEGREES
CALCULATED R AXIS, ECG10: -10 DEGREES
CALCULATED T AXIS, ECG11: 28 DEGREES
DIAGNOSIS, 93000: NORMAL
ERYTHROCYTE [DISTWIDTH] IN BLOOD BY AUTOMATED COUNT: 14.4 % (ref 11.9–14.6)
HCT VFR BLD AUTO: 31.9 % (ref 35.8–46.3)
HGB BLD-MCNC: 10.1 G/DL (ref 11.7–15.4)
MCH RBC QN AUTO: 28.2 PG (ref 26.1–32.9)
MCHC RBC AUTO-ENTMCNC: 31.7 G/DL (ref 31.4–35)
MCV RBC AUTO: 89.1 FL (ref 79.6–97.8)
NRBC # BLD: 0 K/UL (ref 0–0.2)
P-R INTERVAL, ECG05: 204 MS
PLATELET # BLD AUTO: 219 K/UL (ref 150–450)
PMV BLD AUTO: 11.8 FL (ref 9.4–12.3)
Q-T INTERVAL, ECG07: 490 MS
QRS DURATION, ECG06: 80 MS
QTC CALCULATION (BEZET), ECG08: 468 MS
RBC # BLD AUTO: 3.58 M/UL (ref 4.05–5.2)
VENTRICULAR RATE, ECG03: 55 BPM
WBC # BLD AUTO: 5.2 K/UL (ref 4.3–11.1)

## 2019-05-21 PROCEDURE — 74011250637 HC RX REV CODE- 250/637: Performed by: ANESTHESIOLOGY

## 2019-05-21 PROCEDURE — 77030039425 HC BLD LARYNG TRULITE DISP TELE -A: Performed by: ANESTHESIOLOGY

## 2019-05-21 PROCEDURE — 77030005401 HC CATH RAD ARRO -A: Performed by: ANESTHESIOLOGY

## 2019-05-21 PROCEDURE — 77030013794 HC KT TRNSDUC BLD EDWD -B: Performed by: ANESTHESIOLOGY

## 2019-05-21 PROCEDURE — 77030019908 HC STETH ESOPH SIMS -A: Performed by: ANESTHESIOLOGY

## 2019-05-21 PROCEDURE — C1893 INTRO/SHEATH, FIXED,NON-PEEL: HCPCS

## 2019-05-21 PROCEDURE — 74011250636 HC RX REV CODE- 250/636

## 2019-05-21 PROCEDURE — 93005 ELECTROCARDIOGRAM TRACING: CPT | Performed by: INTERNAL MEDICINE

## 2019-05-21 PROCEDURE — 77030020407 HC IV BLD WRMR ST 3M -A: Performed by: ANESTHESIOLOGY

## 2019-05-21 PROCEDURE — 36247 INS CATH ABD/L-EXT ART 3RD: CPT

## 2019-05-21 PROCEDURE — 74011250637 HC RX REV CODE- 250/637: Performed by: INTERNAL MEDICINE

## 2019-05-21 PROCEDURE — C1894 INTRO/SHEATH, NON-LASER: HCPCS

## 2019-05-21 PROCEDURE — 77030013687 HC GD NDL BARD -B

## 2019-05-21 PROCEDURE — 74011000250 HC RX REV CODE- 250

## 2019-05-21 PROCEDURE — B246ZZ4 ULTRASONOGRAPHY OF RIGHT AND LEFT HEART, TRANSESOPHAGEAL: ICD-10-PCS | Performed by: INTERNAL MEDICINE

## 2019-05-21 PROCEDURE — 76210000016 HC OR PH I REC 1 TO 1.5 HR: Performed by: INTERNAL MEDICINE

## 2019-05-21 PROCEDURE — 37246 TRLUML BALO ANGIOP 1ST ART: CPT

## 2019-05-21 PROCEDURE — 77030013292 HC BOWL MX PRSM J&J -A: Performed by: ANESTHESIOLOGY

## 2019-05-21 PROCEDURE — 93312 ECHO TRANSESOPHAGEAL: CPT

## 2019-05-21 PROCEDURE — 74011250636 HC RX REV CODE- 250/636: Performed by: INTERNAL MEDICINE

## 2019-05-21 PROCEDURE — 74011250636 HC RX REV CODE- 250/636: Performed by: ANESTHESIOLOGY

## 2019-05-21 PROCEDURE — 77030038110 HC IMPL LAA WATCHMAN 24MM BSC -L

## 2019-05-21 PROCEDURE — C1769 GUIDE WIRE: HCPCS

## 2019-05-21 PROCEDURE — 74011636320 HC RX REV CODE- 636/320: Performed by: INTERNAL MEDICINE

## 2019-05-21 PROCEDURE — 65660000000 HC RM CCU STEPDOWN

## 2019-05-21 PROCEDURE — C1760 CLOSURE DEV, VASC: HCPCS

## 2019-05-21 PROCEDURE — 77030004559 HC CATH ANGI DX SUPT CARD -B

## 2019-05-21 PROCEDURE — 77030020782 HC GWN BAIR PAWS FLX 3M -B: Performed by: ANESTHESIOLOGY

## 2019-05-21 PROCEDURE — 77030037088 HC TUBE ENDOTRACH ORAL NSL COVD-A: Performed by: ANESTHESIOLOGY

## 2019-05-21 PROCEDURE — 76060000033 HC ANESTHESIA 1 TO 1.5 HR: Performed by: INTERNAL MEDICINE

## 2019-05-21 PROCEDURE — 85027 COMPLETE CBC AUTOMATED: CPT

## 2019-05-21 PROCEDURE — 02L73DK OCCLUSION OF LEFT ATRIAL APPENDAGE WITH INTRALUMINAL DEVICE, PERCUTANEOUS APPROACH: ICD-10-PCS | Performed by: INTERNAL MEDICINE

## 2019-05-21 PROCEDURE — 85347 COAGULATION TIME ACTIVATED: CPT

## 2019-05-21 PROCEDURE — 33340 PERQ CLSR TCAT L ATR APNDGE: CPT

## 2019-05-21 RX ORDER — SODIUM CHLORIDE, SODIUM LACTATE, POTASSIUM CHLORIDE, CALCIUM CHLORIDE 600; 310; 30; 20 MG/100ML; MG/100ML; MG/100ML; MG/100ML
100 INJECTION, SOLUTION INTRAVENOUS CONTINUOUS
Status: ACTIVE | OUTPATIENT
Start: 2019-05-21 | End: 2019-05-21

## 2019-05-21 RX ORDER — LEVOTHYROXINE SODIUM 50 UG/1
50 TABLET ORAL
Status: DISCONTINUED | OUTPATIENT
Start: 2019-05-22 | End: 2019-05-22 | Stop reason: HOSPADM

## 2019-05-21 RX ORDER — ACETAMINOPHEN 325 MG/1
650 TABLET ORAL
Status: DISCONTINUED | OUTPATIENT
Start: 2019-05-21 | End: 2019-05-22 | Stop reason: HOSPADM

## 2019-05-21 RX ORDER — SODIUM CHLORIDE 9 MG/ML
INJECTION, SOLUTION INTRAVENOUS
Status: DISCONTINUED | OUTPATIENT
Start: 2019-05-21 | End: 2019-05-21 | Stop reason: HOSPADM

## 2019-05-21 RX ORDER — SODIUM CHLORIDE, SODIUM LACTATE, POTASSIUM CHLORIDE, CALCIUM CHLORIDE 600; 310; 30; 20 MG/100ML; MG/100ML; MG/100ML; MG/100ML
100 INJECTION, SOLUTION INTRAVENOUS CONTINUOUS
Status: DISCONTINUED | OUTPATIENT
Start: 2019-05-21 | End: 2019-05-21 | Stop reason: HOSPADM

## 2019-05-21 RX ORDER — SODIUM CHLORIDE 9 MG/ML
75 INJECTION, SOLUTION INTRAVENOUS CONTINUOUS
Status: DISCONTINUED | OUTPATIENT
Start: 2019-05-21 | End: 2019-05-21 | Stop reason: HOSPADM

## 2019-05-21 RX ORDER — DEXAMETHASONE SODIUM PHOSPHATE 100 MG/10ML
INJECTION INTRAMUSCULAR; INTRAVENOUS AS NEEDED
Status: DISCONTINUED | OUTPATIENT
Start: 2019-05-21 | End: 2019-05-21 | Stop reason: HOSPADM

## 2019-05-21 RX ORDER — METOPROLOL TARTRATE 5 MG/5ML
INJECTION INTRAVENOUS AS NEEDED
Status: DISCONTINUED | OUTPATIENT
Start: 2019-05-21 | End: 2019-05-21 | Stop reason: HOSPADM

## 2019-05-21 RX ORDER — GLYCOPYRROLATE 0.2 MG/ML
INJECTION INTRAMUSCULAR; INTRAVENOUS AS NEEDED
Status: DISCONTINUED | OUTPATIENT
Start: 2019-05-21 | End: 2019-05-21 | Stop reason: HOSPADM

## 2019-05-21 RX ORDER — NEOSTIGMINE METHYLSULFATE 1 MG/ML
INJECTION, SOLUTION INTRAVENOUS AS NEEDED
Status: DISCONTINUED | OUTPATIENT
Start: 2019-05-21 | End: 2019-05-21 | Stop reason: HOSPADM

## 2019-05-21 RX ORDER — HEPARIN SODIUM 200 [USP'U]/100ML
3 INJECTION, SOLUTION INTRAVENOUS CONTINUOUS
Status: DISCONTINUED | OUTPATIENT
Start: 2019-05-21 | End: 2019-05-21 | Stop reason: HOSPADM

## 2019-05-21 RX ORDER — ESCITALOPRAM OXALATE 10 MG/1
10 TABLET ORAL DAILY
Status: DISCONTINUED | OUTPATIENT
Start: 2019-05-22 | End: 2019-05-22 | Stop reason: HOSPADM

## 2019-05-21 RX ORDER — LIDOCAINE HYDROCHLORIDE 20 MG/ML
INJECTION, SOLUTION EPIDURAL; INFILTRATION; INTRACAUDAL; PERINEURAL AS NEEDED
Status: DISCONTINUED | OUTPATIENT
Start: 2019-05-21 | End: 2019-05-21 | Stop reason: HOSPADM

## 2019-05-21 RX ORDER — NITROGLYCERIN 0.4 MG/1
0.4 TABLET SUBLINGUAL
Status: DISCONTINUED | OUTPATIENT
Start: 2019-05-21 | End: 2019-05-22 | Stop reason: HOSPADM

## 2019-05-21 RX ORDER — CEFAZOLIN SODIUM/WATER 2 G/20 ML
2 SYRINGE (ML) INTRAVENOUS
Status: COMPLETED | OUTPATIENT
Start: 2019-05-21 | End: 2019-05-21

## 2019-05-21 RX ORDER — OXYCODONE HYDROCHLORIDE 5 MG/1
10 TABLET ORAL
Status: COMPLETED | OUTPATIENT
Start: 2019-05-21 | End: 2019-05-21

## 2019-05-21 RX ORDER — TEMAZEPAM 15 MG/1
15 CAPSULE ORAL
Status: DISCONTINUED | OUTPATIENT
Start: 2019-05-21 | End: 2019-05-22 | Stop reason: HOSPADM

## 2019-05-21 RX ORDER — ONDANSETRON 2 MG/ML
INJECTION INTRAMUSCULAR; INTRAVENOUS AS NEEDED
Status: DISCONTINUED | OUTPATIENT
Start: 2019-05-21 | End: 2019-05-21 | Stop reason: HOSPADM

## 2019-05-21 RX ORDER — HYDROCODONE BITARTRATE AND ACETAMINOPHEN 5; 325 MG/1; MG/1
1 TABLET ORAL
Status: DISCONTINUED | OUTPATIENT
Start: 2019-05-21 | End: 2019-05-22 | Stop reason: HOSPADM

## 2019-05-21 RX ORDER — SODIUM CHLORIDE 0.9 % (FLUSH) 0.9 %
5-40 SYRINGE (ML) INJECTION AS NEEDED
Status: DISCONTINUED | OUTPATIENT
Start: 2019-05-21 | End: 2019-05-22 | Stop reason: HOSPADM

## 2019-05-21 RX ORDER — ROCURONIUM BROMIDE 10 MG/ML
INJECTION, SOLUTION INTRAVENOUS AS NEEDED
Status: DISCONTINUED | OUTPATIENT
Start: 2019-05-21 | End: 2019-05-21 | Stop reason: HOSPADM

## 2019-05-21 RX ORDER — LISINOPRIL 20 MG/1
20 TABLET ORAL DAILY
Status: DISCONTINUED | OUTPATIENT
Start: 2019-05-22 | End: 2019-05-22 | Stop reason: HOSPADM

## 2019-05-21 RX ORDER — HYDROMORPHONE HYDROCHLORIDE 2 MG/ML
0.5 INJECTION, SOLUTION INTRAMUSCULAR; INTRAVENOUS; SUBCUTANEOUS
Status: DISCONTINUED | OUTPATIENT
Start: 2019-05-21 | End: 2019-05-21 | Stop reason: HOSPADM

## 2019-05-21 RX ORDER — LIDOCAINE HYDROCHLORIDE 10 MG/ML
0.3 INJECTION INFILTRATION; PERINEURAL ONCE
Status: DISCONTINUED | OUTPATIENT
Start: 2019-05-21 | End: 2019-05-21 | Stop reason: HOSPADM

## 2019-05-21 RX ORDER — FENTANYL CITRATE 50 UG/ML
INJECTION, SOLUTION INTRAMUSCULAR; INTRAVENOUS AS NEEDED
Status: DISCONTINUED | OUTPATIENT
Start: 2019-05-21 | End: 2019-05-21 | Stop reason: HOSPADM

## 2019-05-21 RX ORDER — HEPARIN SODIUM 1000 [USP'U]/ML
INJECTION, SOLUTION INTRAVENOUS; SUBCUTANEOUS AS NEEDED
Status: DISCONTINUED | OUTPATIENT
Start: 2019-05-21 | End: 2019-05-21 | Stop reason: HOSPADM

## 2019-05-21 RX ORDER — METOPROLOL SUCCINATE 25 MG/1
12.5 TABLET, EXTENDED RELEASE ORAL DAILY
Status: DISCONTINUED | OUTPATIENT
Start: 2019-05-22 | End: 2019-05-22 | Stop reason: HOSPADM

## 2019-05-21 RX ORDER — SODIUM CHLORIDE 0.9 % (FLUSH) 0.9 %
5-40 SYRINGE (ML) INJECTION EVERY 8 HOURS
Status: DISCONTINUED | OUTPATIENT
Start: 2019-05-21 | End: 2019-05-22 | Stop reason: HOSPADM

## 2019-05-21 RX ORDER — PRAVASTATIN SODIUM 80 MG/1
80 TABLET ORAL
Status: DISCONTINUED | OUTPATIENT
Start: 2019-05-21 | End: 2019-05-22 | Stop reason: HOSPADM

## 2019-05-21 RX ORDER — PANTOPRAZOLE SODIUM 40 MG/1
40 TABLET, DELAYED RELEASE ORAL DAILY
Status: DISCONTINUED | OUTPATIENT
Start: 2019-05-22 | End: 2019-05-22 | Stop reason: HOSPADM

## 2019-05-21 RX ORDER — PROPOFOL 10 MG/ML
INJECTION, EMULSION INTRAVENOUS AS NEEDED
Status: DISCONTINUED | OUTPATIENT
Start: 2019-05-21 | End: 2019-05-21 | Stop reason: HOSPADM

## 2019-05-21 RX ORDER — EPHEDRINE SULFATE 50 MG/ML
INJECTION, SOLUTION INTRAVENOUS AS NEEDED
Status: DISCONTINUED | OUTPATIENT
Start: 2019-05-21 | End: 2019-05-21 | Stop reason: HOSPADM

## 2019-05-21 RX ORDER — SODIUM CHLORIDE 9 MG/ML
75 INJECTION, SOLUTION INTRAVENOUS CONTINUOUS
Status: DISCONTINUED | OUTPATIENT
Start: 2019-05-21 | End: 2019-05-22 | Stop reason: HOSPADM

## 2019-05-21 RX ORDER — ASPIRIN 81 MG/1
81 TABLET ORAL DAILY
Status: DISCONTINUED | OUTPATIENT
Start: 2019-05-22 | End: 2019-05-22 | Stop reason: HOSPADM

## 2019-05-21 RX ADMIN — FENTANYL CITRATE 50 MCG: 50 INJECTION, SOLUTION INTRAMUSCULAR; INTRAVENOUS at 07:23

## 2019-05-21 RX ADMIN — LIDOCAINE HYDROCHLORIDE 100 MG: 20 INJECTION, SOLUTION EPIDURAL; INFILTRATION; INTRACAUDAL; PERINEURAL at 07:30

## 2019-05-21 RX ADMIN — HYDROCODONE BITARTRATE AND ACETAMINOPHEN 1 TABLET: 5; 325 TABLET ORAL at 14:55

## 2019-05-21 RX ADMIN — ONDANSETRON 4 MG: 2 INJECTION INTRAMUSCULAR; INTRAVENOUS at 08:22

## 2019-05-21 RX ADMIN — HEPARIN SODIUM 9000 UNITS: 1000 INJECTION, SOLUTION INTRAVENOUS; SUBCUTANEOUS at 08:08

## 2019-05-21 RX ADMIN — EPHEDRINE SULFATE 5 MG: 50 INJECTION, SOLUTION INTRAVENOUS at 08:12

## 2019-05-21 RX ADMIN — DEXAMETHASONE SODIUM PHOSPHATE 7 MG: 100 INJECTION INTRAMUSCULAR; INTRAVENOUS at 08:22

## 2019-05-21 RX ADMIN — APIXABAN 5 MG: 5 TABLET, FILM COATED ORAL at 21:39

## 2019-05-21 RX ADMIN — ROCURONIUM BROMIDE 30 MG: 10 INJECTION, SOLUTION INTRAVENOUS at 07:30

## 2019-05-21 RX ADMIN — Medication 2 G: at 07:47

## 2019-05-21 RX ADMIN — EPHEDRINE SULFATE 5 MG: 50 INJECTION, SOLUTION INTRAVENOUS at 07:54

## 2019-05-21 RX ADMIN — FENTANYL CITRATE 50 MCG: 50 INJECTION, SOLUTION INTRAMUSCULAR; INTRAVENOUS at 07:52

## 2019-05-21 RX ADMIN — OXYCODONE HYDROCHLORIDE 5 MG: 5 TABLET ORAL at 11:16

## 2019-05-21 RX ADMIN — Medication 10 ML: at 14:07

## 2019-05-21 RX ADMIN — PRAVASTATIN SODIUM 80 MG: 80 TABLET ORAL at 21:39

## 2019-05-21 RX ADMIN — Medication 10 ML: at 21:40

## 2019-05-21 RX ADMIN — IOPAMIDOL 30 ML: 755 INJECTION, SOLUTION INTRAVENOUS at 08:26

## 2019-05-21 RX ADMIN — SODIUM CHLORIDE: 9 INJECTION, SOLUTION INTRAVENOUS at 07:23

## 2019-05-21 RX ADMIN — ROCURONIUM BROMIDE 5 MG: 10 INJECTION, SOLUTION INTRAVENOUS at 08:05

## 2019-05-21 RX ADMIN — GLYCOPYRROLATE 0.4 MG: 0.2 INJECTION INTRAMUSCULAR; INTRAVENOUS at 08:25

## 2019-05-21 RX ADMIN — PROPOFOL 150 MG: 10 INJECTION, EMULSION INTRAVENOUS at 07:30

## 2019-05-21 RX ADMIN — NEOSTIGMINE METHYLSULFATE 3 MG: 1 INJECTION, SOLUTION INTRAVENOUS at 08:25

## 2019-05-21 RX ADMIN — HEPARIN SODIUM 3 ML/HR: 200 INJECTION, SOLUTION INTRAVENOUS at 08:00

## 2019-05-21 RX ADMIN — SODIUM CHLORIDE, SODIUM LACTATE, POTASSIUM CHLORIDE, AND CALCIUM CHLORIDE: 600; 310; 30; 20 INJECTION, SOLUTION INTRAVENOUS at 07:23

## 2019-05-21 RX ADMIN — METOPROLOL TARTRATE 2 MG: 5 INJECTION INTRAVENOUS at 08:33

## 2019-05-21 RX ADMIN — TEMAZEPAM 15 MG: 15 CAPSULE ORAL at 21:39

## 2019-05-21 NOTE — ANESTHESIA PREPROCEDURE EVALUATION
Relevant Problems   No relevant active problems       Anesthetic History   No history of anesthetic complications            Review of Systems / Medical History  Patient summary reviewed and pertinent labs reviewed    Pulmonary  Within defined limits                 Neuro/Psych       CVA: no residual symptoms       Cardiovascular    Hypertension        Dysrhythmias : atrial fibrillation  CAD and cardiac stents (2 stents in LAD 2014, patent on cath 2018)         GI/Hepatic/Renal     GERD           Endo/Other      Hypothyroidism       Other Findings              Physical Exam    Airway  Mallampati: I  TM Distance: 4 - 6 cm  Neck ROM: normal range of motion   Mouth opening: Normal     Cardiovascular    Rhythm: irregular  Rate: normal         Dental    Dentition: Caps/crowns     Pulmonary  Breath sounds clear to auscultation               Abdominal         Other Findings            Anesthetic Plan    ASA: 3  Anesthesia type: general    Monitoring Plan: Arterial line      Induction: Intravenous  Anesthetic plan and risks discussed with: Patient

## 2019-05-21 NOTE — PROGRESS NOTES
Initial visit to assess pt's spiritual needs. Feeling today? Better  Pt had surgery today    Receiving good care?  yes    Needs from Spiritual Care:  Prayer  Pt's  calls & visits her regularly    Ministry of presence & prayer to demonstrate caring & concern, convey emotional & spiritual support.     brianna Santizo, MDiv,[facility],PhD

## 2019-05-21 NOTE — PROGRESS NOTES
Connect Care Notification  Patient admitted for elective procedure    Will follow via chart review  Call patient post procedure/discharge to home

## 2019-05-21 NOTE — H&P
[facility] CARDIOLOGY History & Physical                   Subjective:     Patient is a 20-year-old female with known paroxysmal atrial fibrillation who is not on anticoagulation secondary to previous GI bleeds. She was admitted on March 13, 2019 with right-sided weakness and aphasia. She received TPA with complete resolution of her neurologic defects. At this point she was restarted on Xarelto. Concerns of recurrent bleeding have led to evaluation for left atrial appendage closure. She appears to be a suitable candidate and presents today for the procedure.     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    CAD (coronary artery disease), native coronary artery May 2014    stent to LAD; 3 stents total    Depression 1/14/2013    GERD (gastroesophageal reflux disease)     Hypercholesteremia 1/14/2013    Hypertension     Hypothyroidism 1/14/2013    Osteoarthritis of back 1/2/2014    Osteoporosis     Pulmonary embolism ([address] 2009    after knee surgery    Sick sinus syndrome ([facility] 1/7/2016    Tachycardia-Bradycardia    Stroke [facility])       Past Surgical History:   Procedure Laterality Date    COLONOSCOPY N/A 7/16/2018    COLONOSCOPY performed by Julio C Rodríguez MD at [address]  HX BREAST BIOPSY Bilateral     HX CATARACT REMOVAL Right 2010    HX CORONARY STENT PLACEMENT  05/2014    LAD X 3    HX ENDOSCOPY  04/12/2019    gastritis with superficial ulcerations    HX HEART CATHETERIZATION  09/17/2015    HX HEENT      multiple eye surgeries - left - macular hoe, retinal detachment twice,     HX HYSTERECTOMY  1970    HX KNEE REPLACEMENT  2009    HX KNEE REPLACEMENT Left 01/12/2017    HX ORTHOPAEDIC  2006 and 2008    herniated disc    HX TONSIL AND ADENOIDECTOMY  1934 Allergies   Allergen Reactions    Adhesive Rash     Social History     Tobacco Use    Smoking status: Never Smoker    Smokeless tobacco: Never Used   Substance Use Topics    Alcohol use: No     Alcohol/week: 0.0 oz      FH:   Family History   Problem Relation Age of Onset    Cancer Mother     Breast Cancer Mother     Cancer Father     Cancer Sister     Cancer Brother         pancreas    Breast Cancer Maternal Aunt         Review of Systems   Constitutional: Negative for chills and fever. HENT: Negative for tinnitus. Eyes: Negative for blurred vision. Respiratory: Negative for cough and shortness of breath. Cardiovascular: Negative for orthopnea. Gastrointestinal: Negative for abdominal pain and nausea. Genitourinary: Negative for dysuria. Musculoskeletal: Positive for joint pain. Skin: Negative for rash. Neurological: Negative for tremors, sensory change and headaches. Endo/Heme/Allergies: Does not bruise/bleed easily. Psychiatric/Behavioral: Negative for depression and suicidal ideas. Objective:       Visit Vitals  /62 (BP 1 Location: Right arm, BP Patient Position: At rest)   Pulse (!) 56   Temp 98.1 °F (36.7 °C)   Ht 5' 3\" (1.6 m)   Wt 68 kg (150 lb)   SpO2 93%   BMI 26.57 kg/m²       No intake/output data recorded. No intake/output data recorded. Physical Exam   Constitutional: She is oriented to person, place, and time and well-developed, well-nourished, and in no distress. HENT:   Head: Atraumatic. Eyes: Pupils are equal, round, and reactive to light. Conjunctivae are normal.   Neck: No JVD present. No thyromegaly present. Cardiovascular: Normal rate and regular rhythm. No murmur heard. Pulmonary/Chest: Effort normal and breath sounds normal.   Abdominal: Soft. Bowel sounds are normal. She exhibits no distension. There is no tenderness. Musculoskeletal: She exhibits no edema. Neurological: She is alert and oriented to person, place, and time. Skin: Skin is warm. No rash noted. Psychiatric: Mood normal.             Data Review:   Labs:   Recent Labs     05/20/19  0919      K 4.0   BUN 17   CREA 1.00   *   WBC 5.0   HGB 10.2*   HCT 32.1*      INR 1.1      No results found for: Chan AMBROSIO Herbert        Assessment/Plan:   Active Problems:    Paroxysmal atrial fibrillation ([address] (1/14/2013)  Patient is an appropriate candidate for  left atrial appendage occlusion. The patient's [id number] score is  7 which indicates a 9.6% annual risk of stroke. Ethelene Gauss Has-BLED score is 6 which indicates a 12.5% annual risk of a major bleeding event. . I have discussed with the rationale for  left atrial appendage occlusion. We discussed the available data from randomized trials which demonstate left atrial appendage occlusion is as effective as long term anticoagulation at prevention of CVA or systemic embolism. We also discussed that left atrial appendage closure reduces risk of CVA or sytemic embolization by 67-83% compared to no anticoagulation. Randomized data also showed signifcant reduction in the following endpoints compared to long term Coumadin administration (Rate per 100 PY):                - 80% reduction in hemorrhagic CVA (0.37 vs 0.94%),              - 59% reduction in disabling CVA (0.37% vs 0.94%),                - 41% reduction in CV/unexplained death (1.3% vs 2.2%)              - 27% reduction in all cause death (3.6% vs 4.9%)              - 72% risk reduction of bleeding.       The risk of left atrial appendage were also discussed. Risk of major complication is approximately 1.5% based on available data. Risk include but not limited:              - Pericardial tamponade (1.28%) which can be treated percutaneously in 63% of cases but can require sugical therapy in  31% of cases (3 out of 1000 patients). - Procedural related CVA in 2 out of 1000 patients.                 - Device embolization in less than 3 out of 1000 patients              - Death related to procedure in approximately 6 out of 10,000 patients.       Based on our discussion, it is felt benefits of left atrial appendage significantly outweigh risk. All questions answered to the best of my ability. Patient would like to proceed with closure. CVA (cerebral vascular accident) ([facility] (3/13/2019)  Plans for BHARATH occlusion      Anemia (4/12/2019)  Stable. Serial CBCs.                  [facility] Brochure SINDHU Hope  5/21/2019  7:07 AM

## 2019-05-21 NOTE — PROGRESS NOTES
TRANSFER - IN REPORT:    Verbal report received from [facility] RN on Kathi Fisher being received from [facility] for routine progression of care      Report consisted of patients Situation, Background, Assessment and Recommendations(SBAR). Information from the following report(s) SBAR, Kardex and OR Summary was reviewed with the receiving nurse. Opportunity for questions and clarification was provided. Assessment completed upon patients arrival to unit and care assumed.

## 2019-05-21 NOTE — OP NOTES
PROCEDURE/OPERATIVE REPORT    Patient: Oscar Stanley MRN: [id number]  SSN: [id number]    YOB: 1929  Age: 80 y.o. Sex: female      DATE OF PROCEDURE: 5/21/2019     PROCEDURE: Left atrial appendage occlusion with Watchman device. Pre-procedural Diagnosis  1. Paroxysmal Atrial fibrillation  2. Intolerant to long term anticoagulation     Procedure Performed  1. Transesophageal echocardiogram  2. Transeptal puncture   3. Watchman implantation    Cardiac Electrophysiologist: Vinay Partida. Itzel Walker MD  Interventional Cardiologist: Kalina Evans MD    Anesthesia: General     Estimated Blood Loss: Less than 10 mL     Specimens: * No specimens in log *    Procedure in Detail:  The patient was brought to the [facility] OR in the fasting state. The patient was intubated by anesthesiology, invasive arterial blood pressure monitoring obtained, a haro catheter inserted. A tranesophageal echocardiogram was performed directly prior to the procedure and was negative for a BHARATH thrombus (see full report in chart). Dr. Itzel Walker obtained venous access and deployed a single Perclose in a \"preclose\" fashion prior to 16 Polish sheath placement. He then placed an SLO and performed the transseptal as outlined in his procedure note. I assisted with this portion of this portion of the procedure. As the primary implanting phyisician, I prepped and draped the Watchman delivery sheath and exchanged for the SLO via an Amplatz super stiff wire located in the left upper pulmonary vein. A pigtail catheter was then inserted into the delivery sheath and used to guide the delivery sheath into the appendage. Depth measurements were performed with fluoroscopy and depth and size measurements determined via JANICE. The sheath was then advanced over the pigtail catheter into position within the BHARATH.  The Watchman device was then prepped per protocol and placed into the delivery sheath via a wet to wet connection and advanced into the sheath. The sheath was then pulled back to allow delivery of the Watchman device within the left atrial appendage. Successful delivery of a 24 mm device revealed excellent PASS criteria. A contrast appendogram was performed in 2 views revealing a adequate seal. After extensive evaluation including a excellent tug test, the device was deployed. Further measurements were taken post implant. The sheath was removed and Dr. Portia Benítez deployed Perclose with good hemostasis. The patient tolerated the procedure well with no acute complications recognized. Just prior to pulling shealths, the JANICE was used to obtain ultrasound images and revealed no evidence of pericardial effusion. Complications: None    Summary:   1.  Successful Watchman implantation of a 24 device      Benton Barber MD

## 2019-05-21 NOTE — PROGRESS NOTES
Care Management Interventions  PCP Verified by CM: Yes(last seen April 2019)  Palliative Care Criteria Met (RRAT>21 & CHF Dx)?: No(RRAT 22 Dx Atrial fib)  Transition of Care Consult (CM Consult): Discharge Planning  Discharge Durable Medical Equipment: No(cane)  Physical Therapy Consult: No  Occupational Therapy Consult: No  Speech Therapy Consult: No  Current Support Network: Lives with Spouse  Confirm Follow Up Transport: Family(spouse)  Plan discussed with Pt/Family/Caregiver: Yes  Freedom of Choice Offered: Yes  Discharge Location  Discharge Placement: Home  Met with patient for d/c planning. Patient alert and oriented x 3, independent of ADL's and lives with her spouse at [address] home. DME includes cane which she states does not use often. She is able to drive but states doesn't drive much and her  provides transportation. She confirms Medicare and [employer] and she is able to obtain her medications without difficulty at [facility] on [facility] [address] [phone]. She voices no concerns or needs for d/c. Current d/c plan is home with spouse when medically stable. CM following.

## 2019-05-21 NOTE — PROGRESS NOTES
LAAO with Dr Red Cabral   16 fr in right femoral vein   Site closed with perclose   Site covered with tegaderm  No bleeding or hematoma noted at site.  Site soft

## 2019-05-21 NOTE — ANESTHESIA POSTPROCEDURE EVALUATION
Procedure(s):  LEFT ATRIAL APPENDAGE CLOSURE.    general    Anesthesia Post Evaluation      Multimodal analgesia: multimodal analgesia used between 6 hours prior to anesthesia start to PACU discharge  Patient location during evaluation: PACU  Patient participation: complete - patient participated  Level of consciousness: awake and alert  Pain management: adequate  Airway patency: patent  Anesthetic complications: no  Cardiovascular status: acceptable  Respiratory status: acceptable  Hydration status: acceptable  Post anesthesia nausea and vomiting:  none      Vitals Value Taken Time   /107 5/21/2019 11:12 AM   Temp 36.8 °C (98.2 °F) 5/21/2019  9:17 AM   Pulse 56 5/21/2019 11:22 AM   Resp 17 5/21/2019 11:22 AM   SpO2 97 % 5/21/2019 11:22 AM   Vitals shown include unvalidated device data.

## 2019-05-21 NOTE — PERIOP NOTES
TRANSFER - OUT REPORT:    Verbal report given to Alfie(name) on Elly Brown  being transferred to [facility](unit) for routine post - op       Report consisted of patients Situation, Background, Assessment and   Recommendations(SBAR). Information from the following report(s) OR Summary, NSR, was reviewed with the receiving nurse. Lines:   Peripheral IV 05/21/19 Right Wrist (Active)   Site Assessment Clean, dry, & intact 5/21/2019  9:21 AM   Phlebitis Assessment 0 5/21/2019  9:21 AM   Infiltration Assessment 0 5/21/2019  9:21 AM   Dressing Status Clean, dry, & intact 5/21/2019  9:21 AM   Dressing Type Transparent;Tape 5/21/2019  9:21 AM   Hub Color/Line Status Capped;Flushed 5/21/2019  9:21 AM   Alcohol Cap Used No 5/21/2019  9:21 AM       Peripheral IV 05/21/19 Left Wrist (Active)   Site Assessment Clean, dry, & intact 5/21/2019  9:21 AM   Phlebitis Assessment 0 5/21/2019  9:21 AM   Infiltration Assessment 0 5/21/2019  9:21 AM   Dressing Status Clean, dry, & intact 5/21/2019  9:21 AM   Dressing Type Transparent;Tape 5/21/2019  9:21 AM   Hub Color/Line Status Infusing;Green 5/21/2019  9:21 AM   Alcohol Cap Used No 5/21/2019  9:21 AM        Opportunity for questions and clarification was provided. Patient transported with:   O2 @ 3 liters    VTE prophylaxis orders have been written for Elly Brown. Patient and family given floor number and nurses name. Family updated re: pt status after security code verified.

## 2019-05-21 NOTE — PROCEDURES
Pre-Electrophysiology Diagnosis  1. Atrial fibrillation  2. Intolerant to long term anticoagulation     Procedure Performed  1. Transesophageal echocardiogram  2. Transeptal puncture   3. Watchman implantation    Cardiac Electrophysiologist: Joey Adkins. Rossana Cervantes MD  Interventional Cardiologist: Bonifacio Scott MD    Anesthesia: General     Estimated Blood Loss: Less than 10 mL     Specimens: * No specimens in log *    Procedure in Detail:  The patient was brought to the hybrid OR suite in the fasting state. The patient was intubated by anesthesiology and invasive arterial blood pressure monitoring obtained. A tranesophageal echocardiogram was performed directly prior to the procedure and was negative for a BHARATH thrombus (see full report in chart). As the secondary , I obtained venous access under ultrasound guidance x 1 using modified Seldinger technique with placement of a 16Fr short sidearm sheath into the right femoral vein. Prior to placing the 16 Fr sheath, a Perclose device was deployed in a \"preclose\" fashion and will be used for hemostasis post procedure. I placed an SL-O 63cm long braided sheath through the 16 Fr sheath in the right femoral vein and guided over a wire to the RA. Next, I inserted a trans-septal needle into the SLO and it was used to perform a trans-septal puncture with assistance from JANICE, as well as fluoroscopy. The SLO sheath was advanced into the LA. Total weight based heparin bolus was administered (1/2 prior to transeptal puncture and 1/2 just after transeptal access) and systemic blood pressure monitored invasively. The ACT target was 250-300. As the primary implanting physician, Dr. Hackett Host prepped the Watchman delivery sheath and delivered a 24 mm device per his procedure note with my assistance. I was present and assisted with this portion of the procedure.   A contrast appendogram was performed revealing an adequate seal. After extensive evaluation including and excellent tug test, the device was deployed. Further measurements were taken post implant. The sheath was removed. At the completion of the Watchman implant procedure, all catheters were removed, and the Perclose device was fully deployed for adequate hemostasis and sheaths were pulled. The patient tolerated the procedure well with no acute complications recognized. Just prior to pulling shealths, the JANICE was used to obtain ultrasound images and revealed no evidence of pericardial effusion. Complications: None    Summary:   1. Successful Watchman implantation  2. Family updated. Haven Peterson MD, MS  Clinical Cardiac Electrophysiology

## 2019-05-21 NOTE — PROGRESS NOTES
Pt arrived, ambulated to room with no visible problems, planned LAAO for Dr Debra Reyes. Consent signed, Procedure discussed with pt all questions answered voiced understanding. Medications and history discussed with pt. Pt prepped per ordersThe patient has a fraility score of 5-MILDLY FRAIL, based on age and ability to complete ADLs without assistance, increased symptoms on exertion.

## 2019-05-22 VITALS
OXYGEN SATURATION: 93 % | SYSTOLIC BLOOD PRESSURE: 133 MMHG | DIASTOLIC BLOOD PRESSURE: 59 MMHG | BODY MASS INDEX: 26.68 KG/M2 | HEIGHT: 63 IN | WEIGHT: 150.6 LBS | HEART RATE: 60 BPM | TEMPERATURE: 97.9 F | RESPIRATION RATE: 20 BRPM

## 2019-05-22 LAB
ANION GAP SERPL CALC-SCNC: 9 MMOL/L (ref 7–16)
BASOPHILS # BLD: 0 K/UL (ref 0–0.2)
BASOPHILS NFR BLD: 0 % (ref 0–2)
BUN SERPL-MCNC: 19 MG/DL (ref 8–23)
CALCIUM SERPL-MCNC: 8.4 MG/DL (ref 8.3–10.4)
CHLORIDE SERPL-SCNC: 108 MMOL/L (ref 98–107)
CO2 SERPL-SCNC: 22 MMOL/L (ref 21–32)
CREAT SERPL-MCNC: 0.82 MG/DL (ref 0.6–1)
DIFFERENTIAL METHOD BLD: ABNORMAL
EOSINOPHIL # BLD: 0 K/UL (ref 0–0.8)
EOSINOPHIL NFR BLD: 0 % (ref 0.5–7.8)
ERYTHROCYTE [DISTWIDTH] IN BLOOD BY AUTOMATED COUNT: 14.1 % (ref 11.9–14.6)
GLUCOSE SERPL-MCNC: 123 MG/DL (ref 65–100)
HCT VFR BLD AUTO: 28 % (ref 35.8–46.3)
HGB BLD-MCNC: 8.8 G/DL (ref 11.7–15.4)
IMM GRANULOCYTES # BLD AUTO: 0.1 K/UL (ref 0–0.5)
IMM GRANULOCYTES NFR BLD AUTO: 1 % (ref 0–5)
LYMPHOCYTES # BLD: 1.3 K/UL (ref 0.5–4.6)
LYMPHOCYTES NFR BLD: 14 % (ref 13–44)
MAGNESIUM SERPL-MCNC: 2.5 MG/DL (ref 1.8–2.4)
MCH RBC QN AUTO: 27.9 PG (ref 26.1–32.9)
MCHC RBC AUTO-ENTMCNC: 31.4 G/DL (ref 31.4–35)
MCV RBC AUTO: 88.9 FL (ref 79.6–97.8)
MONOCYTES # BLD: 0.9 K/UL (ref 0.1–1.3)
MONOCYTES NFR BLD: 9 % (ref 4–12)
NEUTS SEG # BLD: 7.5 K/UL (ref 1.7–8.2)
NEUTS SEG NFR BLD: 77 % (ref 43–78)
NRBC # BLD: 0 K/UL (ref 0–0.2)
PLATELET # BLD AUTO: 187 K/UL (ref 150–450)
PMV BLD AUTO: 11.5 FL (ref 9.4–12.3)
POTASSIUM SERPL-SCNC: 4.4 MMOL/L (ref 3.5–5.1)
RBC # BLD AUTO: 3.15 M/UL (ref 4.05–5.2)
SODIUM SERPL-SCNC: 139 MMOL/L (ref 136–145)
WBC # BLD AUTO: 9.8 K/UL (ref 4.3–11.1)

## 2019-05-22 PROCEDURE — 83735 ASSAY OF MAGNESIUM: CPT

## 2019-05-22 PROCEDURE — 80048 BASIC METABOLIC PNL TOTAL CA: CPT

## 2019-05-22 PROCEDURE — 85025 COMPLETE CBC W/AUTO DIFF WBC: CPT

## 2019-05-22 PROCEDURE — 36415 COLL VENOUS BLD VENIPUNCTURE: CPT

## 2019-05-22 PROCEDURE — 74011250637 HC RX REV CODE- 250/637: Performed by: INTERNAL MEDICINE

## 2019-05-22 RX ORDER — LANOLIN ALCOHOL/MO/W.PET/CERES
325 CREAM (GRAM) TOPICAL
Qty: 30 TAB | Refills: 5 | Status: SHIPPED | OUTPATIENT
Start: 2019-05-22 | End: 2020-01-23

## 2019-05-22 RX ADMIN — LEVOTHYROXINE SODIUM 50 MCG: 50 TABLET ORAL at 09:22

## 2019-05-22 RX ADMIN — ASPIRIN 81 MG: 81 TABLET, COATED ORAL at 09:23

## 2019-05-22 RX ADMIN — HYDROCODONE BITARTRATE AND ACETAMINOPHEN 1 TABLET: 5; 325 TABLET ORAL at 05:33

## 2019-05-22 RX ADMIN — ESCITALOPRAM OXALATE 10 MG: 10 TABLET ORAL at 09:23

## 2019-05-22 RX ADMIN — APIXABAN 5 MG: 5 TABLET, FILM COATED ORAL at 09:23

## 2019-05-22 RX ADMIN — LISINOPRIL 20 MG: 20 TABLET ORAL at 09:23

## 2019-05-22 RX ADMIN — Medication 5 ML: at 05:39

## 2019-05-22 RX ADMIN — METOPROLOL SUCCINATE 12.5 MG: 25 TABLET, EXTENDED RELEASE ORAL at 09:23

## 2019-05-22 NOTE — PROGRESS NOTES
Discharge instructions were reviewed with patient. An opportunity was given for questions. All medications were reviewed, and information was given on the new medications - eliquis and iron. Patient verbalized understanding, and has no questions at this time. IV and telemetry monitor removed by primary RN.

## 2019-05-22 NOTE — DISCHARGE INSTRUCTIONS
Watchman Discharge Instructions      Activity:     Follow your doctors recommendations   Return to normal activities gradually, pacing yourself as you feel better, resting when tired. · Activity should be limited for the next 48 hours. Climb stairs as little as possible and avoid any stooping, bending or strenuous activity for 48 hours. No heavy lifting (anything over 10 pounds) for three days. · Do not drive for 48 hoursHave a responsible person drive you home and stay with you for at least 24 hours after your heart catheterization/angiography. · Check the puncture site frequently for swelling or bleeding. If you see any bleeding, lie down and apply pressure over the area with a clean town or washcloth. Notify your doctor for any redness, swelling, drainage or oozing from the puncture site. Notify your doctor for any fever or chills. · You may resume your usual diet. Drink more fluids than usual.        Incision / Wound Care       Cleanse wounds with mild soap and water. Keep wound dry.  A small amount of bloody or clear drainage is normal.   Watch for redness, swelling, incision site hot to touch, foul or colored drainage from the incision site, these are all signs of infection and should be reported immediately to the physicians.  If there is site concern please notify your implanting physician.  You may remove the bandage from your Right and Groin in 24 hours. You may shower in 24 hours. No tub baths, hot tubs or swimming for one week. Do not place any lotions, creams, powders, ointments over the puncture site for one week. You may place a clean band-aid over the puncture site each day for 5 days. Change this daily. Continued        Medications:   DO NOT STOP TAKING YOUR WARFARIN OR ASPIRIN  (and/or PLAVIX) WITHOUT SPEAKING WITH YOUR CARDIOLOGIST FIRST!  Take your medications as ordered at the time of discharge.    Do NOT stop taking any medications without first discussing it with your doctor.  Notify all your doctors of current medication lists. Follow instructions on medication administration especially if blood thinning medications are prescribed. Your doctor will monitor your medications and advise you when or if you can stop taking them. Notify your doctor immediately if any of the following:   Sudden weight gain   Increasing shortness of breath   Pain, change in color, temperature or swelling in lower legs or feet.  Fevers greater than 101 degrees, redness, swelling, incision site hot to touch, foul or colored drainage from the incision site, these are all signs of infection and should be reported immediately to the physicians. Post Watchman Discharge Instructions Timeline     After a minimum of 45 days from date of procedure you will need to return to hospital to have an outpatient JANICE performed to determine if the implant has closed the opening of the appendage. At this time you will see the [place] [phone]. Coordinator for a follow up. If the JANICE reveals the appendage is not fully closed - you will require another JANICE at a later date to reassess. Once the results from JANICE have been reviewed the physicians will determine what medication changes need to be made. Your [facility] Coordinator can facilitate arranging these appointments.  6 months post implant you will need to see the [facility] Coordinator and visit the physician for a routine follow up and potentially EKG, and lab work. Your Coordinator can facilitate with setting up these appointments.  At 1 and 2 years post implant you will be contacted by your [place] [phone]. Coordinator for a brief interview. This allows us to complete required patient monitoring.        Prior to any dental work or surgery notify your dentist or surgeon about your Watchman implant and the medications you are on.     Maintain regular follow up visits with your cardiologist     Keep all bloodwork appointments. Thank you for entrusting us with your care! DISCHARGE SUMMARY from Nurse    PATIENT INSTRUCTIONS:    After general anesthesia or intravenous sedation, for 24 hours or while taking prescription Narcotics:  · Limit your activities  · Do not drive and operate hazardous machinery  · Do not make important personal or business decisions  · Do  not drink alcoholic beverages  · If you have not urinated within 8 hours after discharge, please contact your surgeon on call. Report the following to your surgeon:  · Excessive pain, swelling, redness or odor of or around the surgical area  · Temperature over 100.5  · Nausea and vomiting lasting longer than 4 hours or if unable to take medications  · Any signs of decreased circulation or nerve impairment to extremity: change in color, persistent  numbness, tingling, coldness or increase pain  · Any questions    What to do at Home:  Recommended activity: Activity as tolerated. If you experience any of the following symptoms redness, pain swelling, oozing at site, please follow up with [facility] cardiology. *  Please give a list of your current medications to your Primary Care Provider. *  Please update this list whenever your medications are discontinued, doses are      changed, or new medications (including over-the-counter products) are added. *  Please carry medication information at all times in case of emergency situations. These are general instructions for a healthy lifestyle:    No smoking/ No tobacco products/ Avoid exposure to second hand smoke  Surgeon General's Warning:  Quitting smoking now greatly reduces serious risk to your health.     Obesity, smoking, and sedentary lifestyle greatly increases your risk for illness    A healthy diet, regular physical exercise & weight monitoring are important for maintaining a healthy lifestyle    You may be retaining fluid if you have a history of heart failure or if you experience any of the following symptoms:  Weight gain of 3 pounds or more overnight or 5 pounds in a week, increased swelling in our hands or feet or shortness of breath while lying flat in bed. Please call your doctor as soon as you notice any of these symptoms; do not wait until your next office visit. Recognize signs and symptoms of STROKE:    F-face looks uneven    A-arms unable to move or move unevenly    S-speech slurred or non-existent    T-time-call 911 as soon as signs and symptoms begin-DO NOT go       Back to bed or wait to see if you get better-TIME IS BRAIN. Warning Signs of HEART ATTACK     Call 911 if you have these symptoms:   Chest discomfort. Most heart attacks involve discomfort in the center of the chest that lasts more than a few minutes, or that goes away and comes back. It can feel like uncomfortable pressure, squeezing, fullness, or pain.  Discomfort in other areas of the upper body. Symptoms can include pain or discomfort in one or both arms, the back, neck, jaw, or stomach.  Shortness of breath with or without chest discomfort.  Other signs may include breaking out in a cold sweat, nausea, or lightheadedness. Don't wait more than five minutes to call 911 - MINUTES MATTER! Fast action can save your life. Calling 911 is almost always the fastest way to get lifesaving treatment. Emergency Medical Services staff can begin treatment when they arrive -- up to an hour sooner than if someone gets to the hospital by car. The discharge information has been reviewed with the patient. The patient verbalized understanding. Discharge medications reviewed with the patient and appropriate educational materials and side effects teaching were provided.   ___________________________________________________________________________________________________________________________________

## 2019-05-22 NOTE — PROGRESS NOTES
Verbal bedside report received from [address] [place] [address] JENNIFER López. Assumed care of patient.

## 2019-05-22 NOTE — DISCHARGE SUMMARY
[facility] Discharge Summary     Patient ID:  Romy Raphael  [id number]  68 y.o.  9/20/1929    Admit date: 5/21/2019    Discharge date:  5/22/19    Admitting Physician: Benton Barber MD     Discharge Physician: Cole Nieto NP/Dr. Hope    Admission Diagnoses: Atrial fibrillation, unspecified type ([facility] [I48.91]  A-fib [facility]) [I48.91]    Discharge Diagnoses:   Patient Active Problem List    Diagnosis Date Noted    Anemia 04/12/2019    TIA (transient ischemic attack) 04/10/2019    CVA (cerebral vascular accident) ([facility] 03/13/2019    History of recurrent UTIs 12/06/2018    Age-related osteoporosis without current pathological fracture 12/06/2018    Urethral caruncle 04/04/2017    Insomnia, unspecified 08/24/2016    Sick sinus syndrome ([facility] 01/07/2016    [facility] p[facility]is ([facility] 11/13/2015    Coronary atherosclerosis of native coronary artery 11/13/2015    GERD (gastroesophageal reflux disease) 03/06/2014    Osteoarthritis of back 01/02/2014    Hypothyroidism 01/14/2013    Depression 01/14/2013    Hypercholesteremia 01/14/2013    Paroxysmal atrial fibrillation ([facility] 01/14/2013    Allergic rhinitis 01/14/2013       Cardiology Procedures this admission:  JANICE, Implantation of Watchman device,     Consults: None    Hospital Course: Patient was seen at the office of [facility] by Dr. Gaye Meckel in follow up for consideration for watchman device. The patient has known h/o PAF and not on anticoagulation secondary to previous GI bleeds.  The patient's [id number] score is  7 which indicates a 9.6% annual risk of stroke. .   Has-BLED score is 6 which indicates a 12.5% annual risk of a major bleeding event. The patient presented for procedure on 5/21/19. JANICE was performed directly prior to procedure that was negative for BHARATH thrombus. The patient underwent successful implantation of a 24 mm Watchman device. The patient was monitored closely in the telemetry unit.  An post JANICE showed no evidence of pericardial effusion. The morning of 5/22/19, the patient was up feeling well without any complaints of chest pain or shortness of breath. Patient's labs showed Hgb of 8.8 (admission Hgb was 10.1). Patient was seen and examined by Dr. Lashell Vergara and determined stable and ready for discharge. Patient was instructed on the importance of medication compliance. The patient will be discharged home on Eliquis and ASA 81mg. ASA 81mg will be continued indefinitely. The eliquis will be continued for 45 days until BHARATH seal is examined using JANICE. Once BHARATH is noted to be sealed, eliquis will be discontinued, and Plavix 75mg will be started and will be continued until 6 months post watchman implant. This medication plan is due to hemorrhagic complications in the past.  The patient will have repeat CBC in 1 week and will be placed on iron prior to d/c. The patient has some left knee pain the morning of 5/22/19 that resolved prior to d/c after walking in room. The patient will have repeat JANICE performed in 45 days with Dr. Alvin Larson on 6/27/19 at 36 am [facility]. The patient will follow up with [facility] cardiology Dr. Lashell Vergara 7/25/19 at 11 am in [place] office. DISPOSITION: The patient is being discharged home in stable condition on a low saturated fat, low cholesterol and low salt diet. The patient is instructed to advance activities as tolerated to the limit of fatigue or shortness of breath. The patient is instructed to avoid lifting anything heavier than 10 lbs for 1 weeks. The patient is instructed to avoid any straining, stooping or squatting for 2 weeks. The patient is instructed not to drive for 1 week. The patient is instructed to watch the groin site for bleeding/oozing; if seen, the patient is instructed to apply firm pressure with a clean cloth and call [facility] Cardiology at [phone].  The patient is instructed to watch for signs of infection which include: increasing area of redness, fever/hot to touch or purulent drainage at the groin site. The patient is instructed not to soak in a bathtub for 7-10 days, but is cleared to shower. The patient is instructed to return to the ER immediately for any severe pain, color change, or temperature change in leg. The patient is informed not to stop any medications without discussing with our office and to contact our office if any dental work or possible surgeries are expected    Discharge Exam:   Visit Vitals  /68   Pulse 64   Temp 97.6 °F (36.4 °C)   Resp 18   Ht 5' 3\" (1.6 m)   Wt 68.3 kg (150 lb 9.6 oz)   SpO2 93%   BMI 26.68 kg/m²     Patient has been seen by Dr. Katherine Fraga: see his progress note for exam details. Recent Results (from the past 24 hour(s))   METABOLIC PANEL, BASIC    Collection Time: 05/22/19  4:46 AM   Result Value Ref Range    Sodium 139 136 - 145 mmol/L    Potassium 4.4 3.5 - 5.1 mmol/L    Chloride 108 (H) 98 - 107 mmol/L    CO2 22 21 - 32 mmol/L    Anion gap 9 7 - 16 mmol/L    Glucose 123 (H) 65 - 100 mg/dL    BUN 19 8 - 23 MG/DL    Creatinine 0.82 0.6 - 1.0 MG/DL    GFR est AA >60 >60 ml/min/1.73m2    GFR est non-AA >60 >60 ml/min/1.73m2    Calcium 8.4 8.3 - 10.4 MG/DL   MAGNESIUM    Collection Time: 05/22/19  4:46 AM   Result Value Ref Range    Magnesium 2.5 (H) 1.8 - 2.4 mg/dL   CBC WITH AUTOMATED DIFF    Collection Time: 05/22/19  4:46 AM   Result Value Ref Range    WBC 9.8 4.3 - 11.1 K/uL    RBC 3.15 (L) 4.05 - 5.2 M/uL    HGB 8.8 (L) 11.7 - 15.4 g/dL    HCT 28.0 (L) 35.8 - 46.3 %    MCV 88.9 79.6 - 97.8 FL    MCH 27.9 26.1 - 32.9 PG    MCHC 31.4 31.4 - 35.0 g/dL    RDW 14.1 11.9 - 14.6 %    PLATELET 191 843 - 437 K/uL    MPV 11.5 9.4 - 12.3 FL    ABSOLUTE NRBC 0.00 0.0 - 0.2 K/uL    DF AUTOMATED      NEUTROPHILS 77 43 - 78 %    LYMPHOCYTES 14 13 - 44 %    MONOCYTES 9 4.0 - 12.0 %    EOSINOPHILS 0 (L) 0.5 - 7.8 %    BASOPHILS 0 0.0 - 2.0 %    IMMATURE GRANULOCYTES 1 0.0 - 5.0 %    ABS. NEUTROPHILS 7.5 1.7 - 8.2 K/UL    ABS. LYMPHOCYTES 1.3 0.5 - 4.6 K/UL    ABS. MONOCYTES 0.9 0.1 - 1.3 K/UL    ABS. EOSINOPHILS 0.0 0.0 - 0.8 K/UL    ABS. BASOPHILS 0.0 0.0 - 0.2 K/UL    ABS. IMM. GRANS. 0.1 0.0 - 0.5 K/UL         Patient Instructions:     Current Discharge Medication List      START taking these medications    Details   ferrous sulfate 325 mg (65 mg iron) tablet Take 1 Tab by mouth Daily (before breakfast). Qty: 30 Tab, Refills: 5         CONTINUE these medications which have CHANGED    Details   apixaban (ELIQUIS) 5 mg tablet Take 1 Tab by mouth every twelve (12) hours. Qty: 60 Tab, Refills: 5         CONTINUE these medications which have NOT CHANGED    Details   pantoprazole (PROTONIX) 20 mg tablet Take 40 mg by mouth daily. vit a,c & e-lutein-minerals (OCUVITE) tablet daily. lisinopril (PRINIVIL, ZESTRIL) 20 mg tablet Take 1 Tab by mouth daily. Qty: 90 Tab, Refills: 3      pravastatin (PRAVACHOL) 80 mg tablet Take 1 Tab by mouth nightly. Qty: 90 Tab, Refills: 3      vit A/vit C/vit E/zinc/copper (ICAPS AREDS PO) Take  by mouth two (2) times a day. cyanocobalamin 1,000 mcg tablet Take 1,000 mcg by mouth daily. Biotin 2,500 mcg cap Take  by mouth daily. multivitamin (ONE A DAY) tablet Take 1 Tab by mouth daily. escitalopram oxalate (LEXAPRO) 10 mg tablet Take 1 Tab by mouth daily. Qty: 90 Tab, Refills: 3    Associated Diagnoses: Anxiety      levothyroxine (SYNTHROID) 50 mcg tablet Take 1 Tab by mouth Daily (before breakfast). Qty: 90 Tab, Refills: 3      metoprolol succinate (TOPROL-XL) 25 mg XL tablet Take 0.5 Tabs by mouth daily. Qty: 30 Tab, Refills: 1      aspirin delayed-release 81 mg tablet Take 1 Tab by mouth daily. Qty: 30 Tab, Refills: 1      potassium 99 mg tablet Take 99 mg by mouth daily. Calcium Carbonate-Vit D3-Min (CALTRATE 600+D PLUS MINERALS) 600 mg calcium- 400 unit Tab Take  by mouth daily.       nitrofurantoin (MACRODANTIN) 25 mg capsule Take 25 mg by mouth daily. nitroglycerin (NITROSTAT) 0.3 mg SL tablet 1 Tab by SubLINGual route every five (5) minutes as needed for Chest Pain. Qty: 1 Bottle, Refills: 3      acetaminophen (TYLENOL ARTHRITIS PAIN) 650 mg CR tablet Take 650 mg by mouth every six (6) hours as needed for Pain.                Signed:  TETO ZabalaC  5/22/2019  8:13 AM

## 2019-05-22 NOTE — PROGRESS NOTES
Verbal bedside report given to Flo Rojas oncoming RN. Patient's situation, background, assessment and recommendations provided. Opportunity for questions provided. Oncoming RN assumed care of patient. R groin and L wrist site visualized.

## 2019-05-22 NOTE — PROGRESS NOTES
Patient ambulated in hallway, patient tolerated walk well. Patient slightly unsteady at times. Patient has walking equipment at home. Patient without complaints of pain in knee at this time. Maxine Britton notified.

## 2019-05-22 NOTE — PROGRESS NOTES
Care Management Interventions  PCP Verified by CM: Yes(last seen April 2019)  Palliative Care Criteria Met (RRAT>21 & CHF Dx)?: No(RRAT 22 Dx Atrial fib)  Mode of Transport at Discharge: Other (see comment)(family)  Transition of Care Consult (CM Consult): Discharge Planning  Discharge Durable Medical Equipment: No(cane)  Physical Therapy Consult: No  Occupational Therapy Consult: No  Speech Therapy Consult: No  Current Support Network: Lives with Spouse  Confirm Follow Up Transport: Family(spouse)  Plan discussed with Pt/Family/Caregiver: Yes  Freedom of Choice Offered: Yes  Discharge Location  Discharge Placement: Home  Patient ready for d/c today. Patient voices no concerns or needs for d/c. Patient to d/c home with .

## 2019-05-22 NOTE — PROGRESS NOTES
Verbal bedside report received from Abram RN and Cricket RN. Assumed care of patient.  R groin and L wrist visualized

## 2019-05-22 NOTE — PROGRESS NOTES
Verbal bedside report received from Abram RN and [facility], RN. Assumed care of patient.  R groin and L wrist visualized

## 2019-05-23 ENCOUNTER — PATIENT OUTREACH (OUTPATIENT)
Dept: CASE MANAGEMENT | Age: 84
End: 2019-05-23

## 2019-05-23 NOTE — PROGRESS NOTES
Transition of Care Discharge Follow-up Questionnaire   Date/Time of Call:   5/23/19    What was the patient hospitalized for? Watchman device implant   Does the patient understand his/her diagnosis and/or treatment and what happened during the hospitalization? States understanding   Did the patient receive discharge instructions? Yes   CM Assessed Risk for Readmission:       Patient stated Risk for Readmission:      Moderate related to co-morbidities    Not asked   Review any discharge instructions (see discharge instructions/AVS in [facility]). Ask patient if they understand these. Do they have any questions? Low sat fat, low chol diet  Advance activities as tolerated  Avoid lifting heavier than 10lbs x 1 week  Avoid straining, stooping, squatting 2 weeks  No bathtub 7-10 days, ok to shower   Were home services ordered (nursing, PT, OT, ST, etc.)? No   If so, has the first visit occurred? If not, why? (Assist with coordination of services if necessary.)   N/A   Was any DME ordered? None   If so, has it been received? If not, why?  (Assist patient in obtaining DME orders &/or equipment if necessary.) N/A   Complete a review of all medications (new, continued and discontinued meds per the D/C instructions and medication tab in [facility]). Start: Ferrous sulfate 325mg ac breakfast    Stop: none    Changed: Eliquis 5mg every 12 hours    Continue: all other home medications as ordered; including ASA       Were all new prescriptions filled? If not, why?  (Assist patient in obtaining medications if necessary  escalate for CCM &/or SW if ongoing issues are verbalized by pt or anticipated)   Yes, filled and taking as ordered. Did not know to take Eliquis every 12 hours   Does the patient understand the purpose and dosing instructions for all medications?   (If patient has questions, provide explanation and education.)   States understanding   Does the patient have any problems in performing ADLs? (If patient is unable to perform ADLs  what is the limiting factor(s)? Do they have a support system that can assist? If no support system is present, discuss possible assistance that they may be able to obtain. Escalate for CCM/SW if ongoing issues are verbalized by pt or anticipated)   Independent, but has  in home.  will drive to appointments. Does the patient have all follow-up appointments scheduled? 7 day f/up with PCP?   (f/up with PCP may be w/in 14 days if patient has a f/up with their specialist w/in 7 days)    7-14 day f/up with specialist?   (or per discharge instructions)    If f/up has not been made  what actions has the care coordinator made to accomplish this? Has transportation been arranged? PCP 5/30/19    Cardiology 6/27/19   Any other questions or concerns expressed by the patient? No concerns   Schedule next appointment with RADHA BENJAMIN Coordinator or refer to RN Case Manager/ per the workflow guidelines. When is care coordinators next follow-up call scheduled? If referred for CCM  what RN care manager was the referral assigned? Followed by RN [address].    SAM Call Completed By: Mar Palmer RN

## 2019-05-24 LAB
ABO + RH BLD: NORMAL
ANTIGENS PRESENT RBC DONR: NORMAL
BLD PROD TYP BPU: NORMAL
BLOOD GROUP ANTIBODIES SERPL: NORMAL
BLOOD GROUP ANTIBODIES SERPL: NORMAL
BPU ID: NORMAL
CROSSMATCH RESULT,%XM: NORMAL
SPECIMEN EXP DATE BLD: NORMAL
STATUS OF UNIT,%ST: NORMAL
UNIT DIVISION, %UDIV: 0

## 2019-05-28 ENCOUNTER — PATIENT OUTREACH (OUTPATIENT)
Dept: CASE MANAGEMENT | Age: 84
End: 2019-05-28

## 2019-05-29 ENCOUNTER — HOSPITAL ENCOUNTER (OUTPATIENT)
Dept: LAB | Age: 84
Discharge: HOME | End: 2019-05-29
Payer: MEDICARE

## 2019-05-29 LAB
ERYTHROCYTE [DISTWIDTH] IN BLOOD BY AUTOMATED COUNT: 14.8 % (ref 11.9–14.6)
HCT VFR BLD AUTO: 30.8 % (ref 35.8–46.3)
HGB BLD-MCNC: 9.9 G/DL (ref 11.7–15.4)
MCH RBC QN AUTO: 27.8 PG (ref 26.1–32.9)
MCHC RBC AUTO-ENTMCNC: 32.1 G/DL (ref 31.4–35)
MCV RBC AUTO: 86.5 FL (ref 79.6–97.8)
NRBC # BLD: 0 K/UL (ref 0–0.2)
PLATELET # BLD AUTO: 266 K/UL (ref 150–450)
PMV BLD AUTO: 11.4 FL (ref 9.4–12.3)
RBC # BLD AUTO: 3.56 M/UL (ref 4.05–5.2)
WBC # BLD AUTO: 7.1 K/UL (ref 4.3–11.1)

## 2019-05-29 PROCEDURE — 36415 COLL VENOUS BLD VENIPUNCTURE: CPT

## 2019-05-29 PROCEDURE — 85027 COMPLETE CBC AUTOMATED: CPT

## 2019-06-10 ENCOUNTER — PATIENT OUTREACH (OUTPATIENT)
Dept: CASE MANAGEMENT | Age: 84
End: 2019-06-10

## 2019-06-10 NOTE — PROGRESS NOTES
Follow up call with patient  She reports she still fatigues easily but overall has been steadily improving. [address] family support available to her as well as community support from [employer]. Episode resolved  Removed self from care team  Available to support should needs arise.

## 2019-06-19 ENCOUNTER — HOSPITAL ENCOUNTER (OUTPATIENT)
Dept: LAB | Age: 84
Discharge: HOME | End: 2019-06-19
Payer: MEDICARE

## 2019-06-19 DIAGNOSIS — I25.10 ATHEROSCLEROSIS OF NATIVE CORONARY ARTERY OF NATIVE HEART WITHOUT ANGINA PECTORIS: ICD-10-CM

## 2019-06-19 DIAGNOSIS — I48.0 PAROXYSMAL ATRIAL FIBRILLATION (HCC): ICD-10-CM

## 2019-06-19 LAB
ANION GAP SERPL CALC-SCNC: 9 MMOL/L (ref 7–16)
BASOPHILS # BLD: 0 K/UL (ref 0–0.2)
BASOPHILS NFR BLD: 0 % (ref 0–2)
BUN SERPL-MCNC: 16 MG/DL (ref 8–23)
CALCIUM SERPL-MCNC: 9.2 MG/DL (ref 8.3–10.4)
CHLORIDE SERPL-SCNC: 109 MMOL/L (ref 98–107)
CO2 SERPL-SCNC: 24 MMOL/L (ref 21–32)
CREAT SERPL-MCNC: 1 MG/DL (ref 0.6–1)
DIFFERENTIAL METHOD BLD: ABNORMAL
EOSINOPHIL # BLD: 0.1 K/UL (ref 0–0.8)
EOSINOPHIL NFR BLD: 2 % (ref 0.5–7.8)
ERYTHROCYTE [DISTWIDTH] IN BLOOD BY AUTOMATED COUNT: 16.7 % (ref 11.9–14.6)
GLUCOSE SERPL-MCNC: 157 MG/DL (ref 65–100)
HCT VFR BLD AUTO: 31 % (ref 35.8–46.3)
HGB BLD-MCNC: 9.6 G/DL (ref 11.7–15.4)
IMM GRANULOCYTES # BLD AUTO: 0 K/UL (ref 0–0.5)
IMM GRANULOCYTES NFR BLD AUTO: 0 % (ref 0–5)
LYMPHOCYTES # BLD: 1.4 K/UL (ref 0.5–4.6)
LYMPHOCYTES NFR BLD: 31 % (ref 13–44)
MCH RBC QN AUTO: 28.6 PG (ref 26.1–32.9)
MCHC RBC AUTO-ENTMCNC: 31 G/DL (ref 31.4–35)
MCV RBC AUTO: 92.3 FL (ref 79.6–97.8)
MONOCYTES # BLD: 0.5 K/UL (ref 0.1–1.3)
MONOCYTES NFR BLD: 11 % (ref 4–12)
NEUTS SEG # BLD: 2.6 K/UL (ref 1.7–8.2)
NEUTS SEG NFR BLD: 56 % (ref 43–78)
NRBC # BLD: 0 K/UL (ref 0–0.2)
PLATELET # BLD AUTO: 222 K/UL (ref 150–450)
PMV BLD AUTO: 10.6 FL (ref 9.4–12.3)
POTASSIUM SERPL-SCNC: 3.8 MMOL/L (ref 3.5–5.1)
RBC # BLD AUTO: 3.36 M/UL (ref 4.05–5.2)
SODIUM SERPL-SCNC: 142 MMOL/L (ref 136–145)
WBC # BLD AUTO: 4.6 K/UL (ref 4.3–11.1)

## 2019-06-19 PROCEDURE — 85025 COMPLETE CBC W/AUTO DIFF WBC: CPT

## 2019-06-19 PROCEDURE — 36415 COLL VENOUS BLD VENIPUNCTURE: CPT

## 2019-06-19 PROCEDURE — 80048 BASIC METABOLIC PNL TOTAL CA: CPT

## 2019-06-24 RX ORDER — BISMUTH SUBSALICYLATE 262 MG
1 TABLET,CHEWABLE ORAL DAILY
COMMUNITY
End: 2022-02-21

## 2019-06-24 NOTE — PROGRESS NOTES
Patient pre-assessment complete for JANICE scheduled for 19, arrival time 0730. Patient verified using . Patient instructed to bring all home medications in labeled bottles on the day of procedure. NPO status reinforced. Patient instructed to take protonix, metoprolol, and synthroid the morning of her procedure with a small sip of water. She will hold all other medications that morning. Patient verbalizes understanding of all instructions & denies any questions at this time.

## 2019-06-27 ENCOUNTER — HOSPITAL ENCOUNTER (OUTPATIENT)
Dept: CARDIAC CATH/INVASIVE PROCEDURES | Age: 84
Discharge: HOME | End: 2019-06-27
Attending: INTERNAL MEDICINE | Admitting: INTERNAL MEDICINE
Payer: MEDICARE

## 2019-06-27 VITALS
SYSTOLIC BLOOD PRESSURE: 140 MMHG | RESPIRATION RATE: 15 BRPM | BODY MASS INDEX: 26.22 KG/M2 | HEIGHT: 63 IN | WEIGHT: 148 LBS | DIASTOLIC BLOOD PRESSURE: 63 MMHG | HEART RATE: 55 BPM | OXYGEN SATURATION: 96 %

## 2019-06-27 LAB
ATRIAL RATE: 58 BPM
CALCULATED P AXIS, ECG09: 47 DEGREES
CALCULATED R AXIS, ECG10: 16 DEGREES
CALCULATED T AXIS, ECG11: 54 DEGREES
DIAGNOSIS, 93000: NORMAL
P-R INTERVAL, ECG05: 208 MS
Q-T INTERVAL, ECG07: 470 MS
QRS DURATION, ECG06: 80 MS
QTC CALCULATION (BEZET), ECG08: 461 MS
VENTRICULAR RATE, ECG03: 58 BPM

## 2019-06-27 PROCEDURE — 99152 MOD SED SAME PHYS/QHP 5/>YRS: CPT

## 2019-06-27 PROCEDURE — 93005 ELECTROCARDIOGRAM TRACING: CPT | Performed by: INTERNAL MEDICINE

## 2019-06-27 PROCEDURE — 74011250636 HC RX REV CODE- 250/636

## 2019-06-27 PROCEDURE — 93312 ECHO TRANSESOPHAGEAL: CPT

## 2019-06-27 RX ORDER — FENTANYL CITRATE 50 UG/ML
25-100 INJECTION, SOLUTION INTRAMUSCULAR; INTRAVENOUS
Status: DISCONTINUED | OUTPATIENT
Start: 2019-06-27 | End: 2019-06-27 | Stop reason: HOSPADM

## 2019-06-27 RX ORDER — CLOPIDOGREL BISULFATE 75 MG/1
75 TABLET ORAL DAILY
Qty: 30 TAB | Refills: 6 | Status: SHIPPED | OUTPATIENT
Start: 2019-06-27 | End: 2019-07-23 | Stop reason: SDUPTHER

## 2019-06-27 RX ORDER — MIDAZOLAM HYDROCHLORIDE 1 MG/ML
.5-5 INJECTION, SOLUTION INTRAMUSCULAR; INTRAVENOUS
Status: DISCONTINUED | OUTPATIENT
Start: 2019-06-27 | End: 2019-06-27 | Stop reason: HOSPADM

## 2019-06-27 RX ORDER — SODIUM CHLORIDE 9 MG/ML
75 INJECTION, SOLUTION INTRAVENOUS CONTINUOUS
Status: DISCONTINUED | OUTPATIENT
Start: 2019-06-27 | End: 2019-06-27 | Stop reason: HOSPADM

## 2019-06-27 RX ADMIN — FENTANYL CITRATE 25 MCG: 50 INJECTION, SOLUTION INTRAMUSCULAR; INTRAVENOUS at 08:09

## 2019-06-27 RX ADMIN — MIDAZOLAM HYDROCHLORIDE 2 MG: 1 INJECTION, SOLUTION INTRAMUSCULAR; INTRAVENOUS at 08:15

## 2019-06-27 RX ADMIN — MIDAZOLAM HYDROCHLORIDE 2 MG: 1 INJECTION, SOLUTION INTRAMUSCULAR; INTRAVENOUS at 08:09

## 2019-06-27 NOTE — PROGRESS NOTES
39 Day watchman follow up. Patient tolerated JANICE well. Patient can d/c eliquis at this time, per Dr. Sonya Manley. She will start Plavix 75mg and continue asa 81mg. She will continue plavix until it has been 6 months post implant.

## 2019-06-27 NOTE — H&P
[address] Cardiology History & Physical      Date of  Admission: 6/27/2019  7:03 AM     CC: Post watchman JANICE    HPI:  Grey Shahid is a 80 y.o. female     Patient is a 80-year-old female with known paroxysmal atrial fibrillation who is not on anticoagulation secondary to previous GI bleeds. She was admitted on March 13, 2019 with right-sided weakness and aphasia. She received TPA with complete resolution of her neurologic defects. At this point she was restarted on Xarelto. Concerns of recurrent bleeding have led to evaluation for left atrial appendage closure.     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    CAD (coronary artery disease), native coronary artery May 2014    stent to LAD; 3 stents total    Depression 1/14/2013    GERD (gastroesophageal reflux disease)     Hypercholesteremia 1/14/2013    Hypertension     Hypothyroidism 1/14/2013    Osteoarthritis of back 1/2/2014    Osteoporosis     Pulmonary embolism ([address] 2009    after knee surgery    Sick sinus syndrome ([address] 1/7/2016    Tachycardia-Bradycardia    Stroke [facility])       Past Surgical History:   Procedure Laterality Date    COLONOSCOPY N/A 7/16/2018    COLONOSCOPY performed by Vinayak Morse MD at [address] [place] [address] HX BREAST BIOPSY Bilateral     HX CATARACT REMOVAL Right 2010    HX CORONARY STENT PLACEMENT  05/2014    LAD X 3    HX ENDOSCOPY  04/12/2019    gastritis with superficial ulcerations    HX HEART CATHETERIZATION  09/17/2015    HX HEENT      multiple eye surgeries - left - macular hoe, retinal detachment twice,     HX HYSTERECTOMY  1970    HX KNEE REPLACEMENT  2009    HX KNEE REPLACEMENT Left 01/12/2017    HX ORTHOPAEDIC  2006 and 2008    herniated disc    HX TONSIL AND ADENOIDECTOMY  1934       Allergies   Allergen Reactions    Adhesive Rash      Social History     Socioeconomic History    Marital status:      Spouse name: Not on file    Number of children: Not on file    Years of education: Not on file    Highest education level: Not on file   Occupational History    Not on file   Social Needs    Financial resource strain: Not on file    Food insecurity:     Worry: Not on file     Inability: Not on file    Transportation needs:     Medical: Not on file     Non-medical: Not on file   Tobacco Use    Smoking status: Never Smoker    Smokeless tobacco: Never Used   Substance and Sexual Activity    Alcohol use: No     Alcohol/week: 0.0 oz    Drug use: No    Sexual activity: Not on file   Lifestyle    Physical activity:     Days per week: Not on file     Minutes per session: Not on file    Stress: Not on file   Relationships    Social connections:     Talks on phone: Not on file     Gets together: Not on file     Attends [employer] service: Not on file     Active member of club or organization: Not on file     Attends meetings of clubs or organizations: Not on file     Relationship status: Not on file    Intimate partner violence:     Fear of current or ex partner: Not on file     Emotionally abused: Not on file     Physically abused: Not on file     Forced sexual activity: Not on file   Other Topics Concern    Not on file   Social History Narrative    No family/social/cultural issues pertinent to care     Family History   Problem Relation Age of Onset    Cancer Mother     Breast Cancer Mother     Cancer Father     Cancer Sister     Cancer Brother         pancreas    Breast Cancer Maternal Aunt         Current Facility-Administered Medications   Medication Dose Route Frequency    0.9% sodium chloride infusion  75 mL/hr IntraVENous CONTINUOUS       Review of Systems    ROS    As per HPI; all other systems reviewed and are negative     Subjective:     Visit Vitals  /70   Pulse (!) 57   Resp 15   Ht 5' 3\" (1.6 m)   Wt 67.1 kg (148 lb)   SpO2 98%   BMI 26.22 kg/m²       No intake/output data recorded. No intake/output data recorded. Physical Exam   Constitutional: She is oriented to person, place, and time and well-developed, well-nourished, and in no distress. HENT:   Head: Atraumatic. Eyes: Pupils are equal, round, and reactive to light. Conjunctivae are normal.   Neck: No JVD present. No thyromegaly present. Cardiovascular: Normal rate and regular rhythm. No murmur heard. Pulmonary/Chest: Effort normal and breath sounds normal.   Abdominal: Soft. Bowel sounds are normal. She exhibits no distension. There is no tenderness. Musculoskeletal: She exhibits no edema. Neurological: She is alert and oriented to person, place, and time. Skin: Skin is warm. No rash noted. Psychiatric: Mood normal.          Labs:   Recent Results (from the past 24 hour(s))   EKG, 12 LEAD, INITIAL    Collection Time: 06/27/19  7:27 AM   Result Value Ref Range    Ventricular Rate 58 BPM    Atrial Rate 58 BPM    P-R Interval 208 ms    QRS Duration 80 ms    Q-T Interval 470 ms    QTC Calculation (Bezet) 461 ms    Calculated P Axis 47 degrees    Calculated R Axis 16 degrees    Calculated T Axis 54 degrees    Diagnosis       Sinus bradycardia  Nonspecific ST abnormality  Abnormal ECG  When compared with ECG of 21-MAY-2019 06:50,  No significant change was found  Confirmed by Wan Calderón MD (), NELLY FORRESTER ([id number]) on 6/27/2019 7:37:36 AM          Assessment/Plan:     1. PAF  2. Prior history of GI bleeds  3. CVA    Plan for JANICE; if no significant peridevice flow noted, will stop anticoagulation and plan aspirin/plavix for 6 months followed by aspirin indefinitely.        Lurdes Rodriguez MD  6/27/2019 8:10 AM

## 2019-06-27 NOTE — PROGRESS NOTES
Patient received to [address] [place] [address] room # 8  Ambulatory from [facility]. Patient scheduled for JANICE today with Dr Anya Venegas. Procedure reviewed & questions answered, voiced good understanding consent obtained & placed on chart. All medications and medical history reviewed. Will prep patient per orders. Patient & family updated on plan of care. The patient has a fraility score of 3-MANAGING WELL, based on patient A&Ox3, patient able to ambulate to room without difficulty.

## 2019-06-27 NOTE — PROGRESS NOTES
JANICE Dr Juan Chandler  ASA II Mallampati II  Versed 4mg  Fentanyl 25mcg  Post watchman  Pt tolerated procedure well

## 2019-07-13 ENCOUNTER — HOSPITAL ENCOUNTER (OUTPATIENT)
Dept: MAMMOGRAPHY | Age: 84
Discharge: HOME | End: 2019-07-13
Attending: FAMILY MEDICINE
Payer: MEDICARE

## 2019-07-13 DIAGNOSIS — Z12.31 VISIT FOR SCREENING MAMMOGRAM: ICD-10-CM

## 2019-07-13 PROCEDURE — 77067 SCR MAMMO BI INCL CAD: CPT

## 2019-07-15 NOTE — PROGRESS NOTES
Mammogram negative for cancer  Dr. Chayito Orona  Results released to [facility] with comments

## 2019-08-08 ENCOUNTER — PATIENT OUTREACH (OUTPATIENT)
Dept: CASE MANAGEMENT | Age: 84
End: 2019-08-08

## 2019-08-08 NOTE — PROGRESS NOTES
Patient contacted this  about getting some physical therapy. She feels this will help her balance as well as ongoing fatigue and weakness. Contacted about availability - (which there is)  Perry Suresh PT   [address], [facility] [address]  P: [phone]  F: [phone]     Subsequent order requested from PCP. Plan - follow up tomorrow to check chart for order  Confirmation email to patient.

## 2019-08-09 ENCOUNTER — PATIENT OUTREACH (OUTPATIENT)
Dept: CASE MANAGEMENT | Age: 84
End: 2019-08-09

## 2019-08-09 NOTE — PROGRESS NOTES
Order placed for PT  Email sent to patient and to PT @ [facility] to be on look out for order.     Check in with patient in 1 week

## 2019-08-26 ENCOUNTER — PATIENT OUTREACH (OUTPATIENT)
Dept: CASE MANAGEMENT | Age: 84
End: 2019-08-26

## 2019-08-26 NOTE — PROGRESS NOTES
Patient confirms via email that physical therapy is in place - she appreciates being able to go and wants to continue as long as possible. Also c/o weakness/wooziness - will make PCP aware. No more outreach planned at this time. Patient aware of how to reach me should she have ongoing needs.

## 2019-09-03 PROBLEM — Z86.73 HISTORY OF CVA (CEREBROVASCULAR ACCIDENT): Status: ACTIVE | Noted: 2019-09-03

## 2019-09-03 PROBLEM — R42 DIZZINESS: Status: ACTIVE | Noted: 2019-09-03

## 2019-09-11 ENCOUNTER — HOSPITAL ENCOUNTER (OUTPATIENT)
Dept: LAB | Age: 84
Discharge: HOME | End: 2019-09-11
Attending: INTERNAL MEDICINE
Payer: MEDICARE

## 2019-09-11 DIAGNOSIS — D50.9 IRON DEFICIENCY ANEMIA, UNSPECIFIED IRON DEFICIENCY ANEMIA TYPE: ICD-10-CM

## 2019-09-11 DIAGNOSIS — R53.1 WEAKNESS: ICD-10-CM

## 2019-09-11 LAB
BASOPHILS # BLD: 0 K/UL (ref 0–0.2)
BASOPHILS NFR BLD: 1 % (ref 0–2)
DIFFERENTIAL METHOD BLD: ABNORMAL
EOSINOPHIL # BLD: 0.1 K/UL (ref 0–0.8)
EOSINOPHIL NFR BLD: 2 % (ref 0.5–7.8)
ERYTHROCYTE [DISTWIDTH] IN BLOOD BY AUTOMATED COUNT: 15 % (ref 11.9–14.6)
HCT VFR BLD AUTO: 35 % (ref 35.8–46.3)
HGB BLD-MCNC: 11.2 G/DL (ref 11.7–15.4)
IMM GRANULOCYTES # BLD AUTO: 0 K/UL (ref 0–0.5)
IMM GRANULOCYTES NFR BLD AUTO: 0 % (ref 0–5)
LYMPHOCYTES # BLD: 1.8 K/UL (ref 0.5–4.6)
LYMPHOCYTES NFR BLD: 34 % (ref 13–44)
MCH RBC QN AUTO: 27.3 PG (ref 26.1–32.9)
MCHC RBC AUTO-ENTMCNC: 32 G/DL (ref 31.4–35)
MCV RBC AUTO: 85.2 FL (ref 79.6–97.8)
MONOCYTES # BLD: 0.7 K/UL (ref 0.1–1.3)
MONOCYTES NFR BLD: 13 % (ref 4–12)
NEUTS SEG # BLD: 2.7 K/UL (ref 1.7–8.2)
NEUTS SEG NFR BLD: 51 % (ref 43–78)
NRBC # BLD: 0 K/UL (ref 0–0.2)
PLATELET # BLD AUTO: 204 K/UL (ref 150–450)
PMV BLD AUTO: 9.9 FL (ref 9.4–12.3)
RBC # BLD AUTO: 4.11 M/UL (ref 4.05–5.2)
WBC # BLD AUTO: 5.3 K/UL (ref 4.3–11.1)

## 2019-09-11 PROCEDURE — 85025 COMPLETE CBC W/AUTO DIFF WBC: CPT

## 2019-09-11 PROCEDURE — 36415 COLL VENOUS BLD VENIPUNCTURE: CPT

## 2019-10-06 ENCOUNTER — APPOINTMENT (OUTPATIENT)
Dept: GENERAL RADIOLOGY | Age: 84
End: 2019-10-06
Attending: EMERGENCY MEDICINE
Payer: MEDICARE

## 2019-10-06 ENCOUNTER — HOSPITAL ENCOUNTER (EMERGENCY)
Age: 84
Discharge: HOME | End: 2019-10-06
Attending: EMERGENCY MEDICINE
Payer: MEDICARE

## 2019-10-06 VITALS
DIASTOLIC BLOOD PRESSURE: 71 MMHG | BODY MASS INDEX: 26.75 KG/M2 | RESPIRATION RATE: 20 BRPM | TEMPERATURE: 98.4 F | SYSTOLIC BLOOD PRESSURE: 156 MMHG | HEART RATE: 65 BPM | HEIGHT: 63 IN | WEIGHT: 151 LBS | OXYGEN SATURATION: 98 %

## 2019-10-06 DIAGNOSIS — R07.9 CHEST PAIN, UNSPECIFIED TYPE: Primary | ICD-10-CM

## 2019-10-06 LAB
ALBUMIN SERPL-MCNC: 3.2 G/DL (ref 3.2–4.6)
ALBUMIN/GLOB SERPL: 0.7 {RATIO} (ref 1.2–3.5)
ALP SERPL-CCNC: 78 U/L (ref 50–136)
ALT SERPL-CCNC: 30 U/L (ref 12–65)
ANION GAP SERPL CALC-SCNC: 10 MMOL/L (ref 7–16)
APTT PPP: 27.2 SEC (ref 24.7–39.8)
AST SERPL-CCNC: 58 U/L (ref 15–37)
BASOPHILS # BLD: 0 K/UL (ref 0–0.2)
BASOPHILS NFR BLD: 1 % (ref 0–2)
BILIRUB SERPL-MCNC: 0.6 MG/DL (ref 0.2–1.1)
BUN SERPL-MCNC: 19 MG/DL (ref 8–23)
CALCIUM SERPL-MCNC: 9.6 MG/DL (ref 8.3–10.4)
CHLORIDE SERPL-SCNC: 109 MMOL/L (ref 98–107)
CO2 SERPL-SCNC: 21 MMOL/L (ref 21–32)
CREAT SERPL-MCNC: 0.88 MG/DL (ref 0.6–1)
DIFFERENTIAL METHOD BLD: ABNORMAL
EOSINOPHIL # BLD: 0.1 K/UL (ref 0–0.8)
EOSINOPHIL NFR BLD: 2 % (ref 0.5–7.8)
ERYTHROCYTE [DISTWIDTH] IN BLOOD BY AUTOMATED COUNT: 15.9 % (ref 11.9–14.6)
GLOBULIN SER CALC-MCNC: 4.4 G/DL (ref 2.3–3.5)
GLUCOSE SERPL-MCNC: 89 MG/DL (ref 65–100)
HCT VFR BLD AUTO: 35.5 % (ref 35.8–46.3)
HGB BLD-MCNC: 11.4 G/DL (ref 11.7–15.4)
IMM GRANULOCYTES # BLD AUTO: 0 K/UL (ref 0–0.5)
IMM GRANULOCYTES NFR BLD AUTO: 1 % (ref 0–5)
INR PPP: 1
LYMPHOCYTES # BLD: 2 K/UL (ref 0.5–4.6)
LYMPHOCYTES NFR BLD: 32 % (ref 13–44)
MCH RBC QN AUTO: 27.2 PG (ref 26.1–32.9)
MCHC RBC AUTO-ENTMCNC: 32.1 G/DL (ref 31.4–35)
MCV RBC AUTO: 84.7 FL (ref 79.6–97.8)
MONOCYTES # BLD: 0.8 K/UL (ref 0.1–1.3)
MONOCYTES NFR BLD: 13 % (ref 4–12)
NEUTS SEG # BLD: 3.3 K/UL (ref 1.7–8.2)
NEUTS SEG NFR BLD: 53 % (ref 43–78)
NRBC # BLD: 0 K/UL (ref 0–0.2)
PLATELET # BLD AUTO: 276 K/UL (ref 150–450)
PMV BLD AUTO: 11.9 FL (ref 9.4–12.3)
POTASSIUM SERPL-SCNC: 4.9 MMOL/L (ref 3.5–5.1)
PROT SERPL-MCNC: 7.6 G/DL (ref 6.3–8.2)
PROTHROMBIN TIME: 13.3 SEC (ref 11.7–14.5)
RBC # BLD AUTO: 4.19 M/UL (ref 4.05–5.2)
SODIUM SERPL-SCNC: 140 MMOL/L (ref 136–145)
TROPONIN I SERPL-MCNC: <0.02 NG/ML (ref 0.02–0.05)
TROPONIN I SERPL-MCNC: <0.02 NG/ML (ref 0.02–0.05)
WBC # BLD AUTO: 6.2 K/UL (ref 4.3–11.1)

## 2019-10-06 PROCEDURE — 93005 ELECTROCARDIOGRAM TRACING: CPT | Performed by: EMERGENCY MEDICINE

## 2019-10-06 PROCEDURE — 85610 PROTHROMBIN TIME: CPT

## 2019-10-06 PROCEDURE — 80053 COMPREHEN METABOLIC PANEL: CPT

## 2019-10-06 PROCEDURE — 74011250637 HC RX REV CODE- 250/637: Performed by: EMERGENCY MEDICINE

## 2019-10-06 PROCEDURE — 71045 X-RAY EXAM CHEST 1 VIEW: CPT

## 2019-10-06 PROCEDURE — 99285 EMERGENCY DEPT VISIT HI MDM: CPT | Performed by: EMERGENCY MEDICINE

## 2019-10-06 PROCEDURE — 84484 ASSAY OF TROPONIN QUANT: CPT

## 2019-10-06 PROCEDURE — 85730 THROMBOPLASTIN TIME PARTIAL: CPT

## 2019-10-06 PROCEDURE — 85025 COMPLETE CBC W/AUTO DIFF WBC: CPT

## 2019-10-06 RX ADMIN — NITROGLYCERIN 1 INCH: 20 OINTMENT TOPICAL at 17:47

## 2019-10-06 NOTE — ED PROVIDER NOTES
51-year-old female presenting for left-sided chest pain. Reports it started about 230 while she was watching the football game. Started is left-sided and began radiating down the left arm and into the left ear. Continued to intensify over about 2-1/2 hours until she finally told family and they called EMS.  recommended that she take aspirin so she was given a full dose aspirin by her . She continued having the pain until EMS arrived who gave her nitroglycerin and it resolved. Complaining of some left-sided discomfort now but nothing compared to what it was. She is never really had this before. The history is provided by the patient. Chest Pain (Angina)    This is a new problem. The current episode started 3 to 5 hours ago. The problem has been gradually improving. Duration of episode(s) is 3 hours. The problem occurs rarely. The pain is associated with normal activity. The pain is present in the left side. The pain is at a severity of 8/10. The pain is severe. The quality of the pain is described as pressure-like. The pain radiates to the left jaw, left neck, left shoulder and left arm. Associated symptoms include back pain. Pertinent negatives include no abdominal pain, no claudication, no cough, no diaphoresis, no dizziness, no exertional chest pressure, no fever, no headaches, no hemoptysis, no irregular heartbeat, no leg pain, no lower extremity edema, no malaise/fatigue, no nausea, no near-syncope, no numbness, no orthopnea, no palpitations, no PND, no shortness of breath, no sputum production, no vomiting and no weakness. She has tried aspirin and nitroglycerin for the symptoms. The treatment provided significant relief. Risk factors include hypertension, postmenopause and cardiac disease. Procedural history includes cardiac catheterization and cardiac stents.        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    CAD (coronary artery disease), native coronary artery May 2014    stent to LAD; 3 stents total    Depression 1/14/2013    GERD (gastroesophageal reflux disease)     Hypercholesteremia 1/14/2013    Hypertension     Hypothyroidism 1/14/2013    Osteoarthritis of back 1/2/2014    Osteoporosis     Pulmonary embolism ([facility] 2009    after knee surgery    Sick sinus syndrome ([facility] 1/7/2016    Tachycardia-Bradycardia    Stroke [facility])        Past Surgical History:   Procedure Laterality Date    COLONOSCOPY N/A 7/16/2018    COLONOSCOPY performed by Tyrone Watkins MD at [address]  HX BREAST BIOPSY Bilateral     HX CATARACT REMOVAL Right 2010    HX CORONARY STENT PLACEMENT  05/2014    LAD X 3    HX ENDOSCOPY  04/12/2019    gastritis with superficial ulcerations    HX HEART CATHETERIZATION  09/17/2015    HX HEENT      multiple eye surgeries - left - macular hoe, retinal detachment twice,     HX HYSTERECTOMY  1970    HX KNEE REPLACEMENT  2009    HX KNEE REPLACEMENT Left 01/12/2017    HX ORTHOPAEDIC  2006 and 2008    herniated disc    HX TONSIL AND ADENOIDECTOMY  1934         Family History:   Problem Relation Age of Onset    Cancer Mother     Breast Cancer Mother     Cancer Father     Cancer Sister     Cancer Brother         pancreas    Breast Cancer Maternal Aunt        Social History     Socioeconomic History    Marital status:      Spouse name: Not on file    Number of children: Not on file    Years of education: Not on file    Highest education level: Not on file   Occupational History    Not on file   Social Needs    Financial resource strain: Not on file    Food insecurity:     Worry: Not on file     Inability: Not on file    Transportation needs:     Medical: Not on file     Non-medical: Not on file   Tobacco Use    Smoking status: Never Smoker    Smokeless tobacco: Never Used   Substance and Sexual Activity    Alcohol use: No     Alcohol/week: 0.0 standard drinks    Drug use: No    Sexual activity: Not on file   Lifestyle    Physical activity:     Days per week: Not on file     Minutes per session: Not on file    Stress: Not on file   Relationships    Social connections:     Talks on phone: Not on file     Gets together: Not on file     Attends [employer] service: Not on file     Active member of club or organization: Not on file     Attends meetings of clubs or organizations: Not on file     Relationship status: Not on file    Intimate partner violence:     Fear of current or ex partner: Not on file     Emotionally abused: Not on file     Physically abused: Not on file     Forced sexual activity: Not on file   Other Topics Concern    Not on file   Social History Narrative    No family/social/cultural issues pertinent to care         ALLERGIES: Adhesive    Review of Systems   Constitutional: Negative for diaphoresis, fever and malaise/fatigue. Respiratory: Negative for cough, hemoptysis, sputum production and shortness of breath. Cardiovascular: Positive for chest pain. Negative for palpitations, orthopnea, claudication, PND and near-syncope. Gastrointestinal: Negative for abdominal pain, nausea and vomiting. Musculoskeletal: Positive for back pain. Neurological: Negative for dizziness, weakness, numbness and headaches. All other systems reviewed and are negative. Vitals:    10/06/19 1657 10/06/19 1658 10/06/19 1659 10/06/19 1723   BP:   162/74    Pulse:       Resp:       Temp:       SpO2: 99% 99% 99% 99%   Weight:       Height:                Physical Exam   Constitutional: She is oriented to person, place, and time. She appears well-developed and well-nourished. HENT:   Head: Normocephalic and atraumatic. Eyes: Pupils are equal, round, and reactive to light.  Conjunctivae are normal.   Neck: Neck supple. Cardiovascular: Normal rate, regular rhythm, intact distal pulses and normal pulses. Pulmonary/Chest: Effort normal and breath sounds normal.   Abdominal: Soft. Bowel sounds are normal.   Musculoskeletal: Normal range of motion. Neurological: She is alert and oriented to person, place, and time. Skin: Skin is warm and dry. Nursing note and vitals reviewed. MDM  Number of Diagnoses or Management Options  Chest pain, unspecified type:   Diagnosis management comments: 5year-old female presenting for left-sided chest pain that radiates to her neck and her arm that resolved with nitroglycerin and aspirin. Concern for ACS, chest wall pain, pneumothorax, pneumonia, anxiety, esophageal spasm referred thoracic back pain    KG was performed upon arrival shows a normal sinus rhythm rate 70, left axis deviation, CT is 190, QRS is 84, QTc is 471 with no acute ischemic change       Amount and/or Complexity of Data Reviewed  Clinical lab tests: ordered and reviewed (.thi)  Tests in the radiology section of CPT®: ordered and reviewed (Xr Chest Port    Result Date: 10/6/2019  Chest X-ray INDICATION: Chest pain A portable AP view of the chest was obtained. FINDINGS: The lungs are clear. There are no infiltrates or effusions. The heart size is normal.  The bony thorax is intact. IMPRESSION: No acute findings in the chest     )  Independent visualization of images, tracings, or specimens: yes    Risk of Complications, Morbidity, and/or Mortality  Presenting problems: moderate  Diagnostic procedures: moderate  Management options: moderate  General comments: Patient has remained hemodynamically stable. No recurrence of chest pain. Troponins have been negative. Chest x-ray is clear. Left the patient's follow-up information with the [facility] Cardiology referral line who should reach out to her in the next few days. Wants to go home.     I personally reviewed the patient's vital signs, laboratory tests, and/or radiological findings. I discussed these findings with the patient and their significance. I answered all questions and gave the patient clear return precautions. The patient was discharged from the emergency department in stable condition        Patient Progress  Patient progress: improved    ED Course as of Oct 06 2114   Dayday Abdalla Oct 06, 2019   1959 Form the patient our findings and that we will be repeating her troponin shortly. She expressed wanting to go home thereafter if her heart enzyme is negative and that she can follow-up with her heart doctors as an outpatient.     [JS]      ED Course User Index  [JS] Zia Gallagher MD       Procedures

## 2019-10-06 NOTE — ED TRIAGE NOTES
Patient arrives via EMS from home. States CP beginning at 1400 today. Radiating to left arm and neck. Took 324 asa prior to arrival. EMS gave 3 nitro. Hx 3 stents. Patient states pain free post-nitro. Patient is alert and oriented. Family at bedside.

## 2019-10-07 LAB
ATRIAL RATE: 69 BPM
CALCULATED P AXIS, ECG09: 70 DEGREES
CALCULATED R AXIS, ECG10: -25 DEGREES
CALCULATED T AXIS, ECG11: 47 DEGREES
DIAGNOSIS, 93000: NORMAL
P-R INTERVAL, ECG05: 190 MS
Q-T INTERVAL, ECG07: 440 MS
QRS DURATION, ECG06: 84 MS
QTC CALCULATION (BEZET), ECG08: 471 MS
VENTRICULAR RATE, ECG03: 69 BPM

## 2019-10-07 NOTE — ED NOTES
I have reviewed discharge instructions with the patient. The patient verbalized understanding. Patient left ED via Discharge Method: ambulatory to Home with family. Opportunity for questions and clarification provided. Patient given 0 scripts. Pt in no acute distress at time of d/c        To continue your aftercare when you leave the hospital, you may receive an automated call from our care team to check in on how you are doing. This is a free service and part of our promise to provide the best care and service to meet your aftercare needs.  If you have questions, or wish to unsubscribe from this service please call [phone]. Thank you for Choosing our [facility] Emergency Department.

## 2019-10-07 NOTE — DISCHARGE INSTRUCTIONS
Evidence of heart attack. Her troponin is negative by 2. Continue taking her medications as directed. Call [facility] Cardiology in the next day or 2 if you have not heard from them.   Turn to the emergency department if anything changes or worsens

## 2019-10-21 ENCOUNTER — HOSPITAL ENCOUNTER (OUTPATIENT)
Dept: CT IMAGING | Age: 84
Discharge: HOME | End: 2019-10-21
Payer: MEDICARE

## 2019-10-21 DIAGNOSIS — R10.30 LOWER ABDOMINAL PAIN: ICD-10-CM

## 2019-10-21 DIAGNOSIS — R07.89 NON-CARDIAC CHEST PAIN: ICD-10-CM

## 2019-10-21 DIAGNOSIS — K59.09 OTHER CONSTIPATION: ICD-10-CM

## 2019-10-21 LAB — CREAT BLD-MCNC: 0.8 MG/DL (ref 0.8–1.5)

## 2019-10-21 PROCEDURE — 82565 ASSAY OF CREATININE: CPT

## 2019-10-21 PROCEDURE — 74011636320 HC RX REV CODE- 636/320: Performed by: PHYSICIAN ASSISTANT

## 2019-10-21 PROCEDURE — 74011000258 HC RX REV CODE- 258: Performed by: PHYSICIAN ASSISTANT

## 2019-10-21 PROCEDURE — 74177 CT ABD & PELVIS W/CONTRAST: CPT

## 2019-10-21 RX ORDER — SODIUM CHLORIDE 0.9 % (FLUSH) 0.9 %
10 SYRINGE (ML) INJECTION
Status: COMPLETED | OUTPATIENT
Start: 2019-10-21 | End: 2019-10-21

## 2019-10-21 RX ADMIN — DIATRIZOATE MEGLUMINE AND DIATRIZOATE SODIUM 15 ML: 660; 100 LIQUID ORAL; RECTAL at 14:17

## 2019-10-21 RX ADMIN — Medication 10 ML: at 14:17

## 2019-10-21 RX ADMIN — SODIUM CHLORIDE 100 ML: 900 INJECTION, SOLUTION INTRAVENOUS at 14:17

## 2019-10-21 RX ADMIN — IOPAMIDOL 100 ML: 755 INJECTION, SOLUTION INTRAVENOUS at 14:17

## 2019-11-12 ENCOUNTER — ANESTHESIA EVENT (OUTPATIENT)
Dept: ENDOSCOPY | Age: 84
End: 2019-11-12
Payer: MEDICARE

## 2019-11-13 ENCOUNTER — HOSPITAL ENCOUNTER (OUTPATIENT)
Age: 84
Setting detail: OUTPATIENT SURGERY
Discharge: HOME | End: 2019-11-13
Attending: INTERNAL MEDICINE | Admitting: INTERNAL MEDICINE
Payer: MEDICARE

## 2019-11-13 ENCOUNTER — ANESTHESIA (OUTPATIENT)
Dept: ENDOSCOPY | Age: 84
End: 2019-11-13
Payer: MEDICARE

## 2019-11-13 VITALS
SYSTOLIC BLOOD PRESSURE: 186 MMHG | OXYGEN SATURATION: 96 % | HEIGHT: 63 IN | BODY MASS INDEX: 26.58 KG/M2 | TEMPERATURE: 97 F | DIASTOLIC BLOOD PRESSURE: 79 MMHG | RESPIRATION RATE: 14 BRPM | HEART RATE: 53 BPM | WEIGHT: 150 LBS

## 2019-11-13 PROCEDURE — 74011000250 HC RX REV CODE- 250: Performed by: NURSE ANESTHETIST, CERTIFIED REGISTERED

## 2019-11-13 PROCEDURE — 74011250636 HC RX REV CODE- 250/636: Performed by: NURSE ANESTHETIST, CERTIFIED REGISTERED

## 2019-11-13 PROCEDURE — 74011250636 HC RX REV CODE- 250/636: Performed by: ANESTHESIOLOGY

## 2019-11-13 PROCEDURE — 76060000032 HC ANESTHESIA 0.5 TO 1 HR: Performed by: INTERNAL MEDICINE

## 2019-11-13 PROCEDURE — 76040000026: Performed by: INTERNAL MEDICINE

## 2019-11-13 RX ORDER — PROPOFOL 10 MG/ML
INJECTION, EMULSION INTRAVENOUS AS NEEDED
Status: DISCONTINUED | OUTPATIENT
Start: 2019-11-13 | End: 2019-11-13 | Stop reason: HOSPADM

## 2019-11-13 RX ORDER — SODIUM CHLORIDE, SODIUM LACTATE, POTASSIUM CHLORIDE, CALCIUM CHLORIDE 600; 310; 30; 20 MG/100ML; MG/100ML; MG/100ML; MG/100ML
100 INJECTION, SOLUTION INTRAVENOUS CONTINUOUS
Status: DISCONTINUED | OUTPATIENT
Start: 2019-11-13 | End: 2019-11-13 | Stop reason: HOSPADM

## 2019-11-13 RX ORDER — LIDOCAINE HYDROCHLORIDE 20 MG/ML
INJECTION, SOLUTION EPIDURAL; INFILTRATION; INTRACAUDAL; PERINEURAL AS NEEDED
Status: DISCONTINUED | OUTPATIENT
Start: 2019-11-13 | End: 2019-11-13 | Stop reason: HOSPADM

## 2019-11-13 RX ORDER — SODIUM CHLORIDE, SODIUM LACTATE, POTASSIUM CHLORIDE, CALCIUM CHLORIDE 600; 310; 30; 20 MG/100ML; MG/100ML; MG/100ML; MG/100ML
25 INJECTION, SOLUTION INTRAVENOUS CONTINUOUS
Status: DISCONTINUED | OUTPATIENT
Start: 2019-11-13 | End: 2019-11-13 | Stop reason: HOSPADM

## 2019-11-13 RX ADMIN — SODIUM CHLORIDE, SODIUM LACTATE, POTASSIUM CHLORIDE, AND CALCIUM CHLORIDE 100 ML/HR: 600; 310; 30; 20 INJECTION, SOLUTION INTRAVENOUS at 12:19

## 2019-11-13 RX ADMIN — PROPOFOL 40 MG: 10 INJECTION, EMULSION INTRAVENOUS at 12:40

## 2019-11-13 RX ADMIN — LIDOCAINE HYDROCHLORIDE 50 MG: 20 INJECTION, SOLUTION EPIDURAL; INFILTRATION; INTRACAUDAL; PERINEURAL at 12:40

## 2019-11-13 RX ADMIN — PROPOFOL 20 MG: 10 INJECTION, EMULSION INTRAVENOUS at 12:45

## 2019-11-13 RX ADMIN — PROPOFOL 10 MG: 10 INJECTION, EMULSION INTRAVENOUS at 12:53

## 2019-11-13 RX ADMIN — PROPOFOL 10 MG: 10 INJECTION, EMULSION INTRAVENOUS at 12:50

## 2019-11-13 NOTE — ANESTHESIA PREPROCEDURE EVALUATION
Relevant Problems   No relevant active problems       Anesthetic History   No history of anesthetic complications            Review of Systems / Medical History  Patient summary reviewed and pertinent labs reviewed    Pulmonary  Within defined limits                 Neuro/Psych         TIA    Comments: Blind in left eye Cardiovascular    Hypertension: well controlled        Dysrhythmias : atrial fibrillation  CAD, cardiac stents (x 3) and hyperlipidemia    Exercise tolerance: >4 METS  Comments: SSS   GI/Hepatic/Renal     GERD: well controlled           Endo/Other      Hypothyroidism  Arthritis and anemia     Other Findings   Comments: Recent TIA, symptoms resolved; placed on [address], now with anemia  Watchman 5 months ago  Plavix stopped 5 days  ASA yesterday         Physical Exam    Airway  Mallampati: II  TM Distance: 4 - 6 cm  Neck ROM: normal range of motion   Mouth opening: Normal     Cardiovascular    Rhythm: irregular  Rate: normal         Dental    Dentition: Caps/crowns     Pulmonary  Breath sounds clear to auscultation               Abdominal  GI exam deferred       Other Findings            Anesthetic Plan    ASA: 4  Anesthesia type: total IV anesthesia          Induction: Intravenous

## 2019-11-13 NOTE — ANESTHESIA POSTPROCEDURE EVALUATION
Procedure(s):  ESOPHAGOGASTRODUODENOSCOPY (EGD)/ BMI 28  ENDOSCOPIC ULTRASOUND (EUS). total IV anesthesia    Anesthesia Post Evaluation      Multimodal analgesia: multimodal analgesia not used between 6 hours prior to anesthesia start to PACU discharge  Patient location during evaluation: PACU  Patient participation: complete - patient participated  Level of consciousness: awake and alert  Pain management: adequate  Airway patency: patent  Anesthetic complications: no  Cardiovascular status: hemodynamically stable  Respiratory status: acceptable  Hydration status: acceptable        Vitals Value Taken Time   /79 11/13/2019  1:29 PM   Temp 36.1 °C (97 °F) 11/13/2019  1:00 PM   Pulse 55 11/13/2019  1:32 PM   Resp 14 11/13/2019  1:09 PM   SpO2 97 % 11/13/2019  1:32 PM   Vitals shown include unvalidated device data.

## 2019-11-13 NOTE — H&P
History and Physical for Endoscopic Ultrasound             Date: 11/13/2019     History of Present Illness:  Patient presents to undergo endoscopic ultrasound for evaluation of a pancreatic cyst. Patient denies any dysphagia, diarrhea, constipation, tenesmus, or GI bleeding.         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    CAD (coronary artery disease), native coronary artery May 2014    stent to LAD; 3 stents total    Depression 1/14/2013    GERD (gastroesophageal reflux disease)     Hypercholesteremia 1/14/2013    Hypertension     Hypothyroidism 1/14/2013    Osteoarthritis of back 1/2/2014    Osteoporosis     Pulmonary embolism ([address] 2009    after knee surgery    Sick sinus syndrome ([facility] 1/7/2016    Tachycardia-Bradycardia    Stroke [facility])      Past Surgical History:   Procedure Laterality Date    COLONOSCOPY N/A 7/16/2018    COLONOSCOPY performed by Val Paul MD at [address] [place] [address] HX BREAST BIOPSY Bilateral     HX CATARACT REMOVAL Right 2010    HX CORONARY STENT PLACEMENT  05/2014    LAD X 3    HX ENDOSCOPY  04/12/2019    gastritis with superficial ulcerations    HX HEART CATHETERIZATION  09/17/2015    HX HEENT      multiple eye surgeries - left - macular hoe, retinal detachment twice,     HX HYSTERECTOMY  1970    HX KNEE REPLACEMENT  2009    HX KNEE REPLACEMENT Left 01/12/2017    HX ORTHOPAEDIC  2006 and 2008    herniated disc    HX TONSIL AND ADENOIDECTOMY  1934      Family History   Problem Relation Age of Onset    Cancer Mother     Breast Cancer Mother     Cancer Father     Cancer Sister     Cancer Brother         pancreas    Breast Cancer Maternal Aunt      Social History     Tobacco Use    Smoking status: Never Smoker    Smokeless tobacco: Never Used   Substance Use Topics    Alcohol use: No     Alcohol/week: 0.0 standard drinks        Allergies   Allergen Reactions    Adhesive Rash     Current Facility-Administered Medications   Medication Dose Route Frequency    lactated Ringers infusion  100 mL/hr IntraVENous CONTINUOUS    lactated Ringers infusion  25 mL/hr IntraVENous CONTINUOUS        Review of Systems:  A detailed 10 organ review of systems is obtained with pertinent positives as listed in the History of Present Illness. All others are negative. Objective:     Physical Exam:  Visit Vitals  /80   Pulse (!) 52   Temp 97.4 °F (36.3 °C)   Resp 18   Ht 5' 3\" (1.6 m)   Wt 68 kg (150 lb)   SpO2 99%   BMI 26.57 kg/m²      General:  Alert and oriented. Heart: Regular rate and rhythm  Lungs:  Clear to auscultation bilaterally  Abdomen: Soft, nontender, nondistended    Impression/Plan:     Proceed with EUS as planned. I have discussed with the patient the technique, benefits, alternatives, and risks of the procedure, including medication reaction, immediate or delayed bleeding, or perforation of the gastrointestinal tract.     Signed By: Nasima Tracy MD     November 13, 2019

## 2019-11-13 NOTE — DISCHARGE INSTRUCTIONS
Gastrointestinal Esophagogastroduodenoscopy (EGD) - Upper Exam Discharge Instructions    1. Call Dr. Jeffers Heading at [phone] for any problems or questions. 2. Contact the doctor's office for follow up appointment as directed. 3. Medication may cause drowsiness for several hours, therefore:  · Do not drive or operate machinery for remainder of the day. · No alcohol today. · Do not make any important or legal decisions for 24 hours. · Do not sign any legal documents for 24 hours. 5. Ordinarily, you may resume regular diet and activity after exam unless otherwise specified by your physician. 6. For mild soreness in your throat you may use Cepacol throat lozenges or warm salt-water gargles as needed. Any additional instructions: Resume diet and current medications. Return to office in 1-2 months for ongoing care. Instructions given to Forrest Knowles and other family members.

## 2019-11-13 NOTE — OP NOTES
Gastroenterology Procedure Note              Procedure:  Esophagogastroduodenoscopy, Endoscopic ultrasound with Doppler     Date of Procedure:  11/13/2019    Patient:  Neal Herring     9/20/1929    Indication:  Atypical chest pain, history of pancreatic cyst     Sedation:  MAC    Pre-Procedure Physical Exam:    Mental status:  alert and oriented  Airway:  normal oropharyngeal airway and neck mobility  CV:  regular rate and rhythm  Respiratory:  clear to auscultation    Procedure:  A History and Physical has been performed, and patient medication allergies have been reviewed. Risks of perforation, hemorrhage, adverse drug reaction, and aspiration were discussed. Informed consent was obtained for the procedure, including sedation. The patient was placed in the left lateral decubitus position. The heart rate, oxygen saturations, blood pressure, and response to care were monitored throughout the procedure. The linear echoendoscope was passed through the mouth and advanced under direct vision to the distal duodenum. As the scope was slowly withdrawn, detailed endoscopic images were obtained from the surrounding organs. The patient tolerated the procedure well. The gastroscope was passed through the mouth and advanced under direct vision to the second portion of the duodenum. A careful inspection was made as the gastroscope was withdrawn, including a retroflexed view of the proximal stomach. The patient tolerated the procedure well. Findings:     ENDOSCOPIC FINDINGS:      OROPHARYNX: Cords and pyriform recesses appear normal.   ESOPHAGUS: The esophagus is normal. The proximal, mid, and distal portions are normal. The Z-Line is intact.    STOMACH: The fundus on antegrade and retroflexed views is normal. The body, antrum, and pylorus are normal.   DUODENUM: The bulb and second portions are normal. The ampulla is well-visualized endoscopically and appears normal.    PANCREAS:  The pancreas is well-visualized from head to tail. 2 simple cysts were seen in the body the pancreas. These measure 8 mm and 4 mm, respectively. 2 simple cysts were seen in the tail of the pancreas. These measure 5 mm and 3 mm, respectively. All cysts are in communication with the main pancreatic duct. No mural nodules or other worrisome features are seen. The main pancreatic duct is of normal caliber with a smooth, regular course, measuring up to 3 mm in the head, 2 mm in the body and 1 mm in the tail. Pancreas divisum is not seen. BILIARY TREE: The gallbladder appears normal. The common bile duct is well-visualized from its insertion in the ampulla to the bifurcation of right and left hepatic ducts. It is non-dilated, measuring up to 7 mm maximally with a gradual taper down to the level of the ampulla. There are no intraductal stones, sludge or debris. The ampulla appears normal endosonographically. OTHER ORGANS:  Views of the left lobe of the liver demonstrate no solid mass lesions. There is no ascites in the upper abdomen. The left adrenal gland appears normal. The major vascular structures including the portal vein, splenic vessels and celiac artery appear unremarkable. There are no pathologically enlarged posterior mediastinal or upper abdominal lymph nodes. Specimen:  No    Estimated Blood Loss:  None      Implant:  None           Impression:    1. Small hiatal hernia. 2. Multiple pancreatic cysts. 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 Based on patient's advanced age, she is not likely to be a surgical candidate. Therefore I would not recommend ongoing radiographic surveillance. Plan:  1. Resume diet and current medications. 2. Protonix 40 mg daily, taken prior to breakfast.   3. Carafate 1 g PO QAC/HS prn.   4. Return to office in 1-2 months for ongoing care.     Signed:  Tamela Blair MD  11/13/2019  12:44 PM

## 2019-11-13 NOTE — ROUTINE PROCESS
Vital signs stable. No complaints noted. Education given and reviewed with  and daughter. Pt discharged via wheelchair by [facility] and OR [facility].

## 2020-01-16 ENCOUNTER — TELEPHONE (OUTPATIENT)
Dept: CARDIAC CATH/INVASIVE PROCEDURES | Age: 85
End: 2020-01-16

## 2020-01-16 NOTE — TELEPHONE ENCOUNTER
Patient contacted for watchman 6 month follow up Phone Call. Patient currently taking KJC08yr and plavix 75mg. Patient can now stop plavix 75 mg per Dr. Debra Reyes and the [facility] protocol. She will continue ASA 81mg indefinitely. She states that over all she feels well. He has not had any inpatient hospitalizations since her 45 day follow up. Patient has no questions or concerns at this time.

## 2020-01-23 PROBLEM — Z95.818 PRESENCE OF WATCHMAN LEFT ATRIAL APPENDAGE CLOSURE DEVICE: Status: ACTIVE | Noted: 2020-01-23

## 2020-04-23 ENCOUNTER — TELEPHONE (OUTPATIENT)
Dept: CARDIAC CATH/INVASIVE PROCEDURES | Age: 85
End: 2020-04-23

## 2020-04-23 NOTE — TELEPHONE ENCOUNTER
[facility] 1 Year Follow Up Phone Call    Patient states that she is doing very well. She is currently taking ASA 81mg. She has not had any hospitalizations or procedures since her 6 month follow up in Jan. She has no questions or concerns at this time.     THE   BARTHEL INDEX - completed by interview  Activity Score  FEEDING  0 = unable  5 = needs help cutting, spreading butter, etc., or requires modified diet  10 = independent   10  BATHING  0 = dependent  5 = independent (or in shower)  5  GROOMING  0 = needs to help with personal care  5 = independent face/hair/teeth/shaving (implements provided)  5  DRESSING  0 = dependent  5 = needs help but can do about half unaided  10 = independent (including buttons, zips, laces, etc.)   10  BOWELS  0 = incontinent (or needs to be given enemas)  5 = occasional accident  10 = continent  10  BLADDER  0 = incontinent, or catheterized and unable to manage alone  5 = occasional accident  10 = continent  5  TOILET USE  0 = dependent  5 = needs some help, but can do something alone  10 = independent (on and off, dressing, wiping)  10  TRANSFERS (BED TO CHAIR AND BACK)  0 = unable, no sitting balance  5 = major help (one or two people, physical), can sit  10 = minor help (verbal or physical)  15 = independent   15  MOBILITY (ON LEVEL SURFACES)  0 = immobile or < 50 yards  5 = wheelchair independent, including corners, > 50 yards  10 = walks with help of one person (verbal or physical) > 50 yards  15 = independent (but may use any aid; for example, stick) > 50 yards   15  STAIRS  0 = unable  5 = needs help (verbal, physical, carrying aid)  10 = independent  10  TOTAL (0-100): 95 Statement Selected

## 2020-11-16 ENCOUNTER — HOSPITAL ENCOUNTER (EMERGENCY)
Age: 85
Discharge: HOME | End: 2020-11-16
Attending: EMERGENCY MEDICINE
Payer: MEDICARE

## 2020-11-16 VITALS
TEMPERATURE: 98.6 F | OXYGEN SATURATION: 98 % | RESPIRATION RATE: 22 BRPM | HEART RATE: 72 BPM | DIASTOLIC BLOOD PRESSURE: 77 MMHG | SYSTOLIC BLOOD PRESSURE: 177 MMHG

## 2020-11-16 DIAGNOSIS — R20.2 TINGLING IN EXTREMITIES: Primary | ICD-10-CM

## 2020-11-16 LAB
ALBUMIN SERPL-MCNC: 3.3 G/DL (ref 3.2–4.6)
ALBUMIN/GLOB SERPL: 0.8 {RATIO} (ref 1.2–3.5)
ALP SERPL-CCNC: 83 U/L (ref 50–130)
ALT SERPL-CCNC: 24 U/L (ref 12–65)
ANION GAP SERPL CALC-SCNC: 8 MMOL/L (ref 7–16)
AST SERPL-CCNC: 21 U/L (ref 15–37)
ATRIAL RATE: 55 BPM
BACTERIA URNS QL MICRO: 0 /HPF
BASOPHILS # BLD: 0.1 K/UL (ref 0–0.2)
BASOPHILS NFR BLD: 1 % (ref 0–2)
BILIRUB SERPL-MCNC: 0.7 MG/DL (ref 0.2–1.1)
BUN SERPL-MCNC: 19 MG/DL (ref 8–23)
CALCIUM SERPL-MCNC: 9.6 MG/DL (ref 8.3–10.4)
CALCULATED P AXIS, ECG09: 71 DEGREES
CALCULATED R AXIS, ECG10: -4 DEGREES
CALCULATED T AXIS, ECG11: -70 DEGREES
CASTS URNS QL MICRO: 0 /LPF
CHLORIDE SERPL-SCNC: 110 MMOL/L (ref 98–107)
CO2 SERPL-SCNC: 23 MMOL/L (ref 21–32)
CREAT SERPL-MCNC: 0.8 MG/DL (ref 0.6–1)
DIAGNOSIS, 93000: NORMAL
DIFFERENTIAL METHOD BLD: ABNORMAL
EOSINOPHIL # BLD: 0.1 K/UL (ref 0–0.8)
EOSINOPHIL NFR BLD: 1 % (ref 0.5–7.8)
EPI CELLS #/AREA URNS HPF: NORMAL /HPF
ERYTHROCYTE [DISTWIDTH] IN BLOOD BY AUTOMATED COUNT: 15.4 % (ref 11.9–14.6)
GLOBULIN SER CALC-MCNC: 4 G/DL (ref 2.3–3.5)
GLUCOSE SERPL-MCNC: 102 MG/DL (ref 65–100)
HCT VFR BLD AUTO: 36.6 % (ref 35.8–46.3)
HGB BLD-MCNC: 12 G/DL (ref 11.7–15.4)
IMM GRANULOCYTES # BLD AUTO: 0 K/UL (ref 0–0.5)
IMM GRANULOCYTES NFR BLD AUTO: 0 % (ref 0–5)
LYMPHOCYTES # BLD: 1.5 K/UL (ref 0.5–4.6)
LYMPHOCYTES NFR BLD: 23 % (ref 13–44)
MCH RBC QN AUTO: 27.1 PG (ref 26.1–32.9)
MCHC RBC AUTO-ENTMCNC: 32.8 G/DL (ref 31.4–35)
MCV RBC AUTO: 82.6 FL (ref 79.6–97.8)
MONOCYTES # BLD: 0.7 K/UL (ref 0.1–1.3)
MONOCYTES NFR BLD: 10 % (ref 4–12)
NEUTS SEG # BLD: 4.2 K/UL (ref 1.7–8.2)
NEUTS SEG NFR BLD: 65 % (ref 43–78)
NRBC # BLD: 0 K/UL (ref 0–0.2)
P-R INTERVAL, ECG05: 200 MS
PLATELET # BLD AUTO: 224 K/UL (ref 150–450)
PMV BLD AUTO: 11.5 FL (ref 9.4–12.3)
POTASSIUM SERPL-SCNC: 3.6 MMOL/L (ref 3.5–5.1)
PROT SERPL-MCNC: 7.3 G/DL (ref 6.3–8.2)
Q-T INTERVAL, ECG07: 486 MS
QRS DURATION, ECG06: 94 MS
QTC CALCULATION (BEZET), ECG08: 464 MS
RBC # BLD AUTO: 4.43 M/UL (ref 4.05–5.2)
RBC #/AREA URNS HPF: NORMAL /HPF
SODIUM SERPL-SCNC: 141 MMOL/L (ref 136–145)
VENTRICULAR RATE, ECG03: 55 BPM
WBC # BLD AUTO: 6.5 K/UL (ref 4.3–11.1)
WBC URNS QL MICRO: NORMAL /HPF

## 2020-11-16 PROCEDURE — 85025 COMPLETE CBC W/AUTO DIFF WBC: CPT

## 2020-11-16 PROCEDURE — 80053 COMPREHEN METABOLIC PANEL: CPT

## 2020-11-16 PROCEDURE — 81015 MICROSCOPIC EXAM OF URINE: CPT

## 2020-11-16 PROCEDURE — 81003 URINALYSIS AUTO W/O SCOPE: CPT

## 2020-11-16 PROCEDURE — 93005 ELECTROCARDIOGRAM TRACING: CPT | Performed by: EMERGENCY MEDICINE

## 2020-11-16 PROCEDURE — 99284 EMERGENCY DEPT VISIT MOD MDM: CPT

## 2020-11-16 RX ORDER — SODIUM CHLORIDE 0.9 % (FLUSH) 0.9 %
5-40 SYRINGE (ML) INJECTION AS NEEDED
Status: DISCONTINUED | OUTPATIENT
Start: 2020-11-16 | End: 2020-11-16 | Stop reason: HOSPADM

## 2020-11-16 RX ORDER — SODIUM CHLORIDE 0.9 % (FLUSH) 0.9 %
5-40 SYRINGE (ML) INJECTION EVERY 8 HOURS
Status: DISCONTINUED | OUTPATIENT
Start: 2020-11-16 | End: 2020-11-16 | Stop reason: HOSPADM

## 2020-11-16 NOTE — ED TRIAGE NOTES
Pt arrives via EMS from home with c/o tingling in bilateral hands and feet. Per EMS stroke scale negative. Pt recently lost spouse of 76 years. Masked prior to triage.

## 2020-11-16 NOTE — ED PROVIDER NOTES
Patient has a history of atrial fibrillation, coronary artery disease, hypertension, hyperlipidemia, GERD, prior PE and prior stroke complaining of the sudden onset of weakness and tingling in her extremities around 10 AM today. She states she was sitting down at a computer when it started. She denies any focal weakness or difficulty in her speech. She states she has tingling in her hands and her feet which is now extended up her calves. She states she had a stroke a year ago which manifested with an expressive aphasia. She received TPA and completely recovered from her symptoms. She denies any fever, denies any chest pain or shortness of breath.            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    CAD (coronary artery disease), native coronary artery May 2014    stent to LAD; 3 stents total    Depression 1/14/2013    GERD (gastroesophageal reflux disease)     Hypercholesteremia 1/14/2013    Hypertension     Hypothyroidism 1/14/2013    Osteoarthritis of back 1/2/2014    Osteoporosis     Pulmonary embolism ([address] 2009    after knee surgery    Sick sinus syndrome ([address] 1/7/2016    Tachycardia-Bradycardia    Stroke [facility])        Past Surgical History:   Procedure Laterality Date    COLONOSCOPY N/A 7/16/2018    COLONOSCOPY performed by Suhas Austin MD at [address]  HX BREAST BIOPSY Bilateral     HX CATARACT REMOVAL Right 2010    HX CORONARY STENT PLACEMENT  05/2014    LAD X 3    HX ENDOSCOPY  04/12/2019    gastritis with superficial ulcerations    HX HEART CATHETERIZATION  09/17/2015    HX HEENT      multiple eye surgeries - left - macular hoe, retinal detachment twice,     HX HYSTERECTOMY  1970    HX KNEE REPLACEMENT  2009    HX KNEE REPLACEMENT Left 01/12/2017    HX ORTHOPAEDIC  2006 and 2008    herniated disc    HX TONSIL AND ADENOIDECTOMY  1934         Family History:   Problem Relation Age of Onset    Cancer Mother     Breast Cancer Mother     Cancer Father     Cancer Sister     Cancer Brother         pancreas    Breast Cancer Maternal Aunt        Social History     Socioeconomic History    Marital status:      Spouse name: Not on file    Number of children: Not on file    Years of education: Not on file    Highest education level: Not on file   Occupational History    Not on file   Social Needs    Financial resource strain: Not on file    Food insecurity     Worry: Not on file     Inability: Not on file    Transportation needs     Medical: Not on file     Non-medical: Not on file   Tobacco Use    Smoking status: Never Smoker    Smokeless tobacco: Never Used   Substance and Sexual Activity    Alcohol use: No     Alcohol/week: 0.0 standard drinks    Drug use: No    Sexual activity: Not on file   Lifestyle    Physical activity     Days per week: Not on file     Minutes per session: Not on file    Stress: Not on file   Relationships    Social connections     Talks on phone: Not on file     Gets together: Not on file     Attends Jainism service: Not on file     Active member of club or organization: Not on file     Attends meetings of clubs or organizations: Not on file     Relationship status: Not on file    Intimate partner violence     Fear of current or ex partner: Not on file     Emotionally abused: Not on file     Physically abused: Not on file     Forced sexual activity: Not on file   Other Topics Concern    Not on file   Social History Narrative    No family/social/cultural issues pertinent to care         ALLERGIES: Adhesive    Review of Systems   Constitutional: Negative for chills and fever. Gastrointestinal: Negative for nausea and vomiting. All other systems reviewed and are negative.       Vitals:    11/16/20 1124 11/16/20 1125 11/16/20 1128   BP: (!) 174/103     Temp:   98.6 °F (37 °C)   SpO2:  96%             Physical Exam  Vitals signs and nursing note reviewed. Constitutional:       Appearance: Normal appearance. She is well-developed. HENT:      Head: Normocephalic and atraumatic. Eyes:      Conjunctiva/sclera: Conjunctivae normal.      Pupils: Pupils are equal, round, and reactive to light. Neck:      Musculoskeletal: Normal range of motion and neck supple. Pulmonary:      Effort: Pulmonary effort is normal. No respiratory distress. Musculoskeletal:         General: No tenderness. Skin:     General: Skin is warm and dry. Neurological:      Mental Status: She is alert and oriented to person, place, and time. Psychiatric:         Behavior: Behavior normal.          MDM  Number of Diagnoses or Management Options  Tingling in extremities:   Diagnosis management comments: 2:27 PM discussed results with patient and daughter, unremarkable labs. She appears comfortable and in no distress.        Amount and/or Complexity of Data Reviewed  Clinical lab tests: ordered and reviewed  Tests in the medicine section of CPT®: ordered and reviewed  Obtain history from someone other than the patient: yes    Risk of Complications, Morbidity, and/or Mortality  Presenting problems: moderate  Diagnostic procedures: low  Management options: low    Patient Progress  Patient progress: stable         Procedures

## 2020-11-16 NOTE — ED NOTES
I have reviewed discharge instructions with the patient and caregiver. The patient and caregiver verbalized understanding. Patient left ED via Discharge Method: wheelchair to Home with family    Opportunity for questions and clarification provided. Patient given 0 scripts. To continue your aftercare when you leave the hospital, you may receive an automated call from our care team to check in on how you are doing. This is a free service and part of our promise to provide the best care and service to meet your aftercare needs.  If you have questions, or wish to unsubscribe from this service please call [phone]. Thank you for Choosing our [facility] Emergency Department.

## 2020-11-19 PROBLEM — I10 ESSENTIAL HYPERTENSION: Status: ACTIVE | Noted: 2020-11-19

## 2020-12-11 ENCOUNTER — HOSPITAL ENCOUNTER (OUTPATIENT)
Dept: CT IMAGING | Age: 85
Discharge: HOME | End: 2020-12-11
Attending: INTERNAL MEDICINE

## 2020-12-11 DIAGNOSIS — I48.0 PAROXYSMAL ATRIAL FIBRILLATION (HCC): ICD-10-CM

## 2020-12-11 DIAGNOSIS — R42 DIZZINESS: ICD-10-CM

## 2020-12-11 DIAGNOSIS — I69.993 CVA, OLD, ATAXIA: ICD-10-CM

## 2020-12-11 NOTE — PROGRESS NOTES
Please call patient. No CVA on CT but sinusitis. Give augmentin 875/125 mg BID for 10 days. Some ? If this could be funcgal which is unusual.  REfer to ENT for evaluation.  Thanks  Thank you

## 2020-12-14 NOTE — PROGRESS NOTES
Left detailed message on vm. Went ahead and called in abx to pharmacy. Gave her instructions over phone and informed her I would put in referral for ENT. Told her to call me is she had any questions.

## 2021-03-29 ENCOUNTER — HOSPITAL ENCOUNTER (INPATIENT)
Age: 86
LOS: 1 days | Discharge: HOME HEALTH | DRG: 312 | End: 2021-03-31
Attending: EMERGENCY MEDICINE | Admitting: FAMILY MEDICINE
Payer: MEDICARE

## 2021-03-29 ENCOUNTER — APPOINTMENT (OUTPATIENT)
Dept: CT IMAGING | Age: 86
DRG: 312 | End: 2021-03-29
Attending: EMERGENCY MEDICINE
Payer: MEDICARE

## 2021-03-29 DIAGNOSIS — S01.81XA FACIAL LACERATION, INITIAL ENCOUNTER: ICD-10-CM

## 2021-03-29 DIAGNOSIS — N39.0 URINARY TRACT INFECTION WITHOUT HEMATURIA, SITE UNSPECIFIED: ICD-10-CM

## 2021-03-29 DIAGNOSIS — S00.93XA CONTUSION OF HEAD, UNSPECIFIED PART OF HEAD, INITIAL ENCOUNTER: ICD-10-CM

## 2021-03-29 DIAGNOSIS — Z95.818 PRESENCE OF WATCHMAN LEFT ATRIAL APPENDAGE CLOSURE DEVICE: ICD-10-CM

## 2021-03-29 DIAGNOSIS — I48.0 PAROXYSMAL ATRIAL FIBRILLATION (HCC): ICD-10-CM

## 2021-03-29 DIAGNOSIS — R55 SYNCOPE AND COLLAPSE: Primary | ICD-10-CM

## 2021-03-29 DIAGNOSIS — N39.0 ACUTE UTI: ICD-10-CM

## 2021-03-29 LAB
ALBUMIN SERPL-MCNC: 2.8 G/DL (ref 3.2–4.6)
ALBUMIN/GLOB SERPL: 0.6 {RATIO} (ref 1.2–3.5)
ALP SERPL-CCNC: 79 U/L (ref 50–130)
ALT SERPL-CCNC: 25 U/L (ref 12–65)
ANION GAP SERPL CALC-SCNC: 9 MMOL/L (ref 7–16)
APPEARANCE UR: CLEAR
AST SERPL-CCNC: 19 U/L (ref 15–37)
ATRIAL RATE: 277 BPM
BACTERIA URNS QL MICRO: 0 /HPF
BASOPHILS # BLD: 0 K/UL (ref 0–0.2)
BASOPHILS NFR BLD: 0 % (ref 0–2)
BILIRUB SERPL-MCNC: 0.7 MG/DL (ref 0.2–1.1)
BILIRUB UR QL: NEGATIVE
BUN SERPL-MCNC: 11 MG/DL (ref 8–23)
CALCIUM SERPL-MCNC: 8.7 MG/DL (ref 8.3–10.4)
CALCULATED R AXIS, ECG10: 53 DEGREES
CALCULATED T AXIS, ECG11: 52 DEGREES
CASTS URNS QL MICRO: 0 /LPF
CHLORIDE SERPL-SCNC: 112 MMOL/L (ref 98–107)
CK SERPL-CCNC: 81 U/L (ref 21–215)
CO2 SERPL-SCNC: 20 MMOL/L (ref 21–32)
COLOR UR: YELLOW
CREAT SERPL-MCNC: 0.67 MG/DL (ref 0.6–1)
DIAGNOSIS, 93000: NORMAL
DIFFERENTIAL METHOD BLD: ABNORMAL
EOSINOPHIL # BLD: 0.1 K/UL (ref 0–0.8)
EOSINOPHIL NFR BLD: 1 % (ref 0.5–7.8)
EPI CELLS #/AREA URNS HPF: ABNORMAL /HPF
ERYTHROCYTE [DISTWIDTH] IN BLOOD BY AUTOMATED COUNT: 14 % (ref 11.9–14.6)
GLOBULIN SER CALC-MCNC: 4.5 G/DL (ref 2.3–3.5)
GLUCOSE SERPL-MCNC: 129 MG/DL (ref 65–100)
GLUCOSE UR STRIP.AUTO-MCNC: NEGATIVE MG/DL
HCT VFR BLD AUTO: 33.8 % (ref 35.8–46.3)
HGB BLD-MCNC: 11 G/DL (ref 11.7–15.4)
HGB UR QL STRIP: NEGATIVE
IMM GRANULOCYTES # BLD AUTO: 0.1 K/UL (ref 0–0.5)
IMM GRANULOCYTES NFR BLD AUTO: 1 % (ref 0–5)
KETONES UR QL STRIP.AUTO: NEGATIVE MG/DL
LEUKOCYTE ESTERASE UR QL STRIP.AUTO: ABNORMAL
LYMPHOCYTES # BLD: 1.2 K/UL (ref 0.5–4.6)
LYMPHOCYTES NFR BLD: 13 % (ref 13–44)
MAGNESIUM SERPL-MCNC: 2.2 MG/DL (ref 1.8–2.4)
MCH RBC QN AUTO: 27.7 PG (ref 26.1–32.9)
MCHC RBC AUTO-ENTMCNC: 32.5 G/DL (ref 31.4–35)
MCV RBC AUTO: 85.1 FL (ref 79.6–97.8)
MONOCYTES # BLD: 0.9 K/UL (ref 0.1–1.3)
MONOCYTES NFR BLD: 10 % (ref 4–12)
NEUTS SEG # BLD: 6.9 K/UL (ref 1.7–8.2)
NEUTS SEG NFR BLD: 75 % (ref 43–78)
NITRITE UR QL STRIP.AUTO: NEGATIVE
NRBC # BLD: 0 K/UL (ref 0–0.2)
PH UR STRIP: 7.5 [PH] (ref 5–9)
PLATELET # BLD AUTO: 260 K/UL (ref 150–450)
PMV BLD AUTO: 10.7 FL (ref 9.4–12.3)
POTASSIUM SERPL-SCNC: 3.8 MMOL/L (ref 3.5–5.1)
PROT SERPL-MCNC: 7.3 G/DL (ref 6.3–8.2)
PROT UR STRIP-MCNC: NEGATIVE MG/DL
Q-T INTERVAL, ECG07: 380 MS
QRS DURATION, ECG06: 80 MS
QTC CALCULATION (BEZET), ECG08: 459 MS
RBC # BLD AUTO: 3.97 M/UL (ref 4.05–5.2)
RBC #/AREA URNS HPF: ABNORMAL /HPF
SODIUM SERPL-SCNC: 141 MMOL/L (ref 136–145)
SP GR UR REFRACTOMETRY: 1.01 (ref 1–1.02)
T4 FREE SERPL-MCNC: 1.3 NG/DL (ref 0.9–1.8)
TSH SERPL DL<=0.005 MIU/L-ACNC: 1.05 UIU/ML
UROBILINOGEN UR QL STRIP.AUTO: 0.2 EU/DL (ref 0.2–1)
VENTRICULAR RATE, ECG03: 88 BPM
WBC # BLD AUTO: 9.2 K/UL (ref 4.3–11.1)
WBC URNS QL MICRO: ABNORMAL /HPF

## 2021-03-29 PROCEDURE — 81001 URINALYSIS AUTO W/SCOPE: CPT

## 2021-03-29 PROCEDURE — 96372 THER/PROPH/DIAG INJ SC/IM: CPT

## 2021-03-29 PROCEDURE — 96374 THER/PROPH/DIAG INJ IV PUSH: CPT

## 2021-03-29 PROCEDURE — 36415 COLL VENOUS BLD VENIPUNCTURE: CPT

## 2021-03-29 PROCEDURE — 85025 COMPLETE CBC W/AUTO DIFF WBC: CPT

## 2021-03-29 PROCEDURE — 84439 ASSAY OF FREE THYROXINE: CPT

## 2021-03-29 PROCEDURE — 93005 ELECTROCARDIOGRAM TRACING: CPT | Performed by: EMERGENCY MEDICINE

## 2021-03-29 PROCEDURE — 87086 URINE CULTURE/COLONY COUNT: CPT

## 2021-03-29 PROCEDURE — 75810000293 HC SIMP/SUPERF WND  RPR

## 2021-03-29 PROCEDURE — 84443 ASSAY THYROID STIM HORMONE: CPT

## 2021-03-29 PROCEDURE — 2709999900 HC NON-CHARGEABLE SUPPLY

## 2021-03-29 PROCEDURE — C8929 TTE W OR WO FOL WCON,DOPPLER: HCPCS

## 2021-03-29 PROCEDURE — 82550 ASSAY OF CK (CPK): CPT

## 2021-03-29 PROCEDURE — 74011000258 HC RX REV CODE- 258: Performed by: INTERNAL MEDICINE

## 2021-03-29 PROCEDURE — 83735 ASSAY OF MAGNESIUM: CPT

## 2021-03-29 PROCEDURE — 99218 HC RM OBSERVATION: CPT

## 2021-03-29 PROCEDURE — 74011000250 HC RX REV CODE- 250: Performed by: INTERNAL MEDICINE

## 2021-03-29 PROCEDURE — 70450 CT HEAD/BRAIN W/O DYE: CPT

## 2021-03-29 PROCEDURE — 99285 EMERGENCY DEPT VISIT HI MDM: CPT

## 2021-03-29 PROCEDURE — 74011250636 HC RX REV CODE- 250/636: Performed by: INTERNAL MEDICINE

## 2021-03-29 PROCEDURE — 80053 COMPREHEN METABOLIC PANEL: CPT

## 2021-03-29 RX ORDER — PANTOPRAZOLE SODIUM 40 MG/1
40 TABLET, DELAYED RELEASE ORAL DAILY
Status: DISCONTINUED | OUTPATIENT
Start: 2021-03-30 | End: 2021-03-31 | Stop reason: HOSPADM

## 2021-03-29 RX ORDER — SODIUM CHLORIDE 9 MG/ML
75 INJECTION, SOLUTION INTRAVENOUS CONTINUOUS
Status: DISCONTINUED | OUTPATIENT
Start: 2021-03-29 | End: 2021-03-31 | Stop reason: HOSPADM

## 2021-03-29 RX ORDER — METOPROLOL SUCCINATE 25 MG/1
12.5 TABLET, EXTENDED RELEASE ORAL DAILY
Status: DISCONTINUED | OUTPATIENT
Start: 2021-03-30 | End: 2021-03-30

## 2021-03-29 RX ORDER — ENOXAPARIN SODIUM 100 MG/ML
40 INJECTION SUBCUTANEOUS EVERY 24 HOURS
Status: DISCONTINUED | OUTPATIENT
Start: 2021-03-29 | End: 2021-03-29

## 2021-03-29 RX ORDER — OXYCODONE HYDROCHLORIDE 5 MG/1
5 TABLET ORAL
Status: DISCONTINUED | OUTPATIENT
Start: 2021-03-29 | End: 2021-03-31 | Stop reason: HOSPADM

## 2021-03-29 RX ORDER — ESCITALOPRAM OXALATE 10 MG/1
10 TABLET ORAL DAILY
Status: DISCONTINUED | OUTPATIENT
Start: 2021-03-30 | End: 2021-03-30

## 2021-03-29 RX ORDER — TRAZODONE HYDROCHLORIDE 50 MG/1
25 TABLET ORAL
Status: DISCONTINUED | OUTPATIENT
Start: 2021-03-29 | End: 2021-03-31 | Stop reason: HOSPADM

## 2021-03-29 RX ORDER — ENOXAPARIN SODIUM 100 MG/ML
30 INJECTION SUBCUTANEOUS EVERY 24 HOURS
Status: DISCONTINUED | OUTPATIENT
Start: 2021-03-30 | End: 2021-03-31 | Stop reason: HOSPADM

## 2021-03-29 RX ORDER — ASPIRIN 81 MG/1
81 TABLET ORAL DAILY
Status: DISCONTINUED | OUTPATIENT
Start: 2021-03-30 | End: 2021-03-30

## 2021-03-29 RX ORDER — AMLODIPINE BESYLATE 5 MG/1
5 TABLET ORAL DAILY
Status: DISCONTINUED | OUTPATIENT
Start: 2021-03-30 | End: 2021-03-31 | Stop reason: HOSPADM

## 2021-03-29 RX ORDER — FLUTICASONE PROPIONATE 50 MCG
2 SPRAY, SUSPENSION (ML) NASAL DAILY
Status: DISCONTINUED | OUTPATIENT
Start: 2021-03-30 | End: 2021-03-31 | Stop reason: HOSPADM

## 2021-03-29 RX ORDER — SODIUM CHLORIDE 0.9 % (FLUSH) 0.9 %
5-40 SYRINGE (ML) INJECTION EVERY 8 HOURS
Status: DISCONTINUED | OUTPATIENT
Start: 2021-03-29 | End: 2021-03-31 | Stop reason: HOSPADM

## 2021-03-29 RX ORDER — SODIUM CHLORIDE 0.9 % (FLUSH) 0.9 %
5-40 SYRINGE (ML) INJECTION AS NEEDED
Status: DISCONTINUED | OUTPATIENT
Start: 2021-03-29 | End: 2021-03-31 | Stop reason: HOSPADM

## 2021-03-29 RX ORDER — ACETAMINOPHEN 325 MG/1
650 TABLET ORAL
Status: DISCONTINUED | OUTPATIENT
Start: 2021-03-29 | End: 2021-03-31 | Stop reason: HOSPADM

## 2021-03-29 RX ORDER — LISINOPRIL 20 MG/1
20 TABLET ORAL DAILY
Status: DISCONTINUED | OUTPATIENT
Start: 2021-03-30 | End: 2021-03-30

## 2021-03-29 RX ORDER — LEVOTHYROXINE SODIUM 50 UG/1
50 TABLET ORAL
Status: DISCONTINUED | OUTPATIENT
Start: 2021-03-30 | End: 2021-03-31 | Stop reason: HOSPADM

## 2021-03-29 RX ADMIN — ENOXAPARIN SODIUM 40 MG: 40 INJECTION SUBCUTANEOUS at 13:46

## 2021-03-29 RX ADMIN — CEFTRIAXONE 1 G: 1 INJECTION, POWDER, FOR SOLUTION INTRAMUSCULAR; INTRAVENOUS at 13:46

## 2021-03-29 RX ADMIN — Medication 10 ML: at 13:58

## 2021-03-29 RX ADMIN — SODIUM CHLORIDE 75 ML/HR: 900 INJECTION, SOLUTION INTRAVENOUS at 13:47

## 2021-03-29 RX ADMIN — PERFLUTREN 1 ML: 6.52 INJECTION, SUSPENSION INTRAVENOUS at 15:15

## 2021-03-29 NOTE — PROGRESS NOTES
1800-END OF SHIFT NOTE:    -pt rested well since arrival to floor   -echo completed  -vss, no needs voiced at this time     Intake/Output  03/29 0701 - 03/29 1900  In: 498 [P.O.:240; I.V.:258]  Out: 150 [Urine:150]   Voiding: YES  Catheter: NO  Drain:          Stool:  1 occurrences. Stool Assessment  Stool Color: (patient flushed) (03/29/21 1710)    Emesis:  0 occurrences. VITAL SIGNS  Patient Vitals for the past 12 hrs:   Temp Pulse Resp BP SpO2   03/29/21 1530 97.9 °F (36.6 °C) 80 16 102/84 98 %   03/29/21 1318 97.6 °F (36.4 °C) 77 16 (!) 148/55 97 %   03/29/21 1025  83  123/63 97 %   03/29/21 0925  80  (!) 124/59 97 %   03/29/21 0746 98.7 °F (37.1 °C) 93 16 129/61 98 %       Pain Assessment  Pain 1  Pain Scale 1: Numeric (0 - 10) (03/29/21 1330)  Pain Intensity 1: 0 (03/29/21 1330)  Patient Stated Pain Goal: 0 (03/29/21 1330)  Pain Location 1: Head (03/29/21 0746)    Ambulating  Yes    Additional Information:     Shift report given to oncoming nurse at the bedside.     Janina Litten, RN

## 2021-03-29 NOTE — ED NOTES
TRANSFER - OUT REPORT:    Verbal report given to [facility] DALIA RODRIGUEZ on Dianne Most  being transferred to [facility] for routine progression of care       Report consisted of patients Situation, Background, Assessment and   Recommendations(SBAR). Information from the following report(s) SBAR was reviewed with the receiving nurse. Lines:   Peripheral IV 03/29/21 Left Antecubital (Active)   Site Assessment Clean, dry, & intact 03/29/21 0821   Phlebitis Assessment 0 03/29/21 0821   Infiltration Assessment 0 03/29/21 3420        Opportunity for questions and clarification was provided.       Patient transported with:   [employer]

## 2021-03-29 NOTE — PROGRESS NOTES
03/29/21 1330   Dual Skin Pressure Injury Assessment   Dual Skin Pressure Injury Assessment WDL   Second Care Provider (Based on [address] [place] [address]) rolando phipps    Skin Integumentary   Skin Integumentary (WDL) WDL    Pressure  Injury Documentation No Pressure Injury Noted-Pressure Ulcer Prevention Initiated   Skin Condition/Temp Dry;Fragile; Warm   Skin Integrity Laceration (comment)  (left eye )       Dual skin assessment preformed with Nilton Rogers RN. No pressure injuries noted.

## 2021-03-29 NOTE — ED TRIAGE NOTES
Patient comes via ems from home ([facility]) co syncope episode this morning around 2 am pt recalls feeling dizzy and falling. , pt has hematoma to left eye

## 2021-03-29 NOTE — ED PROVIDER NOTES
43-year-old female presents to the ER following a syncopal spell this morning around 2:15 AM.  She got up from bed at the assisted living, gone to the restroom, had completed a business there, got up was going to take her second dose of Macrobid for UTI. She got into the kitchen, gotten a glass of milk from the refrigerator, and then as she was putting the milk up suddenly collapsed. She did not have any premonitory dizziness, palpitations, nausea, pain. She landed partially in the next room striking her left ocular temporal area on the carpeted floor. She has a contusion with ecchymosis and small laceration near the lateral edge of the left eyebrow. She had NOT suddenly assumed an upright/standing position prior to the syncopal spell    No previous syncopal spells, patient does have a history of atrial fibrillation for which she sees Dr. Mata Schwarz at [facility] cardiology. She has a watchman device, and is on no anticoagulants other than aspirin. Patient denies nausea, vomiting, dyspnea. She is currently being treated for a UTI, she was seen at a local urgent care on Monday, March 22 was placed on Keflex. With no improvement after 4 days, she was taken back to the urgent care on Friday the 26th for Saturday the 27th. A UA there was reportedly negative, continuing to languish she was brought back to the urgent care on Sunday the 28th, yesterday. She was found to have a nitrite positive UTI with greater than 50 white cells moderate leukocyte esterase and positive nitrite, at that time her Keflex was changed to [address]. She taken a total of 1 dose prior to her syncopal spell last night. She has had no fevers or chills with the UTI but does have some back pain.            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    CAD (coronary artery disease), native coronary artery May 2014    stent to LAD; 3 stents total    Depression 1/14/2013    GERD (gastroesophageal reflux disease)     Hypercholesteremia 1/14/2013    Hypertension     Hypothyroidism 1/14/2013    Osteoarthritis of back 1/2/2014    Osteoporosis     Pulmonary embolism ([facility] [address] 2009    after knee surgery    Sick sinus syndrome ([facility] [address] 1/7/2016    Tachycardia-Bradycardia    Stroke [facility])        Past Surgical History:   Procedure Laterality Date    COLONOSCOPY N/A 7/16/2018    COLONOSCOPY performed by Rosendo Prado MD at [address] [place] [address] HX BREAST BIOPSY Bilateral     HX CATARACT REMOVAL Right 2010    HX CORONARY STENT PLACEMENT  05/2014    LAD X 3    HX ENDOSCOPY  04/12/2019    gastritis with superficial ulcerations    HX HEART CATHETERIZATION  09/17/2015    HX HEENT      multiple eye surgeries - left - macular hoe, retinal detachment twice,     HX HYSTERECTOMY  1970    HX KNEE REPLACEMENT  2009    HX KNEE REPLACEMENT Left 01/12/2017    HX ORTHOPAEDIC  2006 and 2008    herniated disc    HX TONSIL AND ADENOIDECTOMY  1934         Family History:   Problem Relation Age of Onset    Cancer Mother     Breast Cancer Mother     Cancer Father     Cancer Sister     Cancer Brother         pancreas    Breast Cancer Maternal Aunt        Social History     Socioeconomic History    Marital status:      Spouse name: Not on file    Number of children: Not on file    Years of education: Not on file    Highest education level: Not on file   Occupational History    Not on file   Social Needs    Financial resource strain: Not on file    Food insecurity     Worry: Not on file     Inability: Not on file    Transportation needs     Medical: Not on file     Non-medical: Not on file   Tobacco Use    Smoking status: Never Smoker    Smokeless tobacco: Never Used   Substance and Sexual Activity    Alcohol use:  No Alcohol/week: 0.0 standard drinks    Drug use: No    Sexual activity: Not Currently   Lifestyle    Physical activity     Days per week: Not on file     Minutes per session: Not on file    Stress: Not on file   Relationships    Social connections     Talks on phone: Not on file     Gets together: Not on file     Attends Che[employer] service: Not on file     Active member of club or organization: Not on file     Attends meetings of clubs or organizations: Not on file     Relationship status: Not on file    Intimate partner violence     Fear of current or ex partner: Not on file     Emotionally abused: Not on file     Physically abused: Not on file     Forced sexual activity: Not on file   Other Topics Concern    Not on file   Social History Narrative    No family/social/cultural issues pertinent to care         ALLERGIES: Adhesive    Review of Systems   Constitutional: Negative for chills and fever. HENT: Negative for rhinorrhea and sore throat. Eyes: Negative for discharge and redness. Respiratory: Negative for cough and shortness of breath. Cardiovascular: Negative for chest pain and palpitations. Gastrointestinal: Negative for abdominal pain, diarrhea, nausea and vomiting. Genitourinary: Positive for dysuria and flank pain. Musculoskeletal: Negative for arthralgias and back pain. Skin: Positive for color change and wound. Negative for rash. Neurological: Positive for syncope. Negative for dizziness and headaches. All other systems reviewed and are negative. Vitals:    03/29/21 0746 03/29/21 0925   BP: 129/61 (!) 124/59   Pulse: 93 80   Resp: 16    Temp: 98.7 °F (37.1 °C)    SpO2: 98% 97%   Weight: 65.8 kg (145 lb)    Height: 5' 3\" (1.6 m)             Physical Exam  Vitals signs and nursing note reviewed. Constitutional:       General: She is not in acute distress. Appearance: Normal appearance. She is well-developed. She is not ill-appearing, toxic-appearing or diaphoretic. HENT:      Head: Normocephalic. Contusion and laceration present. Right Ear: External ear normal.      Left Ear: External ear normal.      Mouth/Throat:      Mouth: Mucous membranes are moist.      Pharynx: Oropharynx is clear. No oropharyngeal exudate or posterior oropharyngeal erythema. Eyes:      General: No scleral icterus. Right eye: No discharge. Left eye: No discharge. Extraocular Movements: Extraocular movements intact. Conjunctiva/sclera: Conjunctivae normal.      Pupils: Pupils are equal, round, and reactive to light. Neck:      Musculoskeletal: Normal range of motion and neck supple. Normal range of motion. Thyroid: No thyromegaly. Trachea: Trachea normal.   Cardiovascular:      Rate and Rhythm: Normal rate and regular rhythm. Heart sounds: Normal heart sounds. No murmur. No gallop. Pulmonary:      Effort: Pulmonary effort is normal. No respiratory distress. Breath sounds: Normal breath sounds. No wheezing or rales. Abdominal:      General: Bowel sounds are normal.      Palpations: Abdomen is soft. There is no hepatomegaly, splenomegaly or pulsatile mass. Tenderness: There is no abdominal tenderness. There is no guarding. Musculoskeletal: Normal range of motion. Lymphadenopathy:      Cervical: No cervical adenopathy. Skin:     General: Skin is warm and dry. Neurological:      General: No focal deficit present. Mental Status: She is alert and oriented to person, place, and time. Mental status is at baseline. Motor: No abnormal muscle tone.       Comments: cni 2-12 grossly   Psychiatric:         Mood and Affect: Mood normal.         Behavior: Behavior normal.          MDM  Number of Diagnoses or Management Options  Contusion of head, unspecified part of head, initial encounter: new and requires workup  Facial laceration, initial encounter: new and requires workup  Syncope and collapse: new and requires workup  Urinary tract infection without hematuria, site unspecified: established and improving     Amount and/or Complexity of Data Reviewed  Clinical lab tests: reviewed and ordered (Results Include:    Recent Results (from the past 24 hour(s))  -CBC WITH AUTOMATED DIFF  Collection Time: 03/29/21  7:55 AM       Result                      Value             Ref Range           WBC                         9.2               4.3 - 11.1 K*       RBC                         3.97 (L)          4.05 - 5.2 M*       HGB                         11.0 (L)          11.7 - 15.4 *       HCT                         33.8 (L)          35.8 - 46.3 %       MCV                         85.1              79.6 - 97.8 *       MCH                         27.7              26.1 - 32.9 *       MCHC                        32.5              31.4 - 35.0 *       RDW                         14.0              11.9 - 14.6 %       PLATELET                    260               150 - 450 K/*       MPV                         10.7              9.4 - 12.3 FL       ABSOLUTE NRBC               0.00              0.0 - 0.2 K/*       DF                          AUTOMATED                             NEUTROPHILS                 75                43 - 78 %           LYMPHOCYTES                 13                13 - 44 %           MONOCYTES                   10                4.0 - 12.0 %        EOSINOPHILS                 1                 0.5 - 7.8 %         BASOPHILS                   0                 0.0 - 2.0 %         IMMATURE GRANULOCYTES       1                 0.0 - 5.0 %         ABS. NEUTROPHILS            6.9               1.7 - 8.2 K/*       ABS. LYMPHOCYTES            1.2               0.5 - 4.6 K/*       ABS. MONOCYTES              0.9               0.1 - 1.3 K/*       ABS. EOSINOPHILS            0.1               0.0 - 0.8 K/*       ABS. BASOPHILS              0.0               0.0 - 0.2 K/*       ABS. IMM.  GRANS.            0.1               0.0 - 0.5 K/*  -METABOLIC PANEL, COMPREHENSIVE  Collection Time: 03/29/21  7:55 AM       Result                      Value             Ref Range           Sodium                      141               136 - 145 mm*       Potassium                   3.8               3.5 - 5.1 mm*       Chloride                    112 (H)           98 - 107 mmo*       CO2                         20 (L)            21 - 32 mmol*       Anion gap                   9                 7 - 16 mmol/L       Glucose                     129 (H)           65 - 100 mg/*       BUN                         11                8 - 23 MG/DL        Creatinine                  0.67              0.6 - 1.0 MG*       GFR est AA                  >60               >60 ml/min/1*       GFR est non-AA              >60               >60 ml/min/1*       Calcium                     8.7               8.3 - 10.4 M*       Bilirubin, total            0.7               0.2 - 1.1 MG*       ALT (SGPT)                  25                12 - 65 U/L         AST (SGOT)                  19                15 - 37 U/L         Alk.  phosphatase            79                50 - 130 U/L        Protein, total              7.3               6.3 - 8.2 g/*       Albumin                     2.8 (L)           3.2 - 4.6 g/*       Globulin                    4.5 (H)           2.3 - 3.5 g/*       A-G Ratio                   0.6 (L)           1.2 - 3.5      -CK  Collection Time: 03/29/21  7:57 AM       Result                      Value             Ref Range           CK                          81                21 - 215 U/L   -URINALYSIS W/ RFLX MICROSCOPIC  Collection Time: 03/29/21  8:29 AM       Result                      Value             Ref Range           Color                       YELLOW                                Appearance                  CLEAR                                 Specific gravity            1.007             1.001 - 1.02*       pH (UA)                     7.5               5.0 - 9.0 Protein                     Negative          NEG mg/dL           Glucose                     Negative          mg/dL               Ketone                      Negative          NEG mg/dL           Bilirubin                   Negative          NEG                 Blood                       Negative          NEG                 Urobilinogen                0.2               0.2 - 1.0 EU*       Nitrites                    Negative          NEG                 Leukocyte Esterase          SMALL (A)         NEG                 WBC                         5-10              0 /hpf              RBC                         0-3               0 /hpf              Epithelial cells            0-3               0 /hpf              Bacteria                    0                 0 /hpf              Casts                       0                 0 /lpf         )  Tests in the radiology section of CPT®: ordered and reviewed (CT HEAD WO CONT   Final Result    1. No acute intracranial process evident by noncontrast CT study of the head. )  Decide to obtain previous medical records or to obtain history from someone other than the patient: yes (Urinalysis results obtained from the urgent care,)  Obtain history from someone other than the patient: yes (Daughter)  Discuss the patient with other providers: yes (Hospitalist)           Wound Repair    Date/Time: 3/29/2021 10:23 AM  Performed by: attendingLocation details: face  Wound length:2.5 cm or less  Skin closure: glue  Approximation: close  Patient tolerance: patient tolerated the procedure well with no immediate complications  My total time at bedside, performing this procedure was 1-15 minutes.   EKG    Date/Time: 3/29/2021 10:26 AM  Performed by: Betzy Douglass MD  Authorized by: Betzy Douglass MD     ECG reviewed by ED Physician in the absence of a cardiologist: yes    Previous ECG:     Previous ECG:  Compared to current    Similarity:  No change  Interpretation: Interpretation: non-specific    Rate:     ECG rate assessment: normal    Rhythm:     Rhythm: sinus rhythm and atrial fibrillation    Ectopy:     Ectopy: none    QRS:     QRS intervals:  Normal  Conduction:     Conduction: normal    ST segments:     ST segments:  Non-specific

## 2021-03-29 NOTE — H&P
[facility] H&P      Patient ID:  NAME:  Berto Spears   Age/Sex: 80 y.o. female  :   1929   MRN:   [id number]    Admission Date: 3/29/2021  Chief Complaint: Passing out and dysuria/urinary frequency  Reason for Admission: Syncope and collapse    Assessment/Plan (MDM):     Principle Problem:    Syncope and collapse (3/29/2021)  - Patient with sudden onset syncope  - Had left nini-orbital laceration from fall  - Orthostatics normal  - CT Head unremarkable  - EKG unchanged  - Place on remote tele  - Check ECHO  - Check TFTs  - Start NS  - Consult Cardiology any events on tele or ECHO abnormal  - Otherwise, may need outpatient event monitor    Active Problems:    Acute UTI (3/29/2021)  - Patient on Keflex x 3 days then Macrobid x 1 day  - Still with dysuria and urinary frequency  - UA mildly positive  - Urine culture sent in ER  - Start Ceftriaxone for now      Paroxysmal atrial fibrillation ([facility] (2013)  - Place on remote tele  - Continue Toprol  - Continue ASA  - Has Watchman device      Anemia (2019)  - Stable      Essential hypertension (2020)  - Stable  - Continue home meds      Depression (2013)   - Continue Lexapro      GERD (gastroesophageal reflux disease) (3/6/2014)  - Continue PPI      Coronary atherosclerosis of native coronary artery (2015)  - Continue ASA      Sick sinus syndrome ([facility] [address] (2016)  - Place on remote tele      Presence of Watchman left atrial appendage closure device (2020)  - Check ECHO      Disposition: Home in 48 hours  Diet: Regular  VTE ppx: Lovenox  GI ppx: PPI  CODE STATUS: FULL CODE    PCP: Zack Santizo MD    Attending MD: Jerad Sultana DO    Treatment Team: Attending Provider: Deja Poe DO; Care Manager: Grisel Jones RN    HPI:     Berto Spears is a 80year old CF with a PMH of HTN, CAD, PAF s/p Watchman device, hypothyroidism, and depression who presented from [facility] after a syncopal episode with a left nini-orbital laceration. She states that she had dysuria and urinary frequency approximately 1 weeks prior so went to Urgent Care where she was started on Keflex. She continued to have dysuria, even after the antibiotics, so she went back to Urgent Care on 3/28/21 and they prescribed her Macrobid. They did not have any cultures. Then on 3/29/21 she got up at 2am to use the restroom and take her Macrobid. She went to the refrigerator and when she opened the door she felt dizzy for a second then passed out. She came back to a minute later and was on the floor with blood near her left eye. She called Esau Yates who brought her to the ER. Denies CP/SOB/palpitations. Denies current dizziness. Denies F/C. Denies N/V/D. Still with dysuria and frequency.     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    CAD (coronary artery disease), native coronary artery May 2014    stent to LAD; 3 stents total    Depression 1/14/2013    GERD (gastroesophageal reflux disease)     Hypercholesteremia 1/14/2013    Hypertension     Hypothyroidism 1/14/2013    Osteoarthritis of back 1/2/2014    Osteoporosis     Pulmonary embolism ([address] 2009    after knee surgery    Sick sinus syndrome ([facility] [address] 1/7/2016    Tachycardia-Bradycardia    Stroke [facility])         Past Surgical History:   Procedure Laterality Date    COLONOSCOPY N/A 7/16/2018    COLONOSCOPY performed by Rick Page MD at [address]  HX BREAST BIOPSY Bilateral     HX CATARACT REMOVAL Right 2010    HX CORONARY STENT PLACEMENT  05/2014    LAD X 3    HX ENDOSCOPY  04/12/2019    gastritis with superficial ulcerations    HX HEART CATHETERIZATION  2015    HX HEENT      multiple eye surgeries - left - macular hoe, retinal detachment twice,     HX HYSTERECTOMY  1970    HX KNEE REPLACEMENT  2009    HX KNEE REPLACEMENT Left 2017    HX ORTHOPAEDIC  2006 and     herniated disc    HX TONSIL AND ADENOIDECTOMY  1934        Prior to Admission Medications   Prescriptions Last Dose Informant Patient Reported? Taking? Calcium Carbonate-Vit D3-Min (CALTRATE 600+D PLUS MINERALS) 600 mg calcium- 400 unit Tab   Yes No   Sig: Take  by mouth daily. Salt Irrigation Solution No.1 (Ocean Complete) aero   No No   Si Sprays by Nasal route daily. acetaminophen (TYLENOL ARTHRITIS PAIN) 650 mg CR tablet   Yes No   Sig: Take 650 mg by mouth every six (6) hours as needed for Pain. amLODIPine (NORVASC) 5 mg tablet   No No   Sig: Take 1 Tab by mouth daily. aspirin delayed-release 81 mg tablet   No No   Sig: Take 1 Tab by mouth daily. escitalopram oxalate (LEXAPRO) 10 mg tablet   No No   Sig: Take 1 Tab by mouth daily. fluticasone propionate (Flonase Allergy Relief) 50 mcg/actuation nasal spray   No No   Si Sprays by Both Nostrils route daily. levothyroxine (synthroid) 50 mcg tablet   No No   Sig: Take 1 Tab by mouth Daily (before breakfast). lisinopriL (PRINIVIL, ZESTRIL) 20 mg tablet   No No   Sig: Take 1 Tab by mouth daily. metoprolol succinate (TOPROL-XL) 25 mg XL tablet   No No   Sig: Take 0.5 Tabs by mouth daily. multivitamin (ONE A DAY) tablet   Yes No   Sig: Take 1 Tab by mouth daily. nitrofurantoin (MACRODANTIN) 25 mg capsule   No No   Sig: Take 1 Cap by mouth daily. nitroglycerin (NITROSTAT) 0.3 mg SL tablet   No No   Si Tab by SubLINGual route every five (5) minutes as needed for Chest Pain.   pantoprazole (PROTONIX) 20 mg tablet   No No   Sig: Take 1 Tab by mouth daily. potassium 99 mg tablet   Yes No   Sig: Take 99 mg by mouth daily.    traZODone (DESYREL) 50 mg tablet   Yes No   Sig: Take 25-50 mg by mouth.   vit A/vit C/vit E/zinc/copper (ICAPS AREDS PO)   Yes No   Sig: Take 1 Cap by mouth two (2) times a day. vit a,c & e-lutein-minerals (OCUVITE) tablet   Yes No   Sig: daily. Facility-Administered Medications: None       Allergies   Allergen Reactions    Adhesive Rash        Social History     Tobacco Use    Smoking status: Never Smoker    Smokeless tobacco: Never Used   Substance Use Topics    Alcohol use: No     Alcohol/week: 0.0 standard drinks        Family History   Problem Relation Age of Onset    Cancer Mother     Breast Cancer Mother     Cancer Father     Cancer Sister     Cancer Brother         pancreas    Breast Cancer Maternal Aunt         Objective:       Visit Vitals  BP (!) 148/55 (BP 1 Location: Right arm, BP Patient Position: Supine)   Pulse 77   Temp 97.6 °F (36.4 °C)   Resp 16   Ht 5' 3\" (1.6 m)   Wt 65.8 kg (145 lb)   SpO2 97%   BMI 25.69 kg/m²        Temp (24hrs), Av.2 °F (36.8 °C), Min:97.6 °F (36.4 °C), Max:98.7 °F (37.1 °C)      Oxygen Therapy  O2 Sat (%): 97 % (21 1318)  Pulse via Oximetry: 90 beats per minute (21 1025)  O2 Device: Room air (21 1318)      Physical Exam:    General:    Alert, cooperative, no distress, appears stated age. Eyes:   PERRLA EOMI Anicteric  Head:   Normocephalic, without obvious abnormality, atraumatic. ENT:  Nares normal. No drainage. Lungs:   Clear to auscultation bilaterally. No Wheezing or Rhonchi. No rales. Heart:   Regular rate and rhythm,  no murmur, rub or gallop. No JVD. Abdomen:   Soft, non-tender. Not distended. Bowel sounds normal.   MSK:  No edema. No clubbing or cyanosis. No deformities/lesions. Skin:     Texture, turgor normal. No rashes or lesions. No Jaundice. Neurologic: Alert and oriented x 3, no focal deficits   Psychiatry:      No depression/anxiety. Mood congruent for context. Heme/Lymph/Immune: No echymoses or overt signs of bleeding.      Lab/Data Review:  Recent Results (from the past 24 hour(s))   EKG, 12 LEAD, INITIAL    Collection Time: 03/29/21  7:52 AM   Result Value Ref Range    Ventricular Rate 88 BPM    Atrial Rate 277 BPM    QRS Duration 80 ms    Q-T Interval 380 ms    QTC Calculation (Bezet) 459 ms    Calculated R Axis 53 degrees    Calculated T Axis 52 degrees    Diagnosis       !! AGE AND GENDER SPECIFIC ECG ANALYSIS !! Atrial fibrillation  Possible Anterior infarct (cited on or before 06-OCT-2019)  Abnormal ECG  When compared with ECG of 16-NOV-2020 12:20,  Atrial fibrillation has replaced Sinus rhythm  Vent. rate has increased BY  33 BPM  Questionable change in QRS axis  Nonspecific T wave abnormality no longer evident in Anterior leads  Confirmed by ST ISIDRO JOHNSON MD (), CARISSA YANEZ ([id number]) on 3/29/2021 12:43:14 PM     CBC WITH AUTOMATED DIFF    Collection Time: 03/29/21  7:55 AM   Result Value Ref Range    WBC 9.2 4.3 - 11.1 K/uL    RBC 3.97 (L) 4.05 - 5.2 M/uL    HGB 11.0 (L) 11.7 - 15.4 g/dL    HCT 33.8 (L) 35.8 - 46.3 %    MCV 85.1 79.6 - 97.8 FL    MCH 27.7 26.1 - 32.9 PG    MCHC 32.5 31.4 - 35.0 g/dL    RDW 14.0 11.9 - 14.6 %    PLATELET 972 234 - 468 K/uL    MPV 10.7 9.4 - 12.3 FL    ABSOLUTE NRBC 0.00 0.0 - 0.2 K/uL    DF AUTOMATED      NEUTROPHILS 75 43 - 78 %    LYMPHOCYTES 13 13 - 44 %    MONOCYTES 10 4.0 - 12.0 %    EOSINOPHILS 1 0.5 - 7.8 %    BASOPHILS 0 0.0 - 2.0 %    IMMATURE GRANULOCYTES 1 0.0 - 5.0 %    ABS. NEUTROPHILS 6.9 1.7 - 8.2 K/UL    ABS. LYMPHOCYTES 1.2 0.5 - 4.6 K/UL    ABS. MONOCYTES 0.9 0.1 - 1.3 K/UL    ABS. EOSINOPHILS 0.1 0.0 - 0.8 K/UL    ABS. BASOPHILS 0.0 0.0 - 0.2 K/UL    ABS. IMM.  GRANS. 0.1 0.0 - 0.5 K/UL   METABOLIC PANEL, COMPREHENSIVE    Collection Time: 03/29/21  7:55 AM   Result Value Ref Range    Sodium 141 136 - 145 mmol/L    Potassium 3.8 3.5 - 5.1 mmol/L    Chloride 112 (H) 98 - 107 mmol/L    CO2 20 (L) 21 - 32 mmol/L    Anion gap 9 7 - 16 mmol/L    Glucose 129 (H) 65 - 100 mg/dL    BUN 11 8 - 23 MG/DL    Creatinine 0.67 0.6 - 1.0 MG/DL GFR est AA >60 >60 ml/min/1.73m2    GFR est non-AA >60 >60 ml/min/1.73m2    Calcium 8.7 8.3 - 10.4 MG/DL    Bilirubin, total 0.7 0.2 - 1.1 MG/DL    ALT (SGPT) 25 12 - 65 U/L    AST (SGOT) 19 15 - 37 U/L    Alk. phosphatase 79 50 - 130 U/L    Protein, total 7.3 6.3 - 8.2 g/dL    Albumin 2.8 (L) 3.2 - 4.6 g/dL    Globulin 4.5 (H) 2.3 - 3.5 g/dL    A-G Ratio 0.6 (L) 1.2 - 3.5     CK    Collection Time: 03/29/21  7:57 AM   Result Value Ref Range    CK 81 21 - 215 U/L   URINALYSIS W/ RFLX MICROSCOPIC    Collection Time: 03/29/21  8:29 AM   Result Value Ref Range    Color YELLOW      Appearance CLEAR      Specific gravity 1.007 1.001 - 1.023      pH (UA) 7.5 5.0 - 9.0      Protein Negative NEG mg/dL    Glucose Negative mg/dL    Ketone Negative NEG mg/dL    Bilirubin Negative NEG      Blood Negative NEG      Urobilinogen 0.2 0.2 - 1.0 EU/dL    Nitrites Negative NEG      Leukocyte Esterase SMALL (A) NEG      WBC 5-10 0 /hpf    RBC 0-3 0 /hpf    Epithelial cells 0-3 0 /hpf    Bacteria 0 0 /hpf    Casts 0 0 /lpf       Imaging:  Ct Head Wo Cont    Result Date: 3/29/2021  CT HEAD WITHOUT CONTRAST, 3/29/2021 History: Passed out hitting head this morning. Comparison: None Technique:   5 mm axial scans from the skull base to the vertex. All CT scans performed at this facility use one or all of the following: Automated exposure control, adjustment of the mA and/or kVp according to patient's size, iterative reconstruction. Findings:  No evidence of intracranial hemorrhage is seen. No abnormal extra-axial fluid collections are seen. Mild to moderate age-appropriate chronic cortical volume loss is seen. No evidence for acute hydrocephalus is seen. No evidence of midline shift or herniation is seen. No abnormal edema pattern is seen in a vascular distribution to suggest large artery infarction. Evaluation with bone windows shows no acute osseous changes.   The visualized sinuses, middle ears, and mastoid air cells are well aerated. 1.  No acute intracranial process evident by noncontrast CT study of the head. This report was made using voice transcription. Despite my best efforts to avoid any, transcription errors may persist. If there is any question about the accuracy of the report or need for clarification, then please call [phone], or text me through perfectserv for clarification or correction. Cardiac Studies:  EKG Results     Procedure 720 Value Units Date/Time    EKG 12 LEAD INITIAL [[id number]] Collected: 03/29/21 0752    Order Status: Completed Updated: 03/29/21 1243     Ventricular Rate 88 BPM      Atrial Rate 277 BPM      QRS Duration 80 ms      Q-T Interval 380 ms      QTC Calculation (Bezet) 459 ms      Calculated R Axis 53 degrees      Calculated T Axis 52 degrees      Diagnosis --     !! AGE AND GENDER SPECIFIC ECG ANALYSIS !! Atrial fibrillation  Possible Anterior infarct (cited on or before 06-OCT-2019)  Abnormal ECG  When compared with ECG of 16-NOV-2020 12:20,  Atrial fibrillation has replaced Sinus rhythm  Vent. rate has increased BY  33 BPM  Questionable change in QRS axis  Nonspecific T wave abnormality no longer evident in Anterior leads  Confirmed by ST ISIDRO JOHNSON MD (), CARISSA YANEZ ([id number]) on 3/29/2021 12:43:14 PM            No results found for this visit on 03/29/21. Cultures:   All Micro Results     Procedure Component Value Units Date/Time    CULTURE, URINE [[id number]]     Order Status: Sent Specimen: Cath Urine           Active Problems:     Principal Diagnosis Syncope and collapse    Hospital Problems as of 3/29/2021 Date Reviewed: 12/16/2020          Codes Class Noted - Resolved POA    * (Principal) Syncope and collapse ICD-10-CM: R55  ICD-9-CM: 780.2  3/29/2021 - Present Yes        Acute UTI ICD-10-CM: N39.0  ICD-9-CM: 599.0  3/29/2021 - Present Yes        Essential hypertension ICD-10-CM: I10  ICD-9-CM: 401.9  11/19/2020 - Present Yes        Anemia ICD-10-CM: D64.9  ICD-9-CM: 285.9 4/12/2019 - Present Yes        Paroxysmal atrial fibrillation ([address] ICD-10-CM: I48.0  ICD-9-CM: 427.31  1/14/2013 - Present Yes    Overview Addendum 12/11/2020 12:59 PM by Chica Gooden MD     Paroxysmal   1. 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 2.  Holter (11/19/20): Sinus rhythm. Rare PACs/PVCs. Short runs of atrial tachycardia. No sustained atrial fibrillation.               Presence of Watchman left atrial appendage closure device ICD-10-CM: Z95.818  ICD-9-CM: V45.09  1/23/2020 - Present Yes        Sick sinus syndrome (HCC) ICD-10-CM: I49.5  ICD-9-CM: 427.81  1/7/2016 - Present Yes    Overview Signed 1/7/2016  2:46 PM by Karlos EVERETT     Tachycardia-Bradycardia             Coronary atherosclerosis of native coronary artery ICD-10-CM: I25.10  ICD-9-CM: 414.01  11/13/2015 - Present Yes        GERD (gastroesophageal reflux disease) ICD-10-CM: K21.9  ICD-9-CM: 530.81  3/6/2014 - Present Yes        Depression ICD-10-CM: F32.9  ICD-9-CM: 616  1/14/2013 - Present Yes        History of CVA (cerebrovascular accident) ICD-10-CM: Z86.73  ICD-9-CM: V12.54  9/3/2019 - Present Yes        Allergic rhinitis ICD-10-CM: J30.9  ICD-9-CM: 477.9  1/14/2013 - Present Yes              Anticipated discharge: Home in 48 hours (after urine culture result)    Hester Mcardle, DO  [address] [facility]  3/29/2021 11:57 AM

## 2021-03-29 NOTE — PROGRESS NOTES
1253-TRANSFER - IN REPORT:    Verbal report received from [facility]) on Vickie Loser  being received from ED(unit) for routine progression of care      Report consisted of patients Situation, Background, Assessment and   Recommendations(SBAR). Information from the following report(s) ED Summary was reviewed with the receiving nurse. Opportunity for questions and clarification was provided. Assessment will be completed upon patients arrival to unit and care assumed.

## 2021-03-30 ENCOUNTER — HOME HEALTH ADMISSION (OUTPATIENT)
Dept: HOME HEALTH SERVICES | Facility: HOME HEALTH | Age: 86
End: 2021-03-30
Payer: MEDICARE

## 2021-03-30 LAB
ANION GAP SERPL CALC-SCNC: 5 MMOL/L (ref 7–16)
BUN SERPL-MCNC: 12 MG/DL (ref 8–23)
CALCIUM SERPL-MCNC: 8.7 MG/DL (ref 8.3–10.4)
CHLORIDE SERPL-SCNC: 114 MMOL/L (ref 98–107)
CO2 SERPL-SCNC: 23 MMOL/L (ref 21–32)
CREAT SERPL-MCNC: 0.76 MG/DL (ref 0.6–1)
ERYTHROCYTE [DISTWIDTH] IN BLOOD BY AUTOMATED COUNT: 14.2 % (ref 11.9–14.6)
GLUCOSE SERPL-MCNC: 122 MG/DL (ref 65–100)
HCT VFR BLD AUTO: 30.7 % (ref 35.8–46.3)
HGB BLD-MCNC: 10.2 G/DL (ref 11.7–15.4)
MCH RBC QN AUTO: 28 PG (ref 26.1–32.9)
MCHC RBC AUTO-ENTMCNC: 33.2 G/DL (ref 31.4–35)
MCV RBC AUTO: 84.3 FL (ref 79.6–97.8)
NRBC # BLD: 0 K/UL (ref 0–0.2)
PLATELET # BLD AUTO: 271 K/UL (ref 150–450)
PMV BLD AUTO: 10.8 FL (ref 9.4–12.3)
POTASSIUM SERPL-SCNC: 4.2 MMOL/L (ref 3.5–5.1)
RBC # BLD AUTO: 3.64 M/UL (ref 4.05–5.2)
SODIUM SERPL-SCNC: 142 MMOL/L (ref 136–145)
WBC # BLD AUTO: 5.9 K/UL (ref 4.3–11.1)

## 2021-03-30 PROCEDURE — 99218 HC RM OBSERVATION: CPT

## 2021-03-30 PROCEDURE — 74011250637 HC RX REV CODE- 250/637: Performed by: INTERNAL MEDICINE

## 2021-03-30 PROCEDURE — 74011000258 HC RX REV CODE- 258: Performed by: INTERNAL MEDICINE

## 2021-03-30 PROCEDURE — 74011250636 HC RX REV CODE- 250/636: Performed by: INTERNAL MEDICINE

## 2021-03-30 PROCEDURE — 97165 OT EVAL LOW COMPLEX 30 MIN: CPT

## 2021-03-30 PROCEDURE — 74011250637 HC RX REV CODE- 250/637: Performed by: FAMILY MEDICINE

## 2021-03-30 PROCEDURE — 80048 BASIC METABOLIC PNL TOTAL CA: CPT

## 2021-03-30 PROCEDURE — 97116 GAIT TRAINING THERAPY: CPT

## 2021-03-30 PROCEDURE — 97535 SELF CARE MNGMENT TRAINING: CPT

## 2021-03-30 PROCEDURE — 2709999900 HC NON-CHARGEABLE SUPPLY

## 2021-03-30 PROCEDURE — 36415 COLL VENOUS BLD VENIPUNCTURE: CPT

## 2021-03-30 PROCEDURE — 96376 TX/PRO/DX INJ SAME DRUG ADON: CPT

## 2021-03-30 PROCEDURE — 99222 1ST HOSP IP/OBS MODERATE 55: CPT | Performed by: INTERNAL MEDICINE

## 2021-03-30 PROCEDURE — 85027 COMPLETE CBC AUTOMATED: CPT

## 2021-03-30 PROCEDURE — 97161 PT EVAL LOW COMPLEX 20 MIN: CPT

## 2021-03-30 RX ORDER — METOPROLOL SUCCINATE 25 MG/1
12.5 TABLET, EXTENDED RELEASE ORAL
Status: DISCONTINUED | OUTPATIENT
Start: 2021-03-30 | End: 2021-03-31 | Stop reason: HOSPADM

## 2021-03-30 RX ORDER — ESCITALOPRAM OXALATE 10 MG/1
10 TABLET ORAL
Status: DISCONTINUED | OUTPATIENT
Start: 2021-03-30 | End: 2021-03-31 | Stop reason: HOSPADM

## 2021-03-30 RX ORDER — ASPIRIN 81 MG/1
81 TABLET ORAL
Status: DISCONTINUED | OUTPATIENT
Start: 2021-03-30 | End: 2021-03-31 | Stop reason: HOSPADM

## 2021-03-30 RX ORDER — LISINOPRIL 5 MG/1
10 TABLET ORAL DAILY
Status: DISCONTINUED | OUTPATIENT
Start: 2021-03-31 | End: 2021-03-31 | Stop reason: HOSPADM

## 2021-03-30 RX ORDER — TEMAZEPAM 15 MG/1
15 CAPSULE ORAL
Status: DISCONTINUED | OUTPATIENT
Start: 2021-03-30 | End: 2021-03-30

## 2021-03-30 RX ADMIN — CEFTRIAXONE 1 G: 1 INJECTION, POWDER, FOR SOLUTION INTRAMUSCULAR; INTRAVENOUS at 14:06

## 2021-03-30 RX ADMIN — LISINOPRIL 20 MG: 20 TABLET ORAL at 07:59

## 2021-03-30 RX ADMIN — Medication 10 ML: at 14:08

## 2021-03-30 RX ADMIN — FLUTICASONE PROPIONATE 2 SPRAY: 50 SPRAY, METERED NASAL at 08:01

## 2021-03-30 RX ADMIN — Medication 10 ML: at 05:24

## 2021-03-30 RX ADMIN — METOPROLOL SUCCINATE 12.5 MG: 25 TABLET, EXTENDED RELEASE ORAL at 20:12

## 2021-03-30 RX ADMIN — Medication 81 MG: at 20:12

## 2021-03-30 RX ADMIN — SODIUM CHLORIDE 75 ML/HR: 900 INJECTION, SOLUTION INTRAVENOUS at 14:09

## 2021-03-30 RX ADMIN — AMLODIPINE BESYLATE 5 MG: 5 TABLET ORAL at 08:00

## 2021-03-30 RX ADMIN — Medication 10 ML: at 20:12

## 2021-03-30 RX ADMIN — PANTOPRAZOLE SODIUM 40 MG: 40 TABLET, DELAYED RELEASE ORAL at 08:00

## 2021-03-30 RX ADMIN — LEVOTHYROXINE SODIUM 50 MCG: 0.05 TABLET ORAL at 07:59

## 2021-03-30 RX ADMIN — TEMAZEPAM 15 MG: 15 CAPSULE ORAL at 22:08

## 2021-03-30 RX ADMIN — ESCITALOPRAM OXALATE 10 MG: 10 TABLET ORAL at 20:12

## 2021-03-30 RX ADMIN — MULTIPLE VITAMINS W/ MINERALS TAB 1 TABLET: TAB at 07:59

## 2021-03-30 NOTE — PROGRESS NOTES
Problem: Self Care Deficits Care Plan (Adult)  Goal: *Acute Goals and Plan of Care (Insert Text)  Outcome: Progressing Towards Goal     OCCUPATIONAL THERAPY: Initial Assessment, Daily Note, Discharge, and AM 3/30/2021  OBSERVATION:    [place] / [place] / Product Type: Medicare /      NAME/AGE/GENDER: Zhanna Joyner is a 80 y.o. female   PRIMARY DIAGNOSIS:  Syncope and collapse [R55] Syncope and collapse Syncope and collapse        ICD-10: Treatment Diagnosis:    · Other lack of cordination (R27.8)   Precautions/Allergies:     Adhesive      ASSESSMENT:     Ms. Robin Lancaster admitted with above diagnosis. Pt presents independent with self care and SBA for functional mobility. Pt used rolling walker. No skilled OT indicated at this time. Will discharge OT. Pt independent with toileting and grooming this am.     This section established at most recent assessment   PROBLEM LIST (Impairments causing functional limitations):   INTERVENTIONS PLANNED: (Benefits and precautions of occupational therapy have been discussed with the patient.)     TREATMENT PLAN: Frequency/Duration: discharge OT. Rehabilitation Potential For Stated Goals: discharge OT     REHAB RECOMMENDATIONS (at time of discharge pending progress):    Placement: It is my opinion, based on this patient's performance to date, that Ms. Robin Lancaster may benefit from being discharged with NO further skilled therapy due to a proven ability to function at baseline.   Equipment:    None at this time              OCCUPATIONAL PROFILE AND HISTORY:   History of Present Injury/Illness (Reason for Referral):  syncope  Past Medical History/Comorbidities:   Ms. Robin Lancaster  has a past medical history of Abnormal cardiovascular function study (1/7/2016), Allergic rhinitis (1/14/2013), Anemia (11/13/2015), Arthritis, Atrial fibrillation ([facility] [address] (1/14/2013), Blind left eye, CAD (coronary artery disease), native coronary artery (May 2014), Depression (1/14/2013), GERD (gastroesophageal reflux disease), Hypercholesteremia (1/14/2013), Hypertension, Hypothyroidism (1/14/2013), Osteoarthritis of back (1/2/2014), Osteoporosis, Pulmonary embolism ([facility] [address] (2009), Sick sinus syndrome ([facility] [address] (1/7/2016), and Stroke [facility]). Ms. Pete Telles  has a past surgical history that includes hx appendectomy (1965); hx hysterectomy (1970); hx tonsil and adenoidectomy (1934); hx heent; hx orthopaedic (2006 and 2008); hx cataract removal (Right, 2010); hx coronary stent placement (05/2014); hx heart catheterization (09/17/2015); hx knee replacement (2009); hx knee replacement (Left, 01/12/2017); hx breast biopsy (Bilateral); colonoscopy (N/A, 7/16/2018); and hx endoscopy (04/12/2019). Social History/Living Environment:   Home Environment: Independent living  One/Two Story Residence: One story  Living Alone: Yes  Support Systems: Child(celena), Family member(s)  Patient Expects to be Discharged to[de-identified] Independent living facility  Current DME Used/Available at Home: None  Prior Level of Function/Work/Activity:  Independent; lives at [facility] in [facility].  Living      Number of Personal Factors/Comorbidities that affect the Plan of Care: Brief history (0):  LOW COMPLEXITY   ASSESSMENT OF OCCUPATIONAL PERFORMANCE[de-identified]   Activities of Daily Living:   Basic ADLs (From Assessment) Complex ADLs (From Assessment)   Feeding: Independent  Oral Facial Hygiene/Grooming: Independent  Bathing: Independent  Upper Body Dressing: Independent  Lower Body Dressing: Independent  Toileting: Independent     Grooming/Bathing/Dressing Activities of Daily Living     Cognitive Retraining  Safety/Judgement: Awareness of environment                 Functional Transfers  Bathroom Mobility: Stand-by assistance  Toilet Transfer : Stand-by assistance  Shower Transfer: Stand-by assistance     Bed/Mat Mobility  Supine to Sit: Independent  Sit to Supine: Independent  Sit to Stand: Stand-by assistance  Stand to Sit: Stand-by assistance  Bed to Chair: Stand-by assistance  Scooting: Independent     Most Recent Physical Functioning:   Gross Assessment:  AROM: Within functional limits(BUE)  Strength: Within functional limits(BUE)  Coordination: Within functional limits(BUE)  Tone: Normal  Sensation: Intact               Posture:     Balance:  Sitting: Intact  Standing: Intact; With support Bed Mobility:  Supine to Sit: Independent  Sit to Supine: Independent  Scooting: Independent  Wheelchair Mobility:     Transfers:  Sit to Stand: Stand-by assistance  Stand to Sit: Stand-by assistance  Bed to Chair: Stand-by assistance            Patient Vitals for the past 6 hrs:   BP BP Patient Position SpO2 Pulse   03/30/21 0720 137/71 Sitting 97 % 60   03/30/21 0800    64       Mental Status  Neurologic State: Alert  Orientation Level: Oriented X4  Cognition: Follows commands  Perception: Appears intact  Perseveration: No perseveration noted  Safety/Judgement: Awareness of environment                          Physical Skills Involved:  1. Balance  2. Strength  3. Activity Tolerance Cognitive Skills Affected (resulting in the inability to perform in a timely and safe manner): 1. none Psychosocial Skills Affected:  1. none   Number of elements that affect the Plan of Care: 1-3:  LOW COMPLEXITY   CLINICAL DECISION MAKING:   [employer] AM-PAC 6 Clicks   Daily Activity Inpatient Short Form  How much help from another person does the patient currently need. .. Total A Lot A Little None   1. Putting on and taking off regular lower body clothing? [] 1   [] 2   [] 3   [x] 4   2. Bathing (including washing, rinsing, drying)? [] 1   [] 2   [] 3   [x] 4   3. Toileting, which includes using toilet, bedpan or urinal?   [] 1   [] 2   [] 3   [x] 4   4. Putting on and taking off regular upper body clothing? [] 1   [] 2   [] 3   [x] 4   5. Taking care of personal grooming such as brushing teeth? [] 1   [] 2   [] 3   [x] 4   6. Eating meals? [] 1   [] 2   [] 3   [x] 4   © 2007, Trustees of [facility] [employer], under license to [employer]. All rights reserved      Score:  Initial: 24 Most Recent: X (Date: -- )    Interpretation of Tool:  Represents activities that are increasingly more difficult (i.e. Bed mobility, Transfers, Gait). Medical Necessity:     · Discharge OT  Reason for Services/Other Comments:  · Discharge OT   Use of outcome tool(s) and clinical judgement create a POC that gives a: LOW COMPLEXITY         TREATMENT:   (In addition to Assessment/Re-Assessment sessions the following treatments were rendered)     Pre-treatment Symptoms/Complaints:  tolerated eval  Pain: Initial:   Pain Intensity 1: 0  Post Session: 0     Self Care: (10): Procedure(s) (per grid) utilized to improve and/or restore self-care/home management as related to toileting and grooming. Required minimal verbal and   cueing to facilitate activities of daily living skills and compensatory activities. Assessment/Reassessment (5)    Braces/Orthotics/Lines/Etc:   · IV  · O2 Device: Room air  Treatment/Session Assessment:    · Response to Treatment:  tolerated well  · Interdisciplinary Collaboration:   o Physical Therapist  o Occupational Therapist  o Registered Nurse  · After treatment position/precautions:   o Supine in bed  o Bed/Chair-wheels locked  o Bed in low position  o Caregiver at bedside  o Call light within reach  o RN notified  o Side rails x 3   · Compliance with Program/Exercises: Compliant all of the time.   · Recommendations/Intent for next treatment session:  discharge OT  Total Treatment Duration:  OT Patient Time In/Time Out  Time In: [facility]  Time Out: Michelle Méndez, OT

## 2021-03-30 NOTE — PROGRESS NOTES
- notified provider that patient would like to adjust a couple medicines to be on her home routine. Okay per MD to move aspirin, lexapro, and metoprolol to before bed.     [id number]- tele notified this rn of pt had two full 6 beat vtach. Provider to place orders. 1- notified provider tele order  and that pt would like to be a DNR, not a full code. Tele order extended to another 24 hrs per provider. Pt stated to this RN that she would like to be a DNR at this time. Provider aware.

## 2021-03-30 NOTE — PROGRESS NOTES
Problem: Mobility Impaired (Adult and Pediatric)  Goal: *Acute Goals and Plan of Care (Insert Text)  Description: STG:  (1.)Ms. Gerber Woods will transfer from bed to chair and chair to bed with MODIFIED INDEPENDENCE using the least restrictive device within 1-4 treatment day(s). (2.)Ms. Gerber Woods will ambulate with MODIFIED INDEPENDENCE for 250 feet with the least restrictive device within 1-4 treatment day(s). ________________________________________________________________________________________________      Outcome: Progressing Towards Goal     PHYSICAL THERAPY: Initial Assessment and AM 3/30/2021  OBSERVATION: PT Visit Days : 1  [place] / Plan[place] B / Product Type: Medicare /       NAME/AGE/GENDER: Anne Marie Webb is a 80 y.o. female   PRIMARY DIAGNOSIS: Syncope and collapse [R55] Syncope and collapse Syncope and collapse        ICD-10: Treatment Diagnosis:    · Generalized Muscle Weakness (M62.81)  · Difficulty in walking, Not elsewhere classified (R26.2)   Precaution/Allergies:  Adhesive      ASSESSMENT:     Ms. Gerber Woods presents with generalized weakness and decreased independence with functional mobility. She lives in independent living at [place], daughter at bedside. She states she has been feeing weaker the last 2 weeks. Her plan is to return to [place] and she plans to move to an [facility] to be closer to her daughter. She did well moving in room. We worked on gait training in the bishop with verbal cues, did half of the walk with a walker and the second half without. Pt admits while walking that she still feels weak and not quite back to her normal. She did better with the walker than without. Returned to room and she got back in the bed and remained supine with needs in reach and daughter at bedside.  Will follow while she is an inpatient and recommend HHPT for follow up on discharge from the hospital.     This section established at most recent assessment   PROBLEM LIST (Impairments causing functional limitations):  1. Decreased Strength  2. Decreased ADL/Functional Activities  3. Decreased Transfer Abilities  4. Decreased Ambulation Ability/Technique  5. Decreased Activity Tolerance   INTERVENTIONS PLANNED: (Benefits and precautions of physical therapy have been discussed with the patient.)  1. Balance Exercise  2. Bed Mobility  3. Gait Training  4. Therapeutic Activites  5. Therapeutic Exercise/Strengthening  6. Transfer Training     TREATMENT PLAN: Frequency/Duration: daily for duration of hospital stay  Rehabilitation Potential For Stated Goals: Good     REHAB RECOMMENDATIONS (at time of discharge pending progress):    Placement: It is my opinion, based on this patient's performance to date, that Ms. Kimberly Lee may benefit from [address] after discharge due to the functional deficits listed above that are likely to improve with skilled rehabilitation because he/she has multiple medical issues that affect his/her functional mobility in the community. Equipment:    None at this time              HISTORY:   History of Present Injury/Illness (Reason for Referral):  PER MD NOTE: Rick Dobson is a 80year old CF with a PMH of HTN, CAD, PAF s/p Watchman device, hypothyroidism, and depression who presented from [facility] after a syncopal episode with a left nini-orbital laceration. She states that she had dysuria and urinary frequency approximately 1 weeks prior so went to Urgent Care where she was started on Keflex. She continued to have dysuria, even after the antibiotics, so she went back to Urgent Care on 3/28/21 and they prescribed her Macrobid. They did not have any cultures. Then on 3/29/21 she got up at 2am to use the restroom and take her Macrobid. She went to the refrigerator and when she opened the door she felt dizzy for a second then passed out. She came back to a minute later and was on the floor with blood near her left eye.  She called [facility] who brought her to the ER. Denies CP/SOB/palpitations. Denies current dizziness. Denies F/C. Denies N/V/D. Still with dysuria and frequency. Past Medical History/Comorbidities:   Ms. Ivet Posada  has a past medical history of Abnormal cardiovascular function study (1/7/2016), Allergic rhinitis (1/14/2013), Anemia (11/13/2015), Arthritis, Atrial fibrillation ([address] (1/14/2013), Blind left eye, CAD (coronary artery disease), native coronary artery (May 2014), Depression (1/14/2013), GERD (gastroesophageal reflux disease), Hypercholesteremia (1/14/2013), Hypertension, Hypothyroidism (1/14/2013), Osteoarthritis of back (1/2/2014), Osteoporosis, Pulmonary embolism ([address] (2009), Sick sinus syndrome ([address] (1/7/2016), and Stroke [address] [address] Ms. Ivet Posada  has a past surgical history that includes hx appendectomy (1965); hx hysterectomy (1970); hx tonsil and adenoidectomy (1934); hx heent; hx orthopaedic (2006 and 2008); hx cataract removal (Right, 2010); hx coronary stent placement (05/2014); hx heart catheterization (09/17/2015); hx knee replacement (2009); hx knee replacement (Left, 01/12/2017); hx breast biopsy (Bilateral); colonoscopy (N/A, 7/16/2018); and hx endoscopy (04/12/2019).   Social History/Living Environment:   Home Environment: [place] [address] Name: Latia Steve  # Steps to Enter: 0  One/Two Story Residence: One story  Living Alone: Yes  Support Systems: Child(celena)  Patient Expects to be Discharged to[de-identified] Independent living facility  Current DME Used/Available at Home: Ihsan Pancake, straight, Walker, rollator, Walker, rolling  Prior Level of Function/Work/Activity:  Pt living at home, independent with mobility and self care, used a cane prn     Number of Personal Factors/Comorbidities that affect the Plan of Care: 0: LOW COMPLEXITY   EXAMINATION:   Most Recent Physical Functioning:   Gross Assessment:  AROM: Generally decreased, functional  Strength: Generally decreased, functional Posture:  Posture (WDL): Within defined limits  Balance:  Sitting: Intact  Standing: Intact  Standing - Static: Good  Standing - Dynamic : Fair Bed Mobility:  Supine to Sit: Independent  Sit to Supine: Independent  Wheelchair Mobility:     Transfers:  Sit to Stand: Stand-by assistance  Stand to Sit: Stand-by assistance  Gait:     Speed/Irena: Delayed  Step Length: Left shortened;Right shortened  Distance (ft): 200 Feet (ft)  Assistive Device: Walker, rolling(100 feet with RW and 100 feet without, better WITH RW)  Interventions: Safety awareness training;Verbal cues  Duration: 8 Minutes      Body Structures Involved:  1. Muscles Body Functions Affected:  1. Movement Related Activities and Participation Affected:  1. Mobility  2. Self Care   Number of elements that affect the Plan of Care: 4+: HIGH COMPLEXITY   CLINICAL PRESENTATION:   Presentation: Stable and uncomplicated: LOW COMPLEXITY   CLINICAL DECISION MAKIN[address] [place] [id number] AM-PAC 6 Clicks   Basic Mobility Inpatient Short Form  How much difficulty does the patient currently have. .. Unable A Lot A Little None   1. Turning over in bed (including adjusting bedclothes, sheets and blankets)? [] 1   [] 2   [] 3   [x] 4   2. Sitting down on and standing up from a chair with arms ( e.g., wheelchair, bedside commode, etc.)   [] 1   [] 2   [] 3   [x] 4   3. Moving from lying on back to sitting on the side of the bed? [] 1   [] 2   [] 3   [x] 4   How much help from another person does the patient currently need. .. Total A Lot A Little None   4. Moving to and from a bed to a chair (including a wheelchair)? [] 1   [] 2   [] 3   [x] 4   5. Need to walk in hospital room? [] 1   [] 2   [x] 3   [] 4   6. Climbing 3-5 steps with a railing? [] 1   [] 2   [x] 3   [] 4   © , Trustees of [address] [place], [place] [id number], under license to [employer].  All rights reserved      Score:  Initial: 22 Most Recent: X (Date: -- )    Interpretation of Tool:  Represents activities that are increasingly more difficult (i.e. Bed mobility, Transfers, Gait). Medical Necessity:     · Patient is expected to demonstrate progress in   · strength and functional technique  ·  to   · decrease assistance required with functional mobility  · . Reason for Services/Other Comments:  · Patient continues to require skilled intervention due to   · Inability to complete functional mobility independently  · . Use of outcome tool(s) and clinical judgement create a POC that gives a: Clear prediction of patient's progress: LOW COMPLEXITY            TREATMENT:   (In addition to Assessment/Re-Assessment sessions the following treatments were rendered)   Pre-treatment Symptoms/Complaints:  none  Pain: Initial:   Pain Intensity 1: 0  Post Session:  0/10   Assessment   Gait Training (8 Minutes):  Gait training to improve and/or restore physical functioning as related to mobility and strength. Ambulated 200 Feet (ft) with   using a Walker, rolling(100 feet with RW and 100 feet without, better WITH RW) and minimal Safety awareness training;Verbal cues     Braces/Orthotics/Lines/Etc:   · IV  · Telemetry monitoring  · O2 Device: Room air  Treatment/Session Assessment:    · Response to Treatment:  pt tolerated well  · Interdisciplinary Collaboration:   o Occupational Therapist  o Registered Nurse  · After treatment position/precautions:   o Supine in bed  o Bed/Chair-wheels locked  o Call light within reach  o Family at bedside   · Compliance with Program/Exercises: Compliant all of the time, Will assess as treatment progresses  · Recommendations/Intent for next treatment session: \"Next visit will focus on advancements to more challenging activities and reduction in assistance provided\".   Total Treatment Duration:  PT Patient Time In/Time Out  Time In: 0940  Time Out: [address], PT

## 2021-03-30 NOTE — PROGRESS NOTES
[facility] Hospitalist Note     Admit Date:  3/29/2021  7:43 AM   Name:  Kehinde Rodriguez   Age:  80 y.o.  :  1929   MRN:  [id number]   PCP:  Noreen Vázquez MD  Treatment Team: Attending Provider: Amandeep Ashraf MD; Care Manager: Henry Franklin, JENNIFER; Physical Therapist: Jose Melendez PT; Staff Nurse: Vashti Buerger, RN    HPI/Subjective:   Kehinde Rodriguez is a 80year old CF with a PMH of HTN, CAD, PAF s/p Watchman device, hypothyroidism, and depression who is presented from [facility] after a syncopal episode with a left nini-orbital laceration. Patient is admitted for syncope work-up and monitoring. Also she was being treated for UTI as an outpatient. 3/30: Patient reports she is feeling better. Denies any active complaint. Denies nausea, vomiting, chest pain, palpitation or shortness of breath. She reports dysuria has been improved. Patient had 6 beats of V. tach x2 on telemetry in a.m. Assessment and Plan:     Syncope:  Patient with sudden onset syncope  Orthostatic normal  CT head unremarkable  EKG unchanged  Had 6 beats of V. tach x 2 on telemetry in a.m., consult cardiology, appreciate recommendation  Echocardiogram with ejection fraction 55 to 60% on 3/29/2021    UTI:  Patient on Keflex x3 days then [facility] for 1 day prior to admission  UA mildly positive  Urine culture ordered, follow-up  Continue ceftriaxone for now    Paroxysmal A. Fib:  Continue Toprol and aspirin  She has watchman device    Essential hypertension:  Continue home meds    Depression:  Continue Lexapro    GERD:  Continue PPI    History of sick sinus syndrome:  Continue remote telemetry    Left periorbital laceration from fall:  Fall precautions    Discharge planning: In 1 to 2 days. Physical therapy consulted.     DVT ppx ordered  Code status:  Full  Estimated LOS:  Greater than 2 midnights  Risk:  high    Hospital Problems as of 3/30/2021 Date Reviewed: 2020          Codes Class Noted - Resolved POA    * (Principal) Syncope and collapse ICD-10-CM: R55  ICD-9-CM: 780.2  3/29/2021 - Present Yes        Acute UTI ICD-10-CM: N39.0  ICD-9-CM: 599.0  3/29/2021 - Present Yes        Essential hypertension ICD-10-CM: I10  ICD-9-CM: 401.9  11/19/2020 - Present Yes        Presence of Watchman left atrial appendage closure device ICD-10-CM: Z95.818  ICD-9-CM: V45.09  1/23/2020 - Present Yes        History of CVA (cerebrovascular accident) ICD-10-CM: Z86.73  ICD-9-CM: V12.54  9/3/2019 - Present Yes        Anemia ICD-10-CM: D64.9  ICD-9-CM: 285.9  4/12/2019 - Present Yes        Sick sinus syndrome ([facility] [address] ICD-10-CM: I49.5  ICD-9-CM: 427.81  1/7/2016 - Present Yes    Overview Signed 1/7/2016  2:46 PM by Jessica EVERETT     Tachycardia-Bradycardia             Coronary atherosclerosis of native coronary artery ICD-10-CM: I25.10  ICD-9-CM: 414.01  11/13/2015 - Present Yes        GERD (gastroesophageal reflux disease) ICD-10-CM: K21.9  ICD-9-CM: 530.81  3/6/2014 - Present Yes        Depression ICD-10-CM: F32.9  ICD-9-CM: 599  1/14/2013 - Present Yes        Paroxysmal atrial fibrillation ([facility] [address] ICD-10-CM: I48.0  ICD-9-CM: 427.31  1/14/2013 - Present Yes    Overview Addendum 12/11/2020 12:59 PM by Randine Nageotte, MD     Paroxysmal   1. 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 2.  Holter (11/19/20): Sinus rhythm. Rare PACs/PVCs. Short runs of atrial tachycardia. No sustained atrial fibrillation. Allergic rhinitis ICD-10-CM: J30.9  ICD-9-CM: 477.9  1/14/2013 - Present Yes                10 systems reviewed and negative except as noted in HPI.   Past Medical History:   Diagnosis Date    Abnormal cardiovascular function study 1/7/2016    Allergic rhinitis 1/14/2013    Anemia 11/13/2015    Arthritis     Atrial fibrillation (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)       Past Surgical History:   Procedure Laterality Date    COLONOSCOPY N/A 7/16/2018    COLONOSCOPY performed by Eder Romero MD at [address]  HX BREAST BIOPSY Bilateral     HX CATARACT REMOVAL Right 2010    HX CORONARY STENT PLACEMENT  05/2014    LAD X 3    HX ENDOSCOPY  04/12/2019    gastritis with superficial ulcerations    HX HEART CATHETERIZATION  09/17/2015    HX HEENT      multiple eye surgeries - left - macular hoe, retinal detachment twice,     HX HYSTERECTOMY  1970    HX KNEE REPLACEMENT  2009    HX KNEE REPLACEMENT Left 01/12/2017    HX ORTHOPAEDIC  2006 and 2008    herniated disc    HX TONSIL AND ADENOIDECTOMY  1934      Allergies   Allergen Reactions    Adhesive Rash      Social History     Tobacco Use    Smoking status: Never Smoker    Smokeless tobacco: Never Used   Substance Use Topics    Alcohol use: No     Alcohol/week: 0.0 standard drinks      Family History   Problem Relation Age of Onset    Cancer Mother     Breast Cancer Mother     Cancer Father     Cancer Sister     Cancer Brother         pancreas    Breast Cancer Maternal Aunt       Family history reviewed and noncontributory.   Immunization History   Administered Date(s) Administered    Influenza High Dose Vaccine PF 11/05/2015, 11/09/2016, 11/13/2017, 09/06/2018    Influenza Vaccine 10/17/2013, 10/27/2014    Influenza Vaccine (Tri) Adjuvanted (>65 Yrs FLUAD TRI 20131) 09/25/2019    Pneumococcal Conjugate (PCV-13) 01/15/2016    Pneumococcal Polysaccharide (PPSV-23) 05/16/2018    TB Skin Test (PPD) Intradermal 01/12/2017    Tdap 2014     PTA Medications:  Prior to Admission Medications   Prescriptions Last Dose Informant Patient Reported? Taking? Calcium Carbonate-Vit D3-Min (CALTRATE 600+D PLUS MINERALS) 600 mg calcium- 400 unit Tab 3/29/2021 at Unknown time  Yes Yes   Sig: Take  by mouth daily. Salt Irrigation Solution No.1 (Ocean Complete) aero Not Taking at Unknown time  No No   Si Sprays by Nasal route daily. acetaminophen (TYLENOL ARTHRITIS PAIN) 650 mg CR tablet Not Taking at Unknown time  Yes No   Sig: Take 650 mg by mouth every six (6) hours as needed for Pain. amLODIPine (NORVASC) 5 mg tablet 3/29/2021 at Unknown time  No Yes   Sig: Take 1 Tab by mouth daily. aspirin delayed-release 81 mg tablet 3/28/2021 at Unknown time  No Yes   Sig: Take 1 Tab by mouth daily. escitalopram oxalate (LEXAPRO) 10 mg tablet 3/28/2021 at Unknown time  No Yes   Sig: Take 1 Tab by mouth daily. fluticasone propionate (Flonase Allergy Relief) 50 mcg/actuation nasal spray 2021 at Unknown time  No Yes   Si Sprays by Both Nostrils route daily. levothyroxine (synthroid) 50 mcg tablet 3/29/2021 at Unknown time  No Yes   Sig: Take 1 Tab by mouth Daily (before breakfast). lisinopriL (PRINIVIL, ZESTRIL) 20 mg tablet 3/28/2021 at Unknown time  No Yes   Sig: Take 1 Tab by mouth daily. metoprolol succinate (TOPROL-XL) 25 mg XL tablet Not Taking at Unknown time  No No   Sig: Take 0.5 Tabs by mouth daily. multivitamin (ONE A DAY) tablet 3/29/2021 at Unknown time  Yes Yes   Sig: Take 1 Tab by mouth daily. nitrofurantoin (MACRODANTIN) 25 mg capsule 3/29/2021 at Unknown time  No Yes   Sig: Take 1 Cap by mouth daily. nitroglycerin (NITROSTAT) 0.3 mg SL tablet Not Taking at Unknown time  No No   Si Tab by SubLINGual route every five (5) minutes as needed for Chest Pain.   pantoprazole (PROTONIX) 20 mg tablet Not Taking at Unknown time  No No   Sig: Take 1 Tab by mouth daily.    potassium 99 mg tablet 3/29/2021 at Unknown time  Yes Yes   Sig: Take 99 mg by mouth daily. traZODone (DESYREL) 50 mg tablet 3/28/2021 at Unknown time  Yes Yes   Sig: Take 25-50 mg by mouth. vit A/vit C/vit E/zinc/copper (ICAPS AREDS PO) 3/29/2021 at Unknown time  Yes Yes   Sig: Take 1 Cap by mouth two (2) times a day. vit a,c & e-lutein-minerals (OCUVITE) tablet 3/29/2021 at Unknown time  Yes Yes   Sig: daily. Facility-Administered Medications: None       Objective:     Patient Vitals for the past 24 hrs:   Temp Pulse Resp BP SpO2   03/30/21 0800  64      03/30/21 0720 98 °F (36.7 °C) 60 16 137/71 97 %   03/30/21 0400  64      03/30/21 0305 98.1 °F (36.7 °C) 63 16 138/76 97 %   03/30/21 0000  70      03/29/21 2250 98 °F (36.7 °C) 68 16 135/65 98 %   03/29/21 2000  68      03/29/21 1850 98 °F (36.7 °C) 66 16 124/75 98 %   03/29/21 1530 97.9 °F (36.6 °C) 80 16 102/84 98 %   03/29/21 1318 97.6 °F (36.4 °C) 77 16 (!) 148/55 97 %     Oxygen Therapy  O2 Sat (%): 97 % (03/30/21 0720)  Pulse via Oximetry: 90 beats per minute (03/29/21 1025)  O2 Device: Room air (03/30/21 0720)    Estimated body mass index is 25.69 kg/m² as calculated from the following:    Height as of this encounter: 5' 3\" (1.6 m). Weight as of this encounter: 65.8 kg (145 lb). Intake/Output Summary (Last 24 hours) at 3/30/2021 1133  Last data filed at 3/30/2021 0830  Gross per 24 hour   Intake 1618 ml   Output 800 ml   Net 818 ml       *Note that automatically entered I/Os may not be accurate; dependent on patient compliance with collection and accurate  by assistants. Physical Exam:  General:    Well nourished. Alert. Eyes:   Normal sclerae. Extraocular movements intact. HENT:  Normocephalic, atraumatic. Moist mucous membranes  CV:   RRR. No m/r/g. Lungs:  CTAB. No wheezing, rhonchi, or rales. Abdomen: Soft, nontender, nondistended. Extremities: Warm and dry. No cyanosis or edema. Neurologic: CN II-XII grossly intact.   Sensation intact. Skin:     No rashes or jaundice. Normal coloration  Psych:  Normal mood and affect. I reviewed the labs, imaging, EKGs, telemetry, and other studies done this admission. Data Reviewed:   Recent Results (from the past 24 hour(s))   TSH 3RD GENERATION    Collection Time: 03/29/21  2:38 PM   Result Value Ref Range    TSH 1.050 uIU/mL   MAGNESIUM    Collection Time: 03/29/21  2:38 PM   Result Value Ref Range    Magnesium 2.2 1.8 - 2.4 mg/dL   T4, FREE    Collection Time: 03/29/21  2:38 PM   Result Value Ref Range    T4, Free 1.3 0.9 - 1.8 NG/DL   METABOLIC PANEL, BASIC    Collection Time: 03/30/21  5:40 AM   Result Value Ref Range    Sodium 142 136 - 145 mmol/L    Potassium 4.2 3.5 - 5.1 mmol/L    Chloride 114 (H) 98 - 107 mmol/L    CO2 23 21 - 32 mmol/L    Anion gap 5 (L) 7 - 16 mmol/L    Glucose 122 (H) 65 - 100 mg/dL    BUN 12 8 - 23 MG/DL    Creatinine 0.76 0.6 - 1.0 MG/DL    GFR est AA >60 >60 ml/min/1.73m2    GFR est non-AA >60 >60 ml/min/1.73m2    Calcium 8.7 8.3 - 10.4 MG/DL   CBC W/O DIFF    Collection Time: 03/30/21  5:40 AM   Result Value Ref Range    WBC 5.9 4.3 - 11.1 K/uL    RBC 3.64 (L) 4.05 - 5.2 M/uL    HGB 10.2 (L) 11.7 - 15.4 g/dL    HCT 30.7 (L) 35.8 - 46.3 %    MCV 84.3 79.6 - 97.8 FL    MCH 28.0 26.1 - 32.9 PG    MCHC 33.2 31.4 - 35.0 g/dL    RDW 14.2 11.9 - 14.6 %    PLATELET 003 656 - 568 K/uL    MPV 10.8 9.4 - 12.3 FL    ABSOLUTE NRBC 0.00 0.0 - 0.2 K/uL       All Micro Results     Procedure Component Value Units Date/Time    CULTURE, URINE [[id number]] Collected: 03/29/21 0829    Order Status: Completed Specimen: Cath Urine Updated: 03/30/21 0992     Special Requests: NO SPECIAL REQUESTS        Culture result:       NO GROWTH AFTER SHORT PERIOD OF INCUBATION. FURTHER RESULTS TO FOLLOW AFTER OVERNIGHT INCUBATION.                 Current Facility-Administered Medications   Medication Dose Route Frequency    aspirin delayed-release tablet 81 mg  81 mg Oral QHS    metoprolol succinate (TOPROL-XL) XL tablet 12.5 mg  12.5 mg Oral QHS    escitalopram oxalate (LEXAPRO) tablet 10 mg  10 mg Oral QHS    acetaminophen (TYLENOL) tablet 650 mg  650 mg Oral Q6H PRN    amLODIPine (NORVASC) tablet 5 mg  5 mg Oral DAILY    fluticasone propionate (FLONASE) 50 mcg/actuation nasal spray 2 Spray  2 Spray Both Nostrils DAILY    levothyroxine (SYNTHROID) tablet 50 mcg  50 mcg Oral ACB    lisinopriL (PRINIVIL, ZESTRIL) tablet 20 mg  20 mg Oral DAILY    multivitamin, tx-iron-ca-min (THERA-M w/ IRON) tablet 1 Tab  1 Tab Oral DAILY    pantoprazole (PROTONIX) tablet 40 mg  40 mg Oral DAILY    traZODone (DESYREL) tablet 25 mg  25 mg Oral QHS PRN    sodium chloride (NS) flush 5-40 mL  5-40 mL IntraVENous Q8H    sodium chloride (NS) flush 5-40 mL  5-40 mL IntraVENous PRN    0.9% sodium chloride infusion  75 mL/hr IntraVENous CONTINUOUS    oxyCODONE IR (ROXICODONE) tablet 5 mg  5 mg Oral Q4H PRN    cefTRIAXone (ROCEPHIN) 1 g in 0.9% sodium chloride (MBP/ADV) 50 mL MBP  1 g IntraVENous Q24H    enoxaparin (LOVENOX) injection 30 mg  30 mg SubCUTAneous Q24H       Other Studies:  Results for orders placed or performed during the hospital encounter of 21   2D ECHO COMPLETE ADULT (TTE) [address]  [phone]    Transthoracic Echocardiogram  2D, M-mode, Doppler, and Color Doppler    Patient: Vince Alejandra  MR #: [id number]  : 20-Sep-1929  Age: 80 years  Gender: Female  Study date: 29-Mar-2021  Account #: [de-identified]  Height: 63 in  Weight: 144.8 lb  BSA: 1.69 mï¾²  Status:Routine  Location: [facility]  BP: 148/ 55    Allergies: ADHESIVE    Sonographer:  Yadiel López  Group:  [facility]  Referring Physician:  Claudia Guzman MD  Reading Physician:  Rola Amaya MD    INDICATIONS: Syncope. PROCEDURE: This was a routine study. A transthoracic echocardiogram was  performed.  The study included complete 2D imaging, M-mode, complete spectral  Doppler, and color Doppler. Intravenous contrast (Definity) was administered. Image quality was adequate. LEFT VENTRICLE: Size was normal. Systolic function was normal. Ejection  fraction was estimated in the range of 55 % to 60 %. There were no regional  wall motion abnormalities. Wall thickness was normal. Avg. E/e'= 13.7. Left  ventricular diastolic function parameters were normal.    RIGHT VENTRICLE: The size was normal. Systolic function was normal. Estimated  peak pressure was in the range of 25-30 mmHg. LEFT ATRIUM: Size was normal.    RIGHT ATRIUM: Size was normal.    SYSTEMIC VEINS: IVC: The inferior vena cava was normal in size. The  respirophasic change in diameter was more than 50%. AORTIC VALVE: The valve was trileaflet. There was no evidence for stenosis. There was mild to moderate regurgitation. MITRAL VALVE: There was mild to moderate annular calcification. There was no  evidence for stenosis. There was mild regurgitation. TRICUSPID VALVE: The valve structure was normal. There was no evidence for  stenosis. There was mild regurgitation. PULMONIC VALVE: The valve structure was normal. There was no evidence for  stenosis. There was mild regurgitation. PERICARDIUM: There was no pericardial effusion. AORTA: The root exhibited normal size. SUMMARY:    -  Left ventricle: Systolic function was normal. Ejection fraction was  estimated in the range of 55 % to 60 %. There were no regional wall motion  abnormalities. -  Inferior vena cava, hepatic veins: The respirophasic change in diameter   was  more than 50%. -  Aortic valve: There was mild to moderate regurgitation.    -  Mitral valve: There was mild to moderate annular calcification. There was  mild regurgitation.    -  Tricuspid valve: There was mild regurgitation.     SYSTEM MEASUREMENT TABLES    2D mode  AoR Diam (2D): 3.1 cm  LA Dimension (2D): 3 cm  Left Atrium Systolic Volume Index; Method of Disks, Biplane; 2D mode;: 24.9  ml/m2  IVS/LVPW (2D): 1.1  IVSd (2D): 1.1 cm  LVIDd (2D): 4.5 cm  LVIDs (2D): 2.5 cm  LVPWd (2D): 1 cm  RVIDd (2D): 2.7 cm    Tissue Doppler Imaging  LV Peak Early Cooper Tissue Abilio; Lateral MA (TDI): 9.3 cm/s  LV Peak Early Cooper Tissue Abilio; Medial MA (TDI): 7.8 cm/s    Unspecified Scan Mode  Peak Grad; Mean; Antegrade Flow: 3 mm[Hg]  Vmax; Antegrade Flow: 85.4 cm/s  MV Peak Abilio/LV Peak Tissue Abilio E-Wave; Lateral MA: 12.5  MV Peak Abilio/LV Peak Tissue Abilio E-Wave; Medial MA: 14.9    Prepared and signed by    Eleonora Singleton MD  Signed 29-Mar-2021 16:12:40         No results found. SARS-CoV-2 Lab Results  \"Novel Coronavirus\" Test: No results found for: COV2NT   \"Emergent Disease\" Test: No results found for: EDPR  \"SARS-COV-2\" Test: No results found for: XGCOVT  Rapid Test: No results found for: COVR            Part of this note was written by using a voice dictation software and the note has been proof read but may still contain some grammatical/other typographical errors.     Signed:  Jeromy Fernandez MD

## 2021-03-30 NOTE — H&P
[facility] CARDIOLOGY History &Physical                   Subjective:     HPI:  Ana Maria Bah is a 80 y.o. female with PHM of known paroxysmal atrial fibrillation status post left atrial appendage occlusion with Watchman device (5/21/2019), HTN, dizziness, hypothyroidism, GERD, anemia, and depression who presented to [facility] ED on 3/29 from [employer] after syncopal episode. Patient reports waking up around 2 am to use bathroom and states while getting drink in kitchen she had sudden onset of dizziness and passed out. She denies any clear exacerbating factors otherwise. No vision changes, she injured the right periorbital region of head with bruising. EMS called and was brought to ED for further evaluation. Pt denies any palpitations. Upon evaluation in ED, WBC 5.9, H/H 10.230.7, ptl 271, Na 142, K 4.2, and BUN/Cr 12/0.76. CT head showed no acute intracranial process. Patient was admitted to hospitalist service for syncope. During admission patient was noted on telemetry to have 6 beats of NSVT x 2 therefore Cardiology was consulted for further evaluation. At this time she has remained hemodynamically and electrically stable, no other arrhythmia noted , minimal telemetry data available for review. Denies chest pain, trouble breathing, leg edema, or other complaints at this time.        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    CAD (coronary artery disease), native coronary artery May 2014    stent to LAD; 3 stents total    Depression 1/14/2013    GERD (gastroesophageal reflux disease)     Hypercholesteremia 1/14/2013    Hypertension     Hypothyroidism 1/14/2013    Osteoarthritis of back 1/2/2014    Osteoporosis     Pulmonary embolism ([facility] 2009    after knee surgery    Sick sinus syndrome ([facility] 1/7/2016    Tachycardia-Bradycardia    Stroke [facility])       Past Surgical History:   Procedure Laterality Date    COLONOSCOPY N/A 7/16/2018    COLONOSCOPY performed by Rick Page MD at [address]  HX BREAST BIOPSY Bilateral     HX CATARACT REMOVAL Right 2010    HX CORONARY STENT [facility]  05/2014    LAD X 3    HX ENDOSCOPY  04/12/2019    gastritis with superficial ulcerations    HX HEART CATHETERIZATION  09/17/2015    HX HEENT      multiple eye surgeries - left - macular hoe, retinal detachment twice,     HX HYSTERECTOMY  1970    HX KNEE REPLACEMENT  2009    HX KNEE REPLACEMENT Left 01/12/2017    HX ORTHOPAEDIC  2006 and 2008    herniated disc    HX TONSIL AND ADENOIDECTOMY  1934      Family History   Problem Relation Age of Onset    Cancer Mother     Breast Cancer Mother     Cancer Father     Cancer Sister     Cancer Brother         pancreas    Breast Cancer Maternal Aunt       Social History     Tobacco Use    Smoking status: Never Smoker    Smokeless tobacco: Never Used   Substance Use Topics    Alcohol use: No     Alcohol/week: 0.0 standard drinks      Allergies   Allergen Reactions    Adhesive Rash         Review of Systems   Constitution: Negative. HENT: Negative. Eyes: Negative for blurred vision and double vision. Cardiovascular: Positive for near-syncope and syncope. Negative for chest pain, dyspnea on exertion, irregular heartbeat and leg swelling. Respiratory: Negative. Negative for cough and shortness of breath. Gastrointestinal: Negative for nausea and vomiting.    Genitourinary: Recent UTI. Neurological: Positive for dizziness and headaches. All other systems reviewed and are negative. Objective:       Visit Vitals  BP 96/60 (BP 1 Location: Right arm, BP Patient Position: Sitting)   Pulse 74   Temp 98 °F (36.7 °C)   Resp 16   Ht 5' 3\" (1.6 m)   Wt 136 lb 6.4 oz (61.9 kg)   SpO2 96%   BMI 24.16 kg/m²       03/30 0701 - 03/30 1900  In: 2023 [P.O.:970; I.V.:1053]  Out: 1000 [Urine:1000]  03/28 1901 - 03/30 0700  In: 0130 [P.O.:300; I.V.:958]  Out: 500 [Urine:500]    Physical Exam   Constitutional: She is oriented to person, place, and time. She appears well-developed and well-nourished. No distress. HENT:   Head: Normocephalic. Nose: Nose normal.   Left periorbital bruising. Eyes: Conjunctivae are normal. Right eye exhibits no discharge. Left eye exhibits no discharge. No scleral icterus. Neck: Normal range of motion. No JVD present. No tracheal deviation present. Cardiovascular: Normal rate, regular rhythm, S1 normal, S2 normal and normal heart sounds. Exam reveals no gallop and no friction rub. No murmur heard. Pulmonary/Chest: Effort normal and breath sounds normal. No respiratory distress. She has no wheezes. She has no rales. Abdominal: Soft. She exhibits no distension. Musculoskeletal: Normal range of motion. General: No edema (of legs bilaterally). Neurological: She is alert and oriented to person, place, and time. Skin: Skin is warm and dry. No rash noted. She is not diaphoretic. No pallor. Psychiatric: She has a normal mood and affect. Her behavior is normal. Thought content normal.   Vitals reviewed.       Data Review:   Recent Labs     03/30/21  0540 03/29/21  1438 03/29/21  0755     --  141   K 4.2  --  3.8   MG  --  2.2  --    BUN 12  --  11   CREA 0.76  --  0.67   *  --  129*   WBC 5.9  --  9.2   HGB 10.2*  --  11.0*   HCT 30.7*  --  33.8*     --  260         Echo Results  (Last 48 hours)               03/29/21 0000  2D ECHO COMPLETE ADULT (TTE) W OR WO CONTR Final result    Narrative: [address], [address] [facility]   [phone]       Transthoracic Echocardiogram   2D, M-mode, Doppler, and Color Doppler       Patient: Eladia Moran   MR #: [id number]   : 20-Sep-1929   Age: 80 years   Gender: Female   Study date: 29-Mar-2021   Account #: [de-identified]   Height: 63 in   Weight: 144.8 lb   BSA: 1.69 mï¾²   Status:Routine   Location: [facility]   BP: 148/ 55       Allergies: ADHESIVE       Sonographer:  Romeo Range   Group:  [facility]   Referring Physician:  Anne Marie Dee MD   Reading Physician:  Dewain Lesches, MD       INDICATIONS: Syncope. PROCEDURE: This was a routine study. A transthoracic echocardiogram was   performed. The study included complete 2D imaging, M-mode, complete spectral   Doppler, and color Doppler. Intravenous contrast (Definity) was administered. Image quality was adequate. LEFT VENTRICLE: Size was normal. Systolic function was normal. Ejection   fraction was estimated in the range of 55 % to 60 %. There were no regional   wall motion abnormalities. Wall thickness was normal. Avg. E/e'= 13.7. Left   ventricular diastolic function parameters were normal.       RIGHT VENTRICLE: The size was normal. Systolic function was normal. Estimated   peak pressure was in the range of 25-30 mmHg. LEFT ATRIUM: Size was normal.       RIGHT ATRIUM: Size was normal.       SYSTEMIC VEINS: IVC: The inferior vena cava was normal in size. The   respirophasic change in diameter was more than 50%. AORTIC VALVE: The valve was trileaflet. There was no evidence for stenosis. There was mild to moderate regurgitation. MITRAL VALVE: There was mild to moderate annular calcification. There was no   evidence for stenosis. There was mild regurgitation.        TRICUSPID VALVE: The valve structure was normal. There was no evidence for   stenosis. There was mild regurgitation. PULMONIC VALVE: The valve structure was normal. There was no evidence for   stenosis. There was mild regurgitation. PERICARDIUM: There was no pericardial effusion. AORTA: The root exhibited normal size. SUMMARY:       -  Left ventricle: Systolic function was normal. Ejection fraction was   estimated in the range of 55 % to 60 %. There were no regional wall motion   abnormalities. -  Inferior vena cava, hepatic veins: The respirophasic change in diameter    was   more than 50%. -  Aortic valve: There was mild to moderate regurgitation.       -  Mitral valve: There was mild to moderate annular calcification. There was   mild regurgitation.       -  Tricuspid valve: There was mild regurgitation. SYSTEM MEASUREMENT TABLES       2D mode   AoR Diam (2D): 3.1 cm   LA Dimension (2D): 3 cm   Left Atrium Systolic Volume Index; Method of Disks, Biplane; 2D mode;: 24.9   ml/m2   IVS/LVPW (2D): 1.1   IVSd (2D): 1.1 cm   LVIDd (2D): 4.5 cm   LVIDs (2D): 2.5 cm   LVPWd (2D): 1 cm   RVIDd (2D): 2.7 cm       Tissue Doppler Imaging   LV Peak Early Cooper Tissue Abilio; Lateral MA (TDI): 9.3 cm/s   LV Peak Early Cooper Tissue Abilio; Medial MA (TDI): 7.8 cm/s       Unspecified Scan Mode   Peak Grad; Mean; Antegrade Flow: 3 mm[Hg]   Vmax;  Antegrade Flow: 85.4 cm/s   MV Peak Abilio/LV Peak Tissue Abilio E-Wave; Lateral MA: 12.5   MV Peak Abilio/LV Peak Tissue Abilio E-Wave; Medial MA: 14.9       Prepared and signed by       Marcel Srivastava MD   Signed 29-Mar-2021 16:12:40                     Assessment/Plan:     Principal Problem:    Syncope and collapse (3/29/2021)  - CT head- No acute intracranial process   - ECHO- as above  - orthostatics normal per ercords  - no definitive cardiac etiology identified at this time  - EKG with rate controlled atrial fibrillation   - recent ambulatory monitor without evidence of severe bradycardia,     Active Problems:    Depression (1/14/2013)  - on Lexapro  - per primary       Paroxysmal atrial fibrillation ([address] (1/14/2013)  - s/p Watchman  - on ASA, BB      GERD (gastroesophageal reflux disease) (3/6/2014)  - on PPI      Coronary atherosclerosis of native coronary artery (11/13/2015)  - on ASA, BB      Anemia (4/12/2019)  - per primary       History of CVA (cerebrovascular accident) (9/3/2019)  - chronic, 2019 ; now post Watchman for atrial fibrillation       Essential hypertension (11/19/2020)  - on ACE-I, BB, amlodipine 5  - as BP is low, decrease lisinopril to 10 mg as low BP / orthostasis could have led to her event       Acute UTI (3/29/2021)  - on Rocephin      NSVT  - Electrolytes are within   - no sustained arrhythmias   - ECHO preserved LV systolic function , on low dose metoprolol XL 12.5     At this time no definitive cardiac etiology for syncope, no class I indication for pacer. Discussed this with patient today. Monitor on telemetry overnight and will need close follow up in office. Will follow .      Vivian Crane DO  3/30/2021  6:07 PM

## 2021-03-30 NOTE — PROGRESS NOTES
1810-END OF SHIFT NOTE:    -pt rested well throughout the shift  -pt ambulated with PT in hallway   -cardiology consulted   -vss, no needs voiced at this time     Intake/Output  03/30 0701 - 03/30 1900  In: 2023 [P.O.:970; I.V.:1053]  Out: 1000 [Urine:1000]   Voiding: YES  Catheter: NO  Drain:          Stool:  1 occurrences. Stool Assessment  Stool Color: (patient flushed) (03/29/21 1710)    Emesis:  0 occurrences. VITAL SIGNS  Patient Vitals for the past 12 hrs:   Temp Pulse Resp BP SpO2   03/30/21 1600  74      03/30/21 1505 98 °F (36.7 °C) 67 16 96/60 96 %   03/30/21 1206  70      03/30/21 1110 97.9 °F (36.6 °C) 70 16 (!) 154/67 95 %   03/30/21 0800  64      03/30/21 0720 98 °F (36.7 °C) 60 16 137/71 97 %       Pain Assessment  Pain 1  Pain Scale 1: Numeric (0 - 10) (03/30/21 1010)  Pain Intensity 1: 0 (03/30/21 1010)  Patient Stated Pain Goal: 0 (03/30/21 0810)  Pain Location 1: Head (03/29/21 0746)    Ambulating  Yes    Additional Information:     Shift report given to oncoming nurse at the bedside.     Bernardo Martinez RN

## 2021-03-30 NOTE — PROGRESS NOTES
Chart reviewed. BHAVYA spoke with pt who is A&O, daughter Robinson Wright ) at bedside. Pt is agreeable to [facility] PT & OT services. SW offered choices of HH & pt agreeable to [facility]. BHAVYA spoke with Nancie Tang with [facility] & referral sent via [address] [place] [address] link.

## 2021-03-31 VITALS
SYSTOLIC BLOOD PRESSURE: 163 MMHG | TEMPERATURE: 98.3 F | WEIGHT: 136.4 LBS | RESPIRATION RATE: 18 BRPM | DIASTOLIC BLOOD PRESSURE: 55 MMHG | HEIGHT: 63 IN | HEART RATE: 64 BPM | OXYGEN SATURATION: 98 % | BODY MASS INDEX: 24.17 KG/M2

## 2021-03-31 PROBLEM — R55 SYNCOPE: Status: ACTIVE | Noted: 2021-03-31

## 2021-03-31 PROCEDURE — 74011250637 HC RX REV CODE- 250/637: Performed by: INTERNAL MEDICINE

## 2021-03-31 PROCEDURE — 97530 THERAPEUTIC ACTIVITIES: CPT

## 2021-03-31 PROCEDURE — 2709999900 HC NON-CHARGEABLE SUPPLY

## 2021-03-31 PROCEDURE — 65660000000 HC RM CCU STEPDOWN

## 2021-03-31 PROCEDURE — 99218 HC RM OBSERVATION: CPT

## 2021-03-31 RX ORDER — LISINOPRIL 10 MG/1
10 TABLET ORAL DAILY
Qty: 30 TAB | Refills: 0 | Status: SHIPPED | OUTPATIENT
Start: 2021-03-31 | End: 2021-04-30

## 2021-03-31 RX ADMIN — MULTIPLE VITAMINS W/ MINERALS TAB 1 TABLET: TAB at 08:49

## 2021-03-31 RX ADMIN — LISINOPRIL 10 MG: 5 TABLET ORAL at 08:49

## 2021-03-31 RX ADMIN — FLUTICASONE PROPIONATE 2 SPRAY: 50 SPRAY, METERED NASAL at 08:48

## 2021-03-31 RX ADMIN — PANTOPRAZOLE SODIUM 40 MG: 40 TABLET, DELAYED RELEASE ORAL at 08:48

## 2021-03-31 RX ADMIN — LEVOTHYROXINE SODIUM 50 MCG: 0.05 TABLET ORAL at 08:49

## 2021-03-31 RX ADMIN — AMLODIPINE BESYLATE 5 MG: 5 TABLET ORAL at 08:49

## 2021-03-31 NOTE — PROGRESS NOTES
Dc education complete with patient and patient's daughter including medications, follow-up appointments, and home care. Verbalized understanding. IV dc'd. Patient dc'd home with daughter.

## 2021-03-31 NOTE — PROGRESS NOTES
Care Management Interventions  PCP Verified by CM: Yes(Nallely Barrera MD)  Mode of Transport at Discharge: (family)  Transition of Care Consult (CM Consult): Home Health, Discharge [address]: Yes  Current Support Network: Lives Alone, Own Home  Confirm Follow Up Transport: Family  The Patient and/or Patient Representative was Provided with a Choice of Provider and Agrees with the Discharge Plan?: Yes  Freedom of Choice List was Provided with Basic Dialogue that Supports the Patient's Individualized Plan of Care/Goals, Treatment Preferences and Shares the Quality Data Associated with the Providers?: Yes  Discharge Location  Discharge Placement: Home with home health    Sw reviewed chart and spoke with [facility] liaison. Added Rn to home health care orders due to age, hospitalization for UTI/syncope, and pt lives alone. Pt returning to [facility]. No other needs iden.  Latha Han

## 2021-03-31 NOTE — PROGRESS NOTES
Problem: Mobility Impaired (Adult and Pediatric)  Goal: *Acute Goals and Plan of Care (Insert Text)  Description: STG:  (1.)Ms. Priscilla Feliciano will transfer from bed to chair and chair to bed with MODIFIED INDEPENDENCE using the least restrictive device within 1-4 treatment day(s). (2.)Ms. Priscilla Feliciano will ambulate with MODIFIED INDEPENDENCE for 250 feet with the least restrictive device within 1-4 treatment day(s). ________________________________________________________________________________________________      Outcome: Progressing Towards Goal     PHYSICAL THERAPY: Daily Note and AM 3/31/2021  INPATIENT: PT Visit Days : 2  [place] / [place] / Product Type: Medicare /       NAME/AGE/GENDER: Danna Fairbanks is a 80 y.o. female   PRIMARY DIAGNOSIS: Syncope and collapse [R55]  Syncope [R55] Syncope and collapse Syncope and collapse       ICD-10: Treatment Diagnosis:    · Generalized Muscle Weakness (M62.81)  · Difficulty in walking, Not elsewhere classified (R26.2)   Precaution/Allergies:  Adhesive      ASSESSMENT:     Ms. Priscilla Feliciano presents with generalized weakness and decreased independence with functional mobility. She lives in independent living at [facility], daughter at bedside. She states she has been feeing weaker the last 2 weeks. Her plan is to return to [facility] and she plans to move to an [facility] to be closer to her daughter. She did well moving in room. We worked on gait training in the bishop with verbal cues, did half of the walk with a walker and the second half without. Pt admits while walking that she still feels weak and not quite back to her normal. She did better with the walker than without. Returned to room and she got back in the bed and remained supine with needs in reach and daughter at bedside. Will follow while she is an inpatient and recommend HHPT for follow up on discharge from the hospital.   3/31 supine upon contact.   Independent with all bed mobility. Ambulated 100 ft using RW with SBA and no LOB, then another 100 ft without A.D. and no LOB. Return to EOB and therapist and pt talk about, when she feel unsafe, needs to use the RW. Pt said she would. All question answer and ready for D/C. This section established at most recent assessment   PROBLEM LIST (Impairments causing functional limitations):  1. Decreased Strength  2. Decreased ADL/Functional Activities  3. Decreased Transfer Abilities  4. Decreased Ambulation Ability/Technique  5. Decreased Activity Tolerance   INTERVENTIONS PLANNED: (Benefits and precautions of physical therapy have been discussed with the patient.)  1. Balance Exercise  2. Bed Mobility  3. Gait Training  4. Therapeutic Activites  5. Therapeutic Exercise/Strengthening  6. Transfer Training     TREATMENT PLAN: Frequency/Duration: daily for duration of hospital stay  Rehabilitation Potential For Stated Goals: Good     REHAB RECOMMENDATIONS (at time of discharge pending progress):    Placement: It is my opinion, based on this patient's performance to date, that Ms. Justina Garcia may benefit from [address] after discharge due to the functional deficits listed above that are likely to improve with skilled rehabilitation because he/she has multiple medical issues that affect his/her functional mobility in the community. Equipment:    None at this time              HISTORY:   History of Present Injury/Illness (Reason for Referral):  PER MD NOTE: Darshan Babcock is a 80year old CF with a PMH of HTN, CAD, PAF s/p Watchman device, hypothyroidism, and depression who presented from [facility] after a syncopal episode with a left nini-orbital laceration. She states that she had dysuria and urinary frequency approximately 1 weeks prior so went to Urgent Care where she was started on Keflex.  She continued to have dysuria, even after the antibiotics, so she went back to Urgent Care on 3/28/21 and they prescribed her Macrobid. They did not have any cultures. Then on 3/29/21 she got up at 2am to use the restroom and take her Macrobid. She went to the refrigerator and when she opened the door she felt dizzy for a second then passed out. She came back to a minute later and was on the floor with blood near her left eye. She called [facility] who brought her to the ER. Denies CP/SOB/palpitations. Denies current dizziness. Denies F/C. Denies N/V/D. Still with dysuria and frequency. Past Medical History/Comorbidities:   Ms. Matt Valle  has a past medical history of Abnormal cardiovascular function study (1/7/2016), Allergic rhinitis (1/14/2013), Anemia (11/13/2015), Arthritis, Atrial fibrillation ([facility] (1/14/2013), Blind left eye, CAD (coronary artery disease), native coronary artery (May 2014), Depression (1/14/2013), GERD (gastroesophageal reflux disease), Hypercholesteremia (1/14/2013), Hypertension, Hypothyroidism (1/14/2013), Osteoarthritis of back (1/2/2014), Osteoporosis, Pulmonary embolism ([address] (2009), Sick sinus syndrome ([address] (1/7/2016), and Stroke ([facility] Ms. Matt Valle  has a past surgical history that includes hx appendectomy (1965); hx hysterectomy (1970); hx tonsil and adenoidectomy (1934); hx heent; hx orthopaedic (2006 and 2008); hx cataract removal (Right, 2010); hx coronary stent placement (05/2014); hx heart catheterization (09/17/2015); hx knee replacement (2009); hx knee replacement (Left, 01/12/2017); hx breast biopsy (Bilateral); colonoscopy (N/A, 7/16/2018); and hx endoscopy (04/12/2019).   Social History/Living Environment:   Home Environment: [place] [address] Name: [address] [place] [address]  # Steps to Enter: 0  One/Two Story Residence: One story  Living Alone: Yes  Support Systems: Child(celena)  Patient Expects to be Discharged to[de-identified] Independent living facility  Current DME Used/Available at Home: [address], [place], straight, Walker, rollator, Walker, rolling  Prior Level of Function/Work/Activity:  Pt living at home, independent with mobility and self care, used a cane prn     Number of Personal Factors/Comorbidities that affect the Plan of Care: 0: LOW COMPLEXITY   EXAMINATION:   Most Recent Physical Functioning:   Gross Assessment:                  Posture:     Balance:  Sitting: Intact  Standing: Intact; With support Bed Mobility:  Supine to Sit: Independent  Sit to Supine: Independent  Scooting: Independent  Wheelchair Mobility:     Transfers:  Sit to Stand: Stand-by assistance  Stand to Sit: Stand-by assistance  Bed to Chair: Stand-by assistance  Duration: 23 Minutes  Gait:     Speed/Irena: Pace decreased (<100 feet/min)  Distance (ft): 100 Feet (ft)(another 100 ft without RW)  Assistive Device: Walker, rolling(without  ft)      Body Structures Involved:  1. Muscles Body Functions Affected:  1. Movement Related Activities and Participation Affected:  1. Mobility  2. Self Care   Number of elements that affect the Plan of Care: 4+: HIGH COMPLEXITY   CLINICAL PRESENTATION:   Presentation: Stable and uncomplicated: LOW COMPLEXITY   CLINICAL DECISION MAKIN[address] [place] [address] AM-PAC 6 Clicks   Basic Mobility Inpatient Short Form  How much difficulty does the patient currently have. .. Unable A Lot A Little None   1. Turning over in bed (including adjusting bedclothes, sheets and blankets)? [] 1   [] 2   [] 3   [x] 4   2. Sitting down on and standing up from a chair with arms ( e.g., wheelchair, bedside commode, etc.)   [] 1   [] 2   [] 3   [x] 4   3. Moving from lying on back to sitting on the side of the bed? [] 1   [] 2   [] 3   [x] 4   How much help from another person does the patient currently need. .. Total A Lot A Little None   4. Moving to and from a bed to a chair (including a wheelchair)? [] 1   [] 2   [] 3   [x] 4   5. Need to walk in hospital room? [] 1   [] 2   [x] 3   [] 4   6. Climbing 3-5 steps with a railing?    [] 1   [] 2   [x] 3   [] 4   © , Trustees of [address] [place] [id number], under license to [employer]. All rights reserved      Score:  Initial: 22 Most Recent: X (Date: -- )    Interpretation of Tool:  Represents activities that are increasingly more difficult (i.e. Bed mobility, Transfers, Gait). Medical Necessity:     · Patient is expected to demonstrate progress in   · strength and functional technique  ·  to   · decrease assistance required with functional mobility  · . Reason for Services/Other Comments:  · Patient continues to require skilled intervention due to   · Inability to complete functional mobility independently  · . Use of outcome tool(s) and clinical judgement create a POC that gives a: Clear prediction of patient's progress: LOW COMPLEXITY            TREATMENT:   (In addition to Assessment/Re-Assessment sessions the following treatments were rendered)   Pre-treatment Symptoms/Complaints:  none  Pain: Initial:      Post Session:  0/10   Assessment   Gait Training ( ):  Gait training to improve and/or restore physical functioning as related to mobility and strength. Ambulated 100 Feet (ft)(another 100 ft without RW) with   using a Walker, rolling(without  ft) and minimal     Therapeutic Activity: (  23 Minutes ):  Therapeutic activities including Ambulation on level ground using RW and without A.D. Braces/Orthotics/Lines/Etc:   · IV  · Telemetry monitoring  · O2 Device: Room air  Treatment/Session Assessment:    · Response to Treatment:  pt tolerated well, ready for D/C  · Interdisciplinary Collaboration:   o Physical Therapy Assistant  o Registered Nurse  · After treatment position/precautions:   o Supine in bed  o Bed/Chair-wheels locked  o Call light within reach  o Family at bedside   · Compliance with Program/Exercises: Compliant all of the time, Will assess as treatment progresses  · Recommendations/Intent for next treatment session: \"Next visit will focus on advancements to more challenging activities and reduction in assistance provided\".   Total Treatment Duration:  PT Patient Time In/Time Out  Time In: 1010  Time Out: [address] Tadeo Bentley, PTA

## 2021-03-31 NOTE — PROGRESS NOTES
Problem: Falls - Risk of  Goal: *Absence of Falls  Description: Document Antoinette Diehl Fall Risk and appropriate interventions in the flowsheet.   Outcome: Progressing Towards Goal  Note: Fall Risk Interventions:  Mobility Interventions: Communicate number of staff needed for ambulation/transfer, Patient to call before getting OOB         Medication Interventions: Patient to call before getting OOB, Teach patient to arise slowly    Elimination Interventions: Call light in reach, Patient to call for help with toileting needs, Toileting schedule/hourly rounds    History of Falls Interventions: Door open when patient unattended, Room close to nurse's station

## 2021-03-31 NOTE — DISCHARGE SUMMARY
Hospitalist Discharge Summary     Admit Date:  3/29/2021  7:43 AM   DC note date: 3/31/2021  Name:  Darshan Babcock   Age:  80 y.o.  :  1929   MRN:  [id number]   PCP:  Robina Mora MD  Treatment Team: Attending Provider: Naga Gaona MD; Care Manager: Kalyn Esposito RN; Consulting Provider: Mikaela Galvan DO; Utilization Review: Eilsa Connelly; Physical Therapy Assistant: Lord Diego PTA    Problem List for this Hospitalization:  Hospital Problems as of 3/31/2021 Date Reviewed: 2020          Codes Class Noted - Resolved POA    Syncope ICD-10-CM: R55  ICD-9-CM: 780.2  3/31/2021 - Present Unknown        * (Principal) Syncope and collapse ICD-10-CM: R55  ICD-9-CM: 780.2  3/29/2021 - Present Yes        Acute UTI ICD-10-CM: N39.0  ICD-9-CM: 599.0  3/29/2021 - Present Yes        Essential hypertension ICD-10-CM: I10  ICD-9-CM: 401.9  2020 - Present Yes        Presence of Watchman left atrial appendage closure device ICD-10-CM: Z95.818  ICD-9-CM: V45.09  2020 - Present Yes        History of CVA (cerebrovascular accident) ICD-10-CM: Z86.73  ICD-9-CM: V12.54  9/3/2019 - Present Yes        Anemia ICD-10-CM: D64.9  ICD-9-CM: 285.9  2019 - Present Yes        Sick sinus syndrome ([address] ICD-10-CM: I49.5  ICD-9-CM: 427.81  2016 - Present Yes    Overview Signed 2016  2:46 PM by Juanita EVERETT     Tachycardia-Bradycardia             Coronary atherosclerosis of native coronary artery ICD-10-CM: I25.10  ICD-9-CM: 414.01  2015 - Present Yes        GERD (gastroesophageal reflux disease) ICD-10-CM: K21.9  ICD-9-CM: 530.81  3/6/2014 - Present Yes        Depression ICD-10-CM: F32.9  ICD-9-CM: 066  2013 - Present Yes        Paroxysmal atrial fibrillation ([facility] ICD-10-CM: I48.0  ICD-9-CM: 427.31  2013 - Present Yes    Overview Addendum 2020 12:59 PM by Snehal Scherer MD     Paroxysmal   1. 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 2. Holter (11/19/20): Sinus rhythm. Rare PACs/PVCs. Short runs of atrial tachycardia. No sustained atrial fibrillation. Allergic rhinitis ICD-10-CM: J30.9  ICD-9-CM: 477.9  1/14/2013 - Present Yes            Admission HPI from 3/29/2021:    \" Kehinde Rodriguez is a 80year old CF with a PMH of HTN, CAD, PAF s/p Watchman device, hypothyroidism, and depression who presented from [facility] after a syncopal episode with a left nini-orbital laceration. She states that she had dysuria and urinary frequency approximately 1 weeks prior so went to Urgent Care where she was started on Keflex. She continued to have dysuria, even after the antibiotics, so she went back to Urgent Care on 3/28/21 and they prescribed her Macrobid. They did not have any cultures. Then on 3/29/21 she got up at 2am to use the restroom and take her Macrobid. She went to the refrigerator and when she opened the door she felt dizzy for a second then passed out. She came back to a minute later and was on the floor with blood near her left eye. She called [facility] Yates who brought her to the ER. Denies CP/SOB/palpitations. Denies current dizziness. Denies F/C. Denies N/V/D. Still with dysuria and frequency. \"    Hospital Course:  Patient is admitted for syncope work-up and monitoring. Orthostatic vitals normal.  CT head unremarkable. EKG unchanged. Patient was noted on telemetry to have 6 beats of NSVT x 2. Cardiology consulted. EKG with rate controlled A. fib. Patient with history of recent ambulatory monitor without evidence of severe bradycardia. Echo preserved left ventricular systolic function, on low-dose metoprolol. No sustained arrhythmia on telemetry. Cardiology recommend at this time no definitive cardiac etiology for syncope, no class I indication for pacer. Patient will need close cardiology follow-up as outpatient. She was being treated for UTI as outpatient.   Urine culture obtained on admission and till date negative. Patient has completed 3 doses of ceftriaxone during the hospital stay. PT/OT consulted and recommend home health on discharge. Case management consulted. Patient is stable to discharge home today with follow-up with PCP and cardiology. Disposition: Home Health Care Svc  Activity: Activity as tolerated  Diet: DIET REGULAR  Code Status: DNR    Follow Up Orders: Follow-up Appointments   Procedures    FOLLOW UP VISIT Appointment in: One Week PCP and cardiology     PCP and cardiology     Standing Status:   Standing     Number of Occurrences:   1     Order Specific Question:   Appointment in     Answer: One Week       Follow-up Information     Follow up With Specialties Details Why Contact Info    Bronson Castañeda, [address] [facility] [address] [facility]  [phone]            Discharge meds at bottom of this note. Plan was discussed with patient. All questions answered. Patient was stable at time of discharge. Given instructions to call a physician or return if any concerns. Discharge summary and encounter summary was sent to PCP electronically via \"Comm Mgt\" link in [employer], if possible. Diagnostic Imaging/Tests:   Ct Head Wo Cont    Result Date: 3/29/2021  CT HEAD WITHOUT CONTRAST, 3/29/2021 History: Passed out hitting head this morning. Comparison: None Technique:   5 mm axial scans from the skull base to the vertex. All CT scans performed at this facility use one or all of the following: Automated exposure control, adjustment of the mA and/or kVp according to patient's size, iterative reconstruction. Findings:  No evidence of intracranial hemorrhage is seen. No abnormal extra-axial fluid collections are seen. Mild to moderate age-appropriate chronic cortical volume loss is seen. No evidence for acute hydrocephalus is seen. No evidence of midline shift or herniation is seen.   No abnormal edema pattern is seen in a vascular distribution to suggest large artery infarction. Evaluation with bone windows shows no acute osseous changes. The visualized sinuses, middle ears, and mastoid air cells are well aerated. 1.  No acute intracranial process evident by noncontrast CT study of the head. This report was made using voice transcription. Despite my best efforts to avoid any, transcription errors may persist. If there is any question about the accuracy of the report or need for clarification, then please call [phone], or text me through perfectserv for clarification or correction. Echocardiogram results:  Results for orders placed or performed during the hospital encounter of 21   2D ECHO COMPLETE ADULT (TTE) W OR [address] [facility] [address] [facility], [address] [facility]  [phone]    Transthoracic Echocardiogram  2D, M-mode, Doppler, and Color Doppler    Patient: Maggie Lima  MR #: [id number]  : 20-Sep-1929  Age: 80 years  Gender: Female  Study date: 29-Mar-2021  Account #: [de-identified]  Height: 63 in  Weight: 144.8 lb  BSA: 1.69 mï¾²  Status:Routine  Location: [facility]  BP: 148/ 55    Allergies: ADHESIVE    Sonographer:  Sharif Kinds  Group:  [facility]  Referring Physician:  Brennen Ayoub MD  Reading Physician:  Lulú Fonseca MD    INDICATIONS: Syncope. PROCEDURE: This was a routine study. A transthoracic echocardiogram was  performed. The study included complete 2D imaging, M-mode, complete spectral  Doppler, and color Doppler. Intravenous contrast (Definity) was administered. Image quality was adequate. LEFT VENTRICLE: Size was normal. Systolic function was normal. Ejection  fraction was estimated in the range of 55 % to 60 %. There were no regional  wall motion abnormalities. Wall thickness was normal. Avg. E/e'= 13.7.  Left  ventricular diastolic function parameters were normal.    RIGHT VENTRICLE: The size was normal. Systolic function was normal. Estimated  peak pressure was in the range of 25-30 mmHg. LEFT ATRIUM: Size was normal.    RIGHT ATRIUM: Size was normal.    SYSTEMIC VEINS: IVC: The inferior vena cava was normal in size. The  respirophasic change in diameter was more than 50%. AORTIC VALVE: The valve was trileaflet. There was no evidence for stenosis. There was mild to moderate regurgitation. MITRAL VALVE: There was mild to moderate annular calcification. There was no  evidence for stenosis. There was mild regurgitation. TRICUSPID VALVE: The valve structure was normal. There was no evidence for  stenosis. There was mild regurgitation. PULMONIC VALVE: The valve structure was normal. There was no evidence for  stenosis. There was mild regurgitation. PERICARDIUM: There was no pericardial effusion. AORTA: The root exhibited normal size. SUMMARY:    -  Left ventricle: Systolic function was normal. Ejection fraction was  estimated in the range of 55 % to 60 %. There were no regional wall motion  abnormalities. -  Inferior vena cava, hepatic veins: The respirophasic change in diameter   was  more than 50%. -  Aortic valve: There was mild to moderate regurgitation.    -  Mitral valve: There was mild to moderate annular calcification. There was  mild regurgitation.    -  Tricuspid valve: There was mild regurgitation. SYSTEM MEASUREMENT TABLES    2D mode  AoR Diam (2D): 3.1 cm  LA Dimension (2D): 3 cm  Left Atrium Systolic Volume Index; Method of Disks, Biplane; 2D mode;: 24.9  ml/m2  IVS/LVPW (2D): 1.1  IVSd (2D): 1.1 cm  LVIDd (2D): 4.5 cm  LVIDs (2D): 2.5 cm  LVPWd (2D): 1 cm  RVIDd (2D): 2.7 cm    Tissue Doppler Imaging  LV Peak Early Cooper Tissue Abilio; Lateral MA (TDI): 9.3 cm/s  LV Peak Early Cooper Tissue Abilio; Medial MA (TDI): 7.8 cm/s    Unspecified Scan Mode  Peak Grad; Mean; Antegrade Flow: 3 mm[Hg]  Vmax;  Antegrade Flow: 85.4 cm/s  MV Peak Abilio/LV Peak Tissue Abilio E-Wave; Lateral MA: 12.5  MV Peak Abilio/LV Peak Tissue Abilio E-Wave; Medial MA: 14.9    Prepared and signed by    Paulette Ford MD  Signed 29-Mar-2021 16:12:40         Procedures done this admission:  * No surgery found *    All Micro Results     Procedure Component Value Units Date/Time    CULTURE, URINE [[id number]] Collected: 03/29/21 0829    Order Status: Completed Specimen: Cath Urine Updated: 03/31/21 0824     Special Requests: NO SPECIAL REQUESTS        Culture result: NO GROWTH 1 DAY             SARS-CoV-2 Lab Results  \"Novel Coronavirus\" Test: No results found for: COV2NT   \"Emergent Disease\" Test: No results found for: EDPR  \"SARS-COV-2\" Test: No results found for: XGCOVT  Rapid Test: No results found for: COVR         Labs: Results:       BMP, Mg, Phos Recent Labs     03/30/21  0540 03/29/21  1438 03/29/21  0755     --  141   K 4.2  --  3.8   *  --  112*   CO2 23  --  20*   AGAP 5*  --  9   BUN 12  --  11   CREA 0.76  --  0.67   CA 8.7  --  8.7   *  --  129*   MG  --  2.2  --       CBC Recent Labs     03/30/21  0540 03/29/21  0755   WBC 5.9 9.2   RBC 3.64* 3.97*   HGB 10.2* 11.0*   HCT 30.7* 33.8*    260   GRANS  --  75   LYMPH  --  13   EOS  --  1   MONOS  --  10   BASOS  --  0   IG  --  1   ANEU  --  6.9   ABL  --  1.2   SAMMY  --  0.1   ABM  --  0.9   ABB  --  0.0   AIG  --  0.1      LFT Recent Labs     03/29/21  0755   ALT 25   AP 79   TP 7.3   ALB 2.8*   GLOB 4.5*   AGRAT 0.6*      Cardiac Testing Lab Results   Component Value Date/Time     02/15/2018 03:58 PM    CK 81 03/29/2021 07:57 AM    Troponin-I, Qt. <0.02 (L) 10/06/2019 07:45 PM    Troponin-I, Qt. <0.02 (L) 10/06/2019 04:57 PM    Troponin-I, Qt. <0.02 (L) 07/23/2017 01:40 PM      Coagulation Tests Lab Results   Component Value Date/Time    Prothrombin time 13.3 10/06/2019 05:45 PM    Prothrombin time 13.9 05/20/2019 09:19 AM    Prothrombin time 15.9 (H) 04/10/2019 04:40 PM    INR 1.0 10/06/2019 05:45 PM    INR 1.1 05/20/2019 09:19 AM    INR 1.3 04/10/2019 04:40 PM    INR (POC) 1.1 04/10/2019 04:54 PM    aPTT 27.2 10/06/2019 05:45 PM    aPTT 25.7 04/10/2019 04:40 PM    aPTT 23.3 (L) 12/19/2016 11:00 AM      A1c Lab Results   Component Value Date/Time    Hemoglobin A1c 6.2 (H) 03/14/2019 03:27 AM      Lipid Panel Lab Results   Component Value Date/Time    Cholesterol, total 96 04/11/2019 04:12 AM    HDL Cholesterol 47 04/11/2019 04:12 AM    LDL, calculated 38.4 04/11/2019 04:12 AM    VLDL, calculated 10.6 04/11/2019 04:12 AM    Triglyceride 53 04/11/2019 04:12 AM    CHOL/HDL Ratio 2.0 04/11/2019 04:12 AM      Thyroid Panel Lab Results   Component Value Date/Time    TSH 1.050 03/29/2021 02:38 PM    TSH 2.130 02/25/2020 11:40 AM    T4, Free 1.3 03/29/2021 02:38 PM    T4, Free 1.52 02/25/2020 11:40 AM        Most Recent UA Lab Results   Component Value Date/Time    Color YELLOW 03/29/2021 08:29 AM    Appearance CLEAR 03/29/2021 08:29 AM    Specific gravity 1.007 03/29/2021 08:29 AM    pH (UA) 7.5 03/29/2021 08:29 AM    Protein Negative 03/29/2021 08:29 AM    Glucose Negative 03/29/2021 08:29 AM    Ketone Negative 03/29/2021 08:29 AM    Bilirubin Negative 03/29/2021 08:29 AM    Blood Negative 03/29/2021 08:29 AM    Urobilinogen 0.2 03/29/2021 08:29 AM    Nitrites Negative 03/29/2021 08:29 AM    Leukocyte Esterase SMALL (A) 03/29/2021 08:29 AM    WBC 5-10 03/29/2021 08:29 AM    RBC 0-3 03/29/2021 08:29 AM    Epithelial cells 0-3 03/29/2021 08:29 AM    Bacteria 0 03/29/2021 08:29 AM    Casts 0 03/29/2021 08:29 AM        Allergies   Allergen Reactions    Adhesive Rash     Immunization History   Administered Date(s) Administered    Influenza High Dose Vaccine PF 11/05/2015, 11/09/2016, 11/13/2017, 09/06/2018    Influenza Vaccine 10/17/2013, 10/27/2014    Influenza Vaccine (Tri) Adjuvanted (>65 Yrs FLUAD TRI [id number]) 09/25/2019    Pneumococcal Conjugate (PCV-13) 01/15/2016    Pneumococcal Polysaccharide (PPSV-23) 05/16/2018    TB Skin Test (PPD) Intradermal 01/12/2017    Tdap 06/01/2014 All Labs from Last 24 Hrs:  No results found for this or any previous visit (from the past 24 hour(s)). Discharge Exam:  Patient Vitals for the past 24 hrs:   Temp Pulse Resp BP SpO2   03/31/21 0736 97.8 °F (36.6 °C) 61 18 (!) 159/76 94 %   03/31/21 0404  65      03/31/21 0218 98.2 °F (36.8 °C) 73 17 (!) 153/66 95 %   03/31/21 0000  64      03/30/21 2252 98.2 °F (36.8 °C) 75 17 138/75 98 %   03/30/21 2000  68      03/30/21 1942 98.3 °F (36.8 °C) 64 17 (!) 170/63 95 %   03/30/21 1600  74      03/30/21 1505 98 °F (36.7 °C) 67 16 96/60 96 %   03/30/21 1206  70      03/30/21 1110 97.9 °F (36.6 °C) 70 16 (!) 154/67 95 %     Oxygen Therapy  O2 Sat (%): 94 % (03/31/21 0736)  Pulse via Oximetry: 90 beats per minute (03/29/21 1025)  O2 Device: Room air (03/31/21 0848)    Estimated body mass index is 24.16 kg/m² as calculated from the following:    Height as of this encounter: 5' 3\" (1.6 m). Weight as of this encounter: 61.9 kg (136 lb 6.4 oz). Intake/Output Summary (Last 24 hours) at 3/31/2021 1051  Last data filed at 3/31/2021 9626  Gross per 24 hour   Intake 2606 ml   Output 1100 ml   Net 1506 ml       *Note that automatically entered I/Os may not be accurate; dependent on patient compliance with collection and accurate  by assistants. General:    Well nourished. Alert. Eyes:   Normal sclerae. Extraocular movements intact. Left periorbital bruise from fall  ENT:  Normocephalic, atraumatic. Moist mucous membranes  CV:   Regular rate and rhythm. No murmur, rub, or gallop. Lungs:  Clear to auscultation bilaterally. No wheezing, rhonchi, or rales. Abdomen: Soft, nontender, nondistended. Extremities: Warm and dry. No cyanosis or edema. Neurologic: CN II-XII grossly intact. No gross focal deficits   Skin:     No rashes or jaundice. Psych:  Normal mood and affect.     Current Med List in Hospital:   Current Facility-Administered Medications   Medication Dose Route Frequency    aspirin delayed-release tablet 81 mg  81 mg Oral QHS    metoprolol succinate (TOPROL-XL) XL tablet 12.5 mg  12.5 mg Oral QHS    escitalopram oxalate (LEXAPRO) tablet 10 mg  10 mg Oral QHS    lisinopriL (PRINIVIL, ZESTRIL) tablet 10 mg  10 mg Oral DAILY    acetaminophen (TYLENOL) tablet 650 mg  650 mg Oral Q6H PRN    amLODIPine (NORVASC) tablet 5 mg  5 mg Oral DAILY    fluticasone propionate (FLONASE) 50 mcg/actuation nasal spray 2 Spray  2 Spray Both Nostrils DAILY    levothyroxine (SYNTHROID) tablet 50 mcg  50 mcg Oral ACB    multivitamin, tx-iron-ca-min (THERA-M w/ IRON) tablet 1 Tab  1 Tab Oral DAILY    pantoprazole (PROTONIX) tablet 40 mg  40 mg Oral DAILY    traZODone (DESYREL) tablet 25 mg  25 mg Oral QHS PRN    sodium chloride (NS) flush 5-40 mL  5-40 mL IntraVENous Q8H    sodium chloride (NS) flush 5-40 mL  5-40 mL IntraVENous PRN    0.9% sodium chloride infusion  75 mL/hr IntraVENous CONTINUOUS    oxyCODONE IR (ROXICODONE) tablet 5 mg  5 mg Oral Q4H PRN    cefTRIAXone (ROCEPHIN) 1 g in 0.9% sodium chloride (MBP/ADV) 50 mL MBP  1 g IntraVENous Q24H    enoxaparin (LOVENOX) injection 30 mg  30 mg SubCUTAneous Q24H       Discharge Info:   Current Discharge Medication List      CONTINUE these medications which have CHANGED    Details   lisinopriL (PRINIVIL, ZESTRIL) 10 mg tablet Take 1 Tab by mouth daily for 30 days. Qty: 30 Tab, Refills: 0         CONTINUE these medications which have NOT CHANGED    Details   fluticasone propionate (Flonase Allergy Relief) 50 mcg/actuation nasal spray 2 Sprays by Both Nostrils route daily. Qty: 1 Bottle, Refills: 2      nitrofurantoin (MACRODANTIN) 25 mg capsule Take 1 Cap by mouth daily. Qty: 90 Cap, Refills: 3      traZODone (DESYREL) 50 mg tablet Take 25-50 mg by mouth. amLODIPine (NORVASC) 5 mg tablet Take 1 Tab by mouth daily.   Qty: 90 Tab, Refills: 3    Associated Diagnoses: Essential hypertension      levothyroxine (synthroid) 50 mcg tablet Take 1 Tab by mouth Daily (before breakfast). Qty: 90 Tab, Refills: 3      escitalopram oxalate (LEXAPRO) 10 mg tablet Take 1 Tab by mouth daily. Qty: 90 Tab, Refills: 3    Associated Diagnoses: Anxiety      multivitamin (ONE A DAY) tablet Take 1 Tab by mouth daily. aspirin delayed-release 81 mg tablet Take 1 Tab by mouth daily. Qty: 30 Tab, Refills: 1      potassium 99 mg tablet Take 99 mg by mouth daily. Calcium Carbonate-Vit D3-Min (CALTRATE 600+D PLUS MINERALS) 600 mg calcium- 400 unit Tab Take  by mouth daily. pantoprazole (PROTONIX) 20 mg tablet Take 1 Tab by mouth daily. Qty: 30 Tab, Refills: 11      Salt Irrigation Solution No.1 (Ocean Complete) aero 2 Sprays by Nasal route daily. Qty: 100 mL, Refills: 2    Associated Diagnoses: Chronic sinusitis, unspecified location      metoprolol succinate (TOPROL-XL) 25 mg XL tablet Take 0.5 Tabs by mouth daily. Qty: 45 Tab, Refills: 3      nitroglycerin (NITROSTAT) 0.3 mg SL tablet 1 Tab by SubLINGual route every five (5) minutes as needed for Chest Pain. Qty: 1 Bottle, Refills: 3      acetaminophen (TYLENOL ARTHRITIS PAIN) 650 mg CR tablet Take 650 mg by mouth every six (6) hours as needed for Pain. Time spent in patient discharge planning and coordination 35 minutes.     Signed:  Marcie Mayes MD

## 2021-04-01 ENCOUNTER — HOME CARE VISIT (OUTPATIENT)
Dept: SCHEDULING | Facility: HOME HEALTH | Age: 86
End: 2021-04-01
Payer: MEDICARE

## 2021-04-01 VITALS
TEMPERATURE: 97.5 F | DIASTOLIC BLOOD PRESSURE: 60 MMHG | RESPIRATION RATE: 16 BRPM | HEART RATE: 63 BPM | SYSTOLIC BLOOD PRESSURE: 132 MMHG | OXYGEN SATURATION: 96 %

## 2021-04-01 LAB
BACTERIA SPEC CULT: NORMAL
SERVICE CMNT-IMP: NORMAL

## 2021-04-01 PROCEDURE — 3331090002 HH PPS REVENUE DEBIT

## 2021-04-01 PROCEDURE — 400018 HH-NO PAY CLAIM PROCEDURE

## 2021-04-01 PROCEDURE — G0151 HHCP-SERV OF PT,EA 15 MIN: HCPCS

## 2021-04-01 PROCEDURE — 3331090001 HH PPS REVENUE CREDIT

## 2021-04-01 PROCEDURE — 400013 HH SOC

## 2021-04-02 ENCOUNTER — HOME CARE VISIT (OUTPATIENT)
Dept: SCHEDULING | Facility: HOME HEALTH | Age: 86
End: 2021-04-02
Payer: MEDICARE

## 2021-04-02 PROCEDURE — 3331090002 HH PPS REVENUE DEBIT

## 2021-04-02 PROCEDURE — 3331090001 HH PPS REVENUE CREDIT

## 2021-04-03 ENCOUNTER — HOME CARE VISIT (OUTPATIENT)
Dept: SCHEDULING | Facility: HOME HEALTH | Age: 86
End: 2021-04-03
Payer: MEDICARE

## 2021-04-03 PROCEDURE — 3331090002 HH PPS REVENUE DEBIT

## 2021-04-03 PROCEDURE — G0299 HHS/HOSPICE OF RN EA 15 MIN: HCPCS

## 2021-04-03 PROCEDURE — 3331090001 HH PPS REVENUE CREDIT

## 2021-04-04 VITALS
DIASTOLIC BLOOD PRESSURE: 70 MMHG | TEMPERATURE: 98.2 F | SYSTOLIC BLOOD PRESSURE: 130 MMHG | HEART RATE: 84 BPM | OXYGEN SATURATION: 97 % | RESPIRATION RATE: 15 BRPM

## 2021-04-04 PROCEDURE — 3331090002 HH PPS REVENUE DEBIT

## 2021-04-04 PROCEDURE — 3331090001 HH PPS REVENUE CREDIT

## 2021-04-05 PROCEDURE — 3331090001 HH PPS REVENUE CREDIT

## 2021-04-05 PROCEDURE — 3331090002 HH PPS REVENUE DEBIT

## 2021-04-06 ENCOUNTER — HOME CARE VISIT (OUTPATIENT)
Dept: HOME HEALTH SERVICES | Facility: HOME HEALTH | Age: 86
End: 2021-04-06
Payer: MEDICARE

## 2021-04-06 PROCEDURE — 3331090001 HH PPS REVENUE CREDIT

## 2021-04-06 PROCEDURE — 3331090002 HH PPS REVENUE DEBIT

## 2021-04-07 ENCOUNTER — HOME CARE VISIT (OUTPATIENT)
Dept: HOME HEALTH SERVICES | Facility: HOME HEALTH | Age: 86
End: 2021-04-07
Payer: MEDICARE

## 2021-04-07 PROCEDURE — 3331090002 HH PPS REVENUE DEBIT

## 2021-04-07 PROCEDURE — 3331090001 HH PPS REVENUE CREDIT

## 2021-04-07 PROCEDURE — 3331090003 HH PPS REVENUE ADJ

## 2021-04-07 NOTE — PROGRESS NOTES
spoke to caregiver on 4.6.21 at 602 pm regarding therapy for 4.7.  caregiver states she wants to request discharge from agency since patient will be going to [facility].  states a bed opened up and patient will be moving in

## 2021-10-18 ENCOUNTER — APPOINTMENT (OUTPATIENT)
Dept: CT IMAGING | Age: 86
DRG: 069 | End: 2021-10-18
Attending: EMERGENCY MEDICINE
Payer: MEDICARE

## 2021-10-18 ENCOUNTER — HOSPITAL ENCOUNTER (INPATIENT)
Age: 86
LOS: 2 days | Discharge: HOME | DRG: 069 | End: 2021-10-20
Attending: EMERGENCY MEDICINE | Admitting: INTERNAL MEDICINE
Payer: MEDICARE

## 2021-10-18 ENCOUNTER — APPOINTMENT (OUTPATIENT)
Dept: GENERAL RADIOLOGY | Age: 86
DRG: 069 | End: 2021-10-18
Attending: EMERGENCY MEDICINE
Payer: MEDICARE

## 2021-10-18 DIAGNOSIS — R47.01 APHASIA: ICD-10-CM

## 2021-10-18 DIAGNOSIS — I10 HYPERTENSION, UNSPECIFIED TYPE: ICD-10-CM

## 2021-10-18 DIAGNOSIS — I63.9 CEREBROVASCULAR ACCIDENT (CVA), UNSPECIFIED MECHANISM (HCC): Primary | ICD-10-CM

## 2021-10-18 LAB
ALBUMIN SERPL-MCNC: 3.3 G/DL (ref 3.2–4.6)
ALBUMIN/GLOB SERPL: 0.7 {RATIO} (ref 1.2–3.5)
ALP SERPL-CCNC: 82 U/L (ref 50–136)
ALT SERPL-CCNC: 19 U/L (ref 12–65)
ANION GAP SERPL CALC-SCNC: 9 MMOL/L (ref 7–16)
AST SERPL-CCNC: 19 U/L (ref 15–37)
BASOPHILS # BLD: 0 K/UL (ref 0–0.2)
BASOPHILS NFR BLD: 1 % (ref 0–2)
BILIRUB DIRECT SERPL-MCNC: 0.2 MG/DL
BILIRUB SERPL-MCNC: 0.7 MG/DL (ref 0.2–1.1)
BUN SERPL-MCNC: 16 MG/DL (ref 8–23)
CALCIUM SERPL-MCNC: 9.6 MG/DL (ref 8.3–10.4)
CHLORIDE SERPL-SCNC: 110 MMOL/L (ref 98–107)
CO2 SERPL-SCNC: 22 MMOL/L (ref 21–32)
CREAT SERPL-MCNC: 0.97 MG/DL (ref 0.6–1)
DIFFERENTIAL METHOD BLD: ABNORMAL
EOSINOPHIL # BLD: 0.1 K/UL (ref 0–0.8)
EOSINOPHIL NFR BLD: 1 % (ref 0.5–7.8)
ERYTHROCYTE [DISTWIDTH] IN BLOOD BY AUTOMATED COUNT: 15.3 % (ref 11.9–14.6)
GLOBULIN SER CALC-MCNC: 4.6 G/DL (ref 2.3–3.5)
GLUCOSE BLD STRIP.AUTO-MCNC: 115 MG/DL (ref 65–100)
GLUCOSE SERPL-MCNC: 119 MG/DL (ref 65–100)
HCT VFR BLD AUTO: 38.4 % (ref 35.8–46.3)
HGB BLD-MCNC: 11.9 G/DL (ref 11.7–15.4)
IMM GRANULOCYTES # BLD AUTO: 0 K/UL (ref 0–0.5)
IMM GRANULOCYTES NFR BLD AUTO: 0 % (ref 0–5)
INR BLD: 1 (ref 0.9–1.2)
LYMPHOCYTES # BLD: 2.1 K/UL (ref 0.5–4.6)
LYMPHOCYTES NFR BLD: 39 % (ref 13–44)
MCH RBC QN AUTO: 26 PG (ref 26.1–32.9)
MCHC RBC AUTO-ENTMCNC: 31 G/DL (ref 31.4–35)
MCV RBC AUTO: 83.8 FL (ref 79.6–97.8)
MONOCYTES # BLD: 0.6 K/UL (ref 0.1–1.3)
MONOCYTES NFR BLD: 11 % (ref 4–12)
NEUTS SEG # BLD: 2.5 K/UL (ref 1.7–8.2)
NEUTS SEG NFR BLD: 47 % (ref 43–78)
NRBC # BLD: 0 K/UL (ref 0–0.2)
PLATELET # BLD AUTO: 235 K/UL (ref 150–450)
PMV BLD AUTO: 11.3 FL (ref 9.4–12.3)
POTASSIUM SERPL-SCNC: 3.6 MMOL/L (ref 3.5–5.1)
PROT SERPL-MCNC: 7.9 G/DL (ref 6.3–8.2)
PT BLD: 12.2 SECS (ref 9.6–11.6)
RBC # BLD AUTO: 4.58 M/UL (ref 4.05–5.2)
SERVICE CMNT-IMP: ABNORMAL
SODIUM SERPL-SCNC: 141 MMOL/L (ref 136–145)
TROPONIN-HIGH SENSITIVITY: 4.2 PG/ML (ref 0–14)
WBC # BLD AUTO: 5.4 K/UL (ref 4.3–11.1)

## 2021-10-18 PROCEDURE — 99285 EMERGENCY DEPT VISIT HI MDM: CPT

## 2021-10-18 PROCEDURE — 93005 ELECTROCARDIOGRAM TRACING: CPT | Performed by: EMERGENCY MEDICINE

## 2021-10-18 PROCEDURE — 71045 X-RAY EXAM CHEST 1 VIEW: CPT

## 2021-10-18 PROCEDURE — 94762 N-INVAS EAR/PLS OXIMTRY CONT: CPT

## 2021-10-18 PROCEDURE — 4A03X5D MEASUREMENT OF ARTERIAL FLOW, INTRACRANIAL, EXTERNAL APPROACH: ICD-10-PCS | Performed by: RADIOLOGY

## 2021-10-18 PROCEDURE — 82962 GLUCOSE BLOOD TEST: CPT

## 2021-10-18 PROCEDURE — 84484 ASSAY OF TROPONIN QUANT: CPT

## 2021-10-18 PROCEDURE — 0042T CT PERF W CBF: CPT

## 2021-10-18 PROCEDURE — 74011250636 HC RX REV CODE- 250/636: Performed by: EMERGENCY MEDICINE

## 2021-10-18 PROCEDURE — 74011250637 HC RX REV CODE- 250/637: Performed by: INTERNAL MEDICINE

## 2021-10-18 PROCEDURE — 80048 BASIC METABOLIC PNL TOTAL CA: CPT

## 2021-10-18 PROCEDURE — 74011000258 HC RX REV CODE- 258: Performed by: EMERGENCY MEDICINE

## 2021-10-18 PROCEDURE — 85025 COMPLETE CBC W/AUTO DIFF WBC: CPT

## 2021-10-18 PROCEDURE — 70498 CT ANGIOGRAPHY NECK: CPT

## 2021-10-18 PROCEDURE — 74011250637 HC RX REV CODE- 250/637: Performed by: STUDENT IN AN ORGANIZED HEALTH CARE EDUCATION/TRAINING PROGRAM

## 2021-10-18 PROCEDURE — 85610 PROTHROMBIN TIME: CPT

## 2021-10-18 PROCEDURE — 74011000636 HC RX REV CODE- 636: Performed by: EMERGENCY MEDICINE

## 2021-10-18 PROCEDURE — 65270000029 HC RM PRIVATE

## 2021-10-18 PROCEDURE — 70450 CT HEAD/BRAIN W/O DYE: CPT

## 2021-10-18 PROCEDURE — 96374 THER/PROPH/DIAG INJ IV PUSH: CPT

## 2021-10-18 PROCEDURE — 80076 HEPATIC FUNCTION PANEL: CPT

## 2021-10-18 RX ORDER — ACETAMINOPHEN 325 MG/1
650 TABLET ORAL
Status: DISCONTINUED | OUTPATIENT
Start: 2021-10-18 | End: 2021-10-20 | Stop reason: HOSPADM

## 2021-10-18 RX ORDER — FACIAL-BODY WIPES
10 EACH TOPICAL DAILY PRN
Status: DISCONTINUED | OUTPATIENT
Start: 2021-10-18 | End: 2021-10-20 | Stop reason: HOSPADM

## 2021-10-18 RX ORDER — ENOXAPARIN SODIUM 100 MG/ML
40 INJECTION SUBCUTANEOUS EVERY 24 HOURS
Status: DISCONTINUED | OUTPATIENT
Start: 2021-10-19 | End: 2021-10-20 | Stop reason: HOSPADM

## 2021-10-18 RX ORDER — SODIUM CHLORIDE 0.9 % (FLUSH) 0.9 %
10 SYRINGE (ML) INJECTION
Status: COMPLETED | OUTPATIENT
Start: 2021-10-18 | End: 2021-10-18

## 2021-10-18 RX ORDER — SODIUM CHLORIDE 0.9 % (FLUSH) 0.9 %
5-10 SYRINGE (ML) INJECTION AS NEEDED
Status: DISCONTINUED | OUTPATIENT
Start: 2021-10-18 | End: 2021-10-20 | Stop reason: HOSPADM

## 2021-10-18 RX ORDER — LABETALOL HYDROCHLORIDE 5 MG/ML
5 INJECTION, SOLUTION INTRAVENOUS
Status: DISCONTINUED | OUTPATIENT
Start: 2021-10-18 | End: 2021-10-20 | Stop reason: HOSPADM

## 2021-10-18 RX ORDER — ACETAMINOPHEN 650 MG/1
650 SUPPOSITORY RECTAL
Status: DISCONTINUED | OUTPATIENT
Start: 2021-10-18 | End: 2021-10-20 | Stop reason: HOSPADM

## 2021-10-18 RX ORDER — ATORVASTATIN CALCIUM 40 MG/1
40 TABLET, FILM COATED ORAL
Status: DISCONTINUED | OUTPATIENT
Start: 2021-10-19 | End: 2021-10-19

## 2021-10-18 RX ORDER — TRAZODONE HYDROCHLORIDE 50 MG/1
25 TABLET ORAL
Status: DISCONTINUED | OUTPATIENT
Start: 2021-10-18 | End: 2021-10-20 | Stop reason: HOSPADM

## 2021-10-18 RX ORDER — GUAIFENESIN 100 MG/5ML
81 LIQUID (ML) ORAL DAILY
Status: DISCONTINUED | OUTPATIENT
Start: 2021-10-19 | End: 2021-10-20 | Stop reason: HOSPADM

## 2021-10-18 RX ORDER — SODIUM CHLORIDE 0.9 % (FLUSH) 0.9 %
5-10 SYRINGE (ML) INJECTION EVERY 8 HOURS
Status: DISCONTINUED | OUTPATIENT
Start: 2021-10-18 | End: 2021-10-20 | Stop reason: HOSPADM

## 2021-10-18 RX ORDER — ASPIRIN 325 MG
325 TABLET ORAL ONCE
Status: COMPLETED | OUTPATIENT
Start: 2021-10-18 | End: 2021-10-18

## 2021-10-18 RX ORDER — HYDRALAZINE HYDROCHLORIDE 20 MG/ML
10 INJECTION INTRAMUSCULAR; INTRAVENOUS
Status: DISCONTINUED | OUTPATIENT
Start: 2021-10-18 | End: 2021-10-18

## 2021-10-18 RX ORDER — HYDRALAZINE HYDROCHLORIDE 20 MG/ML
5 INJECTION INTRAMUSCULAR; INTRAVENOUS
Status: COMPLETED | OUTPATIENT
Start: 2021-10-18 | End: 2021-10-18

## 2021-10-18 RX ORDER — SODIUM CHLORIDE 0.9 % (FLUSH) 0.9 %
5-40 SYRINGE (ML) INJECTION AS NEEDED
Status: DISCONTINUED | OUTPATIENT
Start: 2021-10-18 | End: 2021-10-20 | Stop reason: HOSPADM

## 2021-10-18 RX ORDER — FAMOTIDINE 20 MG/1
20 TABLET, FILM COATED ORAL
Status: DISCONTINUED | OUTPATIENT
Start: 2021-10-18 | End: 2021-10-20 | Stop reason: HOSPADM

## 2021-10-18 RX ORDER — ONDANSETRON 2 MG/ML
4 INJECTION INTRAMUSCULAR; INTRAVENOUS
Status: DISCONTINUED | OUTPATIENT
Start: 2021-10-18 | End: 2021-10-20 | Stop reason: HOSPADM

## 2021-10-18 RX ORDER — CHOLECALCIFEROL (VITAMIN D3) 125 MCG
5 CAPSULE ORAL
Status: DISCONTINUED | OUTPATIENT
Start: 2021-10-19 | End: 2021-10-20 | Stop reason: HOSPADM

## 2021-10-18 RX ORDER — LEVOTHYROXINE SODIUM 50 UG/1
50 TABLET ORAL
Status: DISCONTINUED | OUTPATIENT
Start: 2021-10-19 | End: 2021-10-20 | Stop reason: HOSPADM

## 2021-10-18 RX ORDER — METOPROLOL SUCCINATE 25 MG/1
25 TABLET, EXTENDED RELEASE ORAL DAILY
Status: DISCONTINUED | OUTPATIENT
Start: 2021-10-19 | End: 2021-10-20 | Stop reason: HOSPADM

## 2021-10-18 RX ORDER — SODIUM CHLORIDE 0.9 % (FLUSH) 0.9 %
5-40 SYRINGE (ML) INJECTION EVERY 8 HOURS
Status: DISCONTINUED | OUTPATIENT
Start: 2021-10-19 | End: 2021-10-20 | Stop reason: HOSPADM

## 2021-10-18 RX ORDER — ESCITALOPRAM OXALATE 10 MG/1
10 TABLET ORAL DAILY
Status: DISCONTINUED | OUTPATIENT
Start: 2021-10-19 | End: 2021-10-20 | Stop reason: HOSPADM

## 2021-10-18 RX ADMIN — ATORVASTATIN CALCIUM 40 MG: 40 TABLET, FILM COATED ORAL at 23:31

## 2021-10-18 RX ADMIN — IOPAMIDOL 100 ML: 755 INJECTION, SOLUTION INTRAVENOUS at 18:29

## 2021-10-18 RX ADMIN — ASPIRIN 325 MG ORAL TABLET 325 MG: 325 PILL ORAL at 22:47

## 2021-10-18 RX ADMIN — Medication 10 ML: at 23:32

## 2021-10-18 RX ADMIN — Medication 5 MG: at 23:31

## 2021-10-18 RX ADMIN — Medication 10 ML: at 18:29

## 2021-10-18 RX ADMIN — SODIUM CHLORIDE 100 ML: 900 INJECTION, SOLUTION INTRAVENOUS at 18:29

## 2021-10-18 RX ADMIN — HYDRALAZINE HYDROCHLORIDE 5 MG: 20 INJECTION INTRAMUSCULAR; INTRAVENOUS at 19:19

## 2021-10-18 RX ADMIN — Medication 10 ML: at 23:31

## 2021-10-18 NOTE — ED PROVIDER NOTES
Patient presents from a facility. She was last seen normal about 1530. Daughter called patient about [phone] and noticed she had difficulty getting her words out. This is similar to a previous stroke she had. Facility was notified and called EMS. EMS noted some diffuse weakness but no focal abnormality. No facial droop. The history is provided by the EMS personnel. The history is limited by the condition of the patient. No  was used. Aphasia  This is a new problem. Episode onset: 1530. The problem has not changed since onset. There was no focality noted. Primary symptoms include speech difficulty. Pertinent negatives include no focal weakness and no slurred speech. Associated symptoms include confusion. Associated medical issues include CVA.         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    CAD (coronary artery disease), native coronary artery May 2014    stent to LAD; 3 stents total    Depression 1/14/2013    GERD (gastroesophageal reflux disease)     Hypercholesteremia 1/14/2013    Hypertension     Hypothyroidism 1/14/2013    Osteoarthritis of back 1/2/2014    Osteoporosis     Pulmonary embolism ([address] 2009    after knee surgery    Sick sinus syndrome ([address] 1/7/2016    Tachycardia-Bradycardia    Stroke [facility])        Past Surgical History:   Procedure Laterality Date    COLONOSCOPY N/A 7/16/2018    COLONOSCOPY performed by Michele Campos MD at [address] HX BREAST BIOPSY Bilateral     HX CATARACT REMOVAL Right 2010    HX CORONARY STENT PLACEMENT  05/2014    LAD X 3    HX ENDOSCOPY  04/12/2019    gastritis with superficial ulcerations    HX HEART CATHETERIZATION  09/17/2015    HX HEENT      multiple eye surgeries - left - macular hoe, retinal detachment twice,     HX HYSTERECTOMY  1970    HX KNEE REPLACEMENT  2009    HX KNEE REPLACEMENT Left 01/12/2017    HX ORTHOPAEDIC  2006 and 2008    herniated disc    HX TONSIL AND ADENOIDECTOMY  1934         Family History:   Problem Relation Age of Onset    Cancer Mother     Breast Cancer Mother     Cancer Father     Cancer Sister     Cancer Brother         pancreas    Breast Cancer Maternal Aunt        Social History     Socioeconomic History    Marital status:      Spouse name: Not on file    Number of children: Not on file    Years of education: Not on file    Highest education level: Not on file   Occupational History    Not on file   Tobacco Use    Smoking status: Never Smoker    Smokeless tobacco: Never Used   Vaping Use    Vaping Use: Never used   Substance and Sexual Activity    Alcohol use: No     Alcohol/week: 0.0 standard drinks    Drug use: No    Sexual activity: Not Currently   Other Topics Concern    Not on file   Social History Narrative    No family/social/cultural issues pertinent to care     Social Determinants of Health     Financial Resource Strain:     Difficulty of Paying Living Expenses:    Food Insecurity:     Worried About Running Out of Food in the Last Year:     Ran Out of Food in the Last Year:    Transportation Needs:     Lack of Transportation (Medical):      Lack of Transportation (Non-Medical):    Physical Activity:     Days of Exercise per Week:     Minutes of Exercise per Session:    Stress:     Feeling of Stress :    Social Connections:     Frequency of Communication with Friends and Family:     Frequency of Social Gatherings with Friends and Family:     Attends Synagogue Services:     Active Member of Clubs or Organizations:     Attends Club or Organization Meetings:     Marital Status:    Intimate Partner Violence:     Fear of Current or Ex-Partner:     Emotionally Abused:     Physically Abused:     Sexually Abused: ALLERGIES: Adhesive    Review of Systems   Unable to perform ROS: Other (pt with speech difficulty. )   Neurological: Positive for speech difficulty. Negative for focal weakness. Psychiatric/Behavioral: Positive for confusion. There were no vitals filed for this visit. Physical Exam  Constitutional:       Appearance: Normal appearance. She is well-developed. HENT:      Head: Normocephalic and atraumatic. Eyes:      Extraocular Movements: Extraocular movements intact. Pupils: Pupils are equal, round, and reactive to light. Cardiovascular:      Rate and Rhythm: Normal rate and regular rhythm. Pulmonary:      Effort: Pulmonary effort is normal. No respiratory distress. Breath sounds: Normal breath sounds. Abdominal:      General: Bowel sounds are normal.      Palpations: Abdomen is soft. Tenderness: There is no abdominal tenderness. Musculoskeletal:         General: No swelling. Normal range of motion. Cervical back: Normal range of motion and neck supple. Skin:     General: Skin is warm and dry. Neurological:      General: No focal deficit present. Mental Status: She is alert. Cranial Nerves: Cranial nerve deficit present. Motor: No weakness. Comments: Pt with aphasia. MDM  Number of Diagnoses or Management Options  Diagnosis management comments: 6:12 PM  Spoke with teleneurology and with truly unknown time of onset patient does not meet criteria for TPA. We will continue work-up with CTA CT brain perfusion and admit to the hospital.    Patient with no LVO on CTA. Will admit. Amount and/or Complexity of Data Reviewed  Clinical lab tests: ordered and reviewed  Tests in the radiology section of CPT®: ordered and reviewed  Tests in the medicine section of CPT®: ordered and reviewed    Patient Progress  Patient progress: stable         Procedures        EKG: normal sinus rhythm, nonspecific ST and T waves changes.  Rate 86.             CT PERF W CBF (Final result)  Result time 10/18/21 18:47:46  Final result by Ila Starkey MD (10/18/21 18:47:46)                Impression:    There is a tiny, 1 cc area of delayed perfusion on the right,   probably occipital lobe. This could be artifact. No core infarct. Please note that the determination of patient treatment is not based solely upon   imaging factors or calculation values. Management of ischemia is at the   discretion of the primary physician and is based upon a combination of clinical   and imaging data, along other factors. Narrative:    HEAD CT with PERFUSION IMAGING     INDICATION:  Aphasia. Prior history of CVA. Hypertension. Multiple axial images obtained through the brain without intravenous contrast.   CT perfusion imaging of the brain was then performed after the administration of   intravenous contrast.  Perfusion maps and perfusion analysis output were   generated using the RAPID perfusion processing software algorithm.   Radiation   dose reduction techniques were used for this study:  All CT scans performed at   this facility use one or all of the following: Automated exposure control,   adjustment of the mA and/or kVp according to patient's size, iterative   reconstruction. FINDINGS:   VIZ Output Values:     CBF < 30% volume:  0 ml   (core infarction volume greater than 50 cc associated   with poor outcomes)   Tmax > 6 seconds: 1 ml   Tmax/CBF Mismatch Volume: 1 ml   Tmax/CBF Mismatch Ratio: NA   Hypoperfusion Intensity Ratio (Tmax > 10 seconds/Tmax > 6 seconds): 1   (values   greater than 0.5 associated with poor outcome)   Tmax > 10 seconds Volume: 1 ml   (volume greater than 100 mL is associated with   poor outcome)     This scan was analyzed using TYT ([employer]) ContaCT for LVO detection (large vessel   occlusion).                      CTA CODE NEURO HEAD AND NECK W CONT (Preliminary result)  Result time 10/18/21 19:31:49  ED Interpretation by Abilio Nielsen MD (10/18/21 19:31:49, [facility], Radiology)    Preliminary:  1.  Mild atherosclerosis.  No evidence of large vessel occlusion or high-grade stenosis. 2.  Chronic right maxillary sinusitis                    XR CHEST PORT (Final result)  Result time 10/18/21 18:52:04  Final result by Abilio Nielsen MD (10/18/21 18:52:04)                Impression:    Minimal atelectasis in the lung bases, at least partially due to   poor inspiration.  No definite acute infiltrate. Narrative:    Chest X-ray     INDICATION: Aphasia     A portable AP view of the chest was obtained. FINDINGS: There is slight increased density lung bases, at least partially due   to poor inspiration.  Heart size is stable.  The bony thorax is intact.                       CT CODE NEURO HEAD WO CONTRAST (Final result)  Result time 10/18/21 18:03:49  Final result by Ashley Richardson MD (10/18/21 18:03:49)                Impression:    Negative for acute intracranial abnormality.  Chronic changes. Narrative:    CT HEAD WITHOUT CONTRAST. INDICATION: Transient aphasia. COMPARISON: March 2021 and December 2020.       TECHNIQUE:   5 mm axial scans from the skull base to the vertex.  Our CT   scanners use one or more of the following:  Automated exposure control,   adjustment of the mA and or kV according to patient size, iterative   reconstruction. FINDINGS:     No acute intraparenchymal hemorrhage or abnormal extra-axial fluid collection.     The ventricles are normal size. Symmetric bilateral white matter low attenuation is present, nonspecific, likely   chronic small vessel disease.     No midline shift or mass effect. Posterior fossa: Unremarkable. Included portion of the:   Paranasal sinuses and mastoid air cells demonstrate persistent opacified right   maxillary sinus with calcifications.    Globes and orbits grossly demonstrates high attenuation material in the left globe.                   Results Include:    Recent Results (from the past 24 hour(s))   GLUCOSE, POC    Collection Time: 10/18/21  5:44 PM   Result Value Ref Range    Glucose (POC) 115 (H) 65 - 100 mg/dL    Performed by Paulino    CBC WITH AUTOMATED DIFF    Collection Time: 10/18/21  5:48 PM   Result Value Ref Range    WBC 5.4 4.3 - 11.1 K/uL    RBC 4.58 4.05 - 5.2 M/uL    HGB 11.9 11.7 - 15.4 g/dL    HCT 38.4 35.8 - 46.3 %    MCV 83.8 79.6 - 97.8 FL    MCH 26.0 (L) 26.1 - 32.9 PG    MCHC 31.0 (L) 31.4 - 35.0 g/dL    RDW 15.3 (H) 11.9 - 14.6 %    PLATELET 468 178 - 936 K/uL    MPV 11.3 9.4 - 12.3 FL    ABSOLUTE NRBC 0.00 0.0 - 0.2 K/uL    DF AUTOMATED      NEUTROPHILS 47 43 - 78 %    LYMPHOCYTES 39 13 - 44 %    MONOCYTES 11 4.0 - 12.0 %    EOSINOPHILS 1 0.5 - 7.8 %    BASOPHILS 1 0.0 - 2.0 %    IMMATURE GRANULOCYTES 0 0.0 - 5.0 %    ABS. NEUTROPHILS 2.5 1.7 - 8.2 K/UL    ABS. LYMPHOCYTES 2.1 0.5 - 4.6 K/UL    ABS. MONOCYTES 0.6 0.1 - 1.3 K/UL    ABS. EOSINOPHILS 0.1 0.0 - 0.8 K/UL    ABS. BASOPHILS 0.0 0.0 - 0.2 K/UL    ABS. IMM.  GRANS. 0.0 0.0 - 0.5 K/UL   METABOLIC PANEL, BASIC    Collection Time: 10/18/21  5:48 PM   Result Value Ref Range    Sodium 141 136 - 145 mmol/L    Potassium 3.6 3.5 - 5.1 mmol/L    Chloride 110 (H) 98 - 107 mmol/L    CO2 22 21 - 32 mmol/L    Anion gap 9 7 - 16 mmol/L    Glucose 119 (H) 65 - 100 mg/dL    BUN 16 8 - 23 MG/DL    Creatinine 0.97 0.6 - 1.0 MG/DL    GFR est AA >60 >60 ml/min/1.73m2    GFR est non-AA 57 (L) >60 ml/min/1.73m2    Calcium 9.6 8.3 - 10.4 MG/DL   TROPONIN-HIGH SENSITIVITY    Collection Time: 10/18/21  5:48 PM   Result Value Ref Range    Troponin-High Sensitivity 4.2 0 - 14 pg/mL   HEPATIC FUNCTION PANEL    Collection Time: 10/18/21  5:48 PM   Result Value Ref Range    Protein, total 7.9 6.3 - 8.2 g/dL    Albumin 3.3 3.2 - 4.6 g/dL    Globulin 4.6 (H) 2.3 - 3.5 g/dL    A-G Ratio 0.7 (L) 1.2 - 3.5      Bilirubin, total 0.7 0.2 - 1.1 MG/DL    Bilirubin, direct 0.2 <0.4 MG/DL    Alk.  phosphatase 82 50 - 136 U/L    AST (SGOT) 19 15 - 37 U/L    ALT (SGPT) 19 12 - 65 U/L   POC PT/INR    Collection Time: 10/18/21  6:02 PM   Result Value Ref Range    Prothrombin time (POC) 12.2 (H) 9.6 - 11.6 SECS    INR (POC) 1.0 0.9 - 1.2

## 2021-10-18 NOTE — ED TRIAGE NOTES
Patient arrives to ED via EMS from [facility]. Patient has aphasia that started at 1630. Patient has history of stroke. Denies blood thinners. Patient hypertensive at 216/82. MD Christian to see patient in triage.        Race 2

## 2021-10-19 ENCOUNTER — APPOINTMENT (OUTPATIENT)
Dept: MRI IMAGING | Age: 86
DRG: 069 | End: 2021-10-19
Attending: INTERNAL MEDICINE
Payer: MEDICARE

## 2021-10-19 ENCOUNTER — APPOINTMENT (OUTPATIENT)
Dept: NON INVASIVE DIAGNOSTICS | Age: 86
DRG: 069 | End: 2021-10-19
Attending: INTERNAL MEDICINE
Payer: MEDICARE

## 2021-10-19 PROBLEM — H54.40 BLIND LEFT EYE: Status: ACTIVE | Noted: 2021-10-19

## 2021-10-19 LAB
ANION GAP SERPL CALC-SCNC: 8 MMOL/L (ref 7–16)
ATRIAL RATE: 416 BPM
BUN SERPL-MCNC: 15 MG/DL (ref 8–23)
CALCIUM SERPL-MCNC: 9.3 MG/DL (ref 8.3–10.4)
CALCULATED R AXIS, ECG10: 24 DEGREES
CALCULATED T AXIS, ECG11: 62 DEGREES
CHLORIDE SERPL-SCNC: 109 MMOL/L (ref 98–107)
CHOLEST SERPL-MCNC: 226 MG/DL
CO2 SERPL-SCNC: 22 MMOL/L (ref 21–32)
CREAT SERPL-MCNC: 0.93 MG/DL (ref 0.6–1)
DIAGNOSIS, 93000: NORMAL
ECHO AO ASC DIAM: 3.37 CM
ECHO AO ROOT DIAM: 2.97 CM
ECHO AR MAX VEL PISA: 337 CM/S
ECHO AV AREA PEAK VELOCITY: 1.99 CM2
ECHO AV AREA VTI: 2.05 CM2
ECHO AV AREA/BSA PEAK VELOCITY: 1.2 CM2/M2
ECHO AV AREA/BSA VTI: 1.2 CM2/M2
ECHO AV MEAN GRADIENT: 4 MMHG
ECHO AV PEAK GRADIENT: 7 MMHG
ECHO AV PEAK VELOCITY: 133 CM/S
ECHO AV REGURGITANT PHT: 511 MS
ECHO AV VTI: 30.9 CM
ECHO EST RA PRESSURE: 3 MMHG
ECHO LA AREA 2C: 17.8 CM2
ECHO LA AREA 4C: 15.8 CM2
ECHO LA MAJOR AXIS: 5.19 CM
ECHO LA MINOR AXIS: 3.07 CM
ECHO LV E' LATERAL VELOCITY: 9.09 CM/S
ECHO LV E' SEPTAL VELOCITY: 5.32 CM/S
ECHO LV EDV A2C: 91.1 CM3
ECHO LV EDV A4C: 102 CM3
ECHO LV ESV A2C: 36.6 CM3
ECHO LV ESV A4C: 41.6 CM3
ECHO LV INTERNAL DIMENSION DIASTOLIC: 3.78 CM (ref 3.9–5.3)
ECHO LV INTERNAL DIMENSION SYSTOLIC: 2.65 CM
ECHO LV IVSD: 1.11 CM (ref 0.6–0.9)
ECHO LV MASS 2D: 127.4 G (ref 67–162)
ECHO LV MASS INDEX 2D: 75.4 G/M2 (ref 43–95)
ECHO LV POSTERIOR WALL DIASTOLIC: 1.02 CM (ref 0.6–0.9)
ECHO LVOT DIAM: 1.85 CM
ECHO LVOT PEAK GRADIENT: 4 MMHG
ECHO LVOT VTI: 23.6 CM
ECHO MV A VELOCITY: 118 CM/S
ECHO MV E DECELERATION TIME (DT): 415 MS
ECHO MV E VELOCITY: 84.8 CM/S
ECHO MV E/A RATIO: 0.72
ECHO MV E/E' LATERAL: 9.33
ECHO MV E/E' RATIO (AVERAGED): 12.63
ECHO MV E/E' SEPTAL: 15.94
ECHO PV REGURGITANT MAX VELOCITY: 113 CM/S
ECHO RIGHT VENTRICULAR SYSTOLIC PRESSURE (RVSP): 32 MMHG
ECHO RV INTERNAL DIMENSION: 2.98 CM
ECHO RV TAPSE: 1.97 CM (ref 1.5–2)
ECHO TV REGURGITANT MAX VELOCITY: 270 CM/S
ECHO TV REGURGITANT PEAK GRADIENT: 29 MMHG
ERYTHROCYTE [DISTWIDTH] IN BLOOD BY AUTOMATED COUNT: 15.4 % (ref 11.9–14.6)
EST. AVERAGE GLUCOSE BLD GHB EST-MCNC: 128 MG/DL
GLUCOSE SERPL-MCNC: 127 MG/DL (ref 65–100)
HBA1C MFR BLD: 6.1 % (ref 4.2–6.3)
HCT VFR BLD AUTO: 36.6 % (ref 35.8–46.3)
HDLC SERPL-MCNC: 57 MG/DL (ref 40–60)
HDLC SERPL: 4 {RATIO}
HGB BLD-MCNC: 11.9 G/DL (ref 11.7–15.4)
LDLC SERPL CALC-MCNC: 156.6 MG/DL
MCH RBC QN AUTO: 25.5 PG (ref 26.1–32.9)
MCHC RBC AUTO-ENTMCNC: 32.5 G/DL (ref 31.4–35)
MCV RBC AUTO: 78.5 FL (ref 79.6–97.8)
NRBC # BLD: 0 K/UL (ref 0–0.2)
PLATELET # BLD AUTO: 247 K/UL (ref 150–450)
PMV BLD AUTO: 11.5 FL (ref 9.4–12.3)
POTASSIUM SERPL-SCNC: 3.7 MMOL/L (ref 3.5–5.1)
Q-T INTERVAL, ECG07: 450 MS
QRS DURATION, ECG06: 90 MS
QTC CALCULATION (BEZET), ECG08: 528 MS
RBC # BLD AUTO: 4.66 M/UL (ref 4.05–5.2)
SODIUM SERPL-SCNC: 139 MMOL/L (ref 136–145)
TRIGL SERPL-MCNC: 62 MG/DL (ref 35–150)
VENTRICULAR RATE, ECG03: 83 BPM
VLDLC SERPL CALC-MCNC: 12.4 MG/DL (ref 6–23)
WBC # BLD AUTO: 5.4 K/UL (ref 4.3–11.1)

## 2021-10-19 PROCEDURE — 92610 EVALUATE SWALLOWING FUNCTION: CPT

## 2021-10-19 PROCEDURE — 70551 MRI BRAIN STEM W/O DYE: CPT

## 2021-10-19 PROCEDURE — 74011250636 HC RX REV CODE- 250/636: Performed by: INTERNAL MEDICINE

## 2021-10-19 PROCEDURE — 93306 TTE W/DOPPLER COMPLETE: CPT

## 2021-10-19 PROCEDURE — 97535 SELF CARE MNGMENT TRAINING: CPT

## 2021-10-19 PROCEDURE — 74011250637 HC RX REV CODE- 250/637: Performed by: FAMILY MEDICINE

## 2021-10-19 PROCEDURE — 36415 COLL VENOUS BLD VENIPUNCTURE: CPT

## 2021-10-19 PROCEDURE — 97161 PT EVAL LOW COMPLEX 20 MIN: CPT

## 2021-10-19 PROCEDURE — 74011000250 HC RX REV CODE- 250: Performed by: INTERNAL MEDICINE

## 2021-10-19 PROCEDURE — 74011250637 HC RX REV CODE- 250/637: Performed by: STUDENT IN AN ORGANIZED HEALTH CARE EDUCATION/TRAINING PROGRAM

## 2021-10-19 PROCEDURE — 97165 OT EVAL LOW COMPLEX 30 MIN: CPT

## 2021-10-19 PROCEDURE — 97530 THERAPEUTIC ACTIVITIES: CPT

## 2021-10-19 PROCEDURE — 86580 TB INTRADERMAL TEST: CPT | Performed by: INTERNAL MEDICINE

## 2021-10-19 PROCEDURE — 85027 COMPLETE CBC AUTOMATED: CPT

## 2021-10-19 PROCEDURE — 80061 LIPID PANEL: CPT

## 2021-10-19 PROCEDURE — 99222 1ST HOSP IP/OBS MODERATE 55: CPT | Performed by: PSYCHIATRY & NEUROLOGY

## 2021-10-19 PROCEDURE — 83036 HEMOGLOBIN GLYCOSYLATED A1C: CPT

## 2021-10-19 PROCEDURE — 80048 BASIC METABOLIC PNL TOTAL CA: CPT

## 2021-10-19 PROCEDURE — 65270000029 HC RM PRIVATE

## 2021-10-19 PROCEDURE — 74011250637 HC RX REV CODE- 250/637: Performed by: INTERNAL MEDICINE

## 2021-10-19 RX ORDER — ATORVASTATIN CALCIUM 40 MG/1
80 TABLET, FILM COATED ORAL
Status: DISCONTINUED | OUTPATIENT
Start: 2021-10-19 | End: 2021-10-20 | Stop reason: HOSPADM

## 2021-10-19 RX ADMIN — Medication 10 ML: at 22:00

## 2021-10-19 RX ADMIN — ATORVASTATIN CALCIUM 80 MG: 40 TABLET, FILM COATED ORAL at 21:34

## 2021-10-19 RX ADMIN — TUBERCULIN PURIFIED PROTEIN DERIVATIVE 5 UNITS: 5 INJECTION, SOLUTION INTRADERMAL at 06:19

## 2021-10-19 RX ADMIN — Medication 10 ML: at 06:17

## 2021-10-19 RX ADMIN — ASPIRIN 81 MG: 81 TABLET, CHEWABLE ORAL at 09:50

## 2021-10-19 RX ADMIN — Medication 5 MG: at 21:34

## 2021-10-19 RX ADMIN — ENOXAPARIN SODIUM 40 MG: 40 INJECTION SUBCUTANEOUS at 09:48

## 2021-10-19 RX ADMIN — METOPROLOL SUCCINATE 25 MG: 25 TABLET, EXTENDED RELEASE ORAL at 09:50

## 2021-10-19 RX ADMIN — ESCITALOPRAM OXALATE 10 MG: 10 TABLET ORAL at 09:50

## 2021-10-19 RX ADMIN — Medication 10 ML: at 06:16

## 2021-10-19 RX ADMIN — LEVOTHYROXINE SODIUM 50 MCG: 0.05 TABLET ORAL at 06:16

## 2021-10-19 RX ADMIN — Medication 10 ML: at 21:34

## 2021-10-19 NOTE — CONSULTS
[facility] Consult Acknowledged     \"Becky Loera is a 80 y.o. female with medical history of atrial fibrillation, CAD status post stents, depression, GERD, HLD, hypothyroidism, hypertension, history of prior stroke, history of pulmonary embolism, sick sinus syndrome who presented to ED with aphasia. Unknown last well time but the facility noticed it sometimes in the afternoon. Reports word finding difficulties as well as difficulty speaking.   Code stroke was called on arrival to the ED. NIH score are 5. Patient was evaluated by neurology and is not a candidate for TPA. CT head without any acute intracranial abnormalities. CT perfusion with tiny ones in centimeter area of delayed perfusion on the right occipital lobe which is read as likely artifact. CTA preliminary with no evidence of large vessel occlusion. Patient daughter states that patient was recently treated for 2 bouts of UTI and she does get urinary tract infection frequently. Patient has prior CVA with similar presentation. \"    Pt off floor when visited. MRI neg for stroke thus formal consult deferred. Therapies recommending HH. All ST issues have resolved per pt and dtgs report. Tolerating a regular consistency diet. No charge  Deena Pedersen MD, Medical Director  [address] Program

## 2021-10-19 NOTE — PROGRESS NOTES
ACUTE PHYSICAL THERAPY GOALS:  (Developed with and agreed upon by patient and/or caregiver. )    LTG:  (1.)Ms. Alecia Olivas will move from supine to sit and sit to supine , scoot up and down and roll side to side in bed with MODIFIED INDEPENDENCE within 7 treatment day(s). (2.)Ms. Alecia Olivas will transfer from bed to chair and chair to bed with MODIFIED INDEPENDENCE using the least restrictive device within 7 treatment day(s). (3.)Ms. Alecia Olivas will ambulate with SUPERVISION for 250 feet with the least restrictive device within 7 treatment day(s). (4.)Ms. Alecia Olivas will perform standing static and dynamic balance activities x 15 minutes with SUPERVISION to improve safety within 7 treatment day(s). ________________________________________________________________________________________________      PHYSICAL THERAPY ASSESSMENT: Initial Assessment and AM PT Treatment Day # 1      Angélica Melo is a 80 y.o. female   PRIMARY DIAGNOSIS: Severe aphasia  Severe aphasia [R47.01]       Reason for Referral:    ICD-10: Treatment Diagnosis: Generalized Muscle Weakness (M62.81)  [place]PAT[place]: [place] / [place] A [place] B / Product Type: Medicare /     ASSESSMENT:     REHAB RECOMMENDATIONS:   Recommendation to date pending progress:  [address] [place] [address]  Equipment:    To Be Determined     PRIOR LEVEL OF FUNCTION:  (Prior to Hospitalization) INITIAL/CURRENT LEVEL OF FUNCTION:  (Most Recently Demonstrated)   Bed Mobility:   Independent  Sit to Stand:   Independent  Transfers:   Independent  Gait/Mobility:   Modified Independent Bed Mobility:   Standby Assistance  Sit to Stand:   Contact Guard Assistance  Transfers:   Contact Guard Assistance  Gait/Mobility:   CGA-Marcelle     ASSESSMENT:  Ms. Alecia Olivas presents to PT with WFL AROM and WFL strength in B LEs. Pt reported intact B LE sensation and demonstrated some decreased L LE coordination.   Pt performed bed mobility with supervision and STS transfers with CGA. She demonstrated fair standing balance and ambulated with CGA-Marcelle due to increased trunk sway. Her BP was recorded at 105/59 in standing and 132/72. Ms. Tenzin Valero could benefit from skilled PT as she is currently functioning below her baseline. SUBJECTIVE:   Ms. Tenzin Valero states, \"I can't hear well. \"    SOCIAL HISTORY/LIVING ENVIRONMENT: Lives at [facility] and uses [facility] PR[facility] for ambulation  Home Environment: Assisted living  One/Two Story Residence: One story  Living Alone: No  Support Systems: Child(celena), Assisted Living  OBJECTIVE:     PAIN: VITAL SIGNS: LINES/DRAINS:   Pre Treatment: Pain Screen  Pain Scale 1: Numeric (0 - 10)  Pain Intensity 1: 0  Post Treatment: 0   none  O2 Device: None (Room air)     GROSS EVALUATION:  B LE Within Functional Limits Abnormal/ Functional Abnormal/ Non-Functional (see comments) Not Tested Comments:   AROM [x] [] [] []    PROM [] [] [] []    Strength [x] [] [] []    Balance [] [x] [] [] Fair standing   Posture [] [] [] []    Sensation [x] [] [] []    Coordination [] [x] [] [] L LE   Tone [] [] [] []    Edema [] [] [] []    Activity Tolerance [] [x] [] [] Orthostatic hypotension    [] [] [] []      COGNITION/  PERCEPTION: Intact Impaired   (see comments) Comments:   Orientation [x] []    Vision [] []    Hearing [] []    Command Following [x] []    Safety Awareness [x] []     [] []      MOBILITY: I Mod I S SBA CGA Min Mod Max Total  NT x2 Comments:   Bed Mobility    Rolling [] [] [x] [] [] [] [] [] [] [] []    Supine to Sit [] [] [x] [] [] [] [] [] [] [] []    Scooting [] [] [x] [] [] [] [] [] [] [] []    Sit to Supine [] [] [] [] [] [] [] [] [] [] []    Transfers    Sit to Stand [] [] [] [] [x] [] [] [] [] [] []    Bed to Chair [] [] [] [] [x] [x] [] [] [] [] []    Stand to Sit [] [] [] [] [x] [] [] [] [] [] []    I=Independent, Mod I=Modified Independent, S=Supervision, SBA=Standby Assistance, CGA=Contact Guard Assistance,   Min=Minimal Assistance, Mod=Moderate Assistance, Max=Maximal Assistance, Total=Total Assistance, NT=Not Tested  GAIT: I Mod I S SBA CGA Min Mod Max Total  NT x2 Comments:   Level of Assistance [] [] [] [] [x] [x] [] [] [] [] []    Distance 20 ft    DME None    Gait Quality Increased trunk sway    Weightbearing Status N/A     I=Independent, Mod I=Modified Independent, S=Supervision, SBA=Standby Assistance, CGA=Contact Guard Assistance,   Min=Minimal Assistance, Mod=Moderate Assistance, Max=Maximal Assistance, Total=Total Assistance, NT=Not Tested    [place] Form       How much difficulty does the patient currently have. .. Unable A Lot A Little None   1. Turning over in bed (including adjusting bedclothes, sheets and blankets)? [] 1   [] 2   [] 3   [x] 4   2. Sitting down on and standing up from a chair with arms ( e.g., wheelchair, bedside commode, etc.)   [] 1   [] 2   [] 3   [x] 4   3. Moving from lying on back to sitting on the side of the bed? [] 1   [] 2   [] 3   [x] 4   How much help from another person does the patient currently need. .. Total A Lot A Little None   4. Moving to and from a bed to a chair (including a wheelchair)? [] 1   [] 2   [x] 3   [] 4   5. Need to walk in hospital room? [] 1   [] 2   [x] 3   [] 4   6. Climbing 3-5 steps with a railing? [] 1   [] 2   [x] 3   [] 4   © 2007, Trustees of [address] [place] [address], under license to [employer]. All rights reserved     Score:  Initial: 21 Most Recent: X (Date: -- )    Interpretation of Tool:  Represents activities that are increasingly more difficult (i.e. Bed mobility, Transfers, Gait). PLAN:   FREQUENCY/DURATION: PT Plan of Care: 3 times/week for duration of hospital stay or until stated goals are met, whichever comes first.    PROBLEM LIST:   (Skilled intervention is medically necessary to address:)  1. Decreased Activity Tolerance  2. Decreased Balance  3. Decreased Coordination  4.  Decreased Gait Ability INTERVENTIONS PLANNED:   (Benefits and precautions of physical therapy have been discussed with the patient.)  1. Therapeutic Activity  2. Therapeutic Exercise/HEP  3. Neuromuscular Re-education  4. Gait Training  5. Manual Therapy  6. Education     TREATMENT:     EVALUATION: Low Complexity : (Untimed Charge)    TREATMENT:   ($$ Therapeutic Activity: 8-22 mins    )  Therapeutic Activity (8 Minutes): Therapeutic activity included Transfer Training, Ambulation on level ground and Standing balance to improve functional Mobility, Strength and Activity tolerance.     TREATMENT GRID:  N/A    AFTER TREATMENT POSITION/PRECAUTIONS:  Alarm Activated, Chair, Needs within reach and RN notified    INTERDISCIPLINARY COLLABORATION:  RN/PCT, PT/PTA and OT/HAAS    TOTAL TREATMENT DURATION:  PT Patient Time In/Time Out  Time In: 0917  Time Out: Armand Damon, PT, DPT

## 2021-10-19 NOTE — PROGRESS NOTES
SPEECH LANGUAGE PATHOLOGY: DYSPHAGIA- Initial Assessment and Discharge    NAME/AGE/GENDER: Altaf Age is a 80 y.o. female  DATE: 10/19/2021  PRIMARY DIAGNOSIS: Severe aphasia [R47.01]      ICD-10: Treatment Diagnosis: R13.12 Dysphagia, Oropharyngeal Phase    RECOMMENDATIONS   DIET:    Regular Consistency   Thin Liquids    MEDICATIONS: With liquid     PRECAUTIONS, MODIFICATIONS, AND STRATEGIES  · Upright at 90 degrees during meal       RECOMMENDATIONS FOR CONTINUED SPEECH THERAPY:   YES: Anticipate need for ongoing speech therapy for possible language evaluation pending imaging results/neuro workup. ADDENDUM: MRI negative for acute findings. No additional speech therapy indicated at this time. ASSESSMENT   Patient presents with oropharyngeal swallow function that is within normal limits. No s/sx of dysphagia identified. Recommend regular diet/thin liquids. Medications whole with liquid wash. No dysphagia treatment indicated as swallow is within normal limits. Patient with expressive aphasia at time of admission which has since resolved. She is able to communicate at the conversational level with complex sentence structure and appropriate use of humor. Patient and daughter report all symptoms have now resolved. No findings on CT; MRI pending. Possible language evaluation pending imaging results. ADDENDUM: MRI Negative for acute findings. No additional speech therapy indicated. Please consider re-consult if new concerns arise. EDUCATION:  · Recommendations discussed with Patient and RN    REHABILITATION POTENTIAL FOR STATED GOALS: Excellent    PLAN    FREQUENCY/DURATION:   Possible cognitive-linguistic assessment pending clinical coarse and imaging results     ADDENDUM: No additional speech therapy indicated at this time. CONTINUATION OF SKILLED SERVICES/MEDICAL NECESSITY:   Patient continues to require skilled intervention due to possible language evaluation.      SUBJECTIVE Patient sitting upright in bedside chair with daughter present. Communicating in conversation. Oxygen Device: room air  Pain: Pain Scale 1: Numeric (0 - 10)  Pain Intensity 1: 0    History of Present Injury/Illness: Ms. Nicolasa Eldridge  has a past medical history of Abnormal cardiovascular function study (1/7/2016), Allergic rhinitis (1/14/2013), Anemia (11/13/2015), Arthritis, Atrial fibrillation ([address] (1/14/2013), Blind left eye, CAD (coronary artery disease), native coronary artery (May 2014), Depression (1/14/2013), GERD (gastroesophageal reflux disease), Hypercholesteremia (1/14/2013), Hypertension, Hypothyroidism (1/14/2013), Osteoarthritis of back (1/2/2014), Osteoporosis, Pulmonary embolism ([address] (2009), Sick sinus syndrome ([address] (1/7/2016), and Stroke ([address] . She also  has a past surgical history that includes hx appendectomy (1965); hx hysterectomy (1970); hx tonsil and adenoidectomy (1612); hx heent; hx orthopaedic (2006 and 2008); hx cataract removal (Right, 2010); hx coronary stent placement (05/2014); hx heart catheterization (09/17/2015); hx knee replacement (2009); hx knee replacement (Left, 01/12/2017); hx breast biopsy (Bilateral); colonoscopy (N/A, 7/16/2018); and hx endoscopy (04/12/2019). PRECAUTIONS/ALLERGIES: Adhesive     Problem List:  (Impairments causing functional limitations):  1. Oropharyngeal dysphagia- No symptoms identified  2. Previous Dysphagia: NONE REPORTED  Diet Prior to Evaluation: Regular diet/thin liquids    Orientation:  Person  Place  Time  Situation    Cognitive-Linguistic Screening:   Alertness  o Alert   Speech Production:   o Fully intelligible   Expressive Language:  o Fluent speech and Able to effectively communicate wants and needs   Receptive Language:  o Answers yes/no questions appropriately; Follows commands   Cognition:   o Good recall of recent and remote events  Prior Level of Function: Lives in a facility. Language intact.    Recommendations: Given results of screening, patient appears to be functioning at baseline. However imaging results are still pending. Will follow up for further assessment as indicated by findings. OBJECTIVE   Oral Motor:   · Labial: No impairment  · Dentition: Intact  · Oral Hygiene: Adequate  · Lingual: No impairment    Dysphagia evaluation:   Patient presented with thin liquid via cup and straw, puree, mixed, and solid consistencies. Appropriate oral prep with all textures. Timely swallow initiation, and single swallows upon palpation. Adequate oral clearing. No overt signs or symptoms of airway compromise observed with liquid or solid textures. Tool Used: Dysphagia Outcome and Severity Scale (MADISON)    Score Comments   Normal Diet  [] 7 With no strategies or extra time needed   Functional Swallow  [] 6 May have mild oral or pharyngeal delay   Mild Dysphagia  [] 5 Which may require one diet consistency restricted    Mild-Moderate Dysphagia  [] 4 With 1-2 diet consistencies restricted   Moderate Dysphagia  [] 3 With 2 or more diet consistencies restricted   Moderate-Severe Dysphagia  [] 2 With partial PO strategies (trials with ST only)   Severe Dysphagia  [] 1 With inability to tolerate any PO safely      Score:  Initial: 7 Most Recent: x (Date 10/19/21 )   Interpretation of Tool: The Dysphagia Outcome and Severity Scale (MADISON) is a simple, easy-to-use, 7-point scale developed to systematically rate the functional severity of dysphagia based on objective assessment and make recommendations for diet level, independence level, and type of nutrition. Current Medications:   No current facility-administered medications on file prior to encounter. Current Outpatient Medications on File Prior to Encounter   Medication Sig Dispense Refill    lisinopriL (PRINIVIL, ZESTRIL) 20 mg tablet Take 1 Tablet by mouth daily. 90 Tablet 3    LORazepam (Ativan) 0.5 mg tablet Take  by mouth.       fluticasone propionate (Flonase Allergy Relief) 50 mcg/actuation nasal spray 2 Sprays by Both Nostrils route daily. 1 Bottle 2    pantoprazole (PROTONIX) 20 mg tablet Take 1 Tab by mouth daily. 30 Tab 11    levothyroxine (synthroid) 50 mcg tablet Take 1 Tab by mouth Daily (before breakfast). 90 Tab 3    metoprolol succinate (TOPROL-XL) 25 mg XL tablet Take 0.5 Tabs by mouth daily. (Patient taking differently: Take 25 mg by mouth daily.) 45 Tab 3    escitalopram oxalate (LEXAPRO) 10 mg tablet Take 1 Tab by mouth daily. 90 Tab 3    multivitamin (ONE A DAY) tablet Take 1 Tab by mouth daily.  vit a,c & e-lutein-minerals (OCUVITE) tablet Take 1 Tab by mouth daily.  vit A/vit C/vit E/zinc/copper (ICAPS AREDS PO) Take 1 Cap by mouth two (2) times a day.  potassium 99 mg tablet Take 99 mg by mouth daily.  acetaminophen (TYLENOL ARTHRITIS PAIN) 650 mg CR tablet Take 650 mg by mouth every six (6) hours as needed for Pain.  Calcium Carbonate-Vit D3-Min (CALTRATE 600+D PLUS MINERALS) 600 mg calcium- 400 unit Tab Take 1 Tab by mouth daily.  amoxicillin-clavulanate (AUGMENTIN) 875-125 mg per tablet  (Patient not taking: Reported on 10/18/2021)      traZODone (DESYREL) 50 mg tablet Take 25-50 mg by mouth nightly. (Patient not taking: Reported on 10/18/2021)      Salt Irrigation Solution No.1 (Ocean Complete) aero 2 Sprays by Nasal route daily. (Patient not taking: Reported on 10/18/2021) 100 mL 2    nitroglycerin (NITROSTAT) 0.3 mg SL tablet 1 Tab by SubLINGual route every five (5) minutes as needed for Chest Pain. (Patient not taking: Reported on 10/18/2021) 1 Bottle 3    aspirin delayed-release 81 mg tablet Take 1 Tab by mouth daily.  30 Tab 1        INTERDISCIPLINARY COLLABORATION: RN    After treatment position/precautions:  · Upright in chair  · Daughter at bedside  · RN notified    Total Treatment Duration:   Time In: 5970  [facility] [id number], [address] [facility]  Speech Language Pathologist  Acute Rehabilitation Services  Contact: Juan Antonio

## 2021-10-19 NOTE — DISCHARGE INSTRUCTIONS
Stroke: After Your Visit     Your Care Instructions  Risk factors for stroke include being overweight, smoking, and sedentary lifestyle. This means that the blood flow to a part of your brain was blocked for some time, which damages the nerve cells in that part of the brain. The part of your body controlled by that part of your brain may not function properly now. The brain is an amazing organ that can heal itself to some degree. The stroke you had damaged part of your brain, but other parts of your brain may take over in some way for the damaged areas. You have already started this process. Going home may be hard for you and your family. The more you can try to do for yourself, the better. Remember to take each day one at a time. Follow-up care is a key part of your treatment and safety. Be sure to make and go to all appointments, and call your doctor if you are having problems. Its also a good idea to know your test results and keep a list of the medicines you take. How can you care for yourself at home? Enter a stroke rehabilitation (rehab) program, if your doctor recommends it. Physical, speech, and occupational therapies can help you manage bathing, dressing, eating, and other basics of daily living. Eat a heart-healthy diet that is low in cholesterol, saturated fat, and salt. Eat lots of fresh fruits and vegetables and foods high in fiber. Increase your activities slowly. Take short rest breaks when you get tired. Gradually increase the amount you walk. Start out by walking a little more than you did the day before. Do not drive until your doctor says it is okay. It is normal to feel sad or depressed after a stroke. If the blues last, talk to your doctor. If you are having problems with urine leakage, go to the bathroom at regular times, including when you first wake up and at bedtime. Also, limit fluids after dinner.   If you are constipated, drink plenty of fluids, enough so that your urine is light yellow or clear like water. If you have kidney, heart, or liver disease and have to limit fluids, talk with your doctor before you increase the amount of fluids you drink. Set up a regular time for using the toilet. If you continue to have constipation, your doctor may suggest using a bulking agent, such as Metamucil, or a stool softener, laxative, or enema. Medicines  Take your medicines exactly as prescribed. Call your doctor if you think you are having a problem with your medicine. You may be taking several medicines. ACE (angiotensin-converting enzyme) inhibitors, angiotensin II receptor blockers (ARBs), beta-blockers, diuretics (water pills), and calcium channel blockers control your blood pressure. Statins help lower cholesterol. Your doctor may also prescribe medicines for depression, pain, sleep problems, anxiety, or agitation. If your doctor has given you medicine that prevents blood clots, such as warfarin (Coumadin), aspirin combined with extended-release dipyridamole (Aggrenox), clopidogrel (Plavix), or aspirin to prevent another stroke, you should:  Tell your dentist, pharmacist, and other health professionals that you take these medicines. Watch for unusual bruising or bleeding, such as blood in your urine, red or black stools, or bleeding from your nose or gums. Get regular blood tests to check your clotting time if you are taking Coumadin. Wear medical alert jewelry that says you take blood thinners. You can buy this at most drugstores. Do not take any over-the-counter medicines or herbal products without talking to your doctor first.  If you take birth control pills or hormone replacement therapy, talk to your doctor about whether they are right for you. For family members and caregivers  Make the home safe. Set up a room so that your loved one does not have to climb stairs. Be sure the bathroom is on the same floor.  Move throw rugs and furniture that could cause falls, and make sure that the lighting is good. Put grab bars and seats in tubs and showers. Find out what your loved one can do and what he or she needs help with. Try not to do things for your loved one that your loved one can do on his or her own. Help him or her learn and practice new skills. Visit and talk with your loved one often. Try doing activities together that you both enjoy, such as playing cards or board games. Keep in touch with your loved one's friends as much as you can, and encourage them to visit. Take care of yourself. Do not try to do everything yourself. Ask other family members to help. Eat well, get enough rest, and take time to do things that you enjoy. Keep up with your own doctor visits, and make sure to take your medicines regularly. Get out of the house as much as you can. Join a local support group. Find out if you qualify for home health care visits to help with rehab or for adult day care. When should you call for help? Call 911 anytime you think you may need emergency care. For example, call if:  You have signs of another stroke. These may include:  Sudden numbness, paralysis, or weakness in your face, arm, or leg, especially on only one side of your body. New problems with walking or balance. Sudden vision changes. Drooling or slurred speech. New problems speaking or understanding simple statements, or you feel confused. A sudden, severe headache that is different from past headaches. Call 911 even if these symptoms go away in a few minutes. You cough up blood. You vomit blood or what looks like coffee grounds. You pass maroon or very bloody stools. Call your doctor now or seek immediate medical care if:  You have new bruises or blood spots under your skin. You have a nosebleed. Your gums bleed when you brush your teeth. You have blood in your urine. Your stools are black and tarlike or have streaks of blood.   You have vaginal bleeding when you are not having your period, or heavy period bleeding. You have new symptoms that may be related to your stroke, such as falls or trouble swallowing. Watch closely for changes in your health, and be sure to contact your doctor if you have any problems. Where can you learn more? Go to [employer]    Enter C294  in the search box to learn more about \"Stroke: After Your Visit\". © 6245-2121 [employer]. Care instructions adapted under license by [facility] (which disclaims liability or warranty for this information). This care instruction is for use with your licensed healthcare professional. If you have questions about a medical condition or this instruction, always ask your healthcare professional. Mason Kenney any warranty or liability for your use of this information.

## 2021-10-19 NOTE — PROGRESS NOTES
[facility] Hospitalist Progress Note     Name:  Cuong Johnston  Age:89 y.o. Sex:female   :  1929    MRN:  [id number]     Admit Date:  10/18/2021    Reason for Admission:  Severe aphasia [R47.01]    Assessment & Plan   -Aphasia-presently resolved on 10/19/2021-probable TIA  History of prior stroke. CT head, CTA head no acute findings  Continue aspirin and statin. , will increase Lipitor from 40 mg to 80 mg. Neurochecks as per protocol  Continue permissive blood pressure  MRI brain pending  Neurology consulted    -Proximal A. Fib  Status post watchman device    -Coronary disease, history of stent.    -Hypothyroidism  Continue Synthroid 50 mcg daily    -Hypertension  Continue permissive high blood pressure    -Depression  Continue Lexapro    -Blind left eye chronic        Diet:  DIET ADULT  DVT PPx: LOVENOX  Code: DNR        Hospital Course/Subjective:   Cuong Johnston is a 80 y.o. female with medical history of atrial fibrillation, CAD status post stents, depression, GERD, HLD, hypothyroidism, hypertension, history of prior stroke, history of pulmonary embolism, sick sinus syndrome who presented to ED with aphasia. Unknown last well time but the facility noticed it sometimes in the afternoon. Reports word finding difficulties as well as difficulty speaking.     Code stroke was called on arrival to the ED. NIH score are 5. Patient was evaluated by neurology and is not a candidate for TPA. CT head without any acute intracranial abnormalities. CT perfusion with tiny ones in centimeter area of delayed perfusion on the right occipital lobe which is read as likely artifact. CTA preliminary with no evidence of large vessel occlusion. Patient daughter states that patient was recently treated for 2 bouts of UTI and she does get urinary tract infection frequently. Patient has prior CVA with similar presentation.        Subjective/24 hr Events (10/19/21):  10/19/2021  Patient awake alert says her speech is back to normal  No other complaints      Review of Systems: 14 point review of systems is otherwise negative with the exception of the elements mentioned above. Objective:     Patient Vitals for the past 24 hrs:   Temp Pulse Resp BP SpO2   10/19/21 0917    (!) 105/59    10/19/21 0805 98.4 °F (36.9 °C) 87 20 134/61 95 %   10/19/21 0423 98.5 °F (36.9 °C) 77 20 (!) 174/71 95 %   10/19/21 0400  72      10/18/21 2359  73      10/18/21 2258 98.7 °F (37.1 °C) 65 20 (!) 191/65 96 %   10/18/21 2147  72  (!) 164/74 91 %   10/18/21 1929  65  (!) 220/88 98 %   10/18/21 1810 98 °F (36.7 °C) 82 20 (!) 236/102 96 %     Oxygen Therapy  O2 Sat (%): 95 % (10/19/21 0805)  Pulse via Oximetry: 66 beats per minute (10/18/21 1929)  O2 Device: None (Room air) (10/19/21 0805)    Body mass index is 25.74 kg/m². Physical Exam:   General:     alert, awake, no acute distress. Well nourished / malnourished. Obese/underweight/cachetic/malnourished  HENT:   normocephalic, atraumatic. Oropharynx clear with moist mucous membranes and normal hard/soft palate  Eyes:   anicteric sclerae, moist conjunctiva, PERRL, EOMI  Neck:    supple, non-tender. Trachea midline. Lungs:   CTAB, no wheezing, rhonchi, rales  Cardiac:   RRR, Normal S1 and S2. No S3, S4 or murmurs. Abdomen:   Soft, non distended, nontender, +BS, no guarding/rebound  Extremities:   Warm, dry. No edema , pedal pulses present, no digital cyanosis  Skin:   Warn, dry, normnal turgor and texture; no rash, ulcers or nodules appreciated  Neuro:   AAOx3.  No gross focal neurological deficit,  Cranial nerves II-XII grossly intact  Psychiatric:  No anxiety, calm, cooperative      Data Review:  I have reviewed all labs, meds, and studies from the last 24 hours:    Labs:  Recent Results (from the past 24 hour(s))   GLUCOSE, POC    Collection Time: 10/18/21  5:44 PM   Result Value Ref Range    Glucose (POC) 115 (H) 65 - 100 mg/dL    Performed by [employer] CBC WITH AUTOMATED DIFF    Collection Time: 10/18/21  5:48 PM   Result Value Ref Range    WBC 5.4 4.3 - 11.1 K/uL    RBC 4.58 4.05 - 5.2 M/uL    HGB 11.9 11.7 - 15.4 g/dL    HCT 38.4 35.8 - 46.3 %    MCV 83.8 79.6 - 97.8 FL    MCH 26.0 (L) 26.1 - 32.9 PG    MCHC 31.0 (L) 31.4 - 35.0 g/dL    RDW 15.3 (H) 11.9 - 14.6 %    PLATELET 217 478 - 092 K/uL    MPV 11.3 9.4 - 12.3 FL    ABSOLUTE NRBC 0.00 0.0 - 0.2 K/uL    DF AUTOMATED      NEUTROPHILS 47 43 - 78 %    LYMPHOCYTES 39 13 - 44 %    MONOCYTES 11 4.0 - 12.0 %    EOSINOPHILS 1 0.5 - 7.8 %    BASOPHILS 1 0.0 - 2.0 %    IMMATURE GRANULOCYTES 0 0.0 - 5.0 %    ABS. NEUTROPHILS 2.5 1.7 - 8.2 K/UL    ABS. LYMPHOCYTES 2.1 0.5 - 4.6 K/UL    ABS. MONOCYTES 0.6 0.1 - 1.3 K/UL    ABS. EOSINOPHILS 0.1 0.0 - 0.8 K/UL    ABS. BASOPHILS 0.0 0.0 - 0.2 K/UL    ABS. IMM. GRANS. 0.0 0.0 - 0.5 K/UL   METABOLIC PANEL, BASIC    Collection Time: 10/18/21  5:48 PM   Result Value Ref Range    Sodium 141 136 - 145 mmol/L    Potassium 3.6 3.5 - 5.1 mmol/L    Chloride 110 (H) 98 - 107 mmol/L    CO2 22 21 - 32 mmol/L    Anion gap 9 7 - 16 mmol/L    Glucose 119 (H) 65 - 100 mg/dL    BUN 16 8 - 23 MG/DL    Creatinine 0.97 0.6 - 1.0 MG/DL    GFR est AA >60 >60 ml/min/1.73m2    GFR est non-AA 57 (L) >60 ml/min/1.73m2    Calcium 9.6 8.3 - 10.4 MG/DL   TROPONIN-HIGH SENSITIVITY    Collection Time: 10/18/21  5:48 PM   Result Value Ref Range    Troponin-High Sensitivity 4.2 0 - 14 pg/mL   HEPATIC FUNCTION PANEL    Collection Time: 10/18/21  5:48 PM   Result Value Ref Range    Protein, total 7.9 6.3 - 8.2 g/dL    Albumin 3.3 3.2 - 4.6 g/dL    Globulin 4.6 (H) 2.3 - 3.5 g/dL    A-G Ratio 0.7 (L) 1.2 - 3.5      Bilirubin, total 0.7 0.2 - 1.1 MG/DL    Bilirubin, direct 0.2 <0.4 MG/DL    Alk.  phosphatase 82 50 - 136 U/L    AST (SGOT) 19 15 - 37 U/L    ALT (SGPT) 19 12 - 65 U/L   POC PT/INR    Collection Time: 10/18/21  6:02 PM   Result Value Ref Range    Prothrombin time (POC) 12.2 (H) 9.6 - 11.6 SECS INR (POC) 1.0 0.9 - 1.2     EKG, 12 LEAD, INITIAL    Collection Time: 10/18/21  6:09 PM   Result Value Ref Range    Ventricular Rate 83 BPM    Atrial Rate 416 BPM    QRS Duration 90 ms    Q-T Interval 450 ms    QTC Calculation (Bezet) 528 ms    Calculated R Axis 24 degrees    Calculated T Axis 62 degrees    Diagnosis       !! AGE AND GENDER SPECIFIC ECG ANALYSIS !!   Normal sinus rhythm with 1st degree A-V block  Cannot rule out Anterior infarct (cited on or before 06-OCT-2019)  Prolonged QT  Abnormal ECG  When compared with ECG of 29-MAR-2021 07:52,  Nonspecific T wave abnormality, improved in Inferior leads  Nonspecific T wave abnormality, worse in Lateral leads  QT has lengthened  Confirmed by [facility]  MD ()LINCOLN ([id number]) on 10/19/2021 6:33:23 AM     CBC W/O DIFF    Collection Time: 10/19/21  7:16 AM   Result Value Ref Range    WBC 5.4 4.3 - 11.1 K/uL    RBC 4.66 4.05 - 5.2 M/uL    HGB 11.9 11.7 - 15.4 g/dL    HCT 36.6 35.8 - 46.3 %    MCV 78.5 (L) 79.6 - 97.8 FL    MCH 25.5 (L) 26.1 - 32.9 PG    MCHC 32.5 31.4 - 35.0 g/dL    RDW 15.4 (H) 11.9 - 14.6 %    PLATELET 727 297 - 890 K/uL    MPV 11.5 9.4 - 12.3 FL    ABSOLUTE NRBC 0.00 0.0 - 0.2 K/uL   LIPID PANEL    Collection Time: 10/19/21  7:16 AM   Result Value Ref Range    Cholesterol, total 226 (H) <200 MG/DL    Triglyceride 62 35 - 150 MG/DL    HDL Cholesterol 57 40 - 60 MG/DL    LDL, calculated 156.6 (H) <100 MG/DL    VLDL, calculated 12.4 6.0 - 23.0 MG/DL    CHOL/HDL Ratio 4.0     HEMOGLOBIN A1C WITH EAG    Collection Time: 10/19/21  7:16 AM   Result Value Ref Range    Hemoglobin A1c 6.1 4.2 - 6.3 %    Est. average glucose 413 mg/dL   METABOLIC PANEL, BASIC    Collection Time: 10/19/21  7:16 AM   Result Value Ref Range    Sodium 139 136 - 145 mmol/L    Potassium 3.7 3.5 - 5.1 mmol/L    Chloride 109 (H) 98 - 107 mmol/L    CO2 22 21 - 32 mmol/L    Anion gap 8 7 - 16 mmol/L    Glucose 127 (H) 65 - 100 mg/dL    BUN 15 8 - 23 MG/DL    Creatinine 0.93 0.6 - 1.0 MG/DL    GFR est AA >60 >60 ml/min/1.73m2    GFR est non-AA 60 (L) >60 ml/min/1.73m2    Calcium 9.3 8.3 - 10.4 MG/DL       All Micro Results     None          EKG Results     Procedure 720 Value Units Date/Time    Initial ECG [[id number]] Collected: 10/18/21 1809    Order Status: Completed Updated: 10/19/21 7929     Ventricular Rate 83 BPM      Atrial Rate 416 BPM      QRS Duration 90 ms      Q-T Interval 450 ms      QTC Calculation (Bezet) 528 ms      Calculated R Axis 24 degrees      Calculated T Axis 62 degrees      Diagnosis --     !! AGE AND GENDER SPECIFIC ECG ANALYSIS !! Normal sinus rhythm with 1st degree A-V block  Cannot rule out Anterior infarct (cited on or before 06-OCT-2019)  Prolonged QT  Abnormal ECG  When compared with ECG of 29-MAR-2021 07:52,  Nonspecific T wave abnormality, improved in Inferior leads  Nonspecific T wave abnormality, worse in Lateral leads  QT has lengthened  Confirmed by [facility] [employer]  MD ()Juno ([id number]) on 10/19/2021 6:33:23 AM            Other Studies:  CT PERF W CBF    Result Date: 10/18/2021  HEAD CT with PERFUSION IMAGING INDICATION:  Aphasia. Prior history of CVA. Hypertension. Multiple axial images obtained through the brain without intravenous contrast. CT perfusion imaging of the brain was then performed after the administration of intravenous contrast.  Perfusion maps and perfusion analysis output were generated using the RAPID perfusion processing software algorithm. Radiation dose reduction techniques were used for this study: All CT scans performed at this facility use one or all of the following: Automated exposure control, adjustment of the mA and/or kVp according to patient's size, iterative reconstruction.  FINDINGS: VIZ Output Values: CBF < 30% volume:  0 ml   (core infarction volume greater than 50 cc associated with poor outcomes) Tmax > 6 seconds: 1 ml Tmax/CBF Mismatch Volume: 1 ml Tmax/CBF Mismatch Ratio: NA Hypoperfusion Intensity Ratio (Tmax > 10 seconds/Tmax > 6 seconds): 1   (values greater than 0.5 associated with poor outcome) Tmax > 10 seconds Volume: 1 ml   (volume greater than 100 mL is associated with poor outcome) This scan was analyzed using [employer] ContaCT for LVO detection (large vessel occlusion). There is a tiny, 1 cc area of delayed perfusion on the right, probably occipital lobe. This could be artifact. No core infarct. Please note that the determination of patient treatment is not based solely upon imaging factors or calculation values. Management of ischemia is at the discretion of the primary physician and is based upon a combination of clinical and imaging data, along other factors. XR CHEST PORT    Result Date: 10/18/2021  Chest X-ray INDICATION: Aphasia A portable AP view of the chest was obtained. FINDINGS: There is slight increased density lung bases, at least partially due to poor inspiration. Heart size is stable. The bony thorax is intact. Minimal atelectasis in the lung bases, at least partially due to poor inspiration. No definite acute infiltrate. CTA CODE NEURO HEAD AND NECK W CONT    Result Date: 10/19/2021  History: Hypertension with transient aphasia. FINDINGS: CT angiography was performed of the neck and head with contrast and three-dimensional CT angiography reconstruction and reformat was performed. NASCET criteria as needed. CT dose reduction was achieved through use of a standardized protocol tailored for this examination and automatic exposure control for dose modulation. Study analyzed by the [employer] software package per the hospital protocol if presented as such by the facility technologists in the pacs system. However, the results of the analysis are neither reviewed nor provided in this exam interpretation and report. This statement is provided at the request of the hospital for hospital billing purposes only. Aortic arch is mildly atherosclerotic but patent. The left subclavian artery is patent. The left vertebral artery is patent. The right vertebral artery is patent. There is stenosis at the origin of the right subclavian artery. The left common carotid artery has a vessel kink proximally but is patent. The left internal carotid artery is patent. The right common carotid artery is patent. There is a patent right internal carotid artery with a vessel kink at the mid cervical segment. It is mild. The basilar artery is patent. The posterior cerebral arteries are patent with a mild stenosis in the P2 segment on the right. The middle cerebral arteries are patent. The anterior cerebral arteries are patent bilaterally. Right maxillary sinus opacification. Stenosis at the right subclavian artery origin and stenosis in the P2 segment right posterior cerebral artery. CT CODE NEURO HEAD WO CONTRAST    Result Date: 10/18/2021  CT HEAD WITHOUT CONTRAST. INDICATION: Transient aphasia. COMPARISON: March 2021 and December 2020. TECHNIQUE:   5 mm axial scans from the skull base to the vertex. Our CT scanners use one or more of the following:  Automated exposure control, adjustment of the mA and or kV according to patient size, iterative reconstruction. FINDINGS:  No acute intraparenchymal hemorrhage or abnormal extra-axial fluid collection. The ventricles are normal size. Symmetric bilateral white matter low attenuation is present, nonspecific, likely chronic small vessel disease. No midline shift or mass effect. Posterior fossa: Unremarkable. Included portion of the: Paranasal sinuses and mastoid air cells demonstrate persistent opacified right maxillary sinus with calcifications. Globes and orbits grossly demonstrates high attenuation material in the left globe. Negative for acute intracranial abnormality. Chronic changes.        Current Meds:   Current Facility-Administered Medications   Medication Dose Route Frequency    sodium chloride (NS) flush 5-10 mL  5-10 mL IntraVENous Q8H    sodium chloride (NS) flush 5-10 mL  5-10 mL IntraVENous PRN    tuberculin injection 5 Units  5 Units IntraDERMal ONCE    sodium chloride (NS) flush 5-40 mL  5-40 mL IntraVENous Q8H    sodium chloride (NS) flush 5-40 mL  5-40 mL IntraVENous PRN    ondansetron (ZOFRAN) injection 4 mg  4 mg IntraVENous Q4H PRN    aspirin chewable tablet 81 mg  81 mg Oral DAILY    atorvastatin (LIPITOR) tablet 40 mg  40 mg Oral QHS    acetaminophen (TYLENOL) tablet 650 mg  650 mg Oral Q4H PRN    acetaminophen (TYLENOL) suppository 650 mg  650 mg Rectal Q4H PRN    labetaloL (NORMODYNE;TRANDATE) injection 5 mg  5 mg IntraVENous Q10MIN PRN    bisacodyL (DULCOLAX) suppository 10 mg  10 mg Rectal DAILY PRN    famotidine (PEPCID) tablet 20 mg  20 mg Oral BID PRN    enoxaparin (LOVENOX) injection 40 mg  40 mg SubCUTAneous Q24H    escitalopram oxalate (LEXAPRO) tablet 10 mg  10 mg Oral DAILY    levothyroxine (SYNTHROID) tablet 50 mcg  50 mcg Oral ACB    metoprolol succinate (TOPROL-XL) XL tablet 25 mg  25 mg Oral DAILY    traZODone (DESYREL) tablet 25 mg  25 mg Oral QHS PRN    melatonin tablet 5 mg  5 mg Oral QHS       Problem List:  Hospital Problems as of 10/19/2021 Date Reviewed: 9/20/2021        Codes Class Noted - Resolved POA    * (Principal) Severe aphasia ICD-10-CM: R47.01  ICD-9-CM: 784.3  10/18/2021 - Present Yes        CVA (cerebral vascular accident) ([facility] ICD-10-CM: I63.9  ICD-9-CM: 434.91  3/13/2019 - Present Yes        Hypothyroidism ICD-10-CM: E03.9  ICD-9-CM: 244.9  1/14/2013 - Present Yes        Depression ICD-10-CM: F32. A  ICD-9-CM: 094  1/14/2013 - Present Yes        Hypercholesteremia ICD-10-CM: E78.00  ICD-9-CM: 272.0  1/14/2013 - Present Yes        Paroxysmal atrial fibrillation ([facility] ICD-10-CM: I48.0  ICD-9-CM: 427.31  1/14/2013 - Present Yes    Overview Addendum 12/11/2020 12:59 PM by Cameron Russell MD     Paroxysmal   1. 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 2.  Holter (11/19/20):   Sinus rhythm. Rare PACs/PVCs. Short runs of atrial tachycardia. No sustained atrial fibrillation.                      Signed By: Suzan Saucedo MD   [facility]    October 19, 2021

## 2021-10-19 NOTE — CONSULTS
[facility]  [address]  [address] [facility], [address] [facility]          Chief Complaint   Patient presents with   Karolyn Rizo is a 80 y.o. female who presents on referral from the inpatient service with reference to an acute change neurologically with the onset of an aphasia which the patient now is able to accurately report and was having substantial difficulties with naming and verbal expression  Since basically her arrival at the hospital that she has been pretty much normal and if her function returned over a several hour.       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    CAD (coronary artery disease), native coronary artery May 2014    stent to LAD; 3 stents total    Depression 1/14/2013    GERD (gastroesophageal reflux disease)     Hypercholesteremia 1/14/2013    Hypertension     Hypothyroidism 1/14/2013    Osteoarthritis of back 1/2/2014    Osteoporosis     Pulmonary embolism ([address] 2009    after knee surgery    Sick sinus syndrome ([address] 1/7/2016    Tachycardia-Bradycardia    Stroke [facility])        Past Surgical History:   Procedure Laterality Date    COLONOSCOPY N/A 7/16/2018    COLONOSCOPY performed by Carol Nielsen MD at [address]  HX BREAST BIOPSY Bilateral     HX CATARACT REMOVAL Right 2010    HX CORONARY STENT PLACEMENT  05/2014    LAD X 3    HX ENDOSCOPY  04/12/2019    gastritis with superficial ulcerations    HX HEART CATHETERIZATION  09/17/2015    HX HEENT      multiple eye surgeries - left - macular hoe, retinal detachment twice,     HX HYSTERECTOMY  1970    HX KNEE REPLACEMENT  2009    HX KNEE REPLACEMENT Left 01/12/2017    HX ORTHOPAEDIC  2006 and 2008    herniated disc    HX TONSIL AND ADENOIDECTOMY 1934       Family History   Problem Relation Age of Onset    Cancer Mother     Breast Cancer Mother     Cancer Father     Cancer Sister     Cancer Brother         pancreas    Breast Cancer Maternal Aunt        Social History     Socioeconomic History    Marital status:      Spouse name: Not on file    Number of children: Not on file    Years of education: Not on file    Highest education level: Not on file   Tobacco Use    Smoking status: Never Smoker    Smokeless tobacco: Never Used   Vaping Use    Vaping Use: Never used   Substance and Sexual Activity    Alcohol use: No     Alcohol/week: 0.0 standard drinks    Drug use: No    Sexual activity: Not Currently   Social History Narrative    No family/social/cultural issues pertinent to care     Social Determinants of Health     Financial Resource Strain:     Difficulty of Paying Living Expenses:    Food Insecurity:     Worried About Running Out of Food in the Last Year:     Ran Out of Food in the Last Year:    Transportation Needs:     Lack of Transportation (Medical):      Lack of Transportation (Non-Medical):    Physical Activity:     Days of Exercise per Week:     Minutes of Exercise per Session:    Stress:     Feeling of Stress :    Social Connections:     Frequency of Communication with Friends and Family:     Frequency of Social Gatherings with Friends and Family:     Attends Buddhist Services:     Active Member of Clubs or Organizations:     Attends Club or Organization Meetings:     Marital Status:            Current Facility-Administered Medications:     atorvastatin (LIPITOR) tablet 80 mg, 80 mg, Oral, QHS, Kayden Mahajan MD    sodium chloride (NS) flush 5-10 mL, 5-10 mL, IntraVENous, Q8H, Jeremías Gonzales III, MD, 10 mL at 10/19/21 0616    sodium chloride (NS) flush 5-10 mL, 5-10 mL, IntraVENous, PRN, Opal Gonzales MD    tuberculin injection 5 Units, 5 Units, IntraDERMal, ONCE, Camerno Tse MD, 5 Units at 10/19/21 0619    sodium chloride (NS) flush 5-40 mL, 5-40 mL, IntraVENous, Q8H, Cameron Tes MD, 10 mL at 10/19/21 0617    sodium chloride (NS) flush 5-40 mL, 5-40 mL, IntraVENous, PRN, Cameron Gallardo MD    ondansetron (ZOFRAN) injection 4 mg, 4 mg, IntraVENous, Q4H PRN, Dwight Ruggiero MD    aspirin chewable tablet 81 mg, 81 mg, Oral, DAILY, Cameron Tse MD, 81 mg at 10/19/21 0950    acetaminophen (TYLENOL) tablet 650 mg, 650 mg, Oral, Q4H PRN, Dwight Ruggiero MD    acetaminophen (TYLENOL) suppository 650 mg, 650 mg, Rectal, Q4H PRN, Dwight Ruggiero MD    labetaloL (NORMODYNE;TRANDATE) injection 5 mg, 5 mg, IntraVENous, Q10MIN PRN, Dwight Ruggiero MD    bisacodyL (DULCOLAX) suppository 10 mg, 10 mg, Rectal, DAILY PRN, Dwight Ruggiero MD    famotidine (PEPCID) tablet 20 mg, 20 mg, Oral, BID PRN, Cameron Gallardo MD    enoxaparin (LOVENOX) injection 40 mg, 40 mg, SubCUTAneous, Q24H, Cameron Tse MD, 40 mg at 10/19/21 0948    escitalopram oxalate (LEXAPRO) tablet 10 mg, 10 mg, Oral, DAILY, Cameron Tse MD, 10 mg at 10/19/21 0950    levothyroxine (SYNTHROID) tablet 50 mcg, 50 mcg, Oral, ACB, Cameron Tse MD, 50 mcg at 10/19/21 0616    [Held by provider] metoprolol succinate (TOPROL-XL) XL tablet 25 mg, 25 mg, Oral, DAILY, Cameron Tse MD, 25 mg at 10/19/21 0950    traZODone (DESYREL) tablet 25 mg, 25 mg, Oral, QHS PRN, Cameron Tse MD    melatonin tablet 5 mg, 5 mg, Oral, QHS, Tello Damico MD, 5 mg at 10/18/21 5561    Allergies   Allergen Reactions    Adhesive Rash       Review of Systems  Review of systems  General reports normal energy.   Sleep wake cycle appetite  ENT no sinus congestion no epistaxis no unilateral hearing loss no swallowing difficulty  Pulmonarydenies shortness of breath, no orthopnea, no wheezing, no productive sputum no hemoptysis  Cardiac denies chest pain palpitations peripheral edema  GI weight is stable appetite steady no constipation diarrhea abdominal pain  Joints no inflammation no hip pain or shoulder pain no inflammation. No new onset back pain  Skin no rash or ecchymosis  Neuro no syncope vertigo diplopia dysarthria dysphagia difficulty with balance or coordination unilateral weakness   no nocturia. Urinary control is normal.    Psychiatric no mood disorder no litzy no depression no outbursts or belligerent behavior        Visit Vitals  BP (!) 174/71   Pulse 68   Temp 99.3 °F (37.4 °C)   Resp 18   Ht 5' 3\" (1.6 m)   Wt 145 lb (65.8 kg)   SpO2 91%   BMI 25.69 kg/m²       Neurologic Exam  The patient is alert cooperative and oriented. No evident skin lesions no evidence of excessive bruising no trauma  Patient looks well-hydrated. Does not appear chronically ill. Cranial nerve examination normal visual fields. Normal random eye movements. No ptosis. No lid lag  Face moves strongly symmetrically and equally  Speech is normal she is having no difficulty with naming at the present time  Motor  Upper extremities  Normal bulk strength tone and symmetric   Lower extremities  Normal bulk strength tone  Deep tendon reflexes 1+ and symmetric at biceps brachioradialis and triceps  Casual gait is not tested  Fine motor coordination is normal in the hands there is no motor drift    Peripheral sensation light touch normal  There are no carotid bruits  Vital signs are reviewed    Most recent MRI  Results from Hospital Encounter encounter on 10/18/21    MRI BRAIN WO CONT    Narrative  MRI BRAIN WITHOUT CONTRAST 10/19/2021    HISTORY: 80year-old with transient aphasia. TECHNIQUE: Sagittal and axial T1-weighted, axial T2-weighted, axial and coronal  FLAIR, axial T2-weighted gradient-echo, axial diffusion weighted images with ADC  maps of the brain. COMPARISON: Head CT and CT angiogram 10/18/2021    FINDINGS: There is no acute infarction, acute intracranial hemorrhage,  hydrocephalus, intra-axial mass or extra-axial hematoma.     Moderate diffuse cerebral volume loss is present without disproportionate  hippocampal atrophy. There is a small area of superficial siderosis in the left frontal lobe. This  finding suggests old subarachnoid hemorrhage in this region. This may have  occurred as a result of trauma. On the T2-weighted and FLAIR sequences, there are scattered white matter  hyperintensities within the periventricular brain. Findings are nonspecific and  can be seen with chronic small vessel ischemic disease, demyelinating disease or  with migraine headaches. Abnormal signal in the left globe suggests prior surgery for a retinal  detachment. A scleral band surrounds the left globe. There is partial opacification of the right maxillary sinus. Impression  1. No acute infarction. 2. Cerebral volume loss and white matter findings compatible with chronic small  vessel ischemic disease. 3. Minimal superficial siderosis in the left frontal lobe. Correlate with  medical/neurological history. Most recent MRA  Results from [place] encounter on 10/18/21    MRI BRAIN WO CONT    Narrative  MRI BRAIN WITHOUT CONTRAST 10/19/2021    HISTORY: 80year-old with transient aphasia. TECHNIQUE: Sagittal and axial T1-weighted, axial T2-weighted, axial and coronal  FLAIR, axial T2-weighted gradient-echo, axial diffusion weighted images with ADC  maps of the brain. COMPARISON: Head CT and CT angiogram 10/18/2021    FINDINGS: There is no acute infarction, acute intracranial hemorrhage,  hydrocephalus, intra-axial mass or extra-axial hematoma. Moderate diffuse cerebral volume loss is present without disproportionate  hippocampal atrophy. There is a small area of superficial siderosis in the left frontal lobe. This  finding suggests old subarachnoid hemorrhage in this region. This may have  occurred as a result of trauma. On the T2-weighted and FLAIR sequences, there are scattered white matter  hyperintensities within the periventricular brain.  Findings are nonspecific and  can be seen with chronic small vessel ischemic disease, demyelinating disease or  with migraine headaches. Abnormal signal in the left globe suggests prior surgery for a retinal  detachment. A scleral band surrounds the left globe. There is partial opacification of the right maxillary sinus. Impression  1. No acute infarction. 2. Cerebral volume loss and white matter findings compatible with chronic small  vessel ischemic disease. 3. Minimal superficial siderosis in the left frontal lobe. Correlate with  medical/neurological history. Most recent CTA  Results from [place] encounter on 10/18/21    CTA CODE NEURO HEAD AND NECK W CONT    Narrative  History: Hypertension with transient aphasia. FINDINGS:    CT angiography was performed of the neck and head with contrast and  three-dimensional CT angiography reconstruction and reformat was performed. NASCET criteria as needed. CT dose reduction was achieved through use of a  standardized protocol tailored for this examination and automatic exposure  control for dose modulation. Study analyzed by the [employer] software package per the hospital protocol if  presented as such by the facility technologists in the pacs system. However,  the results of the analysis are neither reviewed nor provided in this exam  interpretation and report. This statement is provided at the request of the  hospital for hospital billing purposes only. Aortic arch is mildly atherosclerotic but patent. The left subclavian artery is  patent. The left vertebral artery is patent. The right vertebral artery is  patent. There is stenosis at the origin of the right subclavian artery. The left  common carotid artery has a vessel kink proximally but is patent. The left  internal carotid artery is patent. The right common carotid artery is patent. There is a patent right internal carotid artery with a vessel kink at the mid  cervical segment.  It is mild. The basilar artery is patent. The posterior  cerebral arteries are patent with a mild stenosis in the P2 segment on the  right. The middle cerebral arteries are patent. The anterior cerebral arteries  are patent bilaterally. Right maxillary sinus opacification. 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 Most recent Echo  No results found for this visit on 10/18/21. Most recent lipid panels  Lab Results   Component Value Date/Time    Cholesterol, total 226 (H) 10/19/2021 07:16 AM    HDL Cholesterol 57 10/19/2021 07:16 AM    LDL, calculated 156.6 (H) 10/19/2021 07:16 AM    VLDL, calculated 12.4 10/19/2021 07:16 AM    Triglyceride 62 10/19/2021 07:16 AM    CHOL/HDL Ratio 4.0 10/19/2021 07:16 AM       Most recent Hgb A1C  Lab Results   Component Value Date/Time    Hemoglobin A1c 6.1 10/19/2021 07:16 AM    Hemoglobin A1c (POC) 5.8 08/18/2021 02:11 PM                 Diagnoses and all orders for this visit:    1. Cerebrovascular accident (CVA), unspecified mechanism ([facility] [address]    2. Aphasia    3. Hypertension, unspecified type    Other orders  -     NURSING-MISCELLANEOUS:; Standing  -     CT CODE NEURO HEAD WO CONTRAST; Standing  -     EKG, 12 LEAD, INITIAL; Standing  -     CBC WITH AUTOMATED DIFF; Standing  -     METABOLIC PANEL, BASIC; Standing  -     TROPONIN-HIGH SENSITIVITY; Standing  -     POC PT/INR; Standing  -     POC GLUCOSE; Standing  -     NEURO CHECKS; Standing  -     WEIGH PATIENT; Standing  -     NIH STROKE SCALE ASSESSMENT; Standing  -     INITIATE NURSING DYSPHAGIA SCREENING; Standing  -     SALINE LOCK IV; Standing  -     sodium chloride (NS) flush 5-10 mL  -     sodium chloride (NS) flush 5-10 mL  -     CARDIAC MONITOR - ED ONLY; Standing  -     PULSE OXIMETRY CONTINUOUS; Standing  -     XR CHEST PORT; Standing  -     HEPATIC FUNCTION PANEL; Standing  -     GLUCOSE, POC; Standing  -     POC PT/INR;  Standing  - CT PERF W CBF; Standing  -     CTA CODE NEURO HEAD AND NECK W CONT; Standing  -     sodium chloride 0.9 % bolus infusion 100 mL  -     iopamidoL (ISOVUE-370) 76 % injection 100 mL  -     saline peripheral flush soln 10 mL  -     hydrALAZINE (APRESOLINE) 20 mg/mL injection 5 mg  -     INITIAL PHYSICIAN ORDER: INPATIENT; Standing  -     NURSING-MISCELLANEOUS:; Standing  -     VITAL SIGNS PER UNIT ROUTINE; Standing  -     NOTIFY PROVIDER: VITAL SIGNS CHANGES; Standing  -     NEURO/VASCULAR CHECKS; Standing  -     CARDIAC MONITORING; Standing  -     INTAKE AND OUTPUT; Standing  -     NIH STROKE SCALE ASSESSMENT; Standing  -     FALL PRECAUTIONS; Standing  -     NURSING-MISCELLANEOUS:; Standing  -     PLEASE READ & DOCUMENT PPD TEST IN 24 HRS; Standing  -     PLEASE READ & DOCUMENT PPD TEST IN 48 HRS; Standing  -     PLEASE READ & DOCUMENT PPD TEST IN 72 HRS; Standing  -     tuberculin injection 5 Units  -     NURSING-MISCELLANEOUS:; Standing  -     RT--OXIMETRY, SPOT CHECK; Standing  -     sodium chloride (NS) flush 5-40 mL  -     sodium chloride (NS) flush 5-40 mL  -     ondansetron (ZOFRAN) injection 4 mg  -     aspirin chewable tablet 81 mg  -     acetaminophen (TYLENOL) tablet 650 mg  -     acetaminophen (TYLENOL) suppository 650 mg  -     labetaloL (NORMODYNE;TRANDATE) injection 5 mg  -     bisacodyL (DULCOLAX) suppository 10 mg  -     famotidine (PEPCID) tablet 20 mg  -     CBC W/O DIFF; Standing  -     LIPID PANEL; Standing  -     HEMOGLOBIN A1C WITH EAG; Standing  -     METABOLIC PANEL, BASIC; Standing  -     ECHO ADULT COMPLETE; Standing  -     MRI BRAIN WO CONT; Standing  -     IP CONSULT TO STROKE COORDINATOR; Standing  -     IP CONSULT TO PHYSIATRIST(REHAB MEDICINE);  Standing  -     IP CONSULT TO CASE MANAGEMENT; Standing  -     SLP--EVAL, DEVISE PLAN OF CARE AND TREAT; Standing  -     PT--EVAL, DEVISE PLAN OF CARE AND TREAT; Standing  -     OT--EVAL, DEVISE PLAN OF CARE AND TREAT; Standing  -     DIET ADULT; Standing  -     enoxaparin (LOVENOX) injection 40 mg  -     aspirin tablet 325 mg  -     escitalopram oxalate (LEXAPRO) tablet 10 mg  -     levothyroxine (SYNTHROID) tablet 50 mcg  -     metoprolol succinate (TOPROL-XL) XL tablet 25 mg  -     traZODone (DESYREL) tablet 25 mg  -     URINALYSIS W/ RFLX MICROSCOPIC; Standing  -     NURSING-MISCELLANEOUS:; Standing  -     melatonin tablet 5 mg  -     IP CONSULT TO NEUROLOGY; Standing  -     DO NOT RESUSCITATE; Standing  -     atorvastatin (LIPITOR) tablet 80 mg    Impression  Recurrent transient left brain event occurring in a patient with atrial fibrillation watchman device on aspirin. Major clearing has occurred and she is a nonagenarian.   Await echocardiogram most likely option from the standpoint of treatment will be Plavix need to exclude UTI or other nonvascular etiology, and certainly if this was found would just continue with aspirin  Continue with current statin approach and no change in overall approach to blood pressure            Rico Rausch MD

## 2021-10-19 NOTE — PROGRESS NOTES
Patient's BP: 191/65. Dr. Britni Kirk notified via [employer]. MD states permissive HTN allowed for 24 hr d/t CVA. Will continue to monitor.

## 2021-10-19 NOTE — PROGRESS NOTES
CM made contact with patient via phone to discuss discharge plan and needs. Patient alert/orient x4. Patient verified demographic no changes. Verified PCP and insurance. Patient states she lives at [facility]. States she independent with her ADL's and do has DME's in the home. Patient states she;s active with HH unsure the name. States when she's d/c she going home with her daughter. Patient states she will be returning back home. Patient has no needs identified at this time. CM will continue to monitor. Care Management Interventions  PCP Verified by CM: Yes  Mode of Transport at Discharge:  Other (see comment)  Transition of Care Consult (CM Consult): Discharge Planning  Discharge Durable Medical Equipment: No  Physical Therapy Consult: Yes  Occupational Therapy Consult: Yes  Speech Therapy Consult: Yes  Support Systems: Child(celena)  Confirm Follow Up Transport: Family  The Patient and/or Patient Representative was Provided with a Choice of Provider and Agrees with the Discharge Plan?: Yes  Freedom of Choice List was Provided with Basic Dialogue that Supports the Patient's Individualized Plan of Care/Goals, Treatment Preferences and Shares the Quality Data Associated with the Providers?: Yes   Resource Information Provided?: No  Discharge Location  Discharge Placement: Unable to determine at this time

## 2021-10-19 NOTE — PROGRESS NOTES
Physician Progress Note      Jaleel Poon  CSN #:                  [id number]  :                       1929  ADMIT DATE:       10/18/2021 5:42 PM  DISCH DATE:  RESPONDING  PROVIDER #:        Gretel Rivera MD          QUERY TEXT:    Pt admitted with Aphasia. Pt noted to have /102---220/88. If possible, please document in progress notes and discharge summary if you are evaluating and/or treating any of the following: The medical record reflects the following:  Risk Factors: 80 YOF, AF, HTN, CAD with stents, SSS, stroke, PE  Clinical Indicators: 236/102---220/88---164/74, Aphasia  1018 HP: permissive HTN to 220/120 x 24hrs ( if above goal treat with labetalol 10mg IV or nicardipine gtt)  10/19 PN: Aphasia-presently resolved on 10/19/2021-probable TIA  Treatment: Monitoring, Hydralazine 10mg IV now, 5mg IV now, Labetalol 5MG PRn ,      Hypertensive Crisis, unspecified: at least 2 consecutive readings of SBP > 180 mmHg or DBP > 110 mmHg  - Hypertensive Urgency: Hypertensive crisis w/o associated organ dysfunction. S/s may or may not be present, but can include severe headache, SOB, epistaxis, severe anxiety. Tx: adjustment of oral antihypertensives; IV meds not usually required. - Hypertensive Emergency: Hypertensive crisis w/ associated organ damage (stroke, encephalopathy, MARIBELL, MI, angina, acute or decompensated CHF, acute pulmonary edema, HELLP, etc.). Requires immediate treatment (usually IV meds) & possible ICU admission. Associated organ dysfunction needs documented.   [email or web address]    Thank you,  Vitor Hernandez RN,C BSN  Clinical   [email or web address]  Options provided:  -- Hypertensive Crisis  -- Hypertensive Emergency  -- Other - I will add my own diagnosis  -- Disagree - Not applicable / Not valid  -- Disagree - Clinically unable to determine / Unknown  -- Refer to Clinical Documentation Reviewer    PROVIDER RESPONSE TEXT:    This patient is in hypertensive crisis. Query created by:  Lucita Mahoney on 10/19/2021 2:05 PM      Electronically signed by:  Leny Keen MD 10/19/2021 3:22 PM

## 2021-10-19 NOTE — PROGRESS NOTES
ACUTE OT GOALS:  (Developed with and agreed upon by patient and/or caregiver.)  1. Patient will bathe and dress total body with modified independence and adaptive device as needed. 2. Patient will toilet with independence. 3. Patient will tolerate 30 minutes of OT treatment with up to 2 rest breaks to increase activity tolerance for ADLs. 4. Patient will complete functional transfers/functional mobility for ADLs with modified independence. 5. Patient will demonstrate modified independence with HEP to increase coordination/prorpioception in LUE for increased safety and independence with functional tasks. Timeframe: 7 visits     OCCUPATIONAL THERAPY ASSESSMENT: Initial Assessment and Daily Note OT Treatment Day # 1    Margarita Traylor is a 80 y.o. female   PRIMARY DIAGNOSIS: Severe aphasia  Severe aphasia [R47.01]       Reason for Referral:    ICD-10: Treatment Diagnosis: Generalized Muscle Weakness (M62.81)  Difficulty in walking, Not elsewhere classified (R26.2)  Dizziness and Giddiness (R42)  [place]PAT[place]: [place] / [place] / Product Type: Medicare /   ASSESSMENT:     REHAB RECOMMENDATIONS:   Recommendation to date pending progress:  [address] [place] [address]  Equipment:    To Be Determined     PRIOR LEVEL OF FUNCTION:  (Prior to Hospitalization)  INITIAL/CURRENT LEVEL OF FUNCTION:  (Based on today's evaluation)   Bathing:   Modified Independent  Dressing:   Independent  Feeding/Grooming:   Independent  Toileting:   Independent  Functional Mobility:   Independent Bathing:   Contact Guard Assistance  Dressing:   Contact Guard Assistance  Feeding/Grooming:   Contact Guard Assistance  Toileting:   Contact Guard Assistance  Functional Mobility:   Contact Guard Assistance     ASSESSMENT:  Ms. Amber Cox presents with expressive aphasia, undergoing CVA workup. Hx UTIs, CVA, PE, L eye blindness.  At baseline pt lives in [place], completes ADLs, some IADLs, and ambulates with independence. Uses cane prn/when dizzy. Pt with deficits in strength, balance, and endurance impacting mobility and ADLs. Aphasia appears to be largely resolved. Pt practiced bed mobility with SBA, sitting balance intact. BUE assessment revealed slightly decreased LUE proprioception vs RUE however not causing functional impairment. Pt practiced lower body dressing and grooming with SBA after set up to manage package. Pt practiced functional transfers with CGA, ambulation with CGA-Tani/HHA, limited at times by dizziness. Pt with BP drop in standing, see below. Pt is functioning below baseline and would benefit from continued OT to increase safety and independence. SUBJECTIVE:   Ms. Cynthia Fallon states, Jeanne Diaz do my laundry if I ask, but I usually do it myself. \"    SOCIAL HISTORY/LIVING ENVIRONMENT: At baseline pt lives in [place], completes ADLs, some IADLs (laundry), and ambulates with independence. Uses cane prn/when dizzy.    Home Environment: Assisted living  One/Two Story Residence: One story  Living Alone: No  Support Systems: Child(celena), Assisted Living    OBJECTIVE:     PAIN: VITAL SIGNS: LINES/DRAINS:   Pre Treatment: Pain Screen  Pain Scale 1: Numeric (0 - 10)  Pain Intensity 1: 0  Post Treatment: same Vital Signs  BP: (!) 105/59  MAP (Calculated): 74  BP 1 Location: Right upper arm  BP 1 Method: Automatic  BP Patient Position: Sitting  More BP/Pulse rows needed?: Yes  Additional Blood Pressure/Pulse Data  BP 2: 132/72  MAP 2 (Calculated): 92  BP 2 Location: Right arm  BP Method 2: Automatic  Patient Position 2: Sitting NA  O2 Device: None (Room air)     GROSS EVALUATION:  BUEs Within Functional Limits Abnormal/ Functional Abnormal/ Non-Functional (see comments) Not Tested Comments: R hand dominant    AROM [x] [] [] []    PROM [x] [] [] []    Strength [] [x] [] []    Balance [] [x] [] []    Posture [x] [] [] []    Sensation [x] [] [] []    Coordination [] [x] [] [] LUE with decreased but functional proprioception and coordination    Tone [] [] [] []    Edema [x] [] [] []    Activity Tolerance [] [x] [] []     [] [] [] []      COGNITION/  PERCEPTION: Intact Impaired   (see comments) Comments:   Orientation [x] []    Vision [] [x] L eye blindness limiting L peripheral vision   Hearing [] [x]    Judgment/ Insight [x] []    Attention [x] []    Memory [x] []    Command Following [x] []    Emotional Regulation [x] []     [] []      ACTIVITIES OF DAILY LIVING: I Mod I S SBA CGA Min Mod Max Total NT Comments   BASIC ADLs:              Bathing/ Showering [] [] [] [] [] [] [] [] [] [x]    Toileting [] [] [] [] [] [] [] [] [] [x]    Dressing [] [] [] [x] [] [] [] [] [] [] Socks - seated   Feeding [] [] [x] [] [] [] [] [] [] []    Grooming [] [] [] [] [x] [] [] [] [] [] Brush teeth - standing; assist to manage package   Personal Device Care [] [] [] [] [] [] [] [] [] [x]    Functional Mobility [] [] [] [] [x] [x] [] [] [] []    I=Independent, Mod I=Modified Independent, S=Supervision, SBA=Standby Assistance, CGA=Contact Guard Assistance,   Min=Minimal Assistance, Mod=Moderate Assistance, Max=Maximal Assistance, Total=Total Assistance, NT=Not Tested    MOBILITY: I Mod I S SBA CGA Min Mod Max Total  NT x2 Comments:   Supine to sit [] [] [] [x] [] [] [] [] [] [] []    Sit to supine [] [] [] [] [] [] [] [] [] [x] []    Sit to stand [] [] [] [] [x] [] [] [] [] [] []    Bed to chair [] [] [] [] [x] [x] [] [] [] [] [x] HHAx1-2   I=Independent, Mod I=Modified Independent, S=Supervision, SBA=Standby Assistance, CGA=Contact Guard Assistance,   Min=Minimal Assistance, Mod=Moderate Assistance, Max=Maximal Assistance, Total=Total Assistance, NT=Not Tested    [address] [facility]\"" [address] [address] AM-PAC 6 Clicks   Daily Activity Inpatient Short Form        How much help from another person does the patient currently need. .. Total A Lot A Little None   1. Putting on and taking off regular lower body clothing? [] 1   [] 2   [x] 3   [] 4   2.   Bathing (including washing, rinsing, drying)? [] 1   [] 2   [x] 3   [] 4   3. Toileting, which includes using toilet, bedpan or urinal?   [] 1   [] 2   [x] 3   [] 4   4. Putting on and taking off regular upper body clothing? [] 1   [] 2   [x] 3   [] 4   5. Taking care of personal grooming such as brushing teeth? [] 1   [] 2   [x] 3   [] 4   6. Eating meals? [] 1   [] 2   [] 3   [x] 4   © 2007, Trustees of [address] [place] [address] [address] [address], under license to [employer]. All rights reserved     Score:  Initial: 19 10/19/21 Most Recent: X (Date: -- )   Interpretation of Tool:  Represents activities that are increasingly more difficult (i.e. Bed mobility, Transfers, Gait). PLAN:   FREQUENCY/DURATION: OT Plan of Care: 3 times/week for duration of hospital stay or until stated goals are met, whichever comes first.    PROBLEM LIST:   (Skilled intervention is medically necessary to address:)  1. Decreased ADL/Functional Activities  2. Decreased Activity Tolerance  3. Decreased Balance  4. Decreased Coordination  5. Decreased Gait Ability  6. Decreased Strength  7. Decreased Transfer Abilities   INTERVENTIONS PLANNED:   (Benefits and precautions of occupational therapy have been discussed with the patient.)  1. Self Care Training  2. Therapeutic Activity  3. Therapeutic Exercise/HEP  4. Neuromuscular Re-education  5. Education     TREATMENT:     EVALUATION: Low Complexity : (Untimed Charge)    TREATMENT:   ($$ Self Care/Home Management: 8-22 mins    )  Co-Treatment PT/OT necessary due to patient's decreased overall endurance/tolerance levels, as well as need for high level skilled assistance to complete functional transfers/mobility and functional tasks  Self Care (10 Minutes): Self care including Lower Body Dressing and Grooming to increase independence and decrease level of assistance required.     TREATMENT GRID:  N/A    AFTER TREATMENT POSITION/PRECAUTIONS:  Alarm Activated, Chair, Needs within reach and RN notified    INTERDISCIPLINARY COLLABORATION:  RN/PCT, PT/PTA and OT/HAAS    TOTAL TREATMENT DURATION:  OT Patient Time In/Time Out  Time In: 0917  Time Out: [place], OTR/L

## 2021-10-19 NOTE — H&P
[facility]   History and Physical       Name:  Sara Poon  Age: 80 y.o. Sex: female   :  1929    MRN:  [id number]   PCP:  López Thomas MD      Admit Date:  10/18/2021  5:42 PM   Presenting Complaint: Aphasia      Reason for Admission:  Severe aphasia [R47.01]    Assessment & Plan:   Severe Aphasia  Last well-known around 1 PM.  NIHSS of 5. Not a candidate for TPA per teleneurology. CT head without any acute intracranial abnormalities. CT perfusion with a tiny 1 cc area of delayed perfusion on the right occipital lobe which is read as likely artifact. CTA preliminary with no evidence of large vessel occlusion or high-grade stenosis. - stat ASA 325mg  - ASA 81, high intensity statin  - Neuro check q4h  - Telemetry monitoring  - permissive HTN to 220/120 x 24hrs ( if above goal treat with labetalol 10mg IV or nicardipine gtt)  - MRI Brain, Echo w/ bubble study  - bedside swallow test / SLP   - f/u A1c, TSH, trops  - PT/OT  - DVT PPx  - glucemic control    pAfib  Not on AC. Had Watchmen device in 2019  - on metoprolol    HTN  - hold home meds for permissive hypertension    Depression: on lexapro      Disposition: Inpatient admission, 2+ midnights  Diet: Regular  VTE ppx: heparin  CODE STATUS: FULL      History of Presenting Illness:     Sara Poon is a 80 y.o. female with medical history of atrial fibrillation, CAD status post stents, depression, GERD, HLD, hypothyroidism, hypertension, history of prior stroke, history of pulmonary embolism, sick sinus syndrome who presented to ED with aphasia. Unknown last well time but the facility noticed it sometimes in the afternoon. Reports word finding difficulties as well as difficulty speaking. Code stroke was called on arrival to the ED. NIH score are 5. Patient was evaluated by neurology and is not a candidate for TPA. CT head without any acute intracranial abnormalities.   CT perfusion with tiny ones in centimeter area of delayed perfusion on the right occipital lobe which is read as likely artifact. CTA preliminary with no evidence of large vessel occlusion. Patient daughter states that patient was recently treated for 2 bouts of UTI and she does get urinary tract infection frequently. Patient has prior CVA with similar presentation. Patient is noted to be hypertensive to 36/102, saturating well on room air. Laboratory work-up is unremarkable. Review of Systems: 10 systems reviewed and negative except as noted in HPI. 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    CAD (coronary artery disease), native coronary artery May 2014    stent to LAD; 3 stents total    Depression 1/14/2013    GERD (gastroesophageal reflux disease)     Hypercholesteremia 1/14/2013    Hypertension     Hypothyroidism 1/14/2013    Osteoarthritis of back 1/2/2014    Osteoporosis     Pulmonary embolism ([address] 2009    after knee surgery    Sick sinus syndrome ([address] 1/7/2016    Tachycardia-Bradycardia    Stroke [facility])        Past Surgical History:   Procedure Laterality Date    COLONOSCOPY N/A 7/16/2018    COLONOSCOPY performed by Gregorio Norwood MD at [address]  HX BREAST BIOPSY Bilateral     HX CATARACT REMOVAL Right 2010    HX CORONARY STENT PLACEMENT  05/2014    LAD X 3    HX ENDOSCOPY  04/12/2019    gastritis with superficial ulcerations    HX HEART CATHETERIZATION  09/17/2015    HX HEENT      multiple eye surgeries - left - macular hoe, retinal detachment twice,     HX HYSTERECTOMY  1970    HX KNEE REPLACEMENT  2009    HX KNEE REPLACEMENT Left 01/12/2017    HX ORTHOPAEDIC  2006 and 2008    herniated disc    HX TONSIL AND ADENOIDECTOMY  1934       Family History : reviewed.   Family History Problem Relation Age of Onset    Cancer Mother     Breast Cancer Mother     Cancer Father     Cancer Sister     Cancer Brother         pancreas    Breast Cancer Maternal Aunt         Social History     Tobacco Use    Smoking status: Never Smoker    Smokeless tobacco: Never Used   Substance Use Topics    Alcohol use: No     Alcohol/week: 0.0 standard drinks       Allergies   Allergen Reactions    Adhesive Rash       Immunization History   Administered Date(s) Administered    Covid-19, MODERNA, Mrna, Lnp-s, Pf, 100mcg/0.5mL 01/22/2021, 02/20/2021    Influenza High Dose Vaccine PF 11/05/2015, 11/09/2016, 11/13/2017, 09/06/2018    Influenza Vaccine 10/17/2013, 10/27/2014    Influenza Vaccine (Tri) Adjuvanted (>65 Yrs FLUAD TRI 59249) 09/25/2019    Pneumococcal Conjugate (PCV-13) 01/15/2016    Pneumococcal Polysaccharide (PPSV-23) 05/16/2018    TB Skin Test (PPD) Intradermal 01/12/2017    Tdap 06/01/2014         PTA Medications:  Current Outpatient Medications   Medication Instructions    acetaminophen (TYLENOL ARTHRITIS PAIN) 650 mg, Oral, EVERY 6 HOURS AS NEEDED    amoxicillin-clavulanate (AUGMENTIN) 875-125 mg per tablet No dose, route, or frequency recorded.     aspirin delayed-release 81 mg, Oral, DAILY    Calcium Carbonate-Vit D3-Min (CALTRATE 600+D PLUS MINERALS) 600 mg calcium- 400 unit Tab 1 Tablet, Oral, DAILY,      escitalopram oxalate (LEXAPRO) 10 mg, Oral, DAILY    fluticasone propionate (Flonase Allergy Relief) 50 mcg/actuation nasal spray 2 Sprays, Both Nostrils, DAILY    levothyroxine (SYNTHROID) 50 mcg, Oral, DAILY BEFORE BREAKFAST    lisinopriL (PRINIVIL, ZESTRIL) 20 mg, Oral, DAILY    LORazepam (Ativan) 0.5 mg tablet Oral    metoprolol succinate (TOPROL-XL) 12.5 mg, Oral, DAILY    multivitamin (ONE A DAY) tablet 1 Tablet, Oral, DAILY    nitroglycerin (NITROSTAT) 0.3 mg, SubLINGual, EVERY 5 MIN AS NEEDED    pantoprazole (PROTONIX) 20 mg, Oral, DAILY    potassium 99 mg, Oral, DAILY    Salt Irrigation Solution No.1 (Ocean Complete) aero 2 Sprays, Nasal, DAILY    traZODone (DESYREL) 25-50 mg, Oral, EVERY BEDTIME    vit a,c & e-lutein-minerals (OCUVITE) tablet 1 Tablet, Oral, DAILY    vit A/vit C/vit E/zinc/copper (ICAPS AREDS PO) 1 Capsule, Oral, 2 TIMES DAILY       Objective:     Patient Vitals for the past 24 hrs:   Temp Pulse Resp BP SpO2   10/18/21 1929  65  (!) 220/88 98 %   10/18/21 1810 98 °F (36.7 °C) 82 20 (!) 236/102 96 %       Oxygen Therapy  O2 Sat (%): 98 % (10/18/21 1929)  Pulse via Oximetry: 66 beats per minute (10/18/21 1929)  O2 Device: None (Room air) (10/18/21 1810)    There is no height or weight on file to calculate BMI. Physical Exam:     General:     alert, awake, in acute distress. tearful  HENT:   normocephalic, atraumatic. Eyes:   anicteric sclerae, moist conjunctiva, EOMI  Neck:    supple, non-tender. Trachea midline. Lungs:   CTAB, no wheezing, rhonchi, rales  Cardiac:   RRR, Normal S1 and S2. .   Abdomen:   Soft, non distended, nontender, +BS, no guarding/rebound  Extremities:   No edema , pedal pulses present, no digital cyanosis  Skin:   Warn, dry, normnal turgor and texture  Neuro:  AAOx3. Slightly slurred speech with word finding difficulty.   Psychiatric:  No anxiety, calm, cooperative    Data Reviewed:  I have reviewed ordered lab tests and independently visualized imaging below:    Recent Results (from the past 24 hour(s))   GLUCOSE, POC    Collection Time: 10/18/21  5:44 PM   Result Value Ref Range    Glucose (POC) 115 (H) 65 - 100 mg/dL    Performed by Paulino    CBC WITH AUTOMATED DIFF    Collection Time: 10/18/21  5:48 PM   Result Value Ref Range    WBC 5.4 4.3 - 11.1 K/uL    RBC 4.58 4.05 - 5.2 M/uL    HGB 11.9 11.7 - 15.4 g/dL    HCT 38.4 35.8 - 46.3 %    MCV 83.8 79.6 - 97.8 FL    MCH 26.0 (L) 26.1 - 32.9 PG    MCHC 31.0 (L) 31.4 - 35.0 g/dL    RDW 15.3 (H) 11.9 - 14.6 %    PLATELET 420 812 - 262 K/uL    MPV 11.3 9.4 - 12.3 FL    ABSOLUTE NRBC 0.00 0.0 - 0.2 K/uL    DF AUTOMATED      NEUTROPHILS 47 43 - 78 %    LYMPHOCYTES 39 13 - 44 %    MONOCYTES 11 4.0 - 12.0 %    EOSINOPHILS 1 0.5 - 7.8 %    BASOPHILS 1 0.0 - 2.0 %    IMMATURE GRANULOCYTES 0 0.0 - 5.0 %    ABS. NEUTROPHILS 2.5 1.7 - 8.2 K/UL    ABS. LYMPHOCYTES 2.1 0.5 - 4.6 K/UL    ABS. MONOCYTES 0.6 0.1 - 1.3 K/UL    ABS. EOSINOPHILS 0.1 0.0 - 0.8 K/UL    ABS. BASOPHILS 0.0 0.0 - 0.2 K/UL    ABS. IMM. GRANS. 0.0 0.0 - 0.5 K/UL   METABOLIC PANEL, BASIC    Collection Time: 10/18/21  5:48 PM   Result Value Ref Range    Sodium 141 136 - 145 mmol/L    Potassium 3.6 3.5 - 5.1 mmol/L    Chloride 110 (H) 98 - 107 mmol/L    CO2 22 21 - 32 mmol/L    Anion gap 9 7 - 16 mmol/L    Glucose 119 (H) 65 - 100 mg/dL    BUN 16 8 - 23 MG/DL    Creatinine 0.97 0.6 - 1.0 MG/DL    GFR est AA >60 >60 ml/min/1.73m2    GFR est non-AA 57 (L) >60 ml/min/1.73m2    Calcium 9.6 8.3 - 10.4 MG/DL   TROPONIN-HIGH SENSITIVITY    Collection Time: 10/18/21  5:48 PM   Result Value Ref Range    Troponin-High Sensitivity 4.2 0 - 14 pg/mL   HEPATIC FUNCTION PANEL    Collection Time: 10/18/21  5:48 PM   Result Value Ref Range    Protein, total 7.9 6.3 - 8.2 g/dL    Albumin 3.3 3.2 - 4.6 g/dL    Globulin 4.6 (H) 2.3 - 3.5 g/dL    A-G Ratio 0.7 (L) 1.2 - 3.5      Bilirubin, total 0.7 0.2 - 1.1 MG/DL    Bilirubin, direct 0.2 <0.4 MG/DL    Alk.  phosphatase 82 50 - 136 U/L    AST (SGOT) 19 15 - 37 U/L    ALT (SGPT) 19 12 - 65 U/L   POC PT/INR    Collection Time: 10/18/21  6:02 PM   Result Value Ref Range    Prothrombin time (POC) 12.2 (H) 9.6 - 11.6 SECS    INR (POC) 1.0 0.9 - 1.2     EKG, 12 LEAD, INITIAL    Collection Time: 10/18/21  6:09 PM   Result Value Ref Range    Ventricular Rate 83 BPM    Atrial Rate 416 BPM    QRS Duration 90 ms    Q-T Interval 450 ms    QTC Calculation (Bezet) 528 ms    Calculated R Axis 24 degrees    Calculated T Axis 62 degrees    Diagnosis       !! AGE AND GENDER SPECIFIC ECG ANALYSIS !!  Atrial fibrillation with premature ventricular or aberrantly conducted   complexes  Cannot rule out Anterior infarct (cited on or before 06-OCT-2019)  Prolonged QT  Abnormal ECG  When compared with ECG of 29-MAR-2021 07:52,  Nonspecific T wave abnormality, improved in Inferior leads  Nonspecific T wave abnormality, worse in Lateral leads  QT has lengthened         All Micro Results     None          Other Studies:  CT PERF W CBF    Result Date: 10/18/2021  HEAD CT with PERFUSION IMAGING INDICATION:  Aphasia. Prior history of CVA. Hypertension. Multiple axial images obtained through the brain without intravenous contrast. CT perfusion imaging of the brain was then performed after the administration of intravenous contrast.  Perfusion maps and perfusion analysis output were generated using the RAPID perfusion processing software algorithm. Radiation dose reduction techniques were used for this study: All CT scans performed at this facility use one or all of the following: Automated exposure control, adjustment of the mA and/or kVp according to patient's size, iterative reconstruction. FINDINGS: VIZ Output Values: CBF < 30% volume:  0 ml   (core infarction volume greater than 50 cc associated with poor outcomes) Tmax > 6 seconds: 1 ml Tmax/CBF Mismatch Volume: 1 ml Tmax/CBF Mismatch Ratio: NA Hypoperfusion Intensity Ratio (Tmax > 10 seconds/Tmax > 6 seconds): 1   (values greater than 0.5 associated with poor outcome) Tmax > 10 seconds Volume: 1 ml   (volume greater than 100 mL is associated with poor outcome) This scan was analyzed using [employer] ContaCT for LVO detection (large vessel occlusion). There is a tiny, 1 cc area of delayed perfusion on the right, probably occipital lobe. This could be artifact. No core infarct. Please note that the determination of patient treatment is not based solely upon imaging factors or calculation values.  Management of ischemia is at the discretion of the primary physician and is based upon a combination of clinical and imaging data, along other factors. XR CHEST PORT    Result Date: 10/18/2021  Chest X-ray INDICATION: Aphasia A portable AP view of the chest was obtained. FINDINGS: There is slight increased density lung bases, at least partially due to poor inspiration. Heart size is stable. The bony thorax is intact. Minimal atelectasis in the lung bases, at least partially due to poor inspiration. No definite acute infiltrate. CT CODE NEURO HEAD WO CONTRAST    Result Date: 10/18/2021  CT HEAD WITHOUT CONTRAST. INDICATION: Transient aphasia. COMPARISON: March 2021 and December 2020. TECHNIQUE:   5 mm axial scans from the skull base to the vertex. Our CT scanners use one or more of the following:  Automated exposure control, adjustment of the mA and or kV according to patient size, iterative reconstruction. FINDINGS:  No acute intraparenchymal hemorrhage or abnormal extra-axial fluid collection. The ventricles are normal size. Symmetric bilateral white matter low attenuation is present, nonspecific, likely chronic small vessel disease. No midline shift or mass effect. Posterior fossa: Unremarkable. Included portion of the: Paranasal sinuses and mastoid air cells demonstrate persistent opacified right maxillary sinus with calcifications. Globes and orbits grossly demonstrates high attenuation material in the left globe. Negative for acute intracranial abnormality. Chronic changes.          Medications:  Medications Administered     hydrALAZINE (APRESOLINE) 20 mg/mL injection 5 mg     Admin Date  10/18/2021 Action  Given Dose  5 mg Route  IntraVENous Administered By  Gladis Clemente, RN          iopamidoL (ISOVUE-370) 76 % injection 100 mL     Admin Date  10/18/2021 Action  Given Dose  100 mL Route  IntraVENous Administered By  Toya Christie L, RT, R, CT          saline peripheral flush soln 10 mL     Admin Date  10/18/2021 Action  Given Dose  10 mL Route  InterCATHeter Administered By  Sima Webb, RT, R, CT          sodium chloride 0.9 % bolus infusion 100 mL     Admin Date  10/18/2021 Action  New Bag Dose  100 mL Route  IntraVENous Administered By  Sima Webb, RT, R, CT                Problem List:     Hospital Problems as of 10/18/2021 Date Reviewed: 9/20/2021        Codes Class Noted - Resolved POA    Severe aphasia ICD-10-CM: R47.01  ICD-9-CM: 784.3  10/18/2021 - Present Yes        CVA (cerebral vascular accident) [facility]) ICD-10-CM: I63.9  ICD-9-CM: 434.91  3/13/2019 - Present Yes        Hypothyroidism ICD-10-CM: E03.9  ICD-9-CM: 244.9  1/14/2013 - Present Yes        Depression ICD-10-CM: F32. A  ICD-9-CM: 931  1/14/2013 - Present Yes        Hypercholesteremia ICD-10-CM: E78.00  ICD-9-CM: 272.0  1/14/2013 - Present Yes        Paroxysmal atrial fibrillation ([facility] [address] ICD-10-CM: I48.0  ICD-9-CM: 427.31  1/14/2013 - Present Yes    Overview Addendum 12/11/2020 12:59 PM by Evette Garza MD     Paroxysmal   1. 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 2.  Holter (11/19/20): Sinus rhythm. Rare PACs/PVCs. Short runs of atrial tachycardia. No sustained atrial fibrillation. Part of this note was written by using a voice dictation software and the note has been proof read but may still contain some grammatical/other typographical errors.        Tennille Thomas MD  [facility] Hospitalist Service  October 18, 2021    8:07 PM

## 2021-10-19 NOTE — PROGRESS NOTES
Patient back on floor from MRI    NAD noted at this time  Patient denies needs/pain  RR even/unlabored on RA  Bed in lowest position wheels locked call light within reach  Patient instructed to call for assistance when needed with understanding verbalized

## 2021-10-19 NOTE — PROGRESS NOTES
Reviewed MRI consent form with daughter who is present in patient's room   Daughter Joyce Nails)  is patient's POA has reviewed patient's answers and has signed consent form  José Fonseca (MRI) notified

## 2021-10-19 NOTE — PROGRESS NOTES
BSR patient resting quietly in bed RR even/unlabored on RA with NAD noted at this time  Patient denies needs/pain  Call light within reach  Patient instructed to call for assistance when needed with understanding verbalized

## 2021-10-19 NOTE — PROGRESS NOTES
Patient arrived to room 832 via stretcher. A&O x4. Respirations present. On room air. Radial and pedal pulses palpable bilaterally. S1 S2 heart sounds present. Dual skin assessment completed with Deena Escobedo RN. No signs of pressure injury at this time. No signs of distress. No needs expressed. Bed low and locked. Call light within reach. Will continue to monitor.

## 2021-10-19 NOTE — PROGRESS NOTES
TRANSFER - IN REPORT:    Verbal report received from Sully Keen RN on Melva Francis  being received from ED for routine progression of care      Report consisted of patients Situation, Background, Assessment and   Recommendations(SBAR). Information from the following report(s) SBAR, ED Summary and MAR was reviewed with the receiving nurse. Opportunity for questions and clarification was provided. Assessment completed upon patients arrival to unit and care assumed.

## 2021-10-20 VITALS
TEMPERATURE: 98.2 F | OXYGEN SATURATION: 95 % | BODY MASS INDEX: 25.69 KG/M2 | WEIGHT: 145 LBS | DIASTOLIC BLOOD PRESSURE: 57 MMHG | RESPIRATION RATE: 18 BRPM | SYSTOLIC BLOOD PRESSURE: 124 MMHG | HEART RATE: 63 BPM | HEIGHT: 63 IN

## 2021-10-20 PROBLEM — G45.9 TIA (TRANSIENT ISCHEMIC ATTACK): Status: ACTIVE | Noted: 2021-10-20

## 2021-10-20 PROBLEM — I63.9 CVA (CEREBRAL VASCULAR ACCIDENT) (HCC): Chronic | Status: ACTIVE | Noted: 2019-03-13

## 2021-10-20 PROBLEM — R47.01: Status: RESOLVED | Noted: 2021-10-18 | Resolved: 2021-10-20

## 2021-10-20 PROBLEM — G31.9 CEREBRAL ATROPHY (HCC): Status: ACTIVE | Noted: 2021-10-20

## 2021-10-20 PROBLEM — H54.40 BLIND LEFT EYE: Chronic | Status: ACTIVE | Noted: 2021-10-19

## 2021-10-20 PROBLEM — G45.9 TIA (TRANSIENT ISCHEMIC ATTACK): Status: RESOLVED | Noted: 2019-04-10 | Resolved: 2021-10-20

## 2021-10-20 PROBLEM — H54.40 BLIND LEFT EYE: Status: RESOLVED | Noted: 2021-10-19 | Resolved: 2021-10-20

## 2021-10-20 LAB
APPEARANCE UR: CLEAR
BACTERIA URNS QL MICRO: ABNORMAL /HPF
BILIRUB UR QL: NEGATIVE
CASTS URNS QL MICRO: ABNORMAL /LPF
COLOR UR: YELLOW
EPI CELLS #/AREA URNS HPF: ABNORMAL /HPF
GLUCOSE UR STRIP.AUTO-MCNC: NEGATIVE MG/DL
HGB UR QL STRIP: NEGATIVE
KETONES UR QL STRIP.AUTO: NEGATIVE MG/DL
LEUKOCYTE ESTERASE UR QL STRIP.AUTO: ABNORMAL
MM INDURATION POC: 0 MM (ref 0–5)
NITRITE UR QL STRIP.AUTO: NEGATIVE
PH UR STRIP: 6 [PH] (ref 5–9)
PPD POC: NEGATIVE NEGATIVE
PROT UR STRIP-MCNC: NEGATIVE MG/DL
RBC #/AREA URNS HPF: ABNORMAL /HPF
SP GR UR REFRACTOMETRY: 1.01 (ref 1–1.02)
UROBILINOGEN UR QL STRIP.AUTO: 0.2 EU/DL (ref 0.2–1)
WBC URNS QL MICRO: ABNORMAL /HPF

## 2021-10-20 PROCEDURE — 99232 SBSQ HOSP IP/OBS MODERATE 35: CPT | Performed by: NURSE PRACTITIONER

## 2021-10-20 PROCEDURE — 74011250637 HC RX REV CODE- 250/637: Performed by: INTERNAL MEDICINE

## 2021-10-20 PROCEDURE — 97530 THERAPEUTIC ACTIVITIES: CPT

## 2021-10-20 PROCEDURE — 97535 SELF CARE MNGMENT TRAINING: CPT

## 2021-10-20 PROCEDURE — 74011250636 HC RX REV CODE- 250/636: Performed by: INTERNAL MEDICINE

## 2021-10-20 PROCEDURE — 81001 URINALYSIS AUTO W/SCOPE: CPT

## 2021-10-20 RX ORDER — ATORVASTATIN CALCIUM 80 MG/1
80 TABLET, FILM COATED ORAL
Qty: 30 TABLET | Refills: 1 | Status: SHIPPED | OUTPATIENT
Start: 2021-10-20 | End: 2021-10-20

## 2021-10-20 RX ORDER — CLOPIDOGREL BISULFATE 75 MG/1
75 TABLET ORAL DAILY
Qty: 21 TABLET | Refills: 0 | Status: SHIPPED | OUTPATIENT
Start: 2021-10-20 | End: 2021-10-20 | Stop reason: SDUPTHER

## 2021-10-20 RX ORDER — CLOPIDOGREL BISULFATE 75 MG/1
75 TABLET ORAL DAILY
Qty: 21 TABLET | Refills: 0 | Status: SHIPPED | OUTPATIENT
Start: 2021-10-20 | End: 2021-11-10

## 2021-10-20 RX ORDER — CLOPIDOGREL BISULFATE 75 MG/1
300 TABLET ORAL ONCE
Status: COMPLETED | OUTPATIENT
Start: 2021-10-20 | End: 2021-10-20

## 2021-10-20 RX ORDER — ATORVASTATIN CALCIUM 80 MG/1
80 TABLET, FILM COATED ORAL
Qty: 30 TABLET | Refills: 1 | Status: SHIPPED | OUTPATIENT
Start: 2021-10-20 | End: 2022-01-10 | Stop reason: SDUPTHER

## 2021-10-20 RX ADMIN — Medication 10 ML: at 06:08

## 2021-10-20 RX ADMIN — CLOPIDOGREL BISULFATE 300 MG: 75 TABLET ORAL at 12:03

## 2021-10-20 RX ADMIN — ENOXAPARIN SODIUM 40 MG: 40 INJECTION SUBCUTANEOUS at 08:16

## 2021-10-20 RX ADMIN — ESCITALOPRAM OXALATE 10 MG: 10 TABLET ORAL at 08:16

## 2021-10-20 RX ADMIN — LEVOTHYROXINE SODIUM 50 MCG: 0.05 TABLET ORAL at 06:07

## 2021-10-20 RX ADMIN — ASPIRIN 81 MG: 81 TABLET, CHEWABLE ORAL at 08:16

## 2021-10-20 RX ADMIN — Medication 10 ML: at 06:07

## 2021-10-20 NOTE — DISCHARGE SUMMARY
Hospitalist Discharge Summary   Admit Date:  10/18/2021  5:42 PM   DC Note date: 10/20/2021  Name:  Juany Russo   Age:  80 y.o. Sex:  female  :  1929   MRN:  [id number]   Room:  [facility]  PCP:  Janina James MD    Presenting Complaint: Aphasia    Initial Admission Diagnosis: Severe aphasia [R47.01]     Problem List for this Hospitalization:  Hospital Problems as of 10/20/2021 Date Reviewed: 2021        Codes Class Noted - Resolved POA    Cerebral atrophy ([address] ICD-10-CM: G31.9  ICD-9-CM: 331.9  10/20/2021 - Present Yes        * (Principal) TIA (transient ischemic attack) ICD-10-CM: G45.9  ICD-9-CM: 435.9  10/20/2021 - Present Yes        CVA (cerebral vascular accident) ([address] (Chronic) ICD-10-CM: I63.9  ICD-9-CM: 434.91  3/13/2019 - Present Yes        Hypothyroidism (Chronic) ICD-10-CM: E03.9  ICD-9-CM: 244.9  2013 - Present Yes        Depression (Chronic) ICD-10-CM: F32. A  ICD-9-CM: 168  2013 - Present Yes        Hypercholesteremia (Chronic) ICD-10-CM: E78.00  ICD-9-CM: 272.0  2013 - Present Yes        Paroxysmal atrial fibrillation (HCC) (Chronic) ICD-10-CM: I48.0  ICD-9-CM: 427.31  2013 - Present Yes    Overview Addendum 2020 12:59 PM by Hector Figueroa MD     Paroxysmal   1. 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 2.  Holter (20): Sinus rhythm. Rare PACs/PVCs. Short runs of atrial tachycardia. No sustained atrial fibrillation.               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 female with medical history of atrial fibrillation, CAD status post stents, depression, GERD, HLD, hypothyroidism, hypertension, history of prior stroke, history of pulmonary embolism, sick sinus syndrome who presented to ED with aphasia. Unknown last well time but the facility noticed it sometimes in the afternoon. Reports word finding difficulties as well as difficulty speaking.     Code stroke was called on arrival to the ED. NIH score are 5. Patient was evaluated by neurology and is not a candidate for TPA. CT head without any acute intracranial abnormalities. CT perfusion with tiny ones in centimeter area of delayed perfusion on the right occipital lobe which is read as likely artifact. CTA preliminary with no evidence of large vessel occlusion. Patient daughter states that patient was recently treated for 2 bouts of UTI and she does get urinary tract infection frequently. Patient has prior CVA with similar presentation.      Patient is noted to be hypertensive to 36/102, saturating well on room air. Laboratory work-up is unremarkable. \"    Hospital Course:  Admitted with TIA. Neuro consulted. MRI unremarkable except for evidence of prior stroke and cerebral volume loss. Echo results unremarkable. Will give plavix for 21 days with loading dose here. Started lipitor due to high LDL; pt reports she was taken off this due to not needing it, but I think she needs this lifelong. Has watchman for afib but needs atherosclerotic stroke prevention also. Outpatient follow up appropriate at this point. Disposition: Home or Self Care  Diet: ADULT DIET Regular  Code Status: DNR    Follow Up Orders: Follow-up Appointments   Procedures    FOLLOW UP VISIT Appointment in: One Week PCP for follow up     PCP for follow up     Standing Status:   Standing     Number of Occurrences:   1     Order Specific Question:   Appointment in     Answer:    One Week       Follow-up Information     Follow up With Specialties Details Why Contact Info    Manuel Fernandez MD Family Medicine In 1 week follow up TIA [address]  [phone]            Time spent in patient discharge and coordination 35 minutes. Plan was discussed with pt. All questions answered. Patient was stable at time of discharge. Given instructions to call a physician or return if any concerns. Discharge Info:   Current Discharge Medication List      START taking these medications    Details   atorvastatin (LIPITOR) 80 mg tablet Take 1 Tablet by mouth nightly. Indications: high cholesterol  Qty: 30 Tablet, Refills: 1  Start date: 10/20/2021      clopidogreL (Plavix) 75 mg tab Take 1 Tablet by mouth daily for 21 days. Start 10-21  Qty: 21 Tablet, Refills: 0  Start date: 10/20/2021, End date: 11/10/2021         CONTINUE these medications which have NOT CHANGED    Details   lisinopriL (PRINIVIL, ZESTRIL) 20 mg tablet Take 1 Tablet by mouth daily. Qty: 90 Tablet, Refills: 3      LORazepam (Ativan) 0.5 mg tablet Take  by mouth. fluticasone propionate (Flonase Allergy Relief) 50 mcg/actuation nasal spray 2 Sprays by Both Nostrils route daily. Qty: 1 Bottle, Refills: 2    Associated Diagnoses: Chronic sinusitis, unspecified location      pantoprazole (PROTONIX) 20 mg tablet Take 1 Tab by mouth daily. Qty: 30 Tab, Refills: 11      levothyroxine (synthroid) 50 mcg tablet Take 1 Tab by mouth Daily (before breakfast). Qty: 90 Tab, Refills: 3      metoprolol succinate (TOPROL-XL) 25 mg XL tablet Take 0.5 Tabs by mouth daily. Qty: 45 Tab, Refills: 3      escitalopram oxalate (LEXAPRO) 10 mg tablet Take 1 Tab by mouth daily. Qty: 90 Tab, Refills: 3    Associated Diagnoses: Anxiety      multivitamin (ONE A DAY) tablet Take 1 Tab by mouth daily. vit a,c & e-lutein-minerals (OCUVITE) tablet Take 1 Tab by mouth daily. vit A/vit C/vit E/zinc/copper (ICAPS AREDS PO) Take 1 Cap by mouth two (2) times a day. potassium 99 mg tablet Take 99 mg by mouth daily. acetaminophen (TYLENOL ARTHRITIS PAIN) 650 mg CR tablet Take 650 mg by mouth every six (6) hours as needed for Pain.       Calcium Carbonate-Vit D3-Min (CALTRATE 600+D PLUS MINERALS) 600 mg calcium- 400 unit Tab Take 1 Tab by mouth daily. traZODone (DESYREL) 50 mg tablet Take 25-50 mg by mouth nightly. Salt Irrigation Solution No.1 (Ocean Complete) aero 2 Sprays by Nasal route daily. Qty: 100 mL, Refills: 2    Associated Diagnoses: Chronic sinusitis, unspecified location      nitroglycerin (NITROSTAT) 0.3 mg SL tablet 1 Tab by SubLINGual route every five (5) minutes as needed for Chest Pain. Qty: 1 Bottle, Refills: 3      aspirin delayed-release 81 mg tablet Take 1 Tab by mouth daily. Qty: 30 Tab, Refills: 1         STOP taking these medications       amoxicillin-clavulanate (AUGMENTIN) 875-125 mg per tablet Comments:   Reason for Stopping:               Procedures done this admission:  * No surgery found *    Consults this admission:  IP CONSULT TO PHYSIATRIST(REHAB MEDICINE)  IP CONSULT TO NEUROLOGY    Echocardiogram/EKG results:  Results from Hospital Encounter encounter on 10/18/21    ECHO ADULT COMPLETE    Interpretation Summary  · LV: Estimated LVEF is 55 - 60%. Normal cavity size and systolic function (ejection fraction normal). Mild concentric hypertrophy. Inconclusive left ventricular diastolic function. · TV: Mild tricuspid valve regurgitation is present. Right Ventricular Arterial Pressure (RVSP) is 32 mmHg. · Saline contrast was given to evaluate for intracardiac shunt. · MV: Mild mitral annular calcification. Mild mitral valve regurgitation is present. · AV: Mild aortic valve regurgitation is present. · PV: Mild pulmonic valve regurgitation is present. · LA: Mildly dilated left atrium. · IAS: No atrial septal defect present. Agitated saline contrast study was performed. · RA: Mildly dilated right atrium.        EKG Results     Procedure 720 Value Units Date/Time    Initial ECG [[id number]] Collected: 10/18/21 1809    Order Status: Completed Updated: 10/19/21 7573     Ventricular Rate 83 BPM      Atrial Rate 416 BPM      QRS Duration 90 ms      Q-T Interval 450 ms      QTC Calculation (Bezet) 528 ms      Calculated R Axis 24 degrees      Calculated T Axis 62 degrees      Diagnosis --     !! AGE AND GENDER SPECIFIC ECG ANALYSIS !! Normal sinus rhythm with 1st degree A-V block  Cannot rule out Anterior infarct (cited on or before 06-OCT-2019)  Prolonged QT  Abnormal ECG  When compared with ECG of 29-MAR-2021 07:52,  Nonspecific T wave abnormality, improved in Inferior leads  Nonspecific T wave abnormality, worse in Lateral leads  QT has lengthened  Confirmed by Aaron Best MD (), Karen Phill ([id number]) on 10/19/2021 6:33:23 AM            Diagnostic Imaging/Tests:   MRI BRAIN WO CONT    Result Date: 10/19/2021  MRI BRAIN WITHOUT CONTRAST 10/19/2021 HISTORY: 80year-old with transient aphasia. TECHNIQUE: Sagittal and axial T1-weighted, axial T2-weighted, axial and coronal FLAIR, axial T2-weighted gradient-echo, axial diffusion weighted images with ADC maps of the brain. COMPARISON: Head CT and CT angiogram 10/18/2021 FINDINGS: There is no acute infarction, acute intracranial hemorrhage, hydrocephalus, intra-axial mass or extra-axial hematoma. Moderate diffuse cerebral volume loss is present without disproportionate hippocampal atrophy. There is a small area of superficial siderosis in the left frontal lobe. This finding suggests old subarachnoid hemorrhage in this region. This may have occurred as a result of trauma. On the T2-weighted and FLAIR sequences, there are scattered white matter hyperintensities within the periventricular brain. Findings are nonspecific and can be seen with chronic small vessel ischemic disease, demyelinating disease or with migraine headaches. Abnormal signal in the left globe suggests prior surgery for a retinal detachment. A scleral band surrounds the left globe. There is partial opacification of the right maxillary sinus. 1. No acute infarction.  2. Cerebral volume loss and white matter findings compatible with chronic small vessel ischemic disease. 3. Minimal superficial siderosis in the left frontal lobe. Correlate with medical/neurological history. CT PERF W CBF    Result Date: 10/18/2021  HEAD CT with PERFUSION IMAGING INDICATION:  Aphasia. Prior history of CVA. Hypertension. Multiple axial images obtained through the brain without intravenous contrast. CT perfusion imaging of the brain was then performed after the administration of intravenous contrast.  Perfusion maps and perfusion analysis output were generated using the RAPID perfusion processing software algorithm. Radiation dose reduction techniques were used for this study: All CT scans performed at this facility use one or all of the following: Automated exposure control, adjustment of the mA and/or kVp according to patient's size, iterative reconstruction. FINDINGS: VIZ Output Values: CBF < 30% volume:  0 ml   (core infarction volume greater than 50 cc associated with poor outcomes) Tmax > 6 seconds: 1 ml Tmax/CBF Mismatch Volume: 1 ml Tmax/CBF Mismatch Ratio: NA Hypoperfusion Intensity Ratio (Tmax > 10 seconds/Tmax > 6 seconds): 1   (values greater than 0.5 associated with poor outcome) Tmax > 10 seconds Volume: 1 ml   (volume greater than 100 mL is associated with poor outcome) This scan was analyzed using [employer] ContaCT for LVO detection (large vessel occlusion). There is a tiny, 1 cc area of delayed perfusion on the right, probably occipital lobe. This could be artifact. No core infarct. Please note that the determination of patient treatment is not based solely upon imaging factors or calculation values. Management of ischemia is at the discretion of the primary physician and is based upon a combination of clinical and imaging data, along other factors. XR CHEST PORT    Result Date: 10/18/2021  Chest X-ray INDICATION: Aphasia A portable AP view of the chest was obtained.  FINDINGS: There is slight increased density lung bases, at least partially due to poor inspiration. Heart size is stable. The bony thorax is intact. Minimal atelectasis in the lung bases, at least partially due to poor inspiration. No definite acute infiltrate. CTA CODE NEURO HEAD AND NECK W CONT    Result Date: 10/19/2021  History: Hypertension with transient aphasia. FINDINGS: CT angiography was performed of the neck and head with contrast and three-dimensional CT angiography reconstruction and reformat was performed. NASCET criteria as needed. CT dose reduction was achieved through use of a standardized protocol tailored for this examination and automatic exposure control for dose modulation. Study analyzed by the [employer] software package per the hospital protocol if presented as such by the facility technologists in the pacs system. However, the results of the analysis are neither reviewed nor provided in this exam interpretation and report. This statement is provided at the request of the hospital for hospital billing purposes only. Aortic arch is mildly atherosclerotic but patent. The left subclavian artery is patent. The left vertebral artery is patent. The right vertebral artery is patent. There is stenosis at the origin of the right subclavian artery. The left common carotid artery has a vessel kink proximally but is patent. The left internal carotid artery is patent. The right common carotid artery is patent. There is a patent right internal carotid artery with a vessel kink at the mid cervical segment. It is mild. The basilar artery is patent. The posterior cerebral arteries are patent with a mild stenosis in the P2 segment on the right. The middle cerebral arteries are patent. The anterior cerebral arteries are patent bilaterally. Right maxillary sinus opacification. Stenosis at the right subclavian artery origin and stenosis in the P2 segment right posterior cerebral artery.      CT CODE NEURO HEAD WO CONTRAST    Result Date: 10/18/2021  CT HEAD WITHOUT CONTRAST. INDICATION: Transient aphasia. COMPARISON: March 2021 and December 2020. TECHNIQUE:   5 mm axial scans from the skull base to the vertex. Our CT scanners use one or more of the following:  Automated exposure control, adjustment of the mA and or kV according to patient size, iterative reconstruction. FINDINGS:  No acute intraparenchymal hemorrhage or abnormal extra-axial fluid collection. The ventricles are normal size. Symmetric bilateral white matter low attenuation is present, nonspecific, likely chronic small vessel disease. No midline shift or mass effect. Posterior fossa: Unremarkable. Included portion of the: Paranasal sinuses and mastoid air cells demonstrate persistent opacified right maxillary sinus with calcifications. Globes and orbits grossly demonstrates high attenuation material in the left globe. Negative for acute intracranial abnormality. Chronic changes. ECHO ADULT COMPLETE    Result Date: 10/19/2021  · LV: Estimated LVEF is 55 - 60%. Normal cavity size and systolic function (ejection fraction normal). Mild concentric hypertrophy. Inconclusive left ventricular diastolic function. · TV: Mild tricuspid valve regurgitation is present. Right Ventricular Arterial Pressure (RVSP) is 32 mmHg. · Saline contrast was given to evaluate for intracardiac shunt. · MV: Mild mitral annular calcification. Mild mitral valve regurgitation is present. · AV: Mild aortic valve regurgitation is present. · PV: Mild pulmonic valve regurgitation is present. · LA: Mildly dilated left atrium. · IAS: No atrial septal defect present. Agitated saline contrast study was performed. · RA: Mildly dilated right atrium.         All Micro Results     None          Labs: Results:       BMP, Mg, Phos Recent Labs     10/19/21  0716 10/18/21  1748    141   K 3.7 3.6   * 110*   CO2 22 22   AGAP 8 9   BUN 15 16   CREA 0.93 0.97   CA 9.3 9.6   * 119*      CBC Recent Labs     10/19/21  0716 10/18/21  1748   WBC 5.4 5.4   RBC 4.66 4.58   HGB 11.9 11.9   HCT 36.6 38.4    235   GRANS  --  47   LYMPH  --  39   EOS  --  1   MONOS  --  11   BASOS  --  1   IG  --  0   ANEU  --  2.5   ABL  --  2.1   SAMMY  --  0.1   ABM  --  0.6   ABB  --  0.0   AIG  --  0.0      LFT Recent Labs     10/18/21  1748   ALT 19   AP 82   TP 7.9   ALB 3.3   GLOB 4.6*   AGRAT 0.7*      Cardiac Testing Lab Results   Component Value Date/Time     02/15/2018 03:58 PM    CK 81 03/29/2021 07:57 AM    Troponin-I, Qt. <0.02 (L) 10/06/2019 07:45 PM    Troponin-I, Qt. <0.02 (L) 10/06/2019 04:57 PM    Troponin-I, Qt. <0.02 (L) 07/23/2017 01:40 PM      Coagulation Tests Lab Results   Component Value Date/Time    Prothrombin time 13.3 10/06/2019 05:45 PM    Prothrombin time 13.9 05/20/2019 09:19 AM    Prothrombin time 15.9 (H) 04/10/2019 04:40 PM    INR 1.0 10/06/2019 05:45 PM    INR 1.1 05/20/2019 09:19 AM    INR 1.3 04/10/2019 04:40 PM    INR (POC) 1.0 10/18/2021 06:02 PM    INR (POC) 1.1 04/10/2019 04:54 PM    aPTT 27.2 10/06/2019 05:45 PM    aPTT 25.7 04/10/2019 04:40 PM    aPTT 23.3 (L) 12/19/2016 11:00 AM      A1c Lab Results   Component Value Date/Time    Hemoglobin A1c 6.1 10/19/2021 07:16 AM    Hemoglobin A1c 6.2 (H) 03/14/2019 03:27 AM      Lipid Panel Lab Results   Component Value Date/Time    Cholesterol, total 226 (H) 10/19/2021 07:16 AM    HDL Cholesterol 57 10/19/2021 07:16 AM    LDL, calculated 156.6 (H) 10/19/2021 07:16 AM    VLDL, calculated 12.4 10/19/2021 07:16 AM    Triglyceride 62 10/19/2021 07:16 AM    CHOL/HDL Ratio 4.0 10/19/2021 07:16 AM      Thyroid Panel Lab Results   Component Value Date/Time    TSH 3.350 08/20/2021 08:32 AM    TSH 1.050 03/29/2021 02:38 PM    T4, Free 1.3 03/29/2021 02:38 PM    T4, Free 1.52 02/25/2020 11:40 AM        Most Recent UA Lab Results   Component Value Date/Time    Color YELLOW 03/29/2021 08:29 AM    Appearance CLEAR 03/29/2021 08:29 AM    Specific gravity 1.007 03/29/2021 08:29 AM    pH (UA) 7.5 03/29/2021 08:29 AM    Protein Negative 03/29/2021 08:29 AM    Glucose Negative 03/29/2021 08:29 AM    Ketone Negative 03/29/2021 08:29 AM    Bilirubin Negative 03/29/2021 08:29 AM    Blood Negative 03/29/2021 08:29 AM    Urobilinogen 0.2 03/29/2021 08:29 AM    Nitrites Negative 03/29/2021 08:29 AM    Leukocyte Esterase SMALL (A) 03/29/2021 08:29 AM    WBC 5-10 03/29/2021 08:29 AM    RBC 0-3 03/29/2021 08:29 AM    Epithelial cells 0-3 03/29/2021 08:29 AM    Bacteria 0 03/29/2021 08:29 AM    Casts 0 03/29/2021 08:29 AM          All Labs from Last 24 Hrs:  Recent Results (from the past 24 hour(s))   ECHO ADULT COMPLETE    Collection Time: 10/19/21  1:30 PM   Result Value Ref Range    LV ED Vol A2C 91.10 cm3    LV ED Vol A4C 102.00 cm3    LV ES Vol A2C 36.60 cm3    LV ES Vol A4C 41.60 cm3    IVSd 1.11 (A) 0.60 - 0.90 cm    LVIDd 3.78 (A) 3.90 - 5.30 cm    LVIDs 2.65 cm    LVOT d 1.85 cm    LVOT VTI 23.60 cm    LVOT Peak Gradient 4.00 mmHg    LVPWd 1.02 (A) 0.60 - 0.90 cm    LV E' Lateral Velocity 9.09 cm/s    LV E' Septal Velocity 5.32 cm/s    Left Atrium Minor Axis 3.07 cm    Left Atrium Major Axis 5.19 cm    LA Area 2C 17.80 cm2    LA Area 4C 15.80 cm2    Aortic Regurgitant Pressure Half-time 511.00 ms    AR Max Abilio 337.00 cm/s    Aortic Valve Systolic Peak Velocity 841.85 cm/s    AoV PG 7.00 mmHg    Aortic Valve Systolic Mean Gradient 0.31 mmHg    AoV VTI 30.90 cm    Aortic Valve Area by Continuity of VTI 2.05 cm2    Aortic Valve Area by Continuity of Peak Velocity 1.99 cm2    Ao Root D 2.97 cm    AO ASC D 3.37 cm    Mitral Valve E Wave Deceleration Time 415.00 ms    MV A Abilio 118.00 cm/s    MV E Abilio 84.80 cm/s    Pulmonic Regurgitant End Max Velocity 113.00 cm/s    RVIDd 2.98 cm    Tapse 1.97 1.50 - 2.00 cm    TR Max Velocity 270.00 cm/s    Triscuspid Valve Regurgitation Peak Gradient 29.00 mmHg    MV E/A 0.72     LV Mass .4 67.0 - 162.0 g    LV Mass AL Index 75.4 43.0 - 95.0 g/m2 E/E' lateral 9.33     E/E' septal 15.94     RVSP 32.0 mmHg    E/E' ratio (averaged) 12.63     Est. RA Pressure 3.0 mmHg    BRIT/BSA Pk Abilio 1.2 cm2/m2    BRIT/BSA VTI 1.2 cm2/m2   PLEASE READ & DOCUMENT PPD TEST IN 24 HRS    Collection Time: 10/20/21  6:10 AM   Result Value Ref Range    PPD Negative Negative    mm Induration 0 0 - 5 mm       Recent Vital Data:  Patient Vitals for the past 24 hrs:   Temp Pulse Resp BP SpO2   10/20/21 0756 98.1 °F (36.7 °C) 64 18 (!) 121/55 95 %   10/20/21 0400  78      10/20/21 0313 98.3 °F (36.8 °C) 75 20 133/68 94 %   10/20/21 0000  (!) 59      10/19/21 2237 98.6 °F (37 °C) (!) 58 20 (!) 152/58 90 %   10/19/21 2000  74      10/19/21 1925 98.2 °F (36.8 °C) 69 19 (!) 143/62 95 %   10/19/21 1429    (!) 174/71    10/19/21 1103 99.3 °F (37.4 °C) 68 18 116/70 91 %     Oxygen Therapy  O2 Sat (%): 95 % (10/20/21 0756)  Pulse via Oximetry: 66 beats per minute (10/18/21 1929)  O2 Device: None (Room air) (10/20/21 0229)    Estimated body mass index is 25.69 kg/m² as calculated from the following:    Height as of this encounter: 5' 3\" (1.6 m). Weight as of this encounter: 65.8 kg (145 lb). Intake/Output Summary (Last 24 hours) at 10/20/2021 1045  Last data filed at 10/20/2021 0816  Gross per 24 hour   Intake 480 ml   Output    Net 480 ml         Physical Exam:  General:    Well nourished. No overt distress  Head:  Normocephalic, atraumatic  Eyes:  Sclerae appear normal.  Pupils equally round. HENT:  Nares appear normal, no drainage. Moist mucous membranes  Neck:  No restricted ROM. Trachea midline  Lungs:    Even, unlabored  Abdomen:   nondistended  Extremities: Warm and dry. No cyanosis or clubbing. No edema. Skin:     No rashes. Normal coloration  Neuro:  CN II-XII grossly intact. Psych:  Normal mood and affect.     Current Med List in Hospital:   Current Facility-Administered Medications   Medication Dose Route Frequency    clopidogreL (PLAVIX) tablet 300 mg  300 mg Oral ONCE    atorvastatin (LIPITOR) tablet 80 mg  80 mg Oral QHS    sodium chloride (NS) flush 5-10 mL  5-10 mL IntraVENous Q8H    sodium chloride (NS) flush 5-10 mL  5-10 mL IntraVENous PRN    sodium chloride (NS) flush 5-40 mL  5-40 mL IntraVENous Q8H    sodium chloride (NS) flush 5-40 mL  5-40 mL IntraVENous PRN    ondansetron (ZOFRAN) injection 4 mg  4 mg IntraVENous Q4H PRN    aspirin chewable tablet 81 mg  81 mg Oral DAILY    acetaminophen (TYLENOL) tablet 650 mg  650 mg Oral Q4H PRN    acetaminophen (TYLENOL) suppository 650 mg  650 mg Rectal Q4H PRN    labetaloL (NORMODYNE;TRANDATE) injection 5 mg  5 mg IntraVENous Q10MIN PRN    bisacodyL (DULCOLAX) suppository 10 mg  10 mg Rectal DAILY PRN    famotidine (PEPCID) tablet 20 mg  20 mg Oral BID PRN    enoxaparin (LOVENOX) injection 40 mg  40 mg SubCUTAneous Q24H    escitalopram oxalate (LEXAPRO) tablet 10 mg  10 mg Oral DAILY    levothyroxine (SYNTHROID) tablet 50 mcg  50 mcg Oral ACB    [Held by provider] metoprolol succinate (TOPROL-XL) XL tablet 25 mg  25 mg Oral DAILY    traZODone (DESYREL) tablet 25 mg  25 mg Oral QHS PRN    melatonin tablet 5 mg  5 mg Oral QHS       Allergies   Allergen Reactions    Adhesive Rash     Immunization History   Administered Date(s) Administered    Covid-19, MODERNA, Mrna, Lnp-s, Pf, 100mcg/0.5mL 01/22/2021, 02/20/2021    Influenza High Dose Vaccine PF 11/05/2015, 11/09/2016, 11/13/2017, 09/06/2018    Influenza Vaccine 10/17/2013, 10/27/2014    Influenza Vaccine (Tri) Adjuvanted (>65 Yrs FLUAD TRI 45085) 09/25/2019    Pneumococcal Conjugate (PCV-13) 01/15/2016    Pneumococcal Polysaccharide (PPSV-23) 05/16/2018    TB Skin Test (PPD) Intradermal 01/12/2017, 10/19/2021    Tdap 06/01/2014       Signed:  Dirk Rios MD    Part of this note may have been written by using a voice dictation software.   The note has been proof read but may still contain some grammatical/other typographical errors.

## 2021-10-20 NOTE — PROGRESS NOTES
Beside shift report received from [place] RN  Patient lying in bed  Respirations present  No signs of distress  No needs expressed at this time  Safety measures in place

## 2021-10-20 NOTE — PROGRESS NOTES
Problem: Falls - Risk of  Goal: *Absence of Falls  Description: Document Clydene Bone Fall Risk and appropriate interventions in the flowsheet.   Outcome: Resolved/Met     Problem: Patient Education: Go to Patient Education Activity  Goal: Patient/Family Education  Outcome: Resolved/Met     Problem: Patient Education: Go to Patient Education Activity  Goal: Patient/Family Education  Outcome: Resolved/Met     Problem: TIA/CVA Stroke: 0-24 hours  Goal: Off Pathway (Use only if patient is Off Pathway)  Outcome: Resolved/Met  Goal: Activity/Safety  Outcome: Resolved/Met  Goal: Consults, if ordered  Outcome: Resolved/Met  Goal: Diagnostic Test/Procedures  Outcome: Resolved/Met  Goal: Nutrition/Diet  Outcome: Resolved/Met  Goal: Discharge Planning  Outcome: Resolved/Met  Goal: Medications  Outcome: Resolved/Met  Goal: Respiratory  Outcome: Resolved/Met  Goal: Treatments/Interventions/Procedures  Outcome: Resolved/Met  Goal: Minimize risk of bleeding post-thrombolytic infusion  Outcome: Resolved/Met  Goal: Monitor for complications post-thrombolytic infusion  Outcome: Resolved/Met  Goal: Psychosocial  Outcome: Resolved/Met  Goal: *Hemodynamically stable  Outcome: Resolved/Met  Goal: *Neurologically stable  Description: Absence of additional neurological deficits    Outcome: Resolved/Met  Goal: *Verbalizes anxiety and depression are reduced or absent  Outcome: Resolved/Met  Goal: *Absence of Signs of Aspiration on Current Diet  Outcome: Resolved/Met  Goal: *Absence of deep venous thrombosis signs and symptoms(Stroke Metric)  Outcome: Resolved/Met  Goal: *Ability to perform ADLs and demonstrates progressive mobility and function  Outcome: Resolved/Met  Goal: *Stroke education started(Stroke Metric)  Outcome: Resolved/Met  Goal: *Dysphagia screen performed(Stroke Metric)  Outcome: Resolved/Met  Goal: *Rehab consulted(Stroke Metric)  Outcome: Resolved/Met     Problem: TIA/CVA Stroke: Day 2 Until Discharge  Goal: Off Pathway (Use only if patient is Off Pathway)  Outcome: Resolved/Met  Goal: Activity/Safety  Outcome: Resolved/Met  Goal: Diagnostic Test/Procedures  Outcome: Resolved/Met  Goal: Nutrition/Diet  Outcome: Resolved/Met  Goal: Discharge Planning  Outcome: Resolved/Met  Goal: Medications  Outcome: Resolved/Met  Goal: Respiratory  Outcome: Resolved/Met  Goal: Treatments/Interventions/Procedures  Outcome: Resolved/Met  Goal: Psychosocial  Outcome: Resolved/Met  Goal: *Verbalizes anxiety and depression are reduced or absent  Outcome: Resolved/Met  Goal: *Absence of aspiration  Outcome: Resolved/Met  Goal: *Absence of deep venous thrombosis signs and symptoms(Stroke Metric)  Outcome: Resolved/Met  Goal: *Optimal pain control at patient's stated goal  Outcome: Resolved/Met  Goal: *Tolerating diet  Outcome: Resolved/Met  Goal: *Ability to perform ADLs and demonstrates progressive mobility and function  Outcome: Resolved/Met  Goal: *Stroke education continued(Stroke Metric)  Outcome: Resolved/Met     Problem: Ischemic Stroke: Discharge Outcomes  Goal: *Verbalizes anxiety and depression are reduced or absent  Outcome: Resolved/Met  Goal: *Verbalize understanding of risk factor modification(Stroke Metric)  Outcome: Resolved/Met  Goal: *Hemodynamically stable  Outcome: Resolved/Met  Goal: *Absence of aspiration pneumonia  Outcome: Resolved/Met  Goal: *Aware of needed dietary changes  Outcome: Resolved/Met  Goal: *Verbalize understanding of prescribed medications including anti-coagulants, anti-lipid, and/or anti-platelets(Stroke Metric)  Outcome: Resolved/Met  Goal: *Tolerating diet  Outcome: Resolved/Met  Goal: *Aware of follow-up diagnostics related to anticoagulants  Outcome: Resolved/Met  Goal: *Ability to perform ADLs and demonstrates progressive mobility and function  Outcome: Resolved/Met  Goal: *Absence of DVT(Stroke Metric)  Outcome: Resolved/Met  Goal: *Absence of aspiration  Outcome: Resolved/Met  Goal: *Optimal pain control at patient's stated goal  Outcome: Resolved/Met  Goal: *Home safety concerns addressed  Outcome: Resolved/Met  Goal: *Describes available resources and support systems  Outcome: Resolved/Met  Goal: *Verbalizes understanding of activation of EMS(911) for stroke symptoms(Stroke Metric)  Outcome: Resolved/Met  Goal: *Understands and describes signs and symptoms to report to providers(Stroke Metric)  Outcome: Resolved/Met  Goal: *Neurolgocially stable (absence of additional neurological deficits)  Outcome: Resolved/Met  Goal: *Verbalizes importance of follow-up with primary care physician(Stroke Metric)  Outcome: Resolved/Met  Goal: *Smoking cessation discussed,if applicable(Stroke Metric)  Outcome: Resolved/Met  Goal: *Depression screening completed(Stroke Metric)  Outcome: Resolved/Met     Problem: Patient Education: Go to Patient Education Activity  Goal: Patient/Family Education  Outcome: Resolved/Met     Problem: Pressure Injury - Risk of  Goal: *Prevention of pressure injury  Description: Document [place] Scale and appropriate interventions in the flowsheet.   Outcome: Resolved/Met     Problem: Patient Education: Go to Patient Education Activity  Goal: Patient/Family Education  Outcome: Resolved/Met

## 2021-10-20 NOTE — PROGRESS NOTES
Received report from Maxime, [facility]. Patient awake and in bed. A&O x4. Respirations present. On room air. No signs of distress. No needs expressed. Bed low and locked. Call light within reach. Will continue to monitor.

## 2021-10-20 NOTE — PROGRESS NOTES
ACUTE PHYSICAL THERAPY GOALS:  (Developed with and agreed upon by patient and/or caregiver. )  LTG:  (1.)Ms. Deja Robledo will move from supine to sit and sit to supine , scoot up and down and roll side to side in bed with MODIFIED INDEPENDENCE within 7 treatment day(s). GOAL MET: 10/20/21  (2.)Ms. Deja Robledo will transfer from bed to chair and chair to bed with MODIFIED INDEPENDENCE using the least restrictive device within 7 treatment day(s). (3.)Ms. Deja Robledo will ambulate with SUPERVISION for 250 feet with the least restrictive device within 7 treatment day(s). (4.)Ms. Deja Robledo will perform standing static and dynamic balance activities x 15 minutes with SUPERVISION to improve safety within 7 treatment day(s). GOAL MET: 10/20/21       PHYSICAL THERAPY: Daily Note and AM Treatment Day # 2    Nilton Vasquez is a 80 y.o. female   PRIMARY DIAGNOSIS: TIA (transient ischemic attack)  Severe aphasia [R47.01]         ASSESSMENT:     REHAB RECOMMENDATIONS: CURRENT LEVEL OF FUNCTION:  (Most Recently Demonstrated)   Recommendation to date pending progress:  Setting:   No further skilled therapy   Equipment:    None Bed Mobility:   Independent  Sit to Stand:   Supervision  Transfers:   Supervision  Gait/Mobility:   Supervision     ASSESSMENT:  Ms. Deja Robledo is making excellent progress toward goals and demonstrated good mobility with supervision to mod I.  Multiple laps of ambulation in the room with no AD and minimal cueing for occasional furniture walking. SUBJECTIVE:   Ms. Deja Robledo states, \"I just feel a little weak in my knees. \"    SOCIAL HISTORY/ LIVING ENVIRONMENT: see eval  Home Environment: Assisted living  One/Two Story Residence: One story  Living Alone: No  Support Systems: Child(celena)  OBJECTIVE:     PAIN: VITAL SIGNS: LINES/DRAINS:   Pre Treatment: Pain Screen  Pain Scale 1: Numeric (0 - 10)  Pain Intensity 1: 0  Post Treatment: 0      O2 Device: None (Room air)     MOBILITY: I Mod I S SBA CGA Min Mod Max Total  NT x2 Comments:   Bed Mobility    Rolling [x] [] [] [] [] [] [] [] [] [] []    Supine to Sit [x] [] [] [] [] [] [] [] [] [] []    Scooting [x] [] [] [] [] [] [] [] [] [] []    Sit to Supine [x] [] [] [] [] [] [] [] [] [] []    Transfers    Sit to Stand [] [] [x] [] [] [] [] [] [] [] []    Bed to Chair [] [] [x] [] [] [] [] [] [] [] []    Stand to Sit [] [] [x] [] [] [] [] [] [] [] []    I=Independent, Mod I=Modified Independent, S=Supervision, SBA=Standby Assistance, CGA=Contact Guard Assistance,   Min=Minimal Assistance, Mod=Moderate Assistance, Max=Maximal Assistance, Total=Total Assistance, NT=Not Tested    BALANCE: Good Fair+ Fair Fair- Poor NT Comments   Sitting Static [x] [] [] [] [] []    Sitting Dynamic [x] [] [] [] [] []              Standing Static [x] [] [] [] [] []    Standing Dynamic [x] [x] [] [] [] []      GAIT: I Mod I S SBA CGA Min Mod Max Total  NT x2 Comments:   Level of Assistance [] [] [x] [] [] [] [] [] [] [] []    Distance 60'    DME None    Gait Quality Steady, occasional furniture walk    Weightbearing  Status N/A     I=Independent, Mod I=Modified Independent, S=Supervision, SBA=Standby Assistance, CGA=Contact Guard Assistance,   Min=Minimal Assistance, Mod=Moderate Assistance, Max=Maximal Assistance, Total=Total Assistance, NT=Not Tested    PLAN:   FREQUENCY/DURATION: PT Plan of Care: 3 times/week for duration of hospital stay or until stated goals are met, whichever comes first.  TREATMENT:     TREATMENT:   ($$ Therapeutic Activity: 8-22 mins    )  Therapeutic Activity (10 Minutes): Therapeutic activity included Supine to Sit, Scooting, Transfer Training, Ambulation on level ground, Sitting balance  and Standing balance to improve functional Mobility, Strength and Activity tolerance.     TREATMENT GRID:  N/A    AFTER TREATMENT POSITION/PRECAUTIONS:  Chair, Needs within reach and OT at bedside    INTERDISCIPLINARY COLLABORATION:  RN/PCT and PT/PTA    TOTAL TREATMENT DURATION:  PT Patient Time In/Time Out  Time In: 1014  Time Out: [address], PTA

## 2021-10-20 NOTE — PROGRESS NOTES
ACUTE OT GOALS:  (Developed with and agreed upon by patient and/or caregiver.)    1. Patient will bathe and dress total body with modified independence and adaptive device as needed. 2. Patient will toilet with independence. 3. Patient will tolerate 30 minutes of OT treatment with up to 2 rest breaks to increase activity tolerance for ADLs. 4. Patient will complete functional transfers/functional mobility for ADLs with modified independence. 5. Patient will demonstrate modified independence with HEP to increase coordination/prorpioception in LUE for increased safety and independence with functional tasks. Timeframe: 7 visits   OCCUPATIONAL THERAPY: Daily Note OT Treatment Day # 1    Leonidas Haddad is a 80 y.o. female   PRIMARY DIAGNOSIS: TIA (transient ischemic attack)  Severe aphasia [R47.01]       [place] / [place] / Product Type: Medicare /   ASSESSMENT:     REHAB RECOMMENDATIONS: CURRENT LEVEL OF FUNCTION:  (Most Recently Demonstrated)   Recommendation to date pending progress:  Setting:   No further skilled therapy   Equipment:    Cane Bathing:   Not tested  Dressing:   Independent  Feeding/Grooming:   Independent  Toileting:   Not tested  Functional Mobility:   Supervision     ASSESSMENT:  Ms. Tenzin Valero presents in supine upon arrival. Pt agreed to therapy but definitely expressed that she did not feel she needed it. Pt demonstrated self care and mobility with supervision. Pt moves well and has good activity tolerance and strength. No further therapy warranted. SUBJECTIVE:   Ms. Tenzin Valero states, \"I thought they said yesterday that I do not need anymore therapy. \"    SOCIAL HISTORY/LIVING ENVIRONMENT:   Home Environment: Assisted living  One/Two Story Residence: One story  Living Alone: No  Support Systems: Child(celena)    OBJECTIVE:     PAIN: VITAL SIGNS: LINES/DRAINS:   Pre Treatment: Pain Screen  Pain Scale 1: Numeric (0 - 10)  Pain Intensity 1: 0  Post Treatment: none   none  O2 Device: None (Room air)     ACTIVITIES OF DAILY LIVING: I Mod I S SBA CGA Min Mod Max Total NT Comments   BASIC ADLs:              Bathing/ Showering [] [] [] [] [] [] [] [] [] [x]    Toileting [] [] [] [] [] [] [] [] [] [x]    Dressing [x] [] [] [] [] [] [] [] [] []    Feeding [] [] [] [] [] [] [] [] [] [x]    Grooming [x] [] [] [] [] [] [] [] [] []    Personal Device Care [] [] [] [] [] [] [] [] [] [x]    Functional Mobility [] [] [x] [] [] [] [] [] [] []    I=Independent, Mod I=Modified Independent, S=Supervision, SBA=Standby Assistance, CGA=Contact Guard Assistance,   Min=Minimal Assistance, Mod=Moderate Assistance, Max=Maximal Assistance, Total=Total Assistance, NT=Not Tested    MOBILITY: I Mod I S SBA CGA Min Mod Max Total  NT x2 Comments:   Supine to sit [x] [] [] [] [] [] [] [] [] [] []    Sit to supine [] [] [] [] [] [] [] [] [] [x] []    Sit to stand [] [] [x] [] [] [] [] [] [] [] []    Bed to chair [] [] [x] [] [] [] [] [] [] [] []    I=Independent, Mod I=Modified Independent, S=Supervision, SBA=Standby Assistance, CGA=Contact Guard Assistance,   Min=Minimal Assistance, Mod=Moderate Assistance, Max=Maximal Assistance, Total=Total Assistance, NT=Not Tested    BALANCE: Good Fair+ Fair Fair- Poor NT Comments   Sitting Static [x] [] [] [] [] []    Sitting Dynamic [x] [] [] [] [] []              Standing Static [x] [] [] [] [] []    Standing Dynamic [x] [] [] [] [] []      PLAN:   FREQUENCY/DURATION: OT Plan of Care: 3 times/week for duration of hospital stay or until stated goals are met, whichever comes first.    TREATMENT:   TREATMENT:   ($$ Self Care/Home Management: 8-22 mins    )  Self Care (15 Minutes): Self care including Lower Body Dressing, Grooming and transfers and functional mobility to increase independence and decrease level of assistance required.     TREATMENT GRID:  N/A    AFTER TREATMENT POSITION/PRECAUTIONS:  Chair, Needs within reach and RN notified    INTERDISCIPLINARY COLLABORATION:  RN/PCT, PT/PTA and OT/HAAS    TOTAL TREATMENT DURATION:  OT Patient Time In/Time Out  Time In: 1025  Time Out: Candis Hdz

## 2021-10-20 NOTE — PROGRESS NOTES
Prescriptions and discharge instructions reviewed with patient  Patient given the opportunity to ask questions   Patient verbalizes understanding.   IV removed

## 2021-10-20 NOTE — PROGRESS NOTES
Care Management Interventions  PCP Verified by CM: Yes  Mode of Transport at Discharge: Other (see comment)  Transition of Care Consult (CM Consult): Assisted Living  Discharge Durable Medical Equipment: No  Physical Therapy Consult: Yes  Occupational Therapy Consult: Yes  Speech Therapy Consult: Yes  Support Systems: Child(celena)  Confirm Follow Up Transport: Family  The Plan for Transition of Care is Related to the Following Treatment Goals : patient is returning to [place]  The Patient and/or Patient Representative was Provided with a Choice of Provider and Agrees with the Discharge Plan?: Yes  Name of the Patient Representative Who was Provided with a Choice of Provider and Agrees with the Discharge Plan: patient  Freedom of Choice List was Provided with Basic Dialogue that Supports the Patient's Individualized Plan of Care/Goals, Treatment Preferences and Shares the Quality Data Associated with the Providers?: Yes  Independence Resource Information Provided?: No  Discharge Location  Discharge Placement: Home  Patient is discharging back to [facility]. Patient is family will transport.  CM did not identify any

## 2021-10-20 NOTE — PROGRESS NOTES
Neurology Daily Progress Note     Assessment:     80year old female with transient episode of aphasia. History of previous stroke. MRI was negative for new infarct. Most likely recrudescence of old stroke symptoms. Plan:     Continue aspirin and statin     Recommend work up for metabolic/infectious causes of symptoms     Subjective: Interval history:    Patient doing well. Back to neurologic baseline. MRI negative for infarct. History:    Tory Gayle is a 80 y.o. female who is being seen for aphasia. Review of systems negative with exception of pertinent positives and negatives noted above.        Objective:     Vitals:    10/20/21 0313 10/20/21 0400 10/20/21 0756 10/20/21 1142   BP: 133/68  (!) 121/55 (!) 124/57   Pulse: 75 78 64 63   Resp: 20 18 18   Temp: 98.3 °F (36.8 °C)  98.1 °F (36.7 °C) 98.2 °F (36.8 °C)   SpO2: 94%  95% 95%   Weight:       Height:              Current Facility-Administered Medications:     atorvastatin (LIPITOR) tablet 80 mg, 80 mg, Oral, QHS, Kayden Mahajan MD, 80 mg at 10/19/21 2134    sodium chloride (NS) flush 5-10 mL, 5-10 mL, IntraVENous, Q8H, Cammy Gonzales III, MD, 10 mL at 10/20/21 0607    sodium chloride (NS) flush 5-10 mL, 5-10 mL, IntraVENous, PRN, Cammy Gonzales III, MD    sodium chloride (NS) flush 5-40 mL, 5-40 mL, IntraVENous, Q8H, Cameron Tse MD, 10 mL at 10/20/21 0608    sodium chloride (NS) flush 5-40 mL, 5-40 mL, IntraVENous, PRN, Cameron Tse MD    ondansetron (ZOFRAN) injection 4 mg, 4 mg, IntraVENous, Q4H PRN, Cameron Mccann MD    aspirin chewable tablet 81 mg, 81 mg, Oral, DAILY, Cameron Tse MD, 81 mg at 10/20/21 0816    acetaminophen (TYLENOL) tablet 650 mg, 650 mg, Oral, Q4H PRN, Karly Baker MD    acetaminophen (TYLENOL) suppository 650 mg, 650 mg, Rectal, Q4H PRN, Cameron Tse MD    labetaloL (NORMODYNE;TRANDATE) injection 5 mg, 5 mg, IntraVENous, Q10MIN PRN, Karly Baker MD    bisacodyL (DULCOLAX) suppository 10 mg, 10 mg, Rectal, DAILY PRN, Nick Bello MD    famotidine (PEPCID) tablet 20 mg, 20 mg, Oral, BID PRN, Nick Bello MD    enoxaparin (LOVENOX) injection 40 mg, 40 mg, SubCUTAneous, Q24H, Cameron Tse MD, 40 mg at 10/20/21 0816    escitalopram oxalate (LEXAPRO) tablet 10 mg, 10 mg, Oral, DAILY, Cameron Tse MD, 10 mg at 10/20/21 0816    levothyroxine (SYNTHROID) tablet 50 mcg, 50 mcg, Oral, ACB, Cameron Tes MD, 50 mcg at 10/20/21 0607    [Held by provider] metoprolol succinate (TOPROL-XL) XL tablet 25 mg, 25 mg, Oral, DAILY, Cameron Tse MD, 25 mg at 10/19/21 0950    traZODone (DESYREL) tablet 25 mg, 25 mg, Oral, QHS PRN, Cameron Tse MD    melatonin tablet 5 mg, 5 mg, Oral, QHS, Abida Conde MD, 5 mg at 10/19/21 2134    Recent Results (from the past 12 hour(s))   PLEASE READ & DOCUMENT PPD TEST IN 24 HRS    Collection Time: 10/20/21  6:10 AM   Result Value Ref Range    PPD Negative Negative    mm Induration 0 0 - 5 mm     MRI Results (most recent):  Results from [place] encounter on 10/18/21    MRI BRAIN WO CONT    Narrative  MRI BRAIN WITHOUT CONTRAST 10/19/2021    HISTORY: 80year-old with transient aphasia. TECHNIQUE: Sagittal and axial T1-weighted, axial T2-weighted, axial and coronal  FLAIR, axial T2-weighted gradient-echo, axial diffusion weighted images with ADC  maps of the brain. COMPARISON: Head CT and CT angiogram 10/18/2021    FINDINGS: There is no acute infarction, acute intracranial hemorrhage,  hydrocephalus, intra-axial mass or extra-axial hematoma. Moderate diffuse cerebral volume loss is present without disproportionate  hippocampal atrophy. There is a small area of superficial siderosis in the left frontal lobe. This  finding suggests old subarachnoid hemorrhage in this region. This may have  occurred as a result of trauma. On the T2-weighted and FLAIR sequences, there are scattered white matter  hyperintensities within the periventricular brain. Findings are nonspecific and  can be seen with chronic small vessel ischemic disease, demyelinating disease or  with migraine headaches. Abnormal signal in the left globe suggests prior surgery for a retinal  detachment. A scleral band surrounds the left globe. There is partial opacification of the right maxillary sinus. Impression  1. No acute infarction. 2. Cerebral volume loss and white matter findings compatible with chronic small  vessel ischemic disease. 3. Minimal superficial siderosis in the left frontal lobe. Correlate with  medical/neurological history. Physical Exam:  General - Well developed, well nourished, in no apparent distress. Pleasant and conversent. HEENT - Normocephalic, atraumatic. Conjunctiva are clear. Neck - Supple without masses. Extremities - Peripheral pulses intact. No edema and no rashes. Neurological examination - Comprehension, attention, memory and reasoning are intact. Language and speech are normal.   On cranial nerve examination, (II, III, IV, VI) pupils are equal, round, and reactive to light. Visual acuity is adequate. Visual fields are full to finger confrontation. Extraocular motility is normal. (V, VII) Face is symmetric and sensation is intact to light touch. (VIII) Hearing is impaired. (IX, X) Palate and uvula elevate symmetrically. Voice is normal. (XI) Shoulder shrug is strong and equal bilaterally. (XII)Tongue is midline. Motor examination - There is normal muscle tone and bulk. Power is full throughout. Muscle stretch reflexes are normoactive and there are no pathological reflexes present. Plantar response is flexor. Sensation is intact to light touch, pinprick, vibration and proprioception in all extremities.  Cerebellar examination is normal.       Signed By: Keyonna Roque NP     October 20, 2021

## 2021-10-29 NOTE — PROGRESS NOTES
Physician Progress Note      PATIENT:               Sahra Naranjo  CSN #:                  [id number]  :                       1929  ADMIT DATE:       10/18/2021 5:42 PM  [address] [place] DATE:        10/20/2021 12:53 PM  RESPONDING  PROVIDER #:        Dontrell Arrieta MD          QUERY TEXT:    Patient admitted with TIA, noted to have AF and s/p Watchman. If possible, please document in progress notes and discharge summary if you are evaluating and/or treating any of the following: The medical record reflects the following:  Risk Factors: AF, HTN, s/p Watchman, CAD with stents, stroke, PE, SSS  Clinical Indicators: Severe aphasia, NIHSS = 5, Patient was admitted with aphasia attributed to a TIA and HTN with a BP of 236/102. Patient was treated with hydralazine and labetalol. Patient has a history of A-fib and is s/p a Watchmanprocedure. Discharge summary noted, \"Has watchman for afib but needs atherosclerotic stroke prevention also. 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 and stenosis in the P2 segment right posterior cerebral artery  10/19 PN: Aphasia-presently resolved on 10/19/2021-probable TIA  Treatment: Monitoring, Neurology consult, started on Plavix, Lipitor,    Thank you,  Charmel Hodgkins RN,C BSN  Clinical   [email or web address]  Options provided:  -- Cerebral embolism due to Afib  -- Cerebral embolism due to failure/complication of Watchman device  -- TIA only  -- Other - I will add my own diagnosis  -- Disagree - Not applicable / Not valid  -- Disagree - Clinically unable to determine / Unknown  -- Refer to Clinical Documentation Reviewer    PROVIDER RESPONSE TEXT:    This patient has TIA only. Query created by:  Yanet Kerr on 10/29/2021 9:37 AM      Electronically signed by:  Michi Zavala Kaylie Mina MD 10/29/2021 11:27 AM

## 2021-11-22 PROBLEM — Z91.81 AT HIGH RISK FOR FALLS: Status: ACTIVE | Noted: 2021-11-22

## 2021-12-20 PROBLEM — R73.02 IGT (IMPAIRED GLUCOSE TOLERANCE): Status: ACTIVE | Noted: 2021-12-20

## 2022-01-07 ENCOUNTER — HOSPITAL ENCOUNTER (OUTPATIENT)
Dept: MAMMOGRAPHY | Age: 87
Discharge: HOME | End: 2022-01-07
Attending: FAMILY MEDICINE
Payer: MEDICARE

## 2022-01-07 DIAGNOSIS — M81.0 AGE-RELATED OSTEOPOROSIS WITHOUT CURRENT PATHOLOGICAL FRACTURE: ICD-10-CM

## 2022-01-07 DIAGNOSIS — Z78.0 POST-MENOPAUSE: ICD-10-CM

## 2022-01-07 PROCEDURE — 77080 DXA BONE DENSITY AXIAL: CPT

## 2022-01-11 NOTE — PROGRESS NOTES
Did it cause her heartburn? DID she take it daily or weekly? Would she be willing to try a once/month med?

## 2022-01-11 NOTE — PROGRESS NOTES
Called and discussed results with pt. Pt states she was Fosamax many years ago but she wasn't able to tolerate medication and states she isn't on anything currently either.  Ty

## 2022-03-18 PROBLEM — Z91.81 AT HIGH RISK FOR FALLS: Status: ACTIVE | Noted: 2021-11-22

## 2022-03-18 PROBLEM — Z87.440 HISTORY OF RECURRENT UTIS: Status: ACTIVE | Noted: 2018-12-06

## 2022-03-18 PROBLEM — I63.9 CVA (CEREBRAL VASCULAR ACCIDENT) (HCC): Status: ACTIVE | Noted: 2019-03-13

## 2022-03-18 PROBLEM — R55 SYNCOPE AND COLLAPSE: Status: ACTIVE | Noted: 2021-03-29

## 2022-03-18 PROBLEM — G45.9 TIA (TRANSIENT ISCHEMIC ATTACK): Status: ACTIVE | Noted: 2021-10-20

## 2022-03-19 PROBLEM — M81.0 AGE-RELATED OSTEOPOROSIS WITHOUT CURRENT PATHOLOGICAL FRACTURE: Status: ACTIVE | Noted: 2018-12-06

## 2022-03-19 PROBLEM — I10 ESSENTIAL HYPERTENSION: Status: ACTIVE | Noted: 2020-11-19

## 2022-03-19 PROBLEM — N36.2 URETHRAL CARUNCLE: Status: ACTIVE | Noted: 2017-04-04

## 2022-03-19 PROBLEM — Z95.818 PRESENCE OF WATCHMAN LEFT ATRIAL APPENDAGE CLOSURE DEVICE: Status: ACTIVE | Noted: 2020-01-23

## 2022-03-19 PROBLEM — R42 DIZZINESS: Status: ACTIVE | Noted: 2019-09-03

## 2022-03-19 PROBLEM — R55 SYNCOPE: Status: ACTIVE | Noted: 2021-03-31

## 2022-03-19 PROBLEM — Z86.73 HISTORY OF CVA (CEREBROVASCULAR ACCIDENT): Status: ACTIVE | Noted: 2019-09-03

## 2022-03-19 PROBLEM — N39.0 ACUTE UTI: Status: ACTIVE | Noted: 2021-03-29

## 2022-03-19 PROBLEM — D64.9 ANEMIA: Status: ACTIVE | Noted: 2019-04-12

## 2022-03-20 PROBLEM — G31.9 CEREBRAL ATROPHY (HCC): Status: ACTIVE | Noted: 2021-10-20

## 2022-03-20 PROBLEM — R73.02 IGT (IMPAIRED GLUCOSE TOLERANCE): Status: ACTIVE | Noted: 2021-12-20

## 2022-06-02 ENCOUNTER — OFFICE VISIT (OUTPATIENT)
Dept: FAMILY MEDICINE CLINIC | Facility: CLINIC | Age: 87
End: 2022-06-02
Payer: MEDICARE

## 2022-06-02 VITALS
HEIGHT: 63 IN | HEART RATE: 67 BPM | WEIGHT: 141 LBS | TEMPERATURE: 98.5 F | DIASTOLIC BLOOD PRESSURE: 70 MMHG | SYSTOLIC BLOOD PRESSURE: 120 MMHG | OXYGEN SATURATION: 97 % | BODY MASS INDEX: 24.98 KG/M2 | RESPIRATION RATE: 18 BRPM

## 2022-06-02 DIAGNOSIS — M54.41 ACUTE RIGHT-SIDED LOW BACK PAIN WITH RIGHT-SIDED SCIATICA: ICD-10-CM

## 2022-06-02 DIAGNOSIS — M25.551 RIGHT HIP PAIN: ICD-10-CM

## 2022-06-02 DIAGNOSIS — N30.00 ACUTE CYSTITIS WITHOUT HEMATURIA: Primary | ICD-10-CM

## 2022-06-02 DIAGNOSIS — S76.019S: ICD-10-CM

## 2022-06-02 LAB
BILIRUBIN, URINE, POC: NEGATIVE
BLOOD URINE, POC: NEGATIVE
GLUCOSE URINE, POC: NEGATIVE
KETONES, URINE, POC: NEGATIVE
LEUKOCYTE ESTERASE, URINE, POC: NORMAL
NITRITE, URINE, POC: NEGATIVE
PH, URINE, POC: 7 (ref 4.6–8)
PROTEIN,URINE, POC: NEGATIVE
SPECIFIC GRAVITY, URINE, POC: 1.01 (ref 1–1.03)
URINALYSIS CLARITY, POC: CLEAR
URINALYSIS COLOR, POC: YELLOW
UROBILINOGEN, POC: 0.2

## 2022-06-02 PROCEDURE — 1036F TOBACCO NON-USER: CPT | Performed by: NURSE PRACTITIONER

## 2022-06-02 PROCEDURE — 1090F PRES/ABSN URINE INCON ASSESS: CPT | Performed by: NURSE PRACTITIONER

## 2022-06-02 PROCEDURE — 81003 URINALYSIS AUTO W/O SCOPE: CPT | Performed by: NURSE PRACTITIONER

## 2022-06-02 PROCEDURE — G8427 DOCREV CUR MEDS BY ELIG CLIN: HCPCS | Performed by: NURSE PRACTITIONER

## 2022-06-02 PROCEDURE — 99214 OFFICE O/P EST MOD 30 MIN: CPT | Performed by: NURSE PRACTITIONER

## 2022-06-02 PROCEDURE — G8420 CALC BMI NORM PARAMETERS: HCPCS | Performed by: NURSE PRACTITIONER

## 2022-06-02 PROCEDURE — 1124F ACP DISCUSS-NO DSCNMKR DOCD: CPT | Performed by: NURSE PRACTITIONER

## 2022-06-02 RX ORDER — IBUPROFEN 200 MG
TABLET ORAL
COMMUNITY

## 2022-06-02 RX ORDER — PHENOL 1.4 %
AEROSOL, SPRAY (ML) MUCOUS MEMBRANE
COMMUNITY

## 2022-06-02 RX ORDER — LIDOCAINE 4 G/G
PATCH TOPICAL
COMMUNITY

## 2022-06-02 RX ORDER — HYDROCODONE BITARTRATE AND ACETAMINOPHEN 7.5; 325 MG/1; MG/1
TABLET ORAL
COMMUNITY

## 2022-06-02 RX ORDER — NITROFURANTOIN 25; 75 MG/1; MG/1
100 CAPSULE ORAL 2 TIMES DAILY
Qty: 14 CAPSULE | Refills: 0 | Status: SHIPPED | OUTPATIENT
Start: 2022-06-02 | End: 2022-06-09

## 2022-06-02 RX ORDER — NITROFURANTOIN 25; 75 MG/1; MG/1
100 CAPSULE ORAL 2 TIMES DAILY
Qty: 14 CAPSULE | Refills: 0 | Status: SHIPPED | OUTPATIENT
Start: 2022-06-02 | End: 2022-06-02 | Stop reason: SDUPTHER

## 2022-06-02 RX ORDER — ONDANSETRON 4 MG/1
TABLET, ORALLY DISINTEGRATING ORAL
COMMUNITY
Start: 2021-11-30

## 2022-06-02 ASSESSMENT — ENCOUNTER SYMPTOMS
CONSTIPATION: 0
VOMITING: 0
ABDOMINAL PAIN: 0
SINUS PAIN: 0
NAUSEA: 0
WHEEZING: 0
COLOR CHANGE: 0
SINUS PRESSURE: 0
COUGH: 0
CHEST TIGHTNESS: 0
SHORTNESS OF BREATH: 0
ABDOMINAL DISTENTION: 0
DIARRHEA: 0
EYE PAIN: 0
BLOOD IN STOOL: 0
SORE THROAT: 0

## 2022-06-02 ASSESSMENT — PATIENT HEALTH QUESTIONNAIRE - PHQ9
SUM OF ALL RESPONSES TO PHQ QUESTIONS 1-9: 13
1. LITTLE INTEREST OR PLEASURE IN DOING THINGS: 3
3. TROUBLE FALLING OR STAYING ASLEEP: 3
8. MOVING OR SPEAKING SO SLOWLY THAT OTHER PEOPLE COULD HAVE NOTICED. OR THE OPPOSITE, BEING SO FIGETY OR RESTLESS THAT YOU HAVE BEEN MOVING AROUND A LOT MORE THAN USUAL: 3
5. POOR APPETITE OR OVEREATING: 0
10. IF YOU CHECKED OFF ANY PROBLEMS, HOW DIFFICULT HAVE THESE PROBLEMS MADE IT FOR YOU TO DO YOUR WORK, TAKE CARE OF THINGS AT HOME, OR GET ALONG WITH OTHER PEOPLE: 1
6. FEELING BAD ABOUT YOURSELF - OR THAT YOU ARE A FAILURE OR HAVE LET YOURSELF OR YOUR FAMILY DOWN: 0
9. THOUGHTS THAT YOU WOULD BE BETTER OFF DEAD, OR OF HURTING YOURSELF: 0
7. TROUBLE CONCENTRATING ON THINGS, SUCH AS READING THE NEWSPAPER OR WATCHING TELEVISION: 2
SUM OF ALL RESPONSES TO PHQ QUESTIONS 1-9: 13
4. FEELING TIRED OR HAVING LITTLE ENERGY: 0
2. FEELING DOWN, DEPRESSED OR HOPELESS: 2
SUM OF ALL RESPONSES TO PHQ QUESTIONS 1-9: 13
SUM OF ALL RESPONSES TO PHQ QUESTIONS 1-9: 13
SUM OF ALL RESPONSES TO PHQ9 QUESTIONS 1 & 2: 5

## 2022-06-02 NOTE — ACP (ADVANCE CARE PLANNING)
Advance Care Planning   Advance Care Planning (ACP)     Attempted to discuss ACP with Ms. Holden Duncan today, she declines to discuss at this time. Will readdress at future visit.

## 2022-06-02 NOTE — PROGRESS NOTES
1. Have you been to the ER, urgent care clinic since your last visit? Hospitalized since your last visit? No    2. Have you seen or consulted any other health care providers outside of the [address] [place] [address] since your last visit? Include any pap smears or colon screening. Dione Carlson (:  1929) is a 80 y.o. female,Established patient, here for evaluation of the following chief complaint(s):  Hip Pain (pt states pain in R hip that has been present x 1 month), Leg Pain (sx's present x 1 month on R leg w/ numbness and tingling), and Lower Back Pain (pt states pain on R lower back that has been present x 1 month.)    .uri     ASSESSMENT/PLAN:  1. Acute cystitis without hematuria  -     Culture, Urine; Future  -     nitrofurantoin, macrocrystal-monohydrate, (MACROBID) 100 MG capsule; Take 1 capsule by mouth 2 times daily for 7 days, Disp-14 capsule, R-0Print  2. Acute right-sided low back pain with right-sided sciatica  -     AMB POC URINALYSIS DIP STICK AUTO W/O MICRO  3. Right hip pain  -     External Referral To Pain Medicine  4. Muscle strain of gluteal region, unspecified laterality, sequela  -     External Referral To Pain Medicine      Return in about 2 weeks (around 2022). Subjective   SUBJECTIVE/OBJECTIVE:  Mrs. Oumou Huston presents today for continued gluteal and right hip pain. She is followed by ortho [facility]) and has an appt with them soon. She has received steroid injections however it has only provided temporary relief. They would like a referral for pain management. She is accompanied by her daughter today. Urinalysis completed and she did have moderate leuks. Will treat with Macrobid and culture. No other complaints verbalized at t his time. F/U in 2 weeks      Review of Systems   Constitutional: Negative for activity change, appetite change, chills, diaphoresis, fatigue and fever. HENT: Negative for congestion, ear pain, sinus pressure, sinus pain and sore throat. Eyes: Negative for pain and visual disturbance. Respiratory: Negative for cough, chest tightness, shortness of breath and wheezing. Cardiovascular: Negative for chest pain, palpitations and leg swelling. Gastrointestinal: Negative for abdominal distention, abdominal pain, blood in stool, constipation, diarrhea, nausea and vomiting. Endocrine: Negative for polydipsia, polyphagia and polyuria. Genitourinary: Negative for dysuria, flank pain, hematuria and urgency. Musculoskeletal: Negative for arthralgias. Right hip and gluteal pain since most recent fall    Skin: Negative for color change and rash. Neurological: Negative for dizziness, tremors, weakness and headaches. Psychiatric/Behavioral: Negative for agitation, behavioral problems, dysphoric mood and suicidal ideas. The patient is not nervous/anxious. Objective   Physical Exam  Vitals and nursing note reviewed. Constitutional:       Appearance: Normal appearance. HENT:      Head: Normocephalic and atraumatic. Nose: Nose normal.   Cardiovascular:      Rate and Rhythm: Normal rate and regular rhythm. Pulses: Normal pulses. Heart sounds: Normal heart sounds. Pulmonary:      Effort: Pulmonary effort is normal.      Breath sounds: Normal breath sounds. Musculoskeletal:         General: Normal range of motion. Cervical back: Neck supple. Skin:     General: Skin is warm and dry. Capillary Refill: Capillary refill takes less than 2 seconds. Neurological:      Mental Status: She is alert and oriented to person, place, and time. Psychiatric:         Behavior: Behavior normal.            On this date 6/2/2022 I have spent 30 minutes reviewing previous notes, test results and face to face with the patient discussing the diagnosis and importance of compliance with the treatment plan as well as documenting on the day of the visit.       An electronic signature was used to authenticate this note.    --MAJO Hoover - CNP

## 2022-06-05 LAB
BACTERIA SPEC CULT: NORMAL
SERVICE CMNT-IMP: NORMAL

## 2022-06-16 ENCOUNTER — TELEPHONE (OUTPATIENT)
Dept: CARDIOLOGY CLINIC | Age: 87
End: 2022-06-16

## 2022-06-16 NOTE — TELEPHONE ENCOUNTER
Dr La Velazquez wanted to know if this patient had any more neurological problems and she is in [facility] with a stroke. Please let him know.

## 2022-06-19 NOTE — TELEPHONE ENCOUNTER
Tell them I am sorry to hear that and let them know I would be happy to help in petr way.  .  Have her see me me when released from hospital.

## 2022-06-20 NOTE — TELEPHONE ENCOUNTER
Spoke with pts daughter made her aware per Dr Harper [place] them I am sorry to hear that and let them know I would be happy to help in petr way. .  Have her see me me when released from hospital.  .     Daughter verbalized understanding and sated she is not sure of her release date it may be today but will follow up to get her scheduled once she is released and stable.

## 2022-06-23 ENCOUNTER — OFFICE VISIT (OUTPATIENT)
Dept: FAMILY MEDICINE CLINIC | Facility: CLINIC | Age: 87
End: 2022-06-23
Payer: MEDICARE

## 2022-06-23 VITALS
WEIGHT: 136.6 LBS | SYSTOLIC BLOOD PRESSURE: 120 MMHG | DIASTOLIC BLOOD PRESSURE: 68 MMHG | RESPIRATION RATE: 18 BRPM | TEMPERATURE: 97.6 F | OXYGEN SATURATION: 96 % | HEART RATE: 59 BPM | BODY MASS INDEX: 24.2 KG/M2 | HEIGHT: 63 IN

## 2022-06-23 DIAGNOSIS — Z09 HOSPITAL DISCHARGE FOLLOW-UP: ICD-10-CM

## 2022-06-23 DIAGNOSIS — R56.9 SEIZURE (HCC): Primary | ICD-10-CM

## 2022-06-23 DIAGNOSIS — R73.01 ELEVATED FASTING BLOOD SUGAR: ICD-10-CM

## 2022-06-23 DIAGNOSIS — I63.9 CEREBROVASCULAR ACCIDENT (CVA), UNSPECIFIED MECHANISM (HCC): ICD-10-CM

## 2022-06-23 PROCEDURE — 1111F DSCHRG MED/CURRENT MED MERGE: CPT | Performed by: NURSE PRACTITIONER

## 2022-06-23 PROCEDURE — 99215 OFFICE O/P EST HI 40 MIN: CPT | Performed by: NURSE PRACTITIONER

## 2022-06-23 RX ORDER — LEVETIRACETAM 500 MG/1
500 TABLET ORAL 2 TIMES DAILY
COMMUNITY
Start: 2022-06-20 | End: 2022-07-20

## 2022-06-23 RX ORDER — NITROFURANTOIN 25; 75 MG/1; MG/1
CAPSULE ORAL
COMMUNITY

## 2022-06-23 ASSESSMENT — PATIENT HEALTH QUESTIONNAIRE - PHQ9
7. TROUBLE CONCENTRATING ON THINGS, SUCH AS READING THE NEWSPAPER OR WATCHING TELEVISION: 0
10. IF YOU CHECKED OFF ANY PROBLEMS, HOW DIFFICULT HAVE THESE PROBLEMS MADE IT FOR YOU TO DO YOUR WORK, TAKE CARE OF THINGS AT HOME, OR GET ALONG WITH OTHER PEOPLE: 0
SUM OF ALL RESPONSES TO PHQ QUESTIONS 1-9: 0
9. THOUGHTS THAT YOU WOULD BE BETTER OFF DEAD, OR OF HURTING YOURSELF: 0
5. POOR APPETITE OR OVEREATING: 0
SUM OF ALL RESPONSES TO PHQ9 QUESTIONS 1 & 2: 0
1. LITTLE INTEREST OR PLEASURE IN DOING THINGS: 0
SUM OF ALL RESPONSES TO PHQ QUESTIONS 1-9: 0
2. FEELING DOWN, DEPRESSED OR HOPELESS: 0
8. MOVING OR SPEAKING SO SLOWLY THAT OTHER PEOPLE COULD HAVE NOTICED. OR THE OPPOSITE, BEING SO FIGETY OR RESTLESS THAT YOU HAVE BEEN MOVING AROUND A LOT MORE THAN USUAL: 0
4. FEELING TIRED OR HAVING LITTLE ENERGY: 0
SUM OF ALL RESPONSES TO PHQ9 QUESTIONS 1 & 2: 0
SUM OF ALL RESPONSES TO PHQ QUESTIONS 1-9: 0
6. FEELING BAD ABOUT YOURSELF - OR THAT YOU ARE A FAILURE OR HAVE LET YOURSELF OR YOUR FAMILY DOWN: 0
1. LITTLE INTEREST OR PLEASURE IN DOING THINGS: 0
2. FEELING DOWN, DEPRESSED OR HOPELESS: 0
3. TROUBLE FALLING OR STAYING ASLEEP: 0
SUM OF ALL RESPONSES TO PHQ QUESTIONS 1-9: 0
SUM OF ALL RESPONSES TO PHQ QUESTIONS 1-9: 0

## 2022-06-23 ASSESSMENT — ENCOUNTER SYMPTOMS
SHORTNESS OF BREATH: 0
SINUS PAIN: 0
ABDOMINAL PAIN: 0
COLOR CHANGE: 0
SORE THROAT: 0
SINUS PRESSURE: 0
WHEEZING: 0
ABDOMINAL DISTENTION: 0
NAUSEA: 0
BLOOD IN STOOL: 0
CONSTIPATION: 0
DIARRHEA: 0
VOMITING: 0
CHEST TIGHTNESS: 0
COUGH: 0
EYE PAIN: 0

## 2022-06-23 NOTE — ASSESSMENT & PLAN NOTE
Monitored by specialist- no acute findings meriting change in the plan     She was started on Keppra in the hospital and has a follow up with Neuro on July 6th.

## 2022-06-23 NOTE — PROGRESS NOTES
1. Have you been to the ER, urgent care clinic since your last visit? Hospitalized since your last visit? Yes When: 2022-2022 Where: [facility] Reason for visit: Seizure    2. Have you seen or consulted any other health care providers outside of the [address] [place] [address] since your last visit? Include any pap smears or colon screening. Yes When: 2022-2022 Where: [facility] Reason for visit: Seizure      Irina Batista (:  1929) is a 80 y.o. female,Established patient, here for evaluation of the following chief complaint(s):  Follow-Up from Hospital (pt was admitted to [facility] 2022-.)         ASSESSMENT/PLAN:  1. Seizure [facility])  Assessment & Plan:   Monitored by specialist- no acute findings meriting change in the plan     She was started on Keppra in the hospital and has a follow up with Neuro on . Orders:  -     CBC with Auto Differential; Future  -     Comprehensive Metabolic Panel; Future  -     Hemoglobin A1C; Future  2. Cerebrovascular accident (CVA), unspecified mechanism ([facility] [address]  Assessment & Plan:   Monitored by specialist- no acute findings meriting change in the plan     Apt with Neuro on the 6yh. No new CVA during admission. Orders:  -     CBC with Auto Differential; Future  -     Comprehensive Metabolic Panel; Future  -     Hemoglobin A1C; Future  3. Elevated fasting blood sugar  -     Hemoglobin A1C; Future  4. Hospital discharge follow-up      Return in about 1 month (around 2022). Subjective   SUBJECTIVE/OBJECTIVE:  Mrs. Anitha Hopper presents today for a hospital follow up accompanied by her daughter. PMH of HLD, HTN, stroke, hypothyroidism, atrial fibrillation s/p [facility] course:    Presented to [facility] on 2022 with acute onset of aphasia. Per daughter this is the 4th time she has had a similar episodes and she reports that the physician feels its related to recurrent UTI's and dehydration.      Hospital Course:   Aphasia  Aborted Stroke vs. Seizure  She received TNK  MRI of the brain did not show an acute infarction. CTA head/neck without significant stenosis. She did have an acute stroke during her most recent episode in November 2021. However, multiple times she did not have an acute stroke associated with the same symptoms. Concern for seizure activity. EEG completed and without epileptiform discharges. She was started on Keppra 500 mg BID  Beta blocker held and patient is to follow up with Cardiology. BP within normal limits today. She has an appt with [facility] July 6th. Repeat Blood work today. No other complaints verbalized today other than fatigue. She is in no acute distress. Review of Systems   Constitutional: Positive for fatigue. Negative for activity change, appetite change, chills, diaphoresis and fever. HENT: Negative for congestion, ear pain, sinus pressure, sinus pain and sore throat. Eyes: Negative for pain and visual disturbance. Respiratory: Negative for cough, chest tightness, shortness of breath and wheezing. Cardiovascular: Negative for chest pain, palpitations and leg swelling. Gastrointestinal: Negative for abdominal distention, abdominal pain, blood in stool, constipation, diarrhea, nausea and vomiting. Endocrine: Negative for polydipsia, polyphagia and polyuria. Genitourinary: Negative for dysuria, flank pain, hematuria and urgency. Musculoskeletal: Negative for arthralgias. Skin: Negative for color change and rash. Neurological: Negative for dizziness, tremors, weakness and headaches. Psychiatric/Behavioral: Negative for agitation, behavioral problems, dysphoric mood and suicidal ideas. The patient is not nervous/anxious. Objective   Physical Exam  Vitals and nursing note reviewed. Constitutional:       Appearance: Normal appearance. HENT:      Head: Normocephalic and atraumatic.    Eyes:      Pupils: Pupils are equal,

## 2022-06-23 NOTE — ASSESSMENT & PLAN NOTE
Monitored by specialist- no acute findings meriting change in the plan     Apt with Neuro on the 6yh. No new CVA during admission.

## 2022-06-23 NOTE — ACP (ADVANCE CARE PLANNING)
Advance Care Planning   Advance Care Planning (ACP)     Attempted to discuss ACP with Ms. Charmayne Sender today, she declines to discuss at this time. Will readdress at future visit.

## 2022-06-24 LAB
ALBUMIN SERPL-MCNC: 3.2 G/DL (ref 3.2–4.6)
ALBUMIN/GLOB SERPL: 0.9 {RATIO} (ref 1.2–3.5)
ALP SERPL-CCNC: 85 U/L (ref 50–136)
ALT SERPL-CCNC: 25 U/L (ref 12–65)
ANION GAP SERPL CALC-SCNC: 6 MMOL/L (ref 7–16)
AST SERPL-CCNC: 20 U/L (ref 15–37)
BASOPHILS # BLD: 0 K/UL (ref 0–0.2)
BASOPHILS NFR BLD: 0 % (ref 0–2)
BILIRUB SERPL-MCNC: 1 MG/DL (ref 0.2–1.1)
BUN SERPL-MCNC: 12 MG/DL (ref 8–23)
CALCIUM SERPL-MCNC: 9.1 MG/DL (ref 8.3–10.4)
CHLORIDE SERPL-SCNC: 109 MMOL/L (ref 98–107)
CO2 SERPL-SCNC: 24 MMOL/L (ref 21–32)
CREAT SERPL-MCNC: 1 MG/DL (ref 0.6–1)
DIFFERENTIAL METHOD BLD: ABNORMAL
EOSINOPHIL # BLD: 0.1 K/UL (ref 0–0.8)
EOSINOPHIL NFR BLD: 1 % (ref 0.5–7.8)
ERYTHROCYTE [DISTWIDTH] IN BLOOD BY AUTOMATED COUNT: 16.7 % (ref 11.9–14.6)
EST. AVERAGE GLUCOSE BLD GHB EST-MCNC: 151 MG/DL
GLOBULIN SER CALC-MCNC: 3.5 G/DL (ref 2.3–3.5)
GLUCOSE SERPL-MCNC: 128 MG/DL (ref 65–100)
HBA1C MFR BLD: 6.9 % (ref 4.2–6.3)
HCT VFR BLD AUTO: 35.1 % (ref 35.8–46.3)
HGB BLD-MCNC: 10.7 G/DL (ref 11.7–15.4)
IMM GRANULOCYTES # BLD AUTO: 0 K/UL (ref 0–0.5)
IMM GRANULOCYTES NFR BLD AUTO: 0 % (ref 0–5)
LYMPHOCYTES # BLD: 1.8 K/UL (ref 0.5–4.6)
LYMPHOCYTES NFR BLD: 23 % (ref 13–44)
MCH RBC QN AUTO: 26.2 PG (ref 26.1–32.9)
MCHC RBC AUTO-ENTMCNC: 30.5 G/DL (ref 31.4–35)
MCV RBC AUTO: 86 FL (ref 79.6–97.8)
MONOCYTES # BLD: 1.1 K/UL (ref 0.1–1.3)
MONOCYTES NFR BLD: 14 % (ref 4–12)
NEUTS SEG # BLD: 4.9 K/UL (ref 1.7–8.2)
NEUTS SEG NFR BLD: 62 % (ref 43–78)
NRBC # BLD: 0 K/UL (ref 0–0.2)
PLATELET # BLD AUTO: 199 K/UL (ref 150–450)
PMV BLD AUTO: 12.9 FL (ref 9.4–12.3)
POTASSIUM SERPL-SCNC: 4.4 MMOL/L (ref 3.5–5.1)
PROT SERPL-MCNC: 6.7 G/DL (ref 6.3–8.2)
RBC # BLD AUTO: 4.08 M/UL (ref 4.05–5.2)
SODIUM SERPL-SCNC: 139 MMOL/L (ref 136–145)
WBC # BLD AUTO: 8 K/UL (ref 4.3–11.1)

## 2022-07-20 ENCOUNTER — OFFICE VISIT (OUTPATIENT)
Dept: CARDIOLOGY CLINIC | Age: 87
End: 2022-07-20
Payer: MEDICARE

## 2022-07-20 VITALS
HEIGHT: 63 IN | SYSTOLIC BLOOD PRESSURE: 140 MMHG | DIASTOLIC BLOOD PRESSURE: 60 MMHG | BODY MASS INDEX: 25.27 KG/M2 | WEIGHT: 142.6 LBS | HEART RATE: 60 BPM

## 2022-07-20 DIAGNOSIS — Z95.818 PRESENCE OF WATCHMAN LEFT ATRIAL APPENDAGE CLOSURE DEVICE: ICD-10-CM

## 2022-07-20 DIAGNOSIS — R56.9 SEIZURE (HCC): Primary | ICD-10-CM

## 2022-07-20 DIAGNOSIS — I25.10 ATHEROSCLEROSIS OF NATIVE CORONARY ARTERY OF NATIVE HEART WITHOUT ANGINA PECTORIS: ICD-10-CM

## 2022-07-20 DIAGNOSIS — I48.0 PAROXYSMAL ATRIAL FIBRILLATION (HCC): ICD-10-CM

## 2022-07-20 PROCEDURE — 1123F ACP DISCUSS/DSCN MKR DOCD: CPT | Performed by: INTERNAL MEDICINE

## 2022-07-20 PROCEDURE — G8427 DOCREV CUR MEDS BY ELIG CLIN: HCPCS | Performed by: INTERNAL MEDICINE

## 2022-07-20 PROCEDURE — G8417 CALC BMI ABV UP PARAM F/U: HCPCS | Performed by: INTERNAL MEDICINE

## 2022-07-20 PROCEDURE — 99214 OFFICE O/P EST MOD 30 MIN: CPT | Performed by: INTERNAL MEDICINE

## 2022-07-20 PROCEDURE — 1090F PRES/ABSN URINE INCON ASSESS: CPT | Performed by: INTERNAL MEDICINE

## 2022-07-20 PROCEDURE — 1036F TOBACCO NON-USER: CPT | Performed by: INTERNAL MEDICINE

## 2022-07-20 ASSESSMENT — ENCOUNTER SYMPTOMS: SHORTNESS OF BREATH: 0

## 2022-07-20 NOTE — PROGRESS NOTES
needed for 90 days. , Disp: , Rfl:     lidocaine 4 % external patch, Salonpas (lidocaine) 4 % topical patch  Apply 1 patch every day by topical route for 30 days. , Disp: , Rfl:     Melatonin 10 MG TABS, melatonin 10 mg tablet  Take 1 tablet every day by oral route at bedtime. , Disp: , Rfl:     ondansetron (ZOFRAN-ODT) 4 MG disintegrating tablet, ondansetron 4 mg disintegrating tablet, Disp: , Rfl:     acetaminophen (TYLENOL) 650 MG extended release tablet, Take 650 mg by mouth every 6 hours as needed, Disp: , Rfl:     aspirin 325 MG tablet, Take 325 mg by mouth in the morning., Disp: , Rfl:     atorvastatin (LIPITOR) 80 MG tablet, Take 80 mg by mouth, Disp: , Rfl:     escitalopram (LEXAPRO) 10 MG tablet, Take 10 mg by mouth daily, Disp: , Rfl:     ibandronate (BONIVA) 150 MG tablet, Take 150 mg by mouth every 30 days, Disp: , Rfl:     levothyroxine (SYNTHROID) 50 MCG tablet, Take 50 mcg by mouth every morning (before breakfast), Disp: , Rfl:     lisinopril (PRINIVIL;ZESTRIL) 20 MG tablet, Take 20 mg by mouth daily, Disp: , Rfl:     LORazepam (ATIVAN) 0.5 MG tablet, Take 0.5 mg by mouth., Disp: , Rfl:     pantoprazole (PROTONIX) 20 MG tablet, Take 20 mg by mouth daily, Disp: , Rfl:     potassium gluconate 550 mg tablet, Take 99 mg by mouth daily, Disp: , Rfl:     HYDROcodone-acetaminophen (NORCO) 7.5-325 MG per tablet, hydrocodone 7.5 mg-acetaminophen 325 mg tablet  Take 1 tablet every 6 hours by oral route as needed for 30 days.  (Patient not taking: Reported on 7/20/2022), Disp: , Rfl:     fluticasone (FLONASE) 50 MCG/ACT nasal spray, 2 sprays by Nasal route daily (Patient not taking: Reported on 7/20/2022), Disp: , Rfl:      Allergies   Allergen Reactions    Adhesive Tape Rash    Other Rash        Patient Active Problem List    Diagnosis Date Noted    [facility] 06/18/2022     Priority: Medium    IGT (impaired glucose tolerance) 12/20/2021    At high risk for falls 11/22/2021    TIA (transient ischemic attack) 10/20/2021    Cerebral atrophy ([facility] [address] 10/20/2021    Syncope 03/31/2021     Occurred 4/21 while being treated for UTI. Concerning for cardiac   syncope. Declined loop monitor        Syncope and collapse 03/29/2021    Acute UTI 03/29/2021    Essential hypertension 11/19/2020    Presence of Watchman left atrial appendage closure device 01/23/2020    Dizziness 09/03/2019    History of CVA (cerebrovascular accident) 09/03/2019    Anemia 04/12/2019    CVA (cerebral vascular accident) ([facility] 03/13/2019    History of recurrent UTIs 12/06/2018    Age-related osteoporosis without current pathological fracture 12/06/2018    Urethral caruncle 04/04/2017    Insomnia, unspecified 08/24/2016    Palpitations 07/13/2016    [facility] 01/07/2016     Tachycardia-Bradycardia        Atherosclerosis of native coronary artery of native heart 11/13/2015     1. Remote LAD stenting  2. LHC (8/10/17): Patent LAD stents. Diagonal jailed with ostial stenosis but no percentage given. LCX and RCA with mild disease. GERD (gastroesophageal reflux disease) 03/06/2014    Osteoarthritis of back 01/02/2014    Depression 01/14/2013    Hypothyroidism 01/14/2013    Hypercholesteremia 01/14/2013    Allergic rhinitis 01/14/2013    Paroxysmal atrial fibrillation ([facility] [address] 01/14/2013     Paroxysmal   1. 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 2.  Holter (11/19/20): Sinus rhythm. Rare PACs/PVCs. Short runs of   atrial tachycardia. No sustained atrial fibrillation. Social History     Tobacco Use    Smoking status: Never    Smokeless tobacco: Never   Substance Use Topics    Alcohol use: No     Alcohol/week: 0.0 standard drinks       ROS:    Review of Systems   Constitutional: Negative for malaise/fatigue. Cardiovascular:  Negative for chest pain and leg swelling. Respiratory:  Negative for shortness of breath. Musculoskeletal:  Positive for arthritis.    Neurological:  Positive for disturbances in coordination. Negative for focal weakness. Psychiatric/Behavioral:  Negative for depression. PHYSICAL EXAM:  Wt Readings from Last 3 Encounters:   07/20/22 142 lb 9.6 oz (64.7 kg)   06/23/22 136 lb 9.6 oz (62 kg)   06/02/22 141 lb (64 kg)     BP Readings from Last 3 Encounters:   07/20/22 (!) 140/60   06/23/22 120/68   06/02/22 120/70     Pulse Readings from Last 3 Encounters:   07/20/22 60   06/23/22 59   06/02/22 67       Physical Exam  Constitutional:       General: She is not in acute distress. Appearance: Normal appearance. Neck:      Vascular: No carotid bruit. Cardiovascular:      Rate and Rhythm: Normal rate and regular rhythm. Pulmonary:      Breath sounds: Normal breath sounds. No wheezing. Abdominal:      General: There is no distension. Palpations: Abdomen is soft. Musculoskeletal:         General: No swelling. Skin:     General: Skin is warm and dry. Neurological:      General: No focal deficit present. Psychiatric:         Mood and Affect: Mood normal.       Medical problems and test results were reviewed with the patient today.        Lab Results   Component Value Date    WBC 8.0 06/23/2022    HGB 10.7 (L) 06/23/2022    HCT 35.1 (L) 06/23/2022    MCV 86.0 06/23/2022     06/23/2022       Lab Results   Component Value Date/Time     06/23/2022 04:24 PM    K 4.4 06/23/2022 04:24 PM     06/23/2022 04:24 PM    CO2 24 06/23/2022 04:24 PM    BUN 12 06/23/2022 04:24 PM    CREATININE 1.00 06/23/2022 04:24 PM    GLUCOSE 128 06/23/2022 04:24 PM    CALCIUM 9.1 06/23/2022 04:24 PM        Lab Results   Component Value Date    CHOL 128 02/23/2022     Lab Results   Component Value Date    TRIG 54 02/23/2022     Lab Results   Component Value Date    HDL 57 02/23/2022     Lab Results   Component Value Date    LDLCALC 59 02/23/2022     Lab Results   Component Value Date    LABVLDL 12.4 10/19/2021    VLDL 12 02/23/2022     Lab Results   Component Value Date CHOLHDLRATIO 4.0 10/19/2021        Data from outside records/labs from outside providers have been reviewed and summarized as noted in the HPI, past history and data review sections of this note       ASSESSMENT and PLAN      1. Atherosclerosis of native coronary artery of native heart without angina pectoris  Patient is doing well without any anginal symptoms. Continue Aspirin 81 mg a day and PRN NTG. We discussed the importance of continued risk factor modification. The patient is encouraged to contact my office with any worsening dyspnea or chest discomfort. 2. Seizure ([address]  Current episodes of expressive aphasia. Felt not to be ischemic events and more likely to be CVA. Regardless low likelihood of these events being embolic given the same vascular territory. Continue aspirin and Keppra    3. Paroxysmal atrial fibrillation (HCC)  Currently in sinus rhythm. Status post left atrial appendage occlusion. 4. Presence of Watchman left atrial appendage closure device  Continue aspirin. Wyatt Ni MD  7/20/2022  12:18 PM    This note may have been dictated using speech recognition software.   As a result, error of speech recognition may have occurred

## 2022-12-15 ENCOUNTER — TELEPHONE (OUTPATIENT)
Dept: FAMILY MEDICINE CLINIC | Facility: CLINIC | Age: 87
End: 2022-12-15

## 2022-12-15 DIAGNOSIS — E11.9 TYPE 2 DIABETES MELLITUS WITHOUT COMPLICATION, WITHOUT LONG-TERM CURRENT USE OF INSULIN (HCC): Primary | ICD-10-CM

## 2022-12-15 DIAGNOSIS — I10 PRIMARY HYPERTENSION: ICD-10-CM

## 2022-12-15 DIAGNOSIS — I48.0 PAROXYSMAL ATRIAL FIBRILLATION (HCC): ICD-10-CM

## 2022-12-15 DIAGNOSIS — E78.00 PURE HYPERCHOLESTEROLEMIA: ICD-10-CM

## 2022-12-15 NOTE — TELEPHONE ENCOUNTER
----- Message from Wellington Luz sent at 12/15/2022  9:12 AM EST -----  Subject: Referral Request    Reason for referral request? daughter called and would like her mom to get   some lab work, the facility she is in stated her [facility] was high 8.1 and   would like to get this blood work redrawn through the office, please call   daughter Luna oCnnell with a lab appointment thank you   Provider patient wants to be referred to(if known):     Provider Phone Number(if known):     Additional Information for Provider?   ---------------------------------------------------------------------------  --------------  [id number] [employer]    [phone]; OK to leave message on voicemail  ---------------------------------------------------------------------------  --------------

## 2022-12-21 ENCOUNTER — NURSE ONLY (OUTPATIENT)
Dept: FAMILY MEDICINE CLINIC | Facility: CLINIC | Age: 87
End: 2022-12-21

## 2022-12-21 DIAGNOSIS — D50.9 IRON DEFICIENCY ANEMIA, UNSPECIFIED IRON DEFICIENCY ANEMIA TYPE: ICD-10-CM

## 2022-12-21 DIAGNOSIS — E11.9 TYPE 2 DIABETES MELLITUS WITHOUT COMPLICATION, WITHOUT LONG-TERM CURRENT USE OF INSULIN (HCC): Primary | ICD-10-CM

## 2022-12-21 DIAGNOSIS — I10 PRIMARY HYPERTENSION: ICD-10-CM

## 2022-12-21 DIAGNOSIS — E03.9 HYPOTHYROIDISM, UNSPECIFIED TYPE: ICD-10-CM

## 2022-12-21 DIAGNOSIS — E78.00 PURE HYPERCHOLESTEROLEMIA: ICD-10-CM

## 2022-12-21 LAB
ALBUMIN SERPL-MCNC: 3.4 G/DL (ref 3.2–4.6)
ALBUMIN/GLOB SERPL: 0.9 {RATIO} (ref 0.4–1.6)
ALP SERPL-CCNC: 85 U/L (ref 50–136)
ALT SERPL-CCNC: 24 U/L (ref 12–65)
ANION GAP SERPL CALC-SCNC: 8 MMOL/L (ref 2–11)
AST SERPL-CCNC: 18 U/L (ref 15–37)
BASOPHILS # BLD: 0 K/UL (ref 0–0.2)
BASOPHILS NFR BLD: 0 % (ref 0–2)
BILIRUB SERPL-MCNC: 0.9 MG/DL (ref 0.2–1.1)
BUN SERPL-MCNC: 12 MG/DL (ref 8–23)
CALCIUM SERPL-MCNC: 9.1 MG/DL (ref 8.3–10.4)
CHLORIDE SERPL-SCNC: 109 MMOL/L (ref 101–110)
CHOLEST SERPL-MCNC: 140 MG/DL
CO2 SERPL-SCNC: 24 MMOL/L (ref 21–32)
CREAT SERPL-MCNC: 0.8 MG/DL (ref 0.6–1)
DIFFERENTIAL METHOD BLD: ABNORMAL
EOSINOPHIL # BLD: 0.1 K/UL (ref 0–0.8)
EOSINOPHIL NFR BLD: 1 % (ref 0.5–7.8)
ERYTHROCYTE [DISTWIDTH] IN BLOOD BY AUTOMATED COUNT: 16.1 % (ref 11.9–14.6)
FERRITIN SERPL-MCNC: 87 NG/ML (ref 8–388)
GLOBULIN SER CALC-MCNC: 3.6 G/DL (ref 2.8–4.5)
GLUCOSE SERPL-MCNC: 133 MG/DL (ref 65–100)
HCT VFR BLD AUTO: 38.4 % (ref 35.8–46.3)
HDLC SERPL-MCNC: 69 MG/DL (ref 40–60)
HDLC SERPL: 2 {RATIO}
HGB BLD-MCNC: 12.2 G/DL (ref 11.7–15.4)
IMM GRANULOCYTES # BLD AUTO: 0.1 K/UL (ref 0–0.5)
IMM GRANULOCYTES NFR BLD AUTO: 1 % (ref 0–5)
LDLC SERPL CALC-MCNC: 56.2 MG/DL
LYMPHOCYTES # BLD: 1.1 K/UL (ref 0.5–4.6)
LYMPHOCYTES NFR BLD: 13 % (ref 13–44)
MCH RBC QN AUTO: 28 PG (ref 26.1–32.9)
MCHC RBC AUTO-ENTMCNC: 31.8 G/DL (ref 31.4–35)
MCV RBC AUTO: 88.1 FL (ref 82–102)
MONOCYTES # BLD: 0.7 K/UL (ref 0.1–1.3)
MONOCYTES NFR BLD: 9 % (ref 4–12)
NEUTS SEG # BLD: 6.2 K/UL (ref 1.7–8.2)
NEUTS SEG NFR BLD: 76 % (ref 43–78)
NRBC # BLD: 0 K/UL (ref 0–0.2)
PLATELET # BLD AUTO: 216 K/UL (ref 150–450)
PMV BLD AUTO: 11.7 FL (ref 9.4–12.3)
POTASSIUM SERPL-SCNC: 4.3 MMOL/L (ref 3.5–5.1)
PROT SERPL-MCNC: 7 G/DL (ref 6.3–8.2)
RBC # BLD AUTO: 4.36 M/UL (ref 4.05–5.2)
SODIUM SERPL-SCNC: 141 MMOL/L (ref 133–143)
TRIGL SERPL-MCNC: 74 MG/DL (ref 35–150)
TSH, 3RD GENERATION: 2.4 UIU/ML (ref 0.36–3.74)
VLDLC SERPL CALC-MCNC: 14.8 MG/DL (ref 6–23)
WBC # BLD AUTO: 8.1 K/UL (ref 4.3–11.1)

## 2022-12-22 LAB
EST. AVERAGE GLUCOSE BLD GHB EST-MCNC: 146 MG/DL
HBA1C MFR BLD: 6.7 % (ref 4.8–5.6)

## 2023-01-20 ENCOUNTER — OFFICE VISIT (OUTPATIENT)
Dept: CARDIOLOGY CLINIC | Age: 88
End: 2023-01-20

## 2023-01-20 VITALS
WEIGHT: 150 LBS | HEIGHT: 63 IN | BODY MASS INDEX: 26.58 KG/M2 | SYSTOLIC BLOOD PRESSURE: 126 MMHG | HEART RATE: 68 BPM | DIASTOLIC BLOOD PRESSURE: 60 MMHG

## 2023-01-20 DIAGNOSIS — I10 ESSENTIAL HYPERTENSION: Primary | ICD-10-CM

## 2023-01-20 DIAGNOSIS — I25.10 ATHEROSCLEROSIS OF NATIVE CORONARY ARTERY OF NATIVE HEART WITHOUT ANGINA PECTORIS: ICD-10-CM

## 2023-01-20 DIAGNOSIS — I48.0 PAROXYSMAL ATRIAL FIBRILLATION (HCC): ICD-10-CM

## 2023-01-20 DIAGNOSIS — E78.00 HYPERCHOLESTEREMIA: ICD-10-CM

## 2023-01-20 DIAGNOSIS — R56.9 SEIZURE (HCC): ICD-10-CM

## 2023-01-20 DIAGNOSIS — Z95.818 PRESENCE OF WATCHMAN LEFT ATRIAL APPENDAGE CLOSURE DEVICE: ICD-10-CM

## 2023-01-20 PROBLEM — I63.9 CVA (CEREBRAL VASCULAR ACCIDENT) (HCC): Status: RESOLVED | Noted: 2019-03-13 | Resolved: 2023-01-20

## 2023-01-20 PROBLEM — G45.9 TIA (TRANSIENT ISCHEMIC ATTACK): Status: RESOLVED | Noted: 2021-10-20 | Resolved: 2023-01-20

## 2023-01-20 RX ORDER — TRAMADOL HYDROCHLORIDE 50 MG/1
50 TABLET ORAL EVERY 6 HOURS PRN
COMMUNITY

## 2023-01-20 RX ORDER — GABAPENTIN 100 MG/1
100 CAPSULE ORAL 3 TIMES DAILY
COMMUNITY

## 2023-01-20 RX ORDER — OXYCODONE HYDROCHLORIDE 5 MG/1
5 CAPSULE ORAL EVERY 4 HOURS PRN
COMMUNITY

## 2023-01-20 RX ORDER — LEVETIRACETAM 500 MG/1
500 TABLET ORAL DAILY
COMMUNITY

## 2023-01-20 ASSESSMENT — ENCOUNTER SYMPTOMS
SHORTNESS OF BREATH: 0
BACK PAIN: 1

## 2023-01-20 NOTE — PROGRESS NOTES
[address] [place], [place]  [address], [address]343 [facility], [address] [place] [address]  PHONE: [phone]    Sandeep Chin  9/20/1929      SUBJECTIVE:   Sandeep Chin is a 80 y.o. female seen for a follow up visit regarding the following:     Chief Complaint   Patient presents with    Coronary Artery Disease    Atrial Fibrillation       HPI:    Sandeep Chin presents for routine follow up for known Coronary Artery Disease. Multiple issues addressed as outlined below:     Coronary Artery Disease:  Patient denies any recent angina. Notes compliance with medical therapy. No recent NTG use. No intolerance to anti-platelet therapy. Paroxysmal atrial fibrillation: No recent palpitations or tachycardia. No neurologic symptoms. Hypertension:  Ambulatory BP readings have been controlled. Patient reports compliance with medical therapy without side effects. Hyperlipidemia:   Tolerating Lipitor. Seizure disorder:  No  recurrent events. It appears her expressive aphasic events were seizures and not TIAs. Having bad sciatic on right leg. Seen by neurosurgeon and currently attempting pain management. They were told that surgery may be necessary. Past Medical History, Past Surgical History, Family history, Social History, and Medications were all reviewed with the patient today and updated as necessary. Current Outpatient Medications:     oxyCODONE 5 MG capsule, Take 5 mg by mouth every 4 hours as needed for Pain., Disp: , Rfl:     traMADol (ULTRAM) 50 MG tablet, Take 50 mg by mouth every 6 hours as needed for Pain., Disp: , Rfl:     gabapentin (NEURONTIN) 100 MG capsule, Take 100 mg by mouth 3 times daily.  100 mg 2 times a day and 300 mg at night, Disp: , Rfl:     levETIRAcetam (KEPPRA) 500 MG tablet, Take 500 mg by mouth daily, Disp: , Rfl:     Multiple Vitamins-Minerals (ICAPS AREDS 2 PO), Take by mouth, Disp: , Rfl:     nitrofurantoin, macrocrystal-monohydrate, (MACROBID) 100 MG capsule, Macrobid 100 mg capsule  Take 1 capsule every day by oral route at bedtime. , Disp: , Rfl:     diclofenac sodium (VOLTAREN) 1 % GEL, diclofenac 1 % topical gel  APPLY 2 GRAMS TO THE AFFECTED AREA(S) BY TOPICAL ROUTE 4 TIMES PER DAY. PATIENT MAY SELF ADMINISTER  PATIENT MAY SELF ADMINISTER, Disp: , Rfl:     ibuprofen (ADVIL;MOTRIN) 200 MG tablet, ibuprofen 200 mg tablet  Take 3 tablets every 6-8 hours by oral route as needed for 90 days. , Disp: , Rfl:     lidocaine 4 % external patch, Salonpas (lidocaine) 4 % topical patch  Apply 1 patch every day by topical route for 30 days. , Disp: , Rfl:     Melatonin 10 MG TABS, melatonin 10 mg tablet  Take 1 tablet every day by oral route at bedtime. , Disp: , Rfl:     ondansetron (ZOFRAN-ODT) 4 MG disintegrating tablet, ondansetron 4 mg disintegrating tablet, Disp: , Rfl:     acetaminophen (TYLENOL) 650 MG extended release tablet, Take 650 mg by mouth every 6 hours as needed, Disp: , Rfl:     aspirin 325 MG tablet, Take 325 mg by mouth in the morning., Disp: , Rfl:     atorvastatin (LIPITOR) 80 MG tablet, Take 80 mg by mouth, Disp: , Rfl:     escitalopram (LEXAPRO) 10 MG tablet, Take 10 mg by mouth daily, Disp: , Rfl:     levothyroxine (SYNTHROID) 50 MCG tablet, Take 50 mcg by mouth every morning (before breakfast), Disp: , Rfl:     lisinopril (PRINIVIL;ZESTRIL) 20 MG tablet, Take 20 mg by mouth daily, Disp: , Rfl:     LORazepam (ATIVAN) 0.5 MG tablet, Take 0.5 mg by mouth., Disp: , Rfl:     pantoprazole (PROTONIX) 20 MG tablet, Take 20 mg by mouth daily, Disp: , Rfl:     ibandronate (BONIVA) 150 MG tablet, Take 150 mg by mouth every 30 days (Patient not taking: Reported on 1/20/2023), Disp: , Rfl:      Allergies   Allergen Reactions    Adhesive Tape Rash    Other Rash        Patient Active Problem List    Diagnosis Date Noted    Seizure ([facility] 06/18/2022     Priority: Medium    IGT (impaired glucose tolerance) 12/20/2021     Priority: Low    At high risk for falls 11/22/2021     Priority: Low    Cerebral [facility] ([facility] 10/20/2021     Priority: Low    Syncope 03/31/2021     Priority: Low     Occurred 4/21 while being treated for UTI. Concerning for cardiac   syncope. 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 Dizziness 09/03/2019     Priority: Low    History of CVA (cerebrovascular accident) 09/03/2019     Priority: Low    Anemia 04/12/2019     Priority: Low    History of recurrent UTIs 12/06/2018     Priority: Low    Age-related osteoporosis without current pathological fracture 12/06/2018     Priority: Low    Urethral caruncle 04/04/2017     Priority: Low    Insomnia, unspecified 08/24/2016     Priority: Low    Sick sinus syndrome ([facility] 01/07/2016     Priority: Low     Tachycardia-Bradycardia        GERD (gastroesophageal reflux disease) 03/06/2014     Priority: Low    Osteoarthritis of back 01/02/2014     Priority: Low    Depression 01/14/2013     Priority: Low    Hypothyroidism 01/14/2013     Priority: Low    Hypercholesteremia 01/14/2013     Priority: Low    Allergic rhinitis 01/14/2013     Priority: Low    Paroxysmal atrial fibrillation ([facility] 01/14/2013     Priority: Low     Paroxysmal   1. 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 2.  Holter (11/19/20): Sinus rhythm. Rare PACs/PVCs. Short runs of   atrial tachycardia. No sustained atrial fibrillation. Atherosclerosis of native coronary artery of native heart 11/13/2015     1. Remote LAD stenting  2. [facility] (8/10/17): Patent LAD stents. Diagonal jailed with ostial stenosis but no percentage given. LCX and RCA with mild disease.            Social History     Tobacco Use    Smoking status: Never Smokeless tobacco: Never   Substance Use Topics    Alcohol use: No     Alcohol/week: 0.0 standard drinks       ROS:    Review of Systems   Constitutional: Negative for malaise/fatigue. Cardiovascular:  Negative for chest pain and irregular heartbeat. Respiratory:  Negative for shortness of breath. Musculoskeletal:  Positive for arthritis and back pain. Neurological:  Negative for focal weakness. Psychiatric/Behavioral:  Negative for depression. PHYSICAL EXAM:  Wt Readings from Last 3 Encounters:   01/20/23 150 lb (68 kg)   07/20/22 142 lb 9.6 oz (64.7 kg)   06/23/22 136 lb 9.6 oz (62 kg)     BP Readings from Last 3 Encounters:   01/20/23 126/60   07/20/22 (!) 140/60   06/23/22 120/68     Pulse Readings from Last 3 Encounters:   01/20/23 68   07/20/22 60   06/23/22 59       Physical Exam  Constitutional:       General: She is not in acute distress. Appearance: Normal appearance. Neck:      Vascular: No carotid bruit. Cardiovascular:      Rate and Rhythm: Normal rate and regular rhythm. Pulmonary:      Breath sounds: Normal breath sounds. No wheezing. Abdominal:      General: There is no distension. Palpations: Abdomen is soft. Musculoskeletal:         General: No swelling. Skin:     General: Skin is warm and dry. Neurological:      General: No focal deficit present. Psychiatric:         Mood and Affect: Mood normal.       Medical problems and test results were reviewed with the patient today.        Lab Results   Component Value Date    WBC 8.1 12/21/2022    HGB 12.2 12/21/2022    HCT 38.4 12/21/2022    MCV 88.1 12/21/2022     12/21/2022       Lab Results   Component Value Date/Time     12/21/2022 10:15 AM    K 4.3 12/21/2022 10:15 AM     12/21/2022 10:15 AM    CO2 24 12/21/2022 10:15 AM    BUN 12 12/21/2022 10:15 AM    CREATININE 0.80 12/21/2022 10:15 AM    GLUCOSE 133 12/21/2022 10:15 AM    CALCIUM 9.1 12/21/2022 10:15 AM        Lab Results   Component Value Date    CHOL 140 12/21/2022     Lab Results   Component Value Date    TRIG 74 12/21/2022     Lab Results   Component Value Date    HDL 69 (H) 12/21/2022     Lab Results   Component Value Date    LDLCALC 56.2 12/21/2022     Lab Results   Component Value Date    LABVLDL 14.8 12/21/2022    VLDL 12 02/23/2022     Lab Results   Component Value Date    CHOLHDLRATIO 2.0 12/21/2022        Data from outside records/labs from outside providers have been reviewed and summarized as noted in the HPI, past history and data review sections of this note     EKG done today and reviewed with patient showed:  Sinus  Rhythm  - occasional PAC     # PACs = 1.  -Old anterior infarct.    -  Nonspecific T-abnormality. Karlene 68    ASSESSMENT and PLAN      1. Essential hypertension  Blood pressure currently well controlled. Continue lisinopril therapy. - EKG 12 Lead    2. Presence of Watchman left atrial appendage closure device  Continue antiplatelet therapy with aspirin. 3. Paroxysmal atrial fibrillation (HCC)  No recent events. Call with palpitation or tachycardia. 4. Atherosclerosis of native coronary artery of native heart without angina pectoris  Patient is doing well without any anginal symptoms. Continue Aspirin 81 mg a day and PRN NTG. We discussed the importance of continued risk factor modification. The patient is encouraged to contact my office with any worsening dyspnea or chest discomfort. 5. Seizure ([address]  Doing well with Keppra. No recurrent neurologic events. 6. Hypercholesteremia  Continue Lipitor. Return in about 6 months (around 7/20/2023). Robert Jones MD  1/20/2023  1:44 PM    This note may have been dictated using speech recognition software.   As a result, error of speech recognition may have occurred

## 2023-04-17 ENCOUNTER — TELEPHONE (OUTPATIENT)
Dept: CARDIOLOGY CLINIC | Age: 88
End: 2023-04-17

## 2023-04-17 NOTE — TELEPHONE ENCOUNTER
From: Rylan Azar  Sent: 4/14/2023   5:26 PM EDT  To: [facility] Clinical Staff  Subject: ER visit                                         This is Todd Judd's daughter. My mother was seen today in the ER at [facility] for an elevated heart rate. We were told that her potassium level was low (meds given) and that she is obviously in A fib and possibly needs her meds adjusted. We were told to follow up with you. Please look at the test results from today and adivise what you would like her to do.     Thanks,  Jimmy Perez

## 2023-04-17 NOTE — TELEPHONE ENCOUNTER
MD Betty Cervantes, RICHAR  Caller: Unspecified (Today,  7:55 AM)  Have her see me [phone] on Thursday with ekg

## 2023-04-17 NOTE — TELEPHONE ENCOUNTER
Discussed with Pt's daughter. States pt cannot come on Thurs as she is pt's only ride. Scheduled 5/4 at 4:30 with EKG. Verb understanding.

## 2023-05-02 SDOH — ECONOMIC STABILITY: FOOD INSECURITY: WITHIN THE PAST 12 MONTHS, YOU WORRIED THAT YOUR FOOD WOULD RUN OUT BEFORE YOU GOT MONEY TO BUY MORE.: NEVER TRUE

## 2023-05-02 SDOH — ECONOMIC STABILITY: FOOD INSECURITY: WITHIN THE PAST 12 MONTHS, THE FOOD YOU BOUGHT JUST DIDN'T LAST AND YOU DIDN'T HAVE MONEY TO GET MORE.: NEVER TRUE

## 2023-05-02 SDOH — ECONOMIC STABILITY: HOUSING INSECURITY
IN THE LAST 12 MONTHS, WAS THERE A TIME WHEN YOU DID NOT HAVE A STEADY PLACE TO SLEEP OR SLEPT IN A SHELTER (INCLUDING NOW)?: NO

## 2023-05-02 SDOH — ECONOMIC STABILITY: INCOME INSECURITY: HOW HARD IS IT FOR YOU TO PAY FOR THE VERY BASICS LIKE FOOD, HOUSING, MEDICAL CARE, AND HEATING?: NOT HARD AT ALL

## 2023-05-03 ENCOUNTER — OFFICE VISIT (OUTPATIENT)
Dept: FAMILY MEDICINE CLINIC | Facility: CLINIC | Age: 88
End: 2023-05-03
Payer: MEDICARE

## 2023-05-03 VITALS
HEART RATE: 96 BPM | DIASTOLIC BLOOD PRESSURE: 60 MMHG | HEIGHT: 63 IN | TEMPERATURE: 97.2 F | WEIGHT: 140.6 LBS | BODY MASS INDEX: 24.91 KG/M2 | SYSTOLIC BLOOD PRESSURE: 110 MMHG | OXYGEN SATURATION: 97 %

## 2023-05-03 DIAGNOSIS — Z00.00 MEDICARE ANNUAL WELLNESS VISIT, SUBSEQUENT: Primary | ICD-10-CM

## 2023-05-03 DIAGNOSIS — Z91.81 AT HIGH RISK FOR FALLS: ICD-10-CM

## 2023-05-03 PROCEDURE — 1123F ACP DISCUSS/DSCN MKR DOCD: CPT | Performed by: FAMILY MEDICINE

## 2023-05-03 PROCEDURE — G0439 PPPS, SUBSEQ VISIT: HCPCS | Performed by: FAMILY MEDICINE

## 2023-05-03 RX ORDER — AMLODIPINE BESYLATE 5 MG/1
5 TABLET ORAL DAILY
COMMUNITY
End: 2023-05-04 | Stop reason: ALTCHOICE

## 2023-05-03 SDOH — ECONOMIC STABILITY: INCOME INSECURITY: HOW HARD IS IT FOR YOU TO PAY FOR THE VERY BASICS LIKE FOOD, HOUSING, MEDICAL CARE, AND HEATING?: NOT HARD AT ALL

## 2023-05-03 SDOH — ECONOMIC STABILITY: FOOD INSECURITY: WITHIN THE PAST 12 MONTHS, YOU WORRIED THAT YOUR FOOD WOULD RUN OUT BEFORE YOU GOT MONEY TO BUY MORE.: NEVER TRUE

## 2023-05-03 SDOH — ECONOMIC STABILITY: FOOD INSECURITY: WITHIN THE PAST 12 MONTHS, THE FOOD YOU BOUGHT JUST DIDN'T LAST AND YOU DIDN'T HAVE MONEY TO GET MORE.: NEVER TRUE

## 2023-05-03 ASSESSMENT — PATIENT HEALTH QUESTIONNAIRE - PHQ9
2. FEELING DOWN, DEPRESSED OR HOPELESS: 1
3. TROUBLE FALLING OR STAYING ASLEEP: 2
1. LITTLE INTEREST OR PLEASURE IN DOING THINGS: 3
4. FEELING TIRED OR HAVING LITTLE ENERGY: 3
SUM OF ALL RESPONSES TO PHQ QUESTIONS 1-9: 12
7. TROUBLE CONCENTRATING ON THINGS, SUCH AS READING THE NEWSPAPER OR WATCHING TELEVISION: 0
10. IF YOU CHECKED OFF ANY PROBLEMS, HOW DIFFICULT HAVE THESE PROBLEMS MADE IT FOR YOU TO DO YOUR WORK, TAKE CARE OF THINGS AT HOME, OR GET ALONG WITH OTHER PEOPLE: 1
SUM OF ALL RESPONSES TO PHQ9 QUESTIONS 1 & 2: 4
5. POOR APPETITE OR OVEREATING: 3
SUM OF ALL RESPONSES TO PHQ QUESTIONS 1-9: 12
6. FEELING BAD ABOUT YOURSELF - OR THAT YOU ARE A FAILURE OR HAVE LET YOURSELF OR YOUR FAMILY DOWN: 0
SUM OF ALL RESPONSES TO PHQ QUESTIONS 1-9: 12
8. MOVING OR SPEAKING SO SLOWLY THAT OTHER PEOPLE COULD HAVE NOTICED. OR THE OPPOSITE, BEING SO FIGETY OR RESTLESS THAT YOU HAVE BEEN MOVING AROUND A LOT MORE THAN USUAL: 0
SUM OF ALL RESPONSES TO PHQ QUESTIONS 1-9: 12
9. THOUGHTS THAT YOU WOULD BE BETTER OFF DEAD, OR OF HURTING YOURSELF: 0

## 2023-05-03 ASSESSMENT — LIFESTYLE VARIABLES
HOW OFTEN DO YOU HAVE A DRINK CONTAINING ALCOHOL: NEVER
HOW MANY STANDARD DRINKS CONTAINING ALCOHOL DO YOU HAVE ON A TYPICAL DAY: PATIENT DOES NOT DRINK

## 2023-05-03 NOTE — PROGRESS NOTES
On the basis of positive falls risk screening, assessment and plan is as follows: home safety tips provided. Medicare Annual Wellness Visit    Facundo Heller is here for Medicare AWV and Other (Is 2 wks into Shingles//Was in hospital x5 days)    Assessment & Plan   Medicare annual wellness visit, subsequent  At high risk for falls      Recommendations for Preventive Services Due: see orders and patient instructions/AVS.  Recommended screening schedule for the next 5-10 years is provided to the patient in written form: see Patient Instructions/AVS.     No follow-ups on file. Subjective   The following acute and/or chronic problems were also addressed today:  Hypokalemia=-labs from [facility] on 4/27 show potassium up to 4.64    Patient's complete Health Risk Assessment and screening values have been reviewed and are found in Flowsheets. The following problems were reviewed today and where indicated follow up appointments were made and/or referrals ordered. Positive Risk Factor Screenings with Interventions:    Fall Risk:  Do you feel unsteady or are you worried about falling? : (!) yes  2 or more falls in past year?: (!) yes  Fall with injury in past year?: no     Interventions:    Fall prevention handouts     Depression:  PHQ-2 Score: 4  PHQ-9 Total Score: 12    Interpretation:   1-4 = minimal  5-9 = mild  10-14 = moderate  15-19 = moderately severe  20-27 = severe  Interventions:  Continue Lexapro 10mg qd and fu in 4 weeks           Opioid Risk: (Low risk score <55) Opioid risk score: 40    Patient is low risk for opioid use disorder or overdose.   Last PDMP Kaleigh Ayers as Reviewed:  Review User Review Instant Review Result                  General HRA Questions:  Select all that apply: (!) Loneliness, Stress    Loneliness Interventions:  Lives in [facility]- encouraged participation in group activities    Stress Interventions:  Encouraged participation in activities at her [facility]       Weight and Activity:  Physical Activity:

## 2023-05-03 NOTE — PATIENT INSTRUCTIONS
tests  These tests are used: To check for vision loss in any area of your range of vision. To screen for certain eye diseases. To look for nerve damage after a stroke, head injury, or other problem that could reduce blood flow to the brain. Refraction and color tests  A refraction test is done to find the right prescription for glasses and contact lenses. A color vision test is done to check for color blindness. Color vision is often tested as part of a routine exam. You may also have this test when you apply for a job where recognizing different colors is important, such as , electronics, or the [employer]. How are vision tests done? Visual acuity test   You cover one eye at a time. You read aloud from a wall chart across the room. You read aloud from a small card that you hold in your hand. Refraction   You look into a special device. The device puts lenses of different strengths in front of each eye to see how strong your glasses or contact lenses need to be. Visual field tests   Your doctor may have you look through special machines. Or your doctor may simply have you stare straight ahead while they move a finger into and out of your field of vision. Color vision test   You look at pieces of printed test patterns in various colors. You say what number or symbol you see. Your doctor may have you trace the number or symbol using a pointer. How do these tests feel? There is very little chance of having a problem from this test. If dilating drops are used for a vision test, they may make the eyes sting and cause a medicine taste in the mouth. Follow-up care is a key part of your treatment and safety. Be sure to make and go to all appointments, and call your doctor if you are having problems. It's also a good idea to know your test results and keep a list of the medicines you take. Where can you learn more?   Go to http://www.ferguson.com/ and enter G579 to learn more about

## 2023-05-04 ENCOUNTER — OFFICE VISIT (OUTPATIENT)
Dept: CARDIOLOGY CLINIC | Age: 88
End: 2023-05-04
Payer: MEDICARE

## 2023-05-04 VITALS
HEIGHT: 63 IN | WEIGHT: 143.4 LBS | SYSTOLIC BLOOD PRESSURE: 140 MMHG | DIASTOLIC BLOOD PRESSURE: 64 MMHG | HEART RATE: 86 BPM | BODY MASS INDEX: 25.41 KG/M2

## 2023-05-04 DIAGNOSIS — Z95.818 PRESENCE OF WATCHMAN LEFT ATRIAL APPENDAGE CLOSURE DEVICE: ICD-10-CM

## 2023-05-04 DIAGNOSIS — I48.11 LONGSTANDING PERSISTENT ATRIAL FIBRILLATION (HCC): ICD-10-CM

## 2023-05-04 DIAGNOSIS — I25.10 ATHEROSCLEROSIS OF NATIVE CORONARY ARTERY OF NATIVE HEART WITHOUT ANGINA PECTORIS: ICD-10-CM

## 2023-05-04 DIAGNOSIS — I10 ESSENTIAL HYPERTENSION: Primary | ICD-10-CM

## 2023-05-04 PROCEDURE — G8417 CALC BMI ABV UP PARAM F/U: HCPCS | Performed by: INTERNAL MEDICINE

## 2023-05-04 PROCEDURE — 1090F PRES/ABSN URINE INCON ASSESS: CPT | Performed by: INTERNAL MEDICINE

## 2023-05-04 PROCEDURE — 1123F ACP DISCUSS/DSCN MKR DOCD: CPT | Performed by: INTERNAL MEDICINE

## 2023-05-04 PROCEDURE — 93000 ELECTROCARDIOGRAM COMPLETE: CPT | Performed by: INTERNAL MEDICINE

## 2023-05-04 PROCEDURE — G8427 DOCREV CUR MEDS BY ELIG CLIN: HCPCS | Performed by: INTERNAL MEDICINE

## 2023-05-04 PROCEDURE — 99214 OFFICE O/P EST MOD 30 MIN: CPT | Performed by: INTERNAL MEDICINE

## 2023-05-04 PROCEDURE — 1036F TOBACCO NON-USER: CPT | Performed by: INTERNAL MEDICINE

## 2023-05-04 RX ORDER — ASPIRIN 81 MG/1
81 TABLET ORAL DAILY
Qty: 90 TABLET | Refills: 1 | Status: SHIPPED | OUTPATIENT
Start: 2023-05-04

## 2023-05-04 RX ORDER — TRAZODONE HYDROCHLORIDE 50 MG/1
50 TABLET ORAL NIGHTLY
COMMUNITY

## 2023-05-04 RX ORDER — DILTIAZEM HYDROCHLORIDE 120 MG/1
120 CAPSULE, COATED, EXTENDED RELEASE ORAL DAILY
Qty: 90 CAPSULE | Refills: 3 | Status: SHIPPED | OUTPATIENT
Start: 2023-05-04

## 2023-05-04 RX ORDER — SENNA AND DOCUSATE SODIUM 50; 8.6 MG/1; MG/1
1 TABLET, FILM COATED ORAL DAILY
COMMUNITY

## 2023-05-04 RX ORDER — DILTIAZEM HYDROCHLORIDE 120 MG/1
120 CAPSULE, COATED, EXTENDED RELEASE ORAL DAILY
Qty: 90 CAPSULE | Refills: 3 | Status: SHIPPED | OUTPATIENT
Start: 2023-05-04 | End: 2023-05-04 | Stop reason: SDUPTHER

## 2023-05-04 RX ORDER — ASPIRIN 81 MG/1
81 TABLET ORAL DAILY
Qty: 90 TABLET | Refills: 1 | Status: SHIPPED | OUTPATIENT
Start: 2023-05-04 | End: 2023-05-04 | Stop reason: SDUPTHER

## 2023-05-04 ASSESSMENT — ENCOUNTER SYMPTOMS: SHORTNESS OF BREATH: 0

## 2023-05-04 NOTE — PROGRESS NOTES
[address] [facility], [address] [place] [address]  PHONE: [phone]    Naila Green  9/20/1929      SUBJECTIVE:   Naila Green is a 80 y.o. female seen for a follow up visit regarding the following:     Chief Complaint   Patient presents with    Follow-Up from Hospital    Palpitations       HPI:    Patient presents for acute care visit. Recently seen in the emergency room on 4/14/2023. Apparently based on review of records she presented with palpitations a home health nurse noted that her heart rate was over 100. Yaya Mealing EKG based on report (unable to view) showed atrial fibrillation with a heart rate of 86. Review of labs shows that her potassium was low at 3.2. Her hemoglobin was 12.3 with a platelet count of 614. Magnesium was 1.8. She was admitted to the hospital and ultimately discharged to rehab. She is now at a assisted living facility. She denies any active palpitations or tachycardia. Her heart rate is 86 and she is in atrial fibrillation. At her last visit she was in sinus rhythm. Past Medical History, Past Surgical History, Family history, Social History, and Medications were all reviewed with the patient today and updated as necessary. Current Outpatient Medications:     traZODone (DESYREL) 50 MG tablet, Take 1 tablet by mouth nightly, Disp: , Rfl:     sennosides-docusate sodium (SENOKOT-S) 8.6-50 MG tablet, Take 1 tablet by mouth daily, Disp: , Rfl:     aspirin EC 81 MG EC tablet, Take 1 tablet by mouth daily, Disp: 90 tablet, Rfl: 1    dilTIAZem (CARDIZEM CD) 120 MG extended release capsule, Take 1 capsule by mouth daily, Disp: 90 capsule, Rfl: 3    gabapentin (NEURONTIN) 100 MG capsule, Take 1 capsule by mouth 3 times daily.  100 mg 2 times a day and 300 mg at night, Disp: , Rfl:     levETIRAcetam (KEPPRA) 500 MG tablet, Take 1 tablet by mouth daily, Disp: , Rfl:     Multiple Vitamins-Minerals (ICAPS AREDS 2 PO), Take by mouth, Disp: ,

## 2023-05-30 ASSESSMENT — PATIENT HEALTH QUESTIONNAIRE - PHQ9
5. POOR APPETITE OR OVEREATING: MORE THAN HALF THE DAYS
7. TROUBLE CONCENTRATING ON THINGS, SUCH AS READING THE NEWSPAPER OR WATCHING TELEVISION: 0
7. TROUBLE CONCENTRATING ON THINGS, SUCH AS READING THE NEWSPAPER OR WATCHING TELEVISION: NOT AT ALL
10. IF YOU CHECKED OFF ANY PROBLEMS, HOW DIFFICULT HAVE THESE PROBLEMS MADE IT FOR YOU TO DO YOUR WORK, TAKE CARE OF THINGS AT HOME, OR GET ALONG WITH OTHER PEOPLE: 0
3. TROUBLE FALLING OR STAYING ASLEEP: NEARLY EVERY DAY
9. THOUGHTS THAT YOU WOULD BE BETTER OFF DEAD, OR OF HURTING YOURSELF: NOT AT ALL
SUM OF ALL RESPONSES TO PHQ QUESTIONS 1-9: 8
3. TROUBLE FALLING OR STAYING ASLEEP: 3
4. FEELING TIRED OR HAVING LITTLE ENERGY: NEARLY EVERY DAY
6. FEELING BAD ABOUT YOURSELF - OR THAT YOU ARE A FAILURE OR HAVE LET YOURSELF OR YOUR FAMILY DOWN: NOT AT ALL
9. THOUGHTS THAT YOU WOULD BE BETTER OFF DEAD, OR OF HURTING YOURSELF: 0
8. MOVING OR SPEAKING SO SLOWLY THAT OTHER PEOPLE COULD HAVE NOTICED. OR THE OPPOSITE, BEING SO FIGETY OR RESTLESS THAT YOU HAVE BEEN MOVING AROUND A LOT MORE THAN USUAL: 0
5. POOR APPETITE OR OVEREATING: 2
SUM OF ALL RESPONSES TO PHQ QUESTIONS 1-9: 8
4. FEELING TIRED OR HAVING LITTLE ENERGY: 3
SUM OF ALL RESPONSES TO PHQ QUESTIONS 1-9: 8
10. IF YOU CHECKED OFF ANY PROBLEMS, HOW DIFFICULT HAVE THESE PROBLEMS MADE IT FOR YOU TO DO YOUR WORK, TAKE CARE OF THINGS AT HOME, OR GET ALONG WITH OTHER PEOPLE: NOT DIFFICULT AT ALL
6. FEELING BAD ABOUT YOURSELF - OR THAT YOU ARE A FAILURE OR HAVE LET YOURSELF OR YOUR FAMILY DOWN: 0
SUM OF ALL RESPONSES TO PHQ QUESTIONS 1-9: 8
8. MOVING OR SPEAKING SO SLOWLY THAT OTHER PEOPLE COULD HAVE NOTICED. OR THE OPPOSITE - BEING SO FIDGETY OR RESTLESS THAT YOU HAVE BEEN MOVING AROUND A LOT MORE THAN USUAL: NOT AT ALL

## 2023-06-02 ENCOUNTER — OFFICE VISIT (OUTPATIENT)
Dept: FAMILY MEDICINE CLINIC | Facility: CLINIC | Age: 88
End: 2023-06-02
Payer: MEDICARE

## 2023-06-02 VITALS
HEIGHT: 63 IN | DIASTOLIC BLOOD PRESSURE: 72 MMHG | HEART RATE: 62 BPM | OXYGEN SATURATION: 97 % | BODY MASS INDEX: 24.98 KG/M2 | TEMPERATURE: 97.8 F | RESPIRATION RATE: 18 BRPM | SYSTOLIC BLOOD PRESSURE: 112 MMHG | WEIGHT: 141 LBS

## 2023-06-02 DIAGNOSIS — E78.00 PURE HYPERCHOLESTEROLEMIA: ICD-10-CM

## 2023-06-02 DIAGNOSIS — R56.9 SEIZURE (HCC): ICD-10-CM

## 2023-06-02 DIAGNOSIS — I10 PRIMARY HYPERTENSION: ICD-10-CM

## 2023-06-02 DIAGNOSIS — D50.9 IRON DEFICIENCY ANEMIA, UNSPECIFIED IRON DEFICIENCY ANEMIA TYPE: ICD-10-CM

## 2023-06-02 DIAGNOSIS — Z86.73 HISTORY OF CVA (CEREBROVASCULAR ACCIDENT): ICD-10-CM

## 2023-06-02 DIAGNOSIS — I48.11 LONGSTANDING PERSISTENT ATRIAL FIBRILLATION (HCC): ICD-10-CM

## 2023-06-02 DIAGNOSIS — E11.9 TYPE 2 DIABETES MELLITUS WITHOUT COMPLICATION, WITHOUT LONG-TERM CURRENT USE OF INSULIN (HCC): Primary | ICD-10-CM

## 2023-06-02 DIAGNOSIS — E03.9 HYPOTHYROIDISM, UNSPECIFIED TYPE: ICD-10-CM

## 2023-06-02 PROBLEM — R73.02 IGT (IMPAIRED GLUCOSE TOLERANCE): Status: RESOLVED | Noted: 2021-12-20 | Resolved: 2023-06-02

## 2023-06-02 LAB
ALBUMIN SERPL-MCNC: 3.3 G/DL (ref 3.2–4.6)
ALBUMIN/GLOB SERPL: 0.8 (ref 0.4–1.6)
ALP SERPL-CCNC: 82 U/L (ref 50–136)
ALT SERPL-CCNC: 30 U/L (ref 12–65)
ANION GAP SERPL CALC-SCNC: 5 MMOL/L (ref 2–11)
AST SERPL-CCNC: 28 U/L (ref 15–37)
BASOPHILS # BLD: 0 K/UL (ref 0–0.2)
BASOPHILS NFR BLD: 1 % (ref 0–2)
BILIRUB SERPL-MCNC: 1 MG/DL (ref 0.2–1.1)
BUN SERPL-MCNC: 13 MG/DL (ref 8–23)
CALCIUM SERPL-MCNC: 9.1 MG/DL (ref 8.3–10.4)
CHLORIDE SERPL-SCNC: 111 MMOL/L (ref 101–110)
CHOLEST SERPL-MCNC: 140 MG/DL
CO2 SERPL-SCNC: 23 MMOL/L (ref 21–32)
CREAT SERPL-MCNC: 0.9 MG/DL (ref 0.6–1)
DIFFERENTIAL METHOD BLD: ABNORMAL
EOSINOPHIL # BLD: 0.1 K/UL (ref 0–0.8)
EOSINOPHIL NFR BLD: 1 % (ref 0.5–7.8)
ERYTHROCYTE [DISTWIDTH] IN BLOOD BY AUTOMATED COUNT: 15.8 % (ref 11.9–14.6)
FERRITIN SERPL-MCNC: 100 NG/ML (ref 8–388)
GLOBULIN SER CALC-MCNC: 4.2 G/DL (ref 2.8–4.5)
GLUCOSE SERPL-MCNC: 115 MG/DL (ref 65–100)
HBA1C MFR BLD: 6.2 %
HCT VFR BLD AUTO: 39.2 % (ref 35.8–46.3)
HDLC SERPL-MCNC: 52 MG/DL (ref 40–60)
HDLC SERPL: 2.7
HGB BLD-MCNC: 12.6 G/DL (ref 11.7–15.4)
IMM GRANULOCYTES # BLD AUTO: 0 K/UL (ref 0–0.5)
IMM GRANULOCYTES NFR BLD AUTO: 0 % (ref 0–5)
LDLC SERPL CALC-MCNC: 68.6 MG/DL
LYMPHOCYTES # BLD: 1.7 K/UL (ref 0.5–4.6)
LYMPHOCYTES NFR BLD: 31 % (ref 13–44)
MCH RBC QN AUTO: 29.2 PG (ref 26.1–32.9)
MCHC RBC AUTO-ENTMCNC: 32.1 G/DL (ref 31.4–35)
MCV RBC AUTO: 91 FL (ref 82–102)
MONOCYTES # BLD: 0.7 K/UL (ref 0.1–1.3)
MONOCYTES NFR BLD: 12 % (ref 4–12)
NEUTS SEG # BLD: 3.1 K/UL (ref 1.7–8.2)
NEUTS SEG NFR BLD: 55 % (ref 43–78)
NRBC # BLD: 0 K/UL (ref 0–0.2)
PLATELET # BLD AUTO: 193 K/UL (ref 150–450)
PMV BLD AUTO: 12.4 FL (ref 9.4–12.3)
POTASSIUM SERPL-SCNC: 3.9 MMOL/L (ref 3.5–5.1)
PROT SERPL-MCNC: 7.5 G/DL (ref 6.3–8.2)
RBC # BLD AUTO: 4.31 M/UL (ref 4.05–5.2)
SODIUM SERPL-SCNC: 139 MMOL/L (ref 133–143)
TRIGL SERPL-MCNC: 97 MG/DL (ref 35–150)
TSH, 3RD GENERATION: 1.59 UIU/ML (ref 0.36–3.74)
VLDLC SERPL CALC-MCNC: 19.4 MG/DL (ref 6–23)
WBC # BLD AUTO: 5.6 K/UL (ref 4.3–11.1)

## 2023-06-02 PROCEDURE — 83036 HEMOGLOBIN GLYCOSYLATED A1C: CPT | Performed by: FAMILY MEDICINE

## 2023-06-02 PROCEDURE — 1036F TOBACCO NON-USER: CPT | Performed by: FAMILY MEDICINE

## 2023-06-02 PROCEDURE — 1090F PRES/ABSN URINE INCON ASSESS: CPT | Performed by: FAMILY MEDICINE

## 2023-06-02 PROCEDURE — G8420 CALC BMI NORM PARAMETERS: HCPCS | Performed by: FAMILY MEDICINE

## 2023-06-02 PROCEDURE — G8427 DOCREV CUR MEDS BY ELIG CLIN: HCPCS | Performed by: FAMILY MEDICINE

## 2023-06-02 PROCEDURE — 99214 OFFICE O/P EST MOD 30 MIN: CPT | Performed by: FAMILY MEDICINE

## 2023-06-02 PROCEDURE — 1123F ACP DISCUSS/DSCN MKR DOCD: CPT | Performed by: FAMILY MEDICINE

## 2023-06-02 ASSESSMENT — ENCOUNTER SYMPTOMS
SHORTNESS OF BREATH: 0
COUGH: 0
VOMITING: 0
DIARRHEA: 0
NAUSEA: 0

## 2023-06-02 NOTE — PROGRESS NOTES
complications. Risk factors for coronary artery disease include diabetes mellitus, dyslipidemia, hypertension and post-menopausal.   Hypertension  This is a chronic problem. The current episode started more than 1 year ago. The problem is unchanged. The problem is controlled. Pertinent negatives include no chest pain or shortness of breath. Identifiable causes of hypertension include a thyroid problem. Thyroid Problem  Presents for follow-up visit. Patient reports no diarrhea or fatigue. The symptoms have been stable. Review of Systems   Constitutional:  Negative for chills and fatigue. Respiratory:  Negative for cough and shortness of breath. Cardiovascular:  Negative for chest pain and leg swelling. Gastrointestinal:  Negative for diarrhea, nausea and vomiting. Objective   Physical Exam  Vitals and nursing note reviewed. Constitutional:       General: She is not in acute distress. Appearance: Normal appearance. She is normal weight. HENT:      Head: Normocephalic and atraumatic. Nose: Nose normal.      Mouth/Throat:      Pharynx: Oropharynx is clear. Eyes:      Extraocular Movements: Extraocular movements intact. Conjunctiva/sclera: Conjunctivae normal.   Cardiovascular:      Rate and Rhythm: Normal rate and regular rhythm. Pulses: Normal pulses. Heart sounds: Normal heart sounds. Pulmonary:      Effort: Pulmonary effort is normal.      Breath sounds: Normal breath sounds. Musculoskeletal:         General: No swelling or tenderness. Normal range of motion. Cervical back: Normal range of motion and neck supple. Skin:     General: Skin is warm and dry. Neurological:      General: No focal deficit present. Mental Status: She is alert. Mental status is at baseline. Psychiatric:         Mood and Affect: Mood normal.         Behavior: Behavior normal.              An electronic signature was used to authenticate this note.     --Maria Isabel Wayne

## 2023-06-05 LAB — LEVETIRACETAM SERPL-MCNC: 20.3 UG/ML (ref 10–40)

## 2023-07-18 PROBLEM — R42 DIZZINESS: Status: RESOLVED | Noted: 2019-09-03 | Resolved: 2023-07-18

## 2023-07-18 ASSESSMENT — ENCOUNTER SYMPTOMS: SHORTNESS OF BREATH: 0

## 2023-07-19 ENCOUNTER — OFFICE VISIT (OUTPATIENT)
Age: 88
End: 2023-07-19
Payer: MEDICARE

## 2023-07-19 VITALS
HEART RATE: 68 BPM | HEIGHT: 63 IN | DIASTOLIC BLOOD PRESSURE: 60 MMHG | BODY MASS INDEX: 25.8 KG/M2 | SYSTOLIC BLOOD PRESSURE: 130 MMHG | WEIGHT: 145.6 LBS

## 2023-07-19 DIAGNOSIS — I10 ESSENTIAL HYPERTENSION: ICD-10-CM

## 2023-07-19 DIAGNOSIS — Z95.818 PRESENCE OF WATCHMAN LEFT ATRIAL APPENDAGE CLOSURE DEVICE: Primary | ICD-10-CM

## 2023-07-19 DIAGNOSIS — I48.11 LONGSTANDING PERSISTENT ATRIAL FIBRILLATION (HCC): ICD-10-CM

## 2023-07-19 DIAGNOSIS — E78.00 HYPERCHOLESTEREMIA: ICD-10-CM

## 2023-07-19 DIAGNOSIS — I25.10 ATHEROSCLEROSIS OF NATIVE CORONARY ARTERY OF NATIVE HEART WITHOUT ANGINA PECTORIS: ICD-10-CM

## 2023-07-19 PROCEDURE — 1090F PRES/ABSN URINE INCON ASSESS: CPT | Performed by: INTERNAL MEDICINE

## 2023-07-19 PROCEDURE — G8427 DOCREV CUR MEDS BY ELIG CLIN: HCPCS | Performed by: INTERNAL MEDICINE

## 2023-07-19 PROCEDURE — 1123F ACP DISCUSS/DSCN MKR DOCD: CPT | Performed by: INTERNAL MEDICINE

## 2023-07-19 PROCEDURE — 99214 OFFICE O/P EST MOD 30 MIN: CPT | Performed by: INTERNAL MEDICINE

## 2023-07-19 PROCEDURE — 1036F TOBACCO NON-USER: CPT | Performed by: INTERNAL MEDICINE

## 2023-07-19 PROCEDURE — G8417 CALC BMI ABV UP PARAM F/U: HCPCS | Performed by: INTERNAL MEDICINE

## 2023-07-19 RX ORDER — DILTIAZEM HYDROCHLORIDE 120 MG/1
120 CAPSULE, COATED, EXTENDED RELEASE ORAL DAILY
Qty: 90 CAPSULE | Refills: 3 | Status: SHIPPED | OUTPATIENT
Start: 2023-07-19

## 2023-07-19 RX ORDER — AMLODIPINE BESYLATE 5 MG/1
5 TABLET ORAL DAILY
COMMUNITY
End: 2023-07-19 | Stop reason: ALTCHOICE

## 2023-07-19 NOTE — PROGRESS NOTES
[address], [address] [facility]  PHONE: [phone]    Ava Hernandez  9/20/1929      SUBJECTIVE:   Ava Hernandez is a 80 y.o. female seen for a follow up visit regarding the following:     Chief Complaint   Patient presents with    Atrial Fibrillation    Hypertension    Coronary Artery Disease       HPI:    Patient presents for follow-up. Last seen on May 4 after hospitalization. She was noted to be in persistent atrial fibrillation and her last visit her amldipine was discontinued and she was started on Cardizem therapy. She had been on aspirin 325 mg a day and this was decreased to 81 mg a day. Multiple issues were addressed. Persistent atrial fibrillation: Denies palpitations or tachycardia. Prior left atrial appendage occlusion: Tolerating aspirin. No neurologic symptoms suggestive of TIA or stroke. Hypertension:  BP controlled. Low this AM with some dizziness at facility. Reportedly 108/78 per patient. Coronary artery disease:  No angina. Taking ASA. Hyperlipidemia:  Tolerating Lipitor. Labs reviewed:   Latest Reference Range & Units 06/02/23 11:33   Cholesterol, Total <200 MG/   HDL Cholesterol 40 - 60 MG/DL 52   LDL Calculated <100 MG/DL 68.6   Triglycerides 35 - 150 MG/DL 97         Past Medical History, Past Surgical History, Family history, Social History, and Medications were all reviewed with the patient today and updated as necessary.            Current Outpatient Medications:     dilTIAZem (CARDIZEM CD) 120 MG extended release capsule, Take 1 capsule by mouth daily, Disp: 90 capsule, Rfl: 3    traZODone (DESYREL) 50 MG tablet, Take 1 tablet by mouth nightly, Disp: , Rfl:     sennosides-docusate sodium (SENOKOT-S) 8.6-50 MG tablet, Take 1 tablet by mouth daily, Disp: , Rfl:     aspirin EC 81 MG EC tablet, Take 1 tablet by mouth daily, Disp: 90 tablet, Rfl: 1    gabapentin (NEURONTIN) 100 MG capsule, Take 1 capsule by

## 2023-09-06 ASSESSMENT — PATIENT HEALTH QUESTIONNAIRE - PHQ9
10. IF YOU CHECKED OFF ANY PROBLEMS, HOW DIFFICULT HAVE THESE PROBLEMS MADE IT FOR YOU TO DO YOUR WORK, TAKE CARE OF THINGS AT HOME, OR GET ALONG WITH OTHER PEOPLE: SOMEWHAT DIFFICULT
SUM OF ALL RESPONSES TO PHQ QUESTIONS 1-9: 6
SUM OF ALL RESPONSES TO PHQ QUESTIONS 1-9: 6
5. POOR APPETITE OR OVEREATING: 0
SUM OF ALL RESPONSES TO PHQ QUESTIONS 1-9: 6
8. MOVING OR SPEAKING SO SLOWLY THAT OTHER PEOPLE COULD HAVE NOTICED. OR THE OPPOSITE, BEING SO FIGETY OR RESTLESS THAT YOU HAVE BEEN MOVING AROUND A LOT MORE THAN USUAL: 0
8. MOVING OR SPEAKING SO SLOWLY THAT OTHER PEOPLE COULD HAVE NOTICED. OR THE OPPOSITE - BEING SO FIDGETY OR RESTLESS THAT YOU HAVE BEEN MOVING AROUND A LOT MORE THAN USUAL: NOT AT ALL
3. TROUBLE FALLING OR STAYING ASLEEP: 2
5. POOR APPETITE OR OVEREATING: NOT AT ALL
9. THOUGHTS THAT YOU WOULD BE BETTER OFF DEAD, OR OF HURTING YOURSELF: NOT AT ALL
SUM OF ALL RESPONSES TO PHQ QUESTIONS 1-9: 6
1. LITTLE INTEREST OR PLEASURE IN DOING THINGS: SEVERAL DAYS
7. TROUBLE CONCENTRATING ON THINGS, SUCH AS READING THE NEWSPAPER OR WATCHING TELEVISION: NOT AT ALL
SUM OF ALL RESPONSES TO PHQ QUESTIONS 1-9: 6
2. FEELING DOWN, DEPRESSED OR HOPELESS: SEVERAL DAYS
7. TROUBLE CONCENTRATING ON THINGS, SUCH AS READING THE NEWSPAPER OR WATCHING TELEVISION: 0
3. TROUBLE FALLING OR STAYING ASLEEP: MORE THAN HALF THE DAYS
10. IF YOU CHECKED OFF ANY PROBLEMS, HOW DIFFICULT HAVE THESE PROBLEMS MADE IT FOR YOU TO DO YOUR WORK, TAKE CARE OF THINGS AT HOME, OR GET ALONG WITH OTHER PEOPLE: 1
4. FEELING TIRED OR HAVING LITTLE ENERGY: MORE THAN HALF THE DAYS
2. FEELING DOWN, DEPRESSED OR HOPELESS: 1
6. FEELING BAD ABOUT YOURSELF - OR THAT YOU ARE A FAILURE OR HAVE LET YOURSELF OR YOUR FAMILY DOWN: 0
SUM OF ALL RESPONSES TO PHQ9 QUESTIONS 1 & 2: 2
1. LITTLE INTEREST OR PLEASURE IN DOING THINGS: 1
9. THOUGHTS THAT YOU WOULD BE BETTER OFF DEAD, OR OF HURTING YOURSELF: 0
6. FEELING BAD ABOUT YOURSELF - OR THAT YOU ARE A FAILURE OR HAVE LET YOURSELF OR YOUR FAMILY DOWN: NOT AT ALL
4. FEELING TIRED OR HAVING LITTLE ENERGY: 2

## 2023-09-08 ENCOUNTER — OFFICE VISIT (OUTPATIENT)
Dept: FAMILY MEDICINE CLINIC | Facility: CLINIC | Age: 88
End: 2023-09-08
Payer: MEDICARE

## 2023-09-08 VITALS
DIASTOLIC BLOOD PRESSURE: 70 MMHG | WEIGHT: 147 LBS | OXYGEN SATURATION: 98 % | HEIGHT: 63 IN | SYSTOLIC BLOOD PRESSURE: 120 MMHG | RESPIRATION RATE: 18 BRPM | HEART RATE: 65 BPM | TEMPERATURE: 97.6 F | BODY MASS INDEX: 26.05 KG/M2

## 2023-09-08 DIAGNOSIS — K21.9 GASTROESOPHAGEAL REFLUX DISEASE, UNSPECIFIED WHETHER ESOPHAGITIS PRESENT: ICD-10-CM

## 2023-09-08 DIAGNOSIS — I48.11 LONGSTANDING PERSISTENT ATRIAL FIBRILLATION (HCC): ICD-10-CM

## 2023-09-08 DIAGNOSIS — E11.9 TYPE 2 DIABETES MELLITUS WITHOUT COMPLICATION, WITHOUT LONG-TERM CURRENT USE OF INSULIN (HCC): Primary | ICD-10-CM

## 2023-09-08 DIAGNOSIS — E03.9 HYPOTHYROIDISM, UNSPECIFIED TYPE: ICD-10-CM

## 2023-09-08 DIAGNOSIS — K86.89 PANCREATIC MASS: ICD-10-CM

## 2023-09-08 DIAGNOSIS — R56.9 SEIZURE (HCC): ICD-10-CM

## 2023-09-08 DIAGNOSIS — E78.00 PURE HYPERCHOLESTEROLEMIA: ICD-10-CM

## 2023-09-08 DIAGNOSIS — K63.89 MESENTERIC MASS: ICD-10-CM

## 2023-09-08 DIAGNOSIS — I49.5 SICK SINUS SYNDROME (HCC): ICD-10-CM

## 2023-09-08 DIAGNOSIS — D50.9 IRON DEFICIENCY ANEMIA, UNSPECIFIED IRON DEFICIENCY ANEMIA TYPE: ICD-10-CM

## 2023-09-08 DIAGNOSIS — I10 PRIMARY HYPERTENSION: ICD-10-CM

## 2023-09-08 LAB
ALBUMIN SERPL-MCNC: 3.3 G/DL (ref 3.2–4.6)
ALBUMIN/GLOB SERPL: 0.8 (ref 0.4–1.6)
ALP SERPL-CCNC: 88 U/L (ref 50–136)
ALT SERPL-CCNC: 26 U/L (ref 12–65)
ANION GAP SERPL CALC-SCNC: 5 MMOL/L (ref 2–11)
AST SERPL-CCNC: 25 U/L (ref 15–37)
BASOPHILS # BLD: 0 K/UL (ref 0–0.2)
BASOPHILS NFR BLD: 0 % (ref 0–2)
BILIRUB SERPL-MCNC: 0.9 MG/DL (ref 0.2–1.1)
BUN SERPL-MCNC: 13 MG/DL (ref 8–23)
CALCIUM SERPL-MCNC: 9.1 MG/DL (ref 8.3–10.4)
CHLORIDE SERPL-SCNC: 113 MMOL/L (ref 101–110)
CHOLEST SERPL-MCNC: 128 MG/DL
CO2 SERPL-SCNC: 23 MMOL/L (ref 21–32)
CREAT SERPL-MCNC: 1 MG/DL (ref 0.6–1)
DIFFERENTIAL METHOD BLD: NORMAL
EOSINOPHIL # BLD: 0.1 K/UL (ref 0–0.8)
EOSINOPHIL NFR BLD: 2 % (ref 0.5–7.8)
ERYTHROCYTE [DISTWIDTH] IN BLOOD BY AUTOMATED COUNT: 13.7 % (ref 11.9–14.6)
FERRITIN SERPL-MCNC: 41 NG/ML (ref 8–388)
GLOBULIN SER CALC-MCNC: 4.1 G/DL (ref 2.8–4.5)
GLUCOSE SERPL-MCNC: 134 MG/DL (ref 65–100)
HBA1C MFR BLD: 6.5 %
HCT VFR BLD AUTO: 38 % (ref 35.8–46.3)
HDLC SERPL-MCNC: 55 MG/DL (ref 40–60)
HDLC SERPL: 2.3
HGB BLD-MCNC: 12.4 G/DL (ref 11.7–15.4)
IMM GRANULOCYTES # BLD AUTO: 0 K/UL (ref 0–0.5)
IMM GRANULOCYTES NFR BLD AUTO: 0 % (ref 0–5)
LDLC SERPL CALC-MCNC: 60 MG/DL
LYMPHOCYTES # BLD: 1.2 K/UL (ref 0.5–4.6)
LYMPHOCYTES NFR BLD: 26 % (ref 13–44)
MCH RBC QN AUTO: 28.8 PG (ref 26.1–32.9)
MCHC RBC AUTO-ENTMCNC: 32.6 G/DL (ref 31.4–35)
MCV RBC AUTO: 88.2 FL (ref 82–102)
MONOCYTES # BLD: 0.3 K/UL (ref 0.1–1.3)
MONOCYTES NFR BLD: 7 % (ref 4–12)
NEUTS SEG # BLD: 3.1 K/UL (ref 1.7–8.2)
NEUTS SEG NFR BLD: 65 % (ref 43–78)
NRBC # BLD: 0 K/UL (ref 0–0.2)
PLATELET # BLD AUTO: 207 K/UL (ref 150–450)
PMV BLD AUTO: 12.2 FL (ref 9.4–12.3)
POTASSIUM SERPL-SCNC: 3.8 MMOL/L (ref 3.5–5.1)
PROT SERPL-MCNC: 7.4 G/DL (ref 6.3–8.2)
RBC # BLD AUTO: 4.31 M/UL (ref 4.05–5.2)
SODIUM SERPL-SCNC: 141 MMOL/L (ref 133–143)
TRIGL SERPL-MCNC: 65 MG/DL (ref 35–150)
TSH, 3RD GENERATION: 2.2 UIU/ML (ref 0.36–3.74)
VLDLC SERPL CALC-MCNC: 13 MG/DL (ref 6–23)
WBC # BLD AUTO: 4.8 K/UL (ref 4.3–11.1)

## 2023-09-08 PROCEDURE — 1036F TOBACCO NON-USER: CPT | Performed by: FAMILY MEDICINE

## 2023-09-08 PROCEDURE — 99214 OFFICE O/P EST MOD 30 MIN: CPT | Performed by: FAMILY MEDICINE

## 2023-09-08 PROCEDURE — 1090F PRES/ABSN URINE INCON ASSESS: CPT | Performed by: FAMILY MEDICINE

## 2023-09-08 PROCEDURE — 1123F ACP DISCUSS/DSCN MKR DOCD: CPT | Performed by: FAMILY MEDICINE

## 2023-09-08 PROCEDURE — G8417 CALC BMI ABV UP PARAM F/U: HCPCS | Performed by: FAMILY MEDICINE

## 2023-09-08 PROCEDURE — G8427 DOCREV CUR MEDS BY ELIG CLIN: HCPCS | Performed by: FAMILY MEDICINE

## 2023-09-08 PROCEDURE — 83036 HEMOGLOBIN GLYCOSYLATED A1C: CPT | Performed by: FAMILY MEDICINE

## 2023-09-08 ASSESSMENT — ENCOUNTER SYMPTOMS
NAUSEA: 0
VOMITING: 0
COUGH: 0
SHORTNESS OF BREATH: 0
DIARRHEA: 0

## 2023-09-08 NOTE — PROGRESS NOTES
CONTRAST; Future  -     External Referral To Oncology      Return in about 3 months (around 12/8/2023) for fasting chronic care. Subjective   SUBJECTIVE/OBJECTIVE:  Diabetes  She presents for her follow-up diabetic visit. She has type 2 diabetes mellitus. Her disease course has been stable. There are no hypoglycemic associated symptoms. There are no diabetic associated symptoms. Pertinent negatives for diabetes include no chest pain and no fatigue. There are no hypoglycemic complications. Symptoms are stable. There are no diabetic complications. Risk factors for coronary artery disease include diabetes mellitus, dyslipidemia, hypertension and post-menopausal.   Hypertension  This is a chronic problem. The current episode started more than 1 year ago. The problem is unchanged. The problem is controlled. Pertinent negatives include no chest pain or shortness of breath. Identifiable causes of hypertension include a thyroid problem. Thyroid Problem  Presents for follow-up visit. Patient reports no diarrhea or fatigue. The symptoms have been stable. Review of Systems   Constitutional:  Negative for chills and fatigue. Respiratory:  Negative for cough and shortness of breath. Cardiovascular:  Negative for chest pain and leg swelling. Gastrointestinal:  Negative for diarrhea, nausea and vomiting. Objective   Physical Exam  Vitals and nursing note reviewed. Constitutional:       General: She is not in acute distress. Appearance: Normal appearance. HENT:      Head: Normocephalic and atraumatic. Nose: Nose normal.      Mouth/Throat:      Pharynx: Oropharynx is clear. Eyes:      Extraocular Movements: Extraocular movements intact. Conjunctiva/sclera: Conjunctivae normal.   Cardiovascular:      Rate and Rhythm: Normal rate and regular rhythm. Pulses: Normal pulses. Heart sounds: Normal heart sounds.    Pulmonary:      Effort: Pulmonary effort is normal.      Breath

## 2023-09-09 LAB
EST. AVERAGE GLUCOSE BLD GHB EST-MCNC: 151 MG/DL
HBA1C MFR BLD: 6.9 % (ref 4.8–5.6)

## 2023-09-18 ENCOUNTER — HOSPITAL ENCOUNTER (OUTPATIENT)
Dept: CT IMAGING | Age: 88
Discharge: HOME | End: 2023-09-21
Attending: FAMILY MEDICINE
Payer: MEDICARE

## 2023-09-18 DIAGNOSIS — K63.89 MESENTERIC MASS: ICD-10-CM

## 2023-09-18 DIAGNOSIS — K86.89 PANCREATIC MASS: ICD-10-CM

## 2023-09-18 PROCEDURE — 74160 CT ABDOMEN W/CONTRAST: CPT

## 2023-09-18 PROCEDURE — 6360000004 HC RX CONTRAST MEDICATION: Performed by: FAMILY MEDICINE

## 2023-09-18 RX ORDER — SODIUM CHLORIDE 0.9 % (FLUSH) 0.9 %
10 SYRINGE (ML) INJECTION
Status: DISCONTINUED | OUTPATIENT
Start: 2023-09-18 | End: 2023-09-22 | Stop reason: HOSPADM

## 2023-09-18 RX ORDER — 0.9 % SODIUM CHLORIDE 0.9 %
100 INTRAVENOUS SOLUTION INTRAVENOUS
Status: DISCONTINUED | OUTPATIENT
Start: 2023-09-18 | End: 2023-09-22 | Stop reason: HOSPADM

## 2023-09-18 RX ADMIN — IOPAMIDOL 100 ML: 755 INJECTION, SOLUTION INTRAVENOUS at 14:45

## 2024-01-05 ENCOUNTER — OFFICE VISIT (OUTPATIENT)
Dept: FAMILY MEDICINE CLINIC | Facility: CLINIC | Age: 89
End: 2024-01-05
Payer: MEDICARE

## 2024-01-05 VITALS
OXYGEN SATURATION: 98 % | SYSTOLIC BLOOD PRESSURE: 128 MMHG | WEIGHT: 148.8 LBS | BODY MASS INDEX: 26.36 KG/M2 | HEART RATE: 98 BPM | DIASTOLIC BLOOD PRESSURE: 78 MMHG | HEIGHT: 63 IN

## 2024-01-05 DIAGNOSIS — F33.0 MILD EPISODE OF RECURRENT MAJOR DEPRESSIVE DISORDER (HCC): ICD-10-CM

## 2024-01-05 DIAGNOSIS — E03.9 ACQUIRED HYPOTHYROIDISM: ICD-10-CM

## 2024-01-05 DIAGNOSIS — R07.9 LEFT-SIDED CHEST PAIN: ICD-10-CM

## 2024-01-05 DIAGNOSIS — I10 ESSENTIAL HYPERTENSION: ICD-10-CM

## 2024-01-05 DIAGNOSIS — E78.00 HYPERCHOLESTEREMIA: ICD-10-CM

## 2024-01-05 DIAGNOSIS — R56.9 SEIZURE (HCC): ICD-10-CM

## 2024-01-05 DIAGNOSIS — G31.9 DEGENERATIVE DISEASE OF NERVOUS SYSTEM, UNSPECIFIED (HCC): ICD-10-CM

## 2024-01-05 DIAGNOSIS — I49.5 SICK SINUS SYNDROME (HCC): ICD-10-CM

## 2024-01-05 DIAGNOSIS — R11.0 CHRONIC NAUSEA: ICD-10-CM

## 2024-01-05 DIAGNOSIS — E11.9 TYPE 2 DIABETES MELLITUS WITHOUT COMPLICATION, WITHOUT LONG-TERM CURRENT USE OF INSULIN (HCC): Primary | ICD-10-CM

## 2024-01-05 LAB
ALBUMIN SERPL-MCNC: 3.4 G/DL (ref 3.2–4.6)
ALBUMIN/GLOB SERPL: 1 (ref 0.4–1.6)
ALP SERPL-CCNC: 88 U/L (ref 50–136)
ALT SERPL-CCNC: 27 U/L (ref 12–65)
ANION GAP SERPL CALC-SCNC: 9 MMOL/L (ref 2–11)
AST SERPL-CCNC: 23 U/L (ref 15–37)
BASOPHILS # BLD: 0.1 K/UL (ref 0–0.2)
BASOPHILS NFR BLD: 1 % (ref 0–2)
BILIRUB SERPL-MCNC: 0.8 MG/DL (ref 0.2–1.1)
BUN SERPL-MCNC: 14 MG/DL (ref 8–23)
CALCIUM SERPL-MCNC: 9.5 MG/DL (ref 8.3–10.4)
CHLORIDE SERPL-SCNC: 112 MMOL/L (ref 103–113)
CHOLEST SERPL-MCNC: 142 MG/DL
CO2 SERPL-SCNC: 21 MMOL/L (ref 21–32)
CREAT SERPL-MCNC: 0.9 MG/DL (ref 0.6–1)
DIFFERENTIAL METHOD BLD: ABNORMAL
EOSINOPHIL # BLD: 0.1 K/UL (ref 0–0.8)
EOSINOPHIL NFR BLD: 1 % (ref 0.5–7.8)
ERYTHROCYTE [DISTWIDTH] IN BLOOD BY AUTOMATED COUNT: 14.9 % (ref 11.9–14.6)
GLOBULIN SER CALC-MCNC: 3.5 G/DL (ref 2.8–4.5)
GLUCOSE SERPL-MCNC: 145 MG/DL (ref 65–100)
HCT VFR BLD AUTO: 40.2 % (ref 35.8–46.3)
HDLC SERPL-MCNC: 61 MG/DL (ref 40–60)
HDLC SERPL: 2.3
HGB BLD-MCNC: 13.1 G/DL (ref 11.7–15.4)
IMM GRANULOCYTES # BLD AUTO: 0 K/UL (ref 0–0.5)
IMM GRANULOCYTES NFR BLD AUTO: 0 % (ref 0–5)
LDLC SERPL CALC-MCNC: 65.8 MG/DL
LYMPHOCYTES # BLD: 1.5 K/UL (ref 0.5–4.6)
LYMPHOCYTES NFR BLD: 27 % (ref 13–44)
MCH RBC QN AUTO: 28.5 PG (ref 26.1–32.9)
MCHC RBC AUTO-ENTMCNC: 32.6 G/DL (ref 31.4–35)
MCV RBC AUTO: 87.4 FL (ref 82–102)
MONOCYTES # BLD: 0.6 K/UL (ref 0.1–1.3)
MONOCYTES NFR BLD: 11 % (ref 4–12)
NEUTS SEG # BLD: 3.3 K/UL (ref 1.7–8.2)
NEUTS SEG NFR BLD: 60 % (ref 43–78)
NRBC # BLD: 0 K/UL (ref 0–0.2)
PLATELET # BLD AUTO: 204 K/UL (ref 150–450)
PMV BLD AUTO: 12.2 FL (ref 9.4–12.3)
POTASSIUM SERPL-SCNC: 3.8 MMOL/L (ref 3.5–5.1)
PROT SERPL-MCNC: 6.9 G/DL (ref 6.3–8.2)
RBC # BLD AUTO: 4.6 M/UL (ref 4.05–5.2)
SODIUM SERPL-SCNC: 142 MMOL/L (ref 136–146)
TRIGL SERPL-MCNC: 76 MG/DL (ref 35–150)
TSH, 3RD GENERATION: 1.32 UIU/ML (ref 0.36–3.74)
VLDLC SERPL CALC-MCNC: 15.2 MG/DL (ref 6–23)
WBC # BLD AUTO: 5.5 K/UL (ref 4.3–11.1)

## 2024-01-05 PROCEDURE — 1123F ACP DISCUSS/DSCN MKR DOCD: CPT | Performed by: FAMILY MEDICINE

## 2024-01-05 PROCEDURE — 99214 OFFICE O/P EST MOD 30 MIN: CPT | Performed by: FAMILY MEDICINE

## 2024-01-05 PROCEDURE — G8417 CALC BMI ABV UP PARAM F/U: HCPCS | Performed by: FAMILY MEDICINE

## 2024-01-05 PROCEDURE — 1090F PRES/ABSN URINE INCON ASSESS: CPT | Performed by: FAMILY MEDICINE

## 2024-01-05 PROCEDURE — G8484 FLU IMMUNIZE NO ADMIN: HCPCS | Performed by: FAMILY MEDICINE

## 2024-01-05 PROCEDURE — G8427 DOCREV CUR MEDS BY ELIG CLIN: HCPCS | Performed by: FAMILY MEDICINE

## 2024-01-05 PROCEDURE — 1036F TOBACCO NON-USER: CPT | Performed by: FAMILY MEDICINE

## 2024-01-05 RX ORDER — ESCITALOPRAM OXALATE 5 MG/1
5 TABLET ORAL DAILY
Qty: 90 TABLET | Refills: 3 | Status: SHIPPED | OUTPATIENT
Start: 2024-01-05

## 2024-01-05 RX ORDER — ESCITALOPRAM OXALATE 5 MG/1
5 TABLET ORAL DAILY
Qty: 90 TABLET | Refills: 3 | Status: SHIPPED | OUTPATIENT
Start: 2024-01-05 | End: 2024-01-05 | Stop reason: SDUPTHER

## 2024-01-05 RX ORDER — ONDANSETRON 4 MG/1
4 TABLET, ORALLY DISINTEGRATING ORAL 3 TIMES DAILY PRN
Qty: 90 TABLET | Refills: 3 | Status: SHIPPED | OUTPATIENT
Start: 2024-01-05 | End: 2024-01-05 | Stop reason: SDUPTHER

## 2024-01-05 RX ORDER — ONDANSETRON 4 MG/1
4 TABLET, ORALLY DISINTEGRATING ORAL 3 TIMES DAILY PRN
Qty: 90 TABLET | Refills: 3 | Status: SHIPPED | OUTPATIENT
Start: 2024-01-05

## 2024-01-05 ASSESSMENT — PATIENT HEALTH QUESTIONNAIRE - PHQ9
7. TROUBLE CONCENTRATING ON THINGS, SUCH AS READING THE NEWSPAPER OR WATCHING TELEVISION: 0
8. MOVING OR SPEAKING SO SLOWLY THAT OTHER PEOPLE COULD HAVE NOTICED. OR THE OPPOSITE, BEING SO FIGETY OR RESTLESS THAT YOU HAVE BEEN MOVING AROUND A LOT MORE THAN USUAL: 0
10. IF YOU CHECKED OFF ANY PROBLEMS, HOW DIFFICULT HAVE THESE PROBLEMS MADE IT FOR YOU TO DO YOUR WORK, TAKE CARE OF THINGS AT HOME, OR GET ALONG WITH OTHER PEOPLE: 0
SUM OF ALL RESPONSES TO PHQ9 QUESTIONS 1 & 2: 6
1. LITTLE INTEREST OR PLEASURE IN DOING THINGS: 3
5. POOR APPETITE OR OVEREATING: 0
3. TROUBLE FALLING OR STAYING ASLEEP: 3
SUM OF ALL RESPONSES TO PHQ QUESTIONS 1-9: 12
SUM OF ALL RESPONSES TO PHQ QUESTIONS 1-9: 12
4. FEELING TIRED OR HAVING LITTLE ENERGY: 3
9. THOUGHTS THAT YOU WOULD BE BETTER OFF DEAD, OR OF HURTING YOURSELF: 0
2. FEELING DOWN, DEPRESSED OR HOPELESS: 3
6. FEELING BAD ABOUT YOURSELF - OR THAT YOU ARE A FAILURE OR HAVE LET YOURSELF OR YOUR FAMILY DOWN: 0
SUM OF ALL RESPONSES TO PHQ QUESTIONS 1-9: 12
SUM OF ALL RESPONSES TO PHQ QUESTIONS 1-9: 12

## 2024-01-05 ASSESSMENT — ENCOUNTER SYMPTOMS
VOMITING: 0
DIARRHEA: 0
SHORTNESS OF BREATH: 0
COUGH: 0
NAUSEA: 0

## 2024-01-05 NOTE — PROGRESS NOTES
Zoie Judd (:  [id number]) is a 89 y.o. female,Established patient, here for evaluation of the following chief complaint(s):  Follow-Up from Hospital-[facility], only(HAD SHARP PAINS ON LT SIDE OF CHEST /- COULD NOT FIND ANYTHING WRONG) and Nausea (6+ MONTHS ) Also0, CC FU of DM2, HTN, HLD, Hypothyroid         ASSESSMENT/PLAN:  1. Type 2 diabetes mellitus without complication, without long-term current use of insulin (HCC)- stable, check A1C  Assessment & Plan:   Borderline controlled, continue current medications  Orders:  -     Hemoglobin A1C; Future  -     Comprehensive Metabolic Panel; Future  2. Essential hypertension- well-controlled/stable  Assessment & Plan:   Well-controlled, continue current medications  Orders:  -     CBC with Auto Differential; Future  -     Comprehensive Metabolic Panel; Future  3. Hypercholesteremia-stable, repeat fasting labs  Assessment & Plan:   Borderline controlled, continue current medications  Orders:  -     Comprehensive Metabolic Panel; Future  -     Lipid Panel; Future  4. Acquired hypothyroidism-stable, repeat TSH  Assessment & Plan:   Borderline controlled, continue current medications  Orders:  -     TSH; Future  5. Degenerative disease of nervous system, unspecified (HCC)-stable, fu with Neurology  6. Sick sinus syndrome (HCC)-stable, FU with Cardiology  7. Seizure (HCC)-stable, FU with Neurology  -     Levetiracetam Level; Future  8. Left-sided chest pain- sharp - improved, ER TALLEY negative  9. Mild episode of recurrent major depressive disorder (HCC)- worsened- restart Lexapro at 5mg qd, fu here in 4 weeks  -     escitalopram (LEXAPRO) 5 MG tablet; Take 1 tablet by mouth daily, Disp-90 tablet, R-3Print  10. Chronic nausea- try zofran PRN, FU with GI asap-daughter will set up  -     ondansetron (ZOFRAN-ODT) 4 MG disintegrating tablet; Take 1 tablet by mouth 3 times daily as needed for Nausea or Vomiting, Disp-90 tablet, R-3Print      Return in about 3 months (around

## 2024-01-06 LAB
EST. AVERAGE GLUCOSE BLD GHB EST-MCNC: 154 MG/DL
HBA1C MFR BLD: 7 % (ref 4.8–5.6)

## 2024-01-08 LAB — LEVETIRACETAM SERPL-MCNC: 23.2 UG/ML (ref 10–40)

## (undated) DEVICE — SYR 5ML 1/5 GRAD LL NSAF LF --

## (undated) DEVICE — SNARE POLYP SM W13MMXL240CM SHTH DIA2.4MM OVL FLX DISP

## (undated) DEVICE — CANNULA NSL ORAL AD FOR CAPNOFLEX CO2 O2 AIRLFE

## (undated) DEVICE — CONNECTOR TBNG OD5-7MM O2 END DISP

## (undated) DEVICE — MOUTHPIECE ENDOSCP 20X27MM --

## (undated) DEVICE — BLOCK BITE AD 60FR W/ VELC STRP ADDRESSES MOST PT AND

## (undated) DEVICE — CONTAINER PREFIL FRMLN 40ML --

## (undated) DEVICE — NDL PRT INJ NSAF BLNT 18GX1.5 --

## (undated) DEVICE — KENDALL RADIOLUCENT FOAM MONITORING ELECTRODE RECTANGULAR SHAPE: Brand: KENDALL

## (undated) DEVICE — AIRLIFE™ OXYGEN TUBING 7 FEET (2.1 M) CRUSH RESISTANT OXYGEN TUBING, VINYL TIPPED: Brand: AIRLIFE™

## (undated) DEVICE — SYR 3ML LL TIP 1/10ML GRAD --